# Patient Record
Sex: MALE | Race: WHITE | NOT HISPANIC OR LATINO | Employment: OTHER | URBAN - METROPOLITAN AREA
[De-identification: names, ages, dates, MRNs, and addresses within clinical notes are randomized per-mention and may not be internally consistent; named-entity substitution may affect disease eponyms.]

---

## 2022-12-17 ENCOUNTER — APPOINTMENT (OUTPATIENT)
Dept: CARDIOLOGY | Facility: HOSPITAL | Age: 77
DRG: 235 | End: 2022-12-17
Payer: MEDICARE

## 2022-12-17 ENCOUNTER — HOSPITAL ENCOUNTER (INPATIENT)
Facility: HOSPITAL | Age: 77
LOS: 17 days | Discharge: NURSING, REHAB OR LONG-TERM CARE | DRG: 235 | End: 2023-01-03
Attending: THORACIC SURGERY (CARDIOTHORACIC VASCULAR SURGERY) | Admitting: THORACIC SURGERY (CARDIOTHORACIC VASCULAR SURGERY)
Payer: MEDICARE

## 2022-12-17 ENCOUNTER — APPOINTMENT (OUTPATIENT)
Dept: GENERAL RADIOLOGY | Facility: HOSPITAL | Age: 77
DRG: 235 | End: 2022-12-17
Payer: MEDICARE

## 2022-12-17 DIAGNOSIS — I25.110 CORONARY ARTERY DISEASE INVOLVING NATIVE CORONARY ARTERY OF NATIVE HEART WITH UNSTABLE ANGINA PECTORIS: Primary | ICD-10-CM

## 2022-12-17 DIAGNOSIS — Z95.1 S/P CABG X 4: ICD-10-CM

## 2022-12-17 PROBLEM — I50.43 SYSTOLIC AND DIASTOLIC CHF, ACUTE ON CHRONIC: Status: ACTIVE | Noted: 2022-12-17

## 2022-12-17 PROBLEM — E11.65 TYPE 2 DIABETES MELLITUS WITH HYPERGLYCEMIA, WITHOUT LONG-TERM CURRENT USE OF INSULIN: Status: ACTIVE | Noted: 2022-12-17

## 2022-12-17 PROBLEM — E78.2 MIXED HYPERLIPIDEMIA: Status: ACTIVE | Noted: 2022-12-17

## 2022-12-17 PROBLEM — I25.10 CAD (CORONARY ARTERY DISEASE), NATIVE CORONARY ARTERY: Status: ACTIVE | Noted: 2022-12-17

## 2022-12-17 PROBLEM — I10 PRIMARY HYPERTENSION: Status: ACTIVE | Noted: 2022-12-17

## 2022-12-17 LAB
ALBUMIN SERPL-MCNC: 3.8 G/DL (ref 3.5–5.2)
ALBUMIN/GLOB SERPL: 1.1 G/DL
ALP SERPL-CCNC: 90 U/L (ref 39–117)
ALT SERPL W P-5'-P-CCNC: 9 U/L (ref 1–41)
ANION GAP SERPL CALCULATED.3IONS-SCNC: 12 MMOL/L (ref 5–15)
ANION GAP SERPL CALCULATED.3IONS-SCNC: 14 MMOL/L (ref 5–15)
APTT PPP: 22.8 SECONDS (ref 61–76.5)
APTT PPP: 41.2 SECONDS (ref 61–76.5)
APTT PPP: 49.4 SECONDS (ref 61–76.5)
AST SERPL-CCNC: 16 U/L (ref 1–40)
BACTERIA UR QL AUTO: ABNORMAL /HPF
BASOPHILS # BLD AUTO: 0.1 10*3/MM3 (ref 0–0.2)
BASOPHILS NFR BLD AUTO: 0.9 % (ref 0–1.5)
BH CV XLRA MEAS - DIST GSV CALF DIST LEFT: 0.32 CM
BH CV XLRA MEAS - DIST GSV CALF DIST RIGHT: 0.37 CM
BH CV XLRA MEAS - DIST GSV THIGH DIST LEFT: 0.47 CM
BH CV XLRA MEAS - DIST GSV THIGH DIST RIGHT: 0.35 CM
BH CV XLRA MEAS - MID GSV CALF LEFT: 0.29 CM
BH CV XLRA MEAS - MID GSV CALF RIGHT: 0.34 CM
BH CV XLRA MEAS - MID GSV THIGH  LEFT: 0.42 CM
BH CV XLRA MEAS - MID GSV THIGH  RIGHT: 0.36 CM
BH CV XLRA MEAS - PROX GSV CALF DIST LEFT: 0.33 CM
BH CV XLRA MEAS - PROX GSV CALF DIST RIGHT: 0.24 CM
BH CV XLRA MEAS - PROX GSV THIGH  LEFT: 0.55 CM
BH CV XLRA MEAS - PROX GSV THIGH  RIGHT: 0.46 CM
BH CV XLRA MEAS LEFT DIST CCA EDV: -14.3 CM/SEC
BH CV XLRA MEAS LEFT DIST CCA PSV: -101 CM/SEC
BH CV XLRA MEAS LEFT DIST ICA EDV: -30.8 CM/SEC
BH CV XLRA MEAS LEFT DIST ICA PSV: -104 CM/SEC
BH CV XLRA MEAS LEFT ICA/CCA RATIO: 1.16
BH CV XLRA MEAS LEFT PROX CCA EDV: 12.4 CM/SEC
BH CV XLRA MEAS LEFT PROX CCA PSV: 88.2 CM/SEC
BH CV XLRA MEAS LEFT PROX ECA PSV: -161 CM/SEC
BH CV XLRA MEAS LEFT PROX ICA EDV: -30.1 CM/SEC
BH CV XLRA MEAS LEFT PROX ICA PSV: -117 CM/SEC
BH CV XLRA MEAS LEFT PROX SCLA PSV: 104 CM/SEC
BH CV XLRA MEAS LEFT VERTEBRAL A EDV: 16.1 CM/SEC
BH CV XLRA MEAS LEFT VERTEBRAL A PSV: 56 CM/SEC
BH CV XLRA MEAS RIGHT DIST CCA EDV: -13.7 CM/SEC
BH CV XLRA MEAS RIGHT DIST CCA PSV: -83.2 CM/SEC
BH CV XLRA MEAS RIGHT DIST ICA EDV: -19.9 CM/SEC
BH CV XLRA MEAS RIGHT DIST ICA PSV: -88.8 CM/SEC
BH CV XLRA MEAS RIGHT ICA/CCA RATIO: 1.47
BH CV XLRA MEAS RIGHT PROX CCA EDV: 13 CM/SEC
BH CV XLRA MEAS RIGHT PROX CCA PSV: 73.9 CM/SEC
BH CV XLRA MEAS RIGHT PROX ECA PSV: -285 CM/SEC
BH CV XLRA MEAS RIGHT PROX ICA EDV: -27.7 CM/SEC
BH CV XLRA MEAS RIGHT PROX ICA PSV: -122 CM/SEC
BH CV XLRA MEAS RIGHT PROX SCLA PSV: 130 CM/SEC
BH CV XLRA MEAS RIGHT VERTEBRAL A EDV: -8.1 CM/SEC
BH CV XLRA MEAS RIGHT VERTEBRAL A PSV: -25.2 CM/SEC
BILIRUB SERPL-MCNC: 0.9 MG/DL (ref 0–1.2)
BILIRUB UR QL STRIP: NEGATIVE
BUN SERPL-MCNC: 32 MG/DL (ref 8–23)
BUN SERPL-MCNC: 37 MG/DL (ref 8–23)
BUN/CREAT SERPL: 27.6 (ref 7–25)
BUN/CREAT SERPL: 28.2 (ref 7–25)
CALCIUM SPEC-SCNC: 9 MG/DL (ref 8.6–10.5)
CALCIUM SPEC-SCNC: 9.2 MG/DL (ref 8.6–10.5)
CHLORIDE SERPL-SCNC: 94 MMOL/L (ref 98–107)
CHLORIDE SERPL-SCNC: 94 MMOL/L (ref 98–107)
CLARITY UR: CLEAR
CLOSE TME COLL+ADP + EPINEP PNL BLD: 93 % (ref 86–100)
CO2 SERPL-SCNC: 26 MMOL/L (ref 22–29)
CO2 SERPL-SCNC: 29 MMOL/L (ref 22–29)
COLOR UR: YELLOW
CREAT SERPL-MCNC: 1.16 MG/DL (ref 0.76–1.27)
CREAT SERPL-MCNC: 1.31 MG/DL (ref 0.76–1.27)
DEPRECATED RDW RBC AUTO: 46.4 FL (ref 37–54)
EGFRCR SERPLBLD CKD-EPI 2021: 56.8 ML/MIN/1.73
EGFRCR SERPLBLD CKD-EPI 2021: 65.7 ML/MIN/1.73
EOSINOPHIL # BLD AUTO: 0.3 10*3/MM3 (ref 0–0.4)
EOSINOPHIL NFR BLD AUTO: 2.4 % (ref 0.3–6.2)
ERYTHROCYTE [DISTWIDTH] IN BLOOD BY AUTOMATED COUNT: 14.4 % (ref 12.3–15.4)
GLOBULIN UR ELPH-MCNC: 3.4 GM/DL
GLUCOSE BLDC GLUCOMTR-MCNC: 214 MG/DL (ref 70–105)
GLUCOSE BLDC GLUCOMTR-MCNC: 220 MG/DL (ref 70–105)
GLUCOSE BLDC GLUCOMTR-MCNC: 264 MG/DL (ref 70–105)
GLUCOSE BLDC GLUCOMTR-MCNC: 416 MG/DL (ref 70–105)
GLUCOSE SERPL-MCNC: 215 MG/DL (ref 65–99)
GLUCOSE SERPL-MCNC: 368 MG/DL (ref 65–99)
GLUCOSE UR STRIP-MCNC: ABNORMAL MG/DL
HCT VFR BLD AUTO: 45 % (ref 37.5–51)
HGB BLD-MCNC: 14.5 G/DL (ref 13–17.7)
HGB UR QL STRIP.AUTO: NEGATIVE
HOLD SPECIMEN: NORMAL
HYALINE CASTS UR QL AUTO: ABNORMAL /LPF
INR PPP: 1.06 (ref 0.93–1.1)
KETONES UR QL STRIP: ABNORMAL
LEFT ARM BP: NORMAL MMHG
LEUKOCYTE ESTERASE UR QL STRIP.AUTO: NEGATIVE
LYMPHOCYTES # BLD AUTO: 1.9 10*3/MM3 (ref 0.7–3.1)
LYMPHOCYTES NFR BLD AUTO: 18.1 % (ref 19.6–45.3)
MAXIMAL PREDICTED HEART RATE: 145 BPM
MAXIMAL PREDICTED HEART RATE: 145 BPM
MCH RBC QN AUTO: 29.5 PG (ref 26.6–33)
MCHC RBC AUTO-ENTMCNC: 32.2 G/DL (ref 31.5–35.7)
MCV RBC AUTO: 91.8 FL (ref 79–97)
MONOCYTES # BLD AUTO: 0.7 10*3/MM3 (ref 0.1–0.9)
MONOCYTES NFR BLD AUTO: 6.8 % (ref 5–12)
NEUTROPHILS NFR BLD AUTO: 7.7 10*3/MM3 (ref 1.7–7)
NEUTROPHILS NFR BLD AUTO: 71.8 % (ref 42.7–76)
NITRITE UR QL STRIP: NEGATIVE
NRBC BLD AUTO-RTO: 0.1 /100 WBC (ref 0–0.2)
PH UR STRIP.AUTO: 5.5 [PH] (ref 5–8)
PLATELET # BLD AUTO: 337 10*3/MM3 (ref 140–450)
PMV BLD AUTO: 9.1 FL (ref 6–12)
POTASSIUM SERPL-SCNC: 4.2 MMOL/L (ref 3.5–5.2)
POTASSIUM SERPL-SCNC: 4.3 MMOL/L (ref 3.5–5.2)
PROT SERPL-MCNC: 7.2 G/DL (ref 6–8.5)
PROT UR QL STRIP: ABNORMAL
PROTHROMBIN TIME: 10.9 SECONDS (ref 9.6–11.7)
RBC # BLD AUTO: 4.9 10*6/MM3 (ref 4.14–5.8)
RBC # UR STRIP: ABNORMAL /HPF
REF LAB TEST METHOD: ABNORMAL
SARS-COV-2 RNA PNL SPEC NAA+PROBE: NOT DETECTED
SODIUM SERPL-SCNC: 134 MMOL/L (ref 136–145)
SODIUM SERPL-SCNC: 135 MMOL/L (ref 136–145)
SP GR UR STRIP: 1.02 (ref 1–1.03)
SQUAMOUS #/AREA URNS HPF: ABNORMAL /HPF
STRESS TARGET HR: 123 BPM
STRESS TARGET HR: 123 BPM
UROBILINOGEN UR QL STRIP: ABNORMAL
WBC # UR STRIP: ABNORMAL /HPF
WBC NRBC COR # BLD: 10.7 10*3/MM3 (ref 3.4–10.8)

## 2022-12-17 PROCEDURE — 93010 ELECTROCARDIOGRAM REPORT: CPT | Performed by: INTERNAL MEDICINE

## 2022-12-17 PROCEDURE — 93005 ELECTROCARDIOGRAM TRACING: CPT | Performed by: THORACIC SURGERY (CARDIOTHORACIC VASCULAR SURGERY)

## 2022-12-17 PROCEDURE — 87635 SARS-COV-2 COVID-19 AMP PRB: CPT | Performed by: INTERNAL MEDICINE

## 2022-12-17 PROCEDURE — 80053 COMPREHEN METABOLIC PANEL: CPT | Performed by: THORACIC SURGERY (CARDIOTHORACIC VASCULAR SURGERY)

## 2022-12-17 PROCEDURE — 85576 BLOOD PLATELET AGGREGATION: CPT | Performed by: THORACIC SURGERY (CARDIOTHORACIC VASCULAR SURGERY)

## 2022-12-17 PROCEDURE — 25010000002 HEPARIN (PORCINE) 25000-0.45 UT/250ML-% SOLUTION: Performed by: THORACIC SURGERY (CARDIOTHORACIC VASCULAR SURGERY)

## 2022-12-17 PROCEDURE — 99221 1ST HOSP IP/OBS SF/LOW 40: CPT | Performed by: NURSE PRACTITIONER

## 2022-12-17 PROCEDURE — 82962 GLUCOSE BLOOD TEST: CPT

## 2022-12-17 PROCEDURE — 85025 COMPLETE CBC W/AUTO DIFF WBC: CPT | Performed by: THORACIC SURGERY (CARDIOTHORACIC VASCULAR SURGERY)

## 2022-12-17 PROCEDURE — 83036 HEMOGLOBIN GLYCOSYLATED A1C: CPT | Performed by: THORACIC SURGERY (CARDIOTHORACIC VASCULAR SURGERY)

## 2022-12-17 PROCEDURE — 93970 EXTREMITY STUDY: CPT

## 2022-12-17 PROCEDURE — 93880 EXTRACRANIAL BILAT STUDY: CPT

## 2022-12-17 PROCEDURE — 81001 URINALYSIS AUTO W/SCOPE: CPT | Performed by: THORACIC SURGERY (CARDIOTHORACIC VASCULAR SURGERY)

## 2022-12-17 PROCEDURE — 85730 THROMBOPLASTIN TIME PARTIAL: CPT | Performed by: THORACIC SURGERY (CARDIOTHORACIC VASCULAR SURGERY)

## 2022-12-17 PROCEDURE — 85610 PROTHROMBIN TIME: CPT | Performed by: THORACIC SURGERY (CARDIOTHORACIC VASCULAR SURGERY)

## 2022-12-17 PROCEDURE — 63710000001 INSULIN ISOPHANE & REGULAR PER 5 UNITS: Performed by: THORACIC SURGERY (CARDIOTHORACIC VASCULAR SURGERY)

## 2022-12-17 PROCEDURE — 99223 1ST HOSP IP/OBS HIGH 75: CPT | Performed by: INTERNAL MEDICINE

## 2022-12-17 PROCEDURE — 71046 X-RAY EXAM CHEST 2 VIEWS: CPT

## 2022-12-17 PROCEDURE — 85730 THROMBOPLASTIN TIME PARTIAL: CPT | Performed by: INTERNAL MEDICINE

## 2022-12-17 PROCEDURE — 63710000001 INSULIN LISPRO (HUMAN) PER 5 UNITS: Performed by: INTERNAL MEDICINE

## 2022-12-17 PROCEDURE — 99024 POSTOP FOLLOW-UP VISIT: CPT | Performed by: THORACIC SURGERY (CARDIOTHORACIC VASCULAR SURGERY)

## 2022-12-17 RX ORDER — ATORVASTATIN CALCIUM 40 MG/1
80 TABLET, FILM COATED ORAL NIGHTLY
Status: DISCONTINUED | OUTPATIENT
Start: 2022-12-17 | End: 2022-12-17

## 2022-12-17 RX ORDER — OSELTAMIVIR PHOSPHATE 75 MG/1
75 CAPSULE ORAL 2 TIMES DAILY
COMMUNITY
End: 2023-01-03 | Stop reason: HOSPADM

## 2022-12-17 RX ORDER — POTASSIUM CHLORIDE 7.45 MG/ML
10 INJECTION INTRAVENOUS
Status: DISCONTINUED | OUTPATIENT
Start: 2022-12-17 | End: 2022-12-17 | Stop reason: SDUPTHER

## 2022-12-17 RX ORDER — POTASSIUM CHLORIDE 7.45 MG/ML
10 INJECTION INTRAVENOUS
Status: DISCONTINUED | OUTPATIENT
Start: 2022-12-17 | End: 2022-12-29

## 2022-12-17 RX ORDER — SODIUM CHLORIDE 0.9 % (FLUSH) 0.9 %
10 SYRINGE (ML) INJECTION AS NEEDED
Status: DISCONTINUED | OUTPATIENT
Start: 2022-12-17 | End: 2022-12-29

## 2022-12-17 RX ORDER — POTASSIUM CHLORIDE 20 MEQ/1
20 TABLET, EXTENDED RELEASE ORAL 2 TIMES DAILY WITH MEALS
Status: DISCONTINUED | OUTPATIENT
Start: 2022-12-17 | End: 2022-12-23

## 2022-12-17 RX ORDER — CHLORAL HYDRATE 500 MG
1000 CAPSULE ORAL
COMMUNITY
End: 2023-01-03 | Stop reason: HOSPADM

## 2022-12-17 RX ORDER — GLIPIZIDE 10 MG/1
10 TABLET ORAL
COMMUNITY
End: 2023-01-03 | Stop reason: HOSPADM

## 2022-12-17 RX ORDER — POTASSIUM CHLORIDE 1.5 G/1.77G
40 POWDER, FOR SOLUTION ORAL AS NEEDED
Status: DISCONTINUED | OUTPATIENT
Start: 2022-12-17 | End: 2022-12-17 | Stop reason: SDUPTHER

## 2022-12-17 RX ORDER — GLIPIZIDE 5 MG/1
10 TABLET ORAL
Status: DISCONTINUED | OUTPATIENT
Start: 2022-12-17 | End: 2022-12-18

## 2022-12-17 RX ORDER — ROSUVASTATIN CALCIUM 5 MG/1
5 TABLET, COATED ORAL NIGHTLY
Status: DISCONTINUED | OUTPATIENT
Start: 2022-12-17 | End: 2022-12-29

## 2022-12-17 RX ORDER — ATORVASTATIN CALCIUM 80 MG/1
80 TABLET, FILM COATED ORAL NIGHTLY
Status: ON HOLD | COMMUNITY
End: 2022-12-17

## 2022-12-17 RX ORDER — CARVEDILOL 3.12 MG/1
3.12 TABLET ORAL 2 TIMES DAILY WITH MEALS
Status: DISCONTINUED | OUTPATIENT
Start: 2022-12-17 | End: 2022-12-22

## 2022-12-17 RX ORDER — DOCUSATE SODIUM 100 MG/1
100 CAPSULE, LIQUID FILLED ORAL 2 TIMES DAILY
Status: DISCONTINUED | OUTPATIENT
Start: 2022-12-17 | End: 2022-12-29

## 2022-12-17 RX ORDER — FUROSEMIDE 40 MG/1
40 TABLET ORAL 2 TIMES DAILY
COMMUNITY
End: 2023-01-03 | Stop reason: HOSPADM

## 2022-12-17 RX ORDER — OSELTAMIVIR PHOSPHATE 75 MG/1
75 CAPSULE ORAL EVERY 12 HOURS SCHEDULED
Status: COMPLETED | OUTPATIENT
Start: 2022-12-17 | End: 2022-12-22

## 2022-12-17 RX ORDER — HEPARIN SODIUM 10000 [USP'U]/100ML
9.71 INJECTION, SOLUTION INTRAVENOUS
Status: DISCONTINUED | OUTPATIENT
Start: 2022-12-17 | End: 2022-12-28

## 2022-12-17 RX ORDER — INSULIN LISPRO 100 [IU]/ML
4-24 INJECTION, SOLUTION INTRAVENOUS; SUBCUTANEOUS
Status: DISCONTINUED | OUTPATIENT
Start: 2022-12-17 | End: 2022-12-20

## 2022-12-17 RX ORDER — OSELTAMIVIR PHOSPHATE 75 MG/1
75 CAPSULE ORAL EVERY 12 HOURS SCHEDULED
Status: DISCONTINUED | OUTPATIENT
Start: 2022-12-17 | End: 2022-12-17

## 2022-12-17 RX ORDER — ACETAMINOPHEN 325 MG/1
650 TABLET ORAL EVERY 4 HOURS PRN
Status: DISCONTINUED | OUTPATIENT
Start: 2022-12-17 | End: 2022-12-25

## 2022-12-17 RX ORDER — DEXTROSE MONOHYDRATE 25 G/50ML
25 INJECTION, SOLUTION INTRAVENOUS
Status: DISCONTINUED | OUTPATIENT
Start: 2022-12-17 | End: 2022-12-29

## 2022-12-17 RX ORDER — NITROGLYCERIN 0.4 MG/1
0.4 TABLET SUBLINGUAL
Status: DISCONTINUED | OUTPATIENT
Start: 2022-12-17 | End: 2022-12-29

## 2022-12-17 RX ORDER — CHOLECALCIFEROL (VITAMIN D3) 125 MCG
5 CAPSULE ORAL NIGHTLY PRN
Status: DISCONTINUED | OUTPATIENT
Start: 2022-12-17 | End: 2022-12-29

## 2022-12-17 RX ORDER — ACETAMINOPHEN, ASPIRIN AND CAFFEINE 250; 250; 65 MG/1; MG/1; MG/1
1 TABLET, FILM COATED ORAL EVERY 6 HOURS PRN
COMMUNITY
End: 2023-01-03 | Stop reason: HOSPADM

## 2022-12-17 RX ORDER — ASPIRIN 81 MG/1
81 TABLET, CHEWABLE ORAL DAILY
COMMUNITY

## 2022-12-17 RX ORDER — POTASSIUM CHLORIDE 20 MEQ/1
40 TABLET, EXTENDED RELEASE ORAL AS NEEDED
Status: DISCONTINUED | OUTPATIENT
Start: 2022-12-17 | End: 2022-12-29

## 2022-12-17 RX ORDER — FUROSEMIDE 40 MG/1
40 TABLET ORAL
Status: DISCONTINUED | OUTPATIENT
Start: 2022-12-17 | End: 2022-12-23

## 2022-12-17 RX ORDER — ASPIRIN 81 MG/1
81 TABLET, CHEWABLE ORAL DAILY
Status: DISCONTINUED | OUTPATIENT
Start: 2022-12-17 | End: 2022-12-29

## 2022-12-17 RX ORDER — CARVEDILOL 3.12 MG/1
3.12 TABLET ORAL 2 TIMES DAILY WITH MEALS
COMMUNITY
End: 2023-01-03 | Stop reason: HOSPADM

## 2022-12-17 RX ORDER — POLYETHYLENE GLYCOL 3350 17 G/17G
17 POWDER, FOR SOLUTION ORAL DAILY
Status: DISCONTINUED | OUTPATIENT
Start: 2022-12-17 | End: 2022-12-29

## 2022-12-17 RX ORDER — ONDANSETRON 2 MG/ML
4 INJECTION INTRAMUSCULAR; INTRAVENOUS EVERY 6 HOURS PRN
Status: DISCONTINUED | OUTPATIENT
Start: 2022-12-17 | End: 2022-12-29

## 2022-12-17 RX ORDER — SODIUM CHLORIDE 0.9 % (FLUSH) 0.9 %
10 SYRINGE (ML) INJECTION EVERY 12 HOURS SCHEDULED
Status: DISCONTINUED | OUTPATIENT
Start: 2022-12-17 | End: 2022-12-29

## 2022-12-17 RX ORDER — POTASSIUM CHLORIDE 1.5 G/1.77G
40 POWDER, FOR SOLUTION ORAL AS NEEDED
Status: DISCONTINUED | OUTPATIENT
Start: 2022-12-17 | End: 2022-12-29

## 2022-12-17 RX ORDER — NICOTINE POLACRILEX 4 MG
15 LOZENGE BUCCAL
Status: DISCONTINUED | OUTPATIENT
Start: 2022-12-17 | End: 2022-12-29

## 2022-12-17 RX ORDER — SODIUM CHLORIDE 9 MG/ML
40 INJECTION, SOLUTION INTRAVENOUS AS NEEDED
Status: DISCONTINUED | OUTPATIENT
Start: 2022-12-17 | End: 2022-12-29

## 2022-12-17 RX ORDER — ROSUVASTATIN CALCIUM 5 MG/1
5 TABLET, COATED ORAL NIGHTLY
COMMUNITY

## 2022-12-17 RX ORDER — POTASSIUM CHLORIDE 20 MEQ/1
40 TABLET, EXTENDED RELEASE ORAL AS NEEDED
Status: DISCONTINUED | OUTPATIENT
Start: 2022-12-17 | End: 2022-12-17 | Stop reason: SDUPTHER

## 2022-12-17 RX ORDER — ALPRAZOLAM 0.25 MG/1
0.25 TABLET ORAL 3 TIMES DAILY PRN
Status: ACTIVE | OUTPATIENT
Start: 2022-12-17 | End: 2022-12-24

## 2022-12-17 RX ORDER — OLANZAPINE 10 MG/2ML
1 INJECTION, POWDER, LYOPHILIZED, FOR SOLUTION INTRAMUSCULAR
Status: DISCONTINUED | OUTPATIENT
Start: 2022-12-17 | End: 2022-12-29

## 2022-12-17 RX ADMIN — Medication 10 ML: at 20:50

## 2022-12-17 RX ADMIN — CARVEDILOL 3.12 MG: 3.12 TABLET, FILM COATED ORAL at 17:47

## 2022-12-17 RX ADMIN — FUROSEMIDE 40 MG: 40 TABLET ORAL at 11:12

## 2022-12-17 RX ADMIN — ROSUVASTATIN CALCIUM 5 MG: 5 TABLET, COATED ORAL at 20:50

## 2022-12-17 RX ADMIN — CARVEDILOL 3.12 MG: 3.12 TABLET, FILM COATED ORAL at 11:12

## 2022-12-17 RX ADMIN — GLIPIZIDE 10 MG: 5 TABLET ORAL at 17:47

## 2022-12-17 RX ADMIN — Medication 10 ML: at 11:18

## 2022-12-17 RX ADMIN — DOCUSATE SODIUM 100 MG: 100 CAPSULE, LIQUID FILLED ORAL at 20:50

## 2022-12-17 RX ADMIN — INSULIN LISPRO 24 UNITS: 100 INJECTION, SOLUTION INTRAVENOUS; SUBCUTANEOUS at 12:19

## 2022-12-17 RX ADMIN — HEPARIN SODIUM 17 UNITS/KG/HR: 10000 INJECTION, SOLUTION INTRAVENOUS at 15:51

## 2022-12-17 RX ADMIN — ASPIRIN 81 MG CHEWABLE TABLET 81 MG: 81 TABLET CHEWABLE at 11:12

## 2022-12-17 RX ADMIN — HEPARIN SODIUM 17 UNITS/KG/HR: 10000 INJECTION, SOLUTION INTRAVENOUS at 17:48

## 2022-12-17 RX ADMIN — INSULIN HUMAN 27 UNITS: 100 INJECTION, SUSPENSION SUBCUTANEOUS at 11:12

## 2022-12-17 RX ADMIN — POLYETHYLENE GLYCOL 3350 17 G: 17 POWDER, FOR SOLUTION ORAL at 11:18

## 2022-12-17 RX ADMIN — DOCUSATE SODIUM 100 MG: 100 CAPSULE, LIQUID FILLED ORAL at 11:17

## 2022-12-17 RX ADMIN — INSULIN LISPRO 12 UNITS: 100 INJECTION, SOLUTION INTRAVENOUS; SUBCUTANEOUS at 17:47

## 2022-12-17 RX ADMIN — GLIPIZIDE 10 MG: 5 TABLET ORAL at 11:12

## 2022-12-17 RX ADMIN — POTASSIUM CHLORIDE 20 MEQ: 1500 TABLET, EXTENDED RELEASE ORAL at 11:17

## 2022-12-17 RX ADMIN — POTASSIUM CHLORIDE 20 MEQ: 1500 TABLET, EXTENDED RELEASE ORAL at 17:47

## 2022-12-17 RX ADMIN — FUROSEMIDE 40 MG: 40 TABLET ORAL at 17:47

## 2022-12-17 RX ADMIN — INSULIN HUMAN 22 UNITS: 100 INJECTION, SUSPENSION SUBCUTANEOUS at 17:47

## 2022-12-17 RX ADMIN — OSELTAMIVIR PHOSPHATE 75 MG: 75 CAPSULE ORAL at 20:50

## 2022-12-17 NOTE — PROGRESS NOTES
Transfer from [place] at request of Dr. Harris. L main and three vessel coronary artery disease, with LVEF 10-15%.    Evaluation for possible Impella assisted PCI. This case was briefly discussed with Dr. Morales prior to patient's admission.

## 2022-12-17 NOTE — PLAN OF CARE
Goal Outcome Evaluation:               Pt is resting at this time, no complaints of pain, vss, no new orders, plan of care continues

## 2022-12-17 NOTE — LETTER
[facility] CASE MANAGEMENT  [address] [facility] [place] [id number] [phone]        January 2, 2023      Patient: Sherman Baumann  YOB: 1945  Date of Visit: 12/15/2022      Admission Referral for Skilled Nursing. Please contact me at [phone] to confirm acceptance or denial.    Thank you,        Rebecca Dempsey RN     Office Phone: [phone]   Office Cell:     [phone]

## 2022-12-17 NOTE — LETTER
[facility] CASE MANAGEMENT  [address] [facility] [place] [address]  [phone]  [phone]        January 3, 2023      Patient: Sherman Baumann  YOB: 1945  Date of Visit: 12/15/2022      Updated discharge Summary with Endocrinology requested medications. Please let us know if there is difficulty getting the narcotic from your pharmacy and how to fix it.     Thanks        Rebecca Dempsey RN

## 2022-12-17 NOTE — LETTER
[facility] CASE MANAGEMENT  [address] [facility] [place] [address]  [phone]        December 20, 2022      Patient: Sherman Baumann  YOB: 1945  Date of Visit: 12/15/2022      Chelo Porras RN     Office Phone: [phone]   Office Cell: [phone]   Fax: [phone]

## 2022-12-17 NOTE — CONSULTS
[facility] Medicine Services      Patient Name: Sherman Baumann  : 1945  MRN: [id number]  Primary Care Physician:  Melvina Hodge  Date of admission: 2022   Reason for consult: Medical management.  Physician requesting consult: Dr. Huber.      Subjective      Chief Complaint: Shortness of breath.    History of Present Illness: Sherman Baumann is a 76 y.o. male who presented to [facility] on 2022 complaining of shortness of breath.  Patient is a 76-year-old male with past medical history of CHF, diabetes mellitus type 2, hypertension and hyperlipidemia who was a transfer to [facility] because of her multivessel coronary artery disease with a severe left ventricular dysfunction with ejection fraction of 20 to 25%.  Cardiology consult was completed.  Cardiothoracic surgery consult was completed.  Hospitalist consult for medical management was requested.    Patient reported no fever or chills, no headache or visual obscurations, no chest pain or abdominal pain, no hot or cold intolerance, no leg swelling or leg pain.      Review of Systems   Constitutional: Negative.   HENT: Negative.    Eyes: Negative.    Cardiovascular: Negative.    Respiratory: Positive for shortness of breath.    Endocrine: Negative.    Hematologic/Lymphatic: Negative.    Skin: Negative.    Musculoskeletal: Negative.    Gastrointestinal: Negative.    Genitourinary: Negative.    Neurological: Negative.    Psychiatric/Behavioral: Negative.    Allergic/Immunologic: Negative.           Personal History     Past Medical History:   Diagnosis Date   • CHF (congestive heart failure) (HCC)    • Diabetes mellitus (HCC)    • Elevated cholesterol    • Hyperlipidemia    • Hypertension        Past Surgical History:   Procedure Laterality Date   • HERNIA REPAIR     • TONSILLECTOMY         Family History:   Father: Hypertension.  Mother: Coronary artery disease.      Social History:    1.  Positive  tobacco.  1 carton per week from 1960 and quit in 1990.  2.  Positive alcohol, occasional.  3.  No IV drug use.  4.  Patient lives with his wife.    Home Medications:  Prior to Admission Medications     Prescriptions Last Dose Informant Patient Reported? Taking?    aspirin 81 MG chewable tablet   Yes No    Chew 81 mg Daily.    aspirin-acetaminophen-caffeine (EXCEDRIN MIGRAINE) 250-250-65 MG per tablet   Yes No    Take 1 tablet by mouth Every 6 (Six) Hours As Needed for Headache.    carvedilol (COREG) 3.125 MG tablet   Yes No    Take 3.125 mg by mouth 2 (Two) Times a Day With Meals.    furosemide (LASIX) 40 MG tablet   Yes No    Take 40 mg by mouth 2 (Two) Times a Day.    glipizide (GLUCOTROL) 10 MG tablet   Yes No    Take 10 mg by mouth 2 (Two) Times a Day Before Meals.    insulin NPH-insulin regular (humuLIN 70/30,novoLIN 70/30) (70-30) 100 UNIT/ML injection   Yes No    Inject 27 Units under the skin into the appropriate area as directed Every Morning.    insulin NPH-insulin regular (humuLIN 70/30,novoLIN 70/30) (70-30) 100 UNIT/ML injection   Yes No    Inject 22 Units under the skin into the appropriate area as directed Every Night.    metFORMIN (GLUCOPHAGE) 500 MG tablet   Yes No    Take 500 mg by mouth Daily With Breakfast.    Omega-3 Fatty Acids (fish oil) 1000 MG capsule capsule   Yes No    Take 1,000 mg by mouth Daily With Breakfast.    oseltamivir (TAMIFLU) 75 MG capsule   Yes No    Take 75 mg by mouth 2 (Two) Times a Day.    rosuvastatin (CRESTOR) 5 MG tablet   Yes No    Take 5 mg by mouth Every Night.            Allergies:  No Known Allergies    Objective      Vitals:   Temp:  [97.8 °F (36.6 °C)-98.3 °F (36.8 °C)] 98.3 °F (36.8 °C)  Heart Rate:  [87-93] 87  Resp:  [18-20] 18  BP: (126-137)/(69-73) 137/71  Flow (L/min):  [3] 3    Physical Exam  Vitals reviewed.   Constitutional:       General: He is not in acute distress.     Comments: Patient is lying comfortably in bed and in no acute distress.   HENT:       Head: Normocephalic.      Nose: Nose normal.      Mouth/Throat:      Mouth: Mucous membranes are dry.      Pharynx: Oropharynx is clear.   Eyes:      Extraocular Movements: Extraocular movements intact.      Conjunctiva/sclera: Conjunctivae normal.      Pupils: Pupils are equal, round, and reactive to light.   Cardiovascular:      Pulses: Normal pulses.      Heart sounds: No murmur heard.    No friction rub. No gallop.      Comments: S1 and S2 present.  No tachycardia.  Pulmonary:      Effort: No respiratory distress.      Breath sounds: No stridor. No wheezing or rales.   Chest:      Chest wall: No tenderness.   Abdominal:      General: Bowel sounds are normal. There is no distension.      Palpations: Abdomen is soft. There is no mass.      Tenderness: There is no abdominal tenderness. There is no right CVA tenderness, left CVA tenderness, guarding or rebound.      Hernia: No hernia is present.   Musculoskeletal:         General: No swelling, tenderness, deformity or signs of injury.      Cervical back: Normal range of motion. No rigidity.      Right lower leg: No edema.      Left lower leg: No edema.   Skin:     General: Skin is warm and dry.      Capillary Refill: Capillary refill takes less than 2 seconds.      Coloration: Skin is not jaundiced.      Findings: No bruising, erythema, lesion or rash.   Neurological:      Comments: No facial asymmetry noted.  Gait and station not tested.   Psychiatric:      Comments: No agitation.            Result Review    Result Review:  I have personally reviewed the results from the time of this admission to 12/17/2022 14:34 EST and agree with these findings:  []  Laboratory  []  Microbiology  []  Radiology  []  EKG/Telemetry   []  Cardiology/Vascular   []  Pathology  []  Old records  []  Other:  Most notable findings include:       Assessment & Plan        Active Hospital Problems:  Active Hospital Problems    Diagnosis    • **CAD (coronary artery disease), native coronary  artery    • Systolic and diastolic CHF, acute on chronic (HCC)    • Type 2 diabetes mellitus with hyperglycemia, without long-term current use of insulin (HCC)    • Mixed hyperlipidemia    • Primary hypertension        Plan:      -Continue appropriate patient's home medications for other chronic medical conditions.  -Continue the present level of care.  -Patient and family agreed with the plan of care.  -Treated diabetes mellitus with insulin therapy.  -Treat hyperlipidemia with Lipitor.  -Treat hypertension with metoprolol.  -Follow cardiology recommendations for CHF and coronary artery disease.  -Follow cardiothoracic surgery recommendations for coronary artery disease.  -Follow ID recommendations for any infectious process including influenza B.      DVT prophylaxis:  Medical DVT prophylaxis orders are present.    CODE STATUS:    Level Of Support Discussed With: Patient  Code Status (Patient has no pulse and is not breathing): CPR (Attempt to Resuscitate)  Medical Interventions (Patient has pulse or is breathing): Full Support      I am very grateful to Dr. Tucker for giving me this great opportunity to participate in the care of this wonderful patient.  I will continue to follow with you.      I discussed the patient's findings and my recommendations with patient, family, nursing staff, primary care team and consulting provider.    This patient has been examined wearing appropriate Personal Protective Equipment and discussed with hospital infection control department, [facility], infectious disease specialist and pulmonologist. 12/17/22      Signature: Electronically signed by Danny Suggs MD, FACP, 12/17/22, 2:34 PM EST.

## 2022-12-17 NOTE — CONSULTS
Infectious Diseases Consult Note    Referring Provider: Jaleel Huber MD    Reason for Consultation: Influenza B infection    Patient Care Team:  Melvina Hodge as PCP - General (Nurse Practitioner)    Chief complaint shortness of breath    Subjective     History of present illness:      This is 76-year-old male who was transferred from [facility] after he was diagnosed with multivessel coronary artery disease.  The patient presented to that local hospital  on December 15, 2022 and apparently was tested positive for influenza B.  The patient was supposedly started on Tamiflu but according to records was only for 1 day.  The patient was transferred here for further work-up.  He was seen and evaluated by cardiothoracic surgery service and felt the patient might benefit from high risk PCI and Impella.  The patient apparently has severe cardiomyopathy with multivessel coronary artery disease.  The patient is currently on 4 L oxygen via nasal cannula      Review of Systems   Review of Systems   Constitutional: Negative.    HENT: Negative.    Eyes: Negative.    Respiratory: Positive for shortness of breath.    Cardiovascular: Negative.    Gastrointestinal: Negative.    Genitourinary: Negative.    Musculoskeletal: Negative.    Skin: Negative.    Neurological: Negative.    Hematological: Negative.    Psychiatric/Behavioral: Negative.        Medications  Medications Prior to Admission   Medication Sig Dispense Refill Last Dose   • aspirin 81 MG chewable tablet Chew 81 mg Daily.      • aspirin-acetaminophen-caffeine (EXCEDRIN MIGRAINE) 250-250-65 MG per tablet Take 1 tablet by mouth Every 6 (Six) Hours As Needed for Headache.      • carvedilol (COREG) 3.125 MG tablet Take 3.125 mg by mouth 2 (Two) Times a Day With Meals.      • furosemide (LASIX) 40 MG tablet Take 40 mg by mouth 2 (Two) Times a Day.      • glipizide (GLUCOTROL) 10 MG tablet Take 10 mg by mouth 2 (Two) Times a Day Before Meals.      • insulin  NPH-insulin regular (humuLIN 70/30,novoLIN 70/30) (70-30) 100 UNIT/ML injection Inject 27 Units under the skin into the appropriate area as directed Every Morning.      • insulin NPH-insulin regular (humuLIN 70/30,novoLIN 70/30) (70-30) 100 UNIT/ML injection Inject 22 Units under the skin into the appropriate area as directed Every Night.      • metFORMIN (GLUCOPHAGE) 500 MG tablet Take 500 mg by mouth Daily With Breakfast.      • Omega-3 Fatty Acids (fish oil) 1000 MG capsule capsule Take 1,000 mg by mouth Daily With Breakfast.      • oseltamivir (TAMIFLU) 75 MG capsule Take 75 mg by mouth 2 (Two) Times a Day.      • rosuvastatin (CRESTOR) 5 MG tablet Take 5 mg by mouth Every Night.          History  Past Medical History:   Diagnosis Date   • CHF (congestive heart failure) (HCC)    • Diabetes mellitus (HCC)    • Elevated cholesterol    • Hyperlipidemia    • Hypertension      Past Surgical History:   Procedure Laterality Date   • HERNIA REPAIR     • TONSILLECTOMY         Family History  No family history on file.    Social History   reports that he has quit smoking. His smoking use included cigarettes. He does not have any smokeless tobacco history on file. He reports that he does not currently use alcohol. He reports that he does not use drugs.    Allergies  Patient has no known allergies.    Objective     Vital Signs   Vital Signs (last 24 hours)       12/16 0700  12/17 0659 12/17 0700  12/17 1538   Most Recent      Temp (°F)   97.8    98.1 -  98.3     98.3 (36.8) 12/17 1424    Heart Rate   93    87 -  90     87 12/17 1424    Resp   20    18 -  20     18 12/17 1424    BP   128/73    126/69 -  137/71     137/71 12/17 1424    SpO2 (%)   96    95 -  98     98 12/17 1424          Physical Exam:  Physical Exam  Vitals and nursing note reviewed.   Constitutional:       Appearance: He is well-developed.   HENT:      Head: Normocephalic and atraumatic.   Eyes:      Pupils: Pupils are equal, round, and reactive to light.    Cardiovascular:      Rate and Rhythm: Normal rate and regular rhythm.      Heart sounds: Normal heart sounds.   Pulmonary:      Effort: Pulmonary effort is normal. No respiratory distress.      Breath sounds: Normal breath sounds. No wheezing or rales.      Comments: Diminished breathing sounds bilaterally  Abdominal:      General: Bowel sounds are normal. There is no distension.      Palpations: Abdomen is soft. There is no mass.      Tenderness: There is no abdominal tenderness. There is no guarding or rebound.   Musculoskeletal:         General: No deformity. Normal range of motion.      Cervical back: Normal range of motion and neck supple.   Skin:     General: Skin is warm.      Findings: No erythema or rash.   Neurological:      Mental Status: He is alert and oriented to person, place, and time.      Cranial Nerves: No cranial nerve deficit.         Microbiology  Microbiology Results (last 10 days)     Procedure Component Value - Date/Time    COVID PRE-OP / PRE-PROCEDURE SCREENING ORDER (NO ISOLATION) - Swab, Nasopharynx [[id number]]  (Normal) Collected: 12/17/22 1237    Lab Status: Final result Specimen: Swab from Nasopharynx Updated: 12/17/22 1304    Narrative:      The following orders were created for panel order COVID PRE-OP / PRE-PROCEDURE SCREENING ORDER (NO ISOLATION) - Swab, Nasopharynx.  Procedure                               Abnormality         Status                     ---------                               -----------         ------                     COVID-19,CEPHEID/CAMERON,CO...[[id number]]  Normal              Final result                 Please view results for these tests on the individual orders.    COVID-19,CEPHEID/CAMERON,COR/BERNY/PAD/ASAF/MAD IN-HOUSE(OR EMERGENT/ADD-ON),NP SWAB IN TRANSPORT MEDIA 3-4 HR TAT, RT-PCR - Swab, Nasopharynx [[id number]]  (Normal) Collected: 12/17/22 1237    Lab Status: Final result Specimen: Swab from Nasopharynx Updated: 12/17/22 1304     COVID19 Not Detected     Narrative:      Fact sheet for providers: https://www.fda.gov/media/018888/download     Fact sheet for patients: https://www.fda.gov/media/105323/download  Fact sheet for providers: https://www.fda.gov/media/453578/download    Fact sheet for patients: https://www.fda.gov/media/136620/download    Test performed by PCR.          Laboratory  Results from last 7 days   Lab Units 12/17/22  0945   WBC 10*3/mm3 10.70   HEMOGLOBIN g/dL 14.5   HEMATOCRIT % 45.0   PLATELETS 10*3/mm3 337     Results from last 7 days   Lab Units 12/17/22  0945   SODIUM mmol/L 134*   POTASSIUM mmol/L 4.2   CHLORIDE mmol/L 94*   CO2 mmol/L 26.0   BUN mg/dL 32*   CREATININE mg/dL 1.16   GLUCOSE mg/dL 368*   CALCIUM mg/dL 9.0     Results from last 7 days   Lab Units 12/17/22  0945   SODIUM mmol/L 134*   POTASSIUM mmol/L 4.2   CHLORIDE mmol/L 94*   CO2 mmol/L 26.0   BUN mg/dL 32*   CREATININE mg/dL 1.16   GLUCOSE mg/dL 368*   CALCIUM mg/dL 9.0                   Radiology  Imaging Results (Last 72 Hours)     Procedure Component Value Units Date/Time    XR Chest PA & Lateral [[id number]] Collected: 12/17/22 1129     Updated: 12/17/22 1132    Narrative:      DATE OF EXAM:  12/17/2022 10:59 AM     PROCEDURE:  XR CHEST PA AND LATERAL-     INDICATIONS:  Coronary artery disease, shortness of breath, confusion.       COMPARISON:  No Comparisons Available     TECHNIQUE:   Two radiologic views of the chest.     FINDINGS:  The heart is enlarged. The pulmonary vascular markings are normal. The  lungs and pleural spaces are clear.  There is degenerative spondylosis  within the thoracic spine. There is an incidental 3 mm metallic density  in the anterior left chest wall consistent with a small foreign body.       Impression:         1. Cardiomegaly.  2. No active pulmonary disease.     Electronically Signed By-Donato Hull MD On:12/17/2022 11:30 AM  This report was finalized on [id number] by  Donato Hull MD.          Cardiology      Results Review:  I have  reviewed all clinical data, test, lab, and imaging results.       Schedule Meds  aspirin, 81 mg, Oral, Daily  carvedilol, 3.125 mg, Oral, BID With Meals  docusate sodium, 100 mg, Oral, BID  furosemide, 40 mg, Oral, BID  glipizide, 10 mg, Oral, BID AC  insulin lispro, 4-24 Units, Subcutaneous, TID With Meals  insulin NPH-insulin regular, 22 Units, Subcutaneous, Daily With Dinner  insulin NPH-insulin regular, 27 Units, Subcutaneous, QAM  oseltamivir, 75 mg, Oral, Q12H  polyethylene glycol, 17 g, Oral, Daily  potassium chloride, 20 mEq, Oral, BID With Meals  rosuvastatin, 5 mg, Oral, Nightly  sodium chloride, 10 mL, Intravenous, Q12H        Infusion Meds  heparin, 9.71 Units/kg/hr, Last Rate: 16 Units/kg/hr (12/17/22 0746)        PRN Meds  •  acetaminophen  •  ALPRAZolam  •  dextrose  •  dextrose  •  glucagon (human recombinant)  •  melatonin  •  nitroglycerin  •  ondansetron  •  potassium chloride **OR** potassium chloride **OR** potassium chloride  •  sodium chloride  •  sodium chloride      Assessment & Plan       Assessment    Influenza B infection.  Patient is currently on 4 L of oxygen.  I believe his hypoxia related to cardiac rather than infection.  The patient was screened positive at outlying facility on December 15, 2022    Multivessel coronary artery disease with ischemic cardiomyopathy.  Patient's been seen by cardiovascular surgery service and cardiology being consulted for possible high risk stent placement    Remote history of tobacco abuse    Plan    Start Tamiflu 75 mg p.o. twice daily for 5 days  Supportive care  Case was discussed with family at bedside  If patient remains afebrile by tomorrow isolation can be discontinued    Navdeep Purdy MD  12/17/22  15:38 EST    Note is dictated utilizing voice recognition software/Dragon

## 2022-12-17 NOTE — H&P
Patient Care Team:  Melvina Hodge as PCP - General (Nurse Practitioner)  Referring Provider:  Dr. Harris ([place], [place])  Reason for transfer: Multivessel CAD, severe LV dysfunction    Chief complaint: Shortness of breath    Subjective     History of Present Illness:  76-year-old gentleman presented to [facility] in [place] with complaints of shortness of breath for approximately 1 week.  He reports it is constant.  Associated symptoms included cough and generalized weakness.  His O2 sats in the ED were in the 80s on 5 L O2 (per ED notes).  He was found to have influenza B.  BNP 5580 and troponin 2.77. Cardiology was consulted he was found to have three-vessel heavily calcified CAD with EF 20 to 25%.  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  Echo at OSH showed EF 15 to 25%, right 1 diastolic dysfunction, mild MR, trace AI, mild TR and mildly dilated ascending aorta.  PMHx includes DM type II, HTN, past tobacco abuse.  Pt is poor historian.  Dr. Huber was asked to evaluate pt for surgical revascularization/PCI.    Review of Systems   Constitutional: Positive for fatigue. Negative for chills and fever.   Respiratory: Positive for cough and shortness of breath.    Cardiovascular: Negative for chest pain, palpitations and leg swelling.   Neurological: Negative for dizziness, weakness and light-headedness.        Past Medical History:   Diagnosis Date   • CHF (congestive heart failure) (HCC)    • Diabetes mellitus (HCC)    • Elevated cholesterol    • Hyperlipidemia    • Hypertension      Past Surgical History:   Procedure Laterality Date   • HERNIA REPAIR     • TONSILLECTOMY       No family history on file.  Social History     Tobacco Use   • Smoking status: Former     Types: Cigarettes   Vaping Use   • Vaping Use: Never used   Substance Use Topics   • Alcohol use: Not Currently   • Drug use: Never     Medications Prior to Admission   Medication Sig  Dispense Refill Last Dose   • aspirin 81 MG chewable tablet Chew 81 mg Daily.      • aspirin-acetaminophen-caffeine (EXCEDRIN MIGRAINE) 250-250-65 MG per tablet Take 1 tablet by mouth Every 6 (Six) Hours As Needed for Headache.      • carvedilol (COREG) 3.125 MG tablet Take 3.125 mg by mouth 2 (Two) Times a Day With Meals.      • furosemide (LASIX) 40 MG tablet Take 40 mg by mouth 2 (Two) Times a Day.      • glipizide (GLUCOTROL) 10 MG tablet Take 10 mg by mouth 2 (Two) Times a Day Before Meals.      • insulin NPH-insulin regular (humuLIN 70/30,novoLIN 70/30) (70-30) 100 UNIT/ML injection Inject 27 Units under the skin into the appropriate area as directed Every Morning.      • insulin NPH-insulin regular (humuLIN 70/30,novoLIN 70/30) (70-30) 100 UNIT/ML injection Inject 22 Units under the skin into the appropriate area as directed Every Night.      • metFORMIN (GLUCOPHAGE) 500 MG tablet Take 500 mg by mouth Daily With Breakfast.      • Omega-3 Fatty Acids (fish oil) 1000 MG capsule capsule Take 1,000 mg by mouth Daily With Breakfast.      • oseltamivir (TAMIFLU) 75 MG capsule Take 75 mg by mouth 2 (Two) Times a Day.      • rosuvastatin (CRESTOR) 5 MG tablet Take 5 mg by mouth Every Night.        aspirin, 81 mg, Oral, Daily  carvedilol, 3.125 mg, Oral, BID With Meals  docusate sodium, 100 mg, Oral, BID  furosemide, 40 mg, Oral, BID  glipizide, 10 mg, Oral, BID AC  insulin lispro, 4-24 Units, Subcutaneous, TID With Meals  insulin NPH-insulin regular, 22 Units, Subcutaneous, Daily With Dinner  insulin NPH-insulin regular, 27 Units, Subcutaneous, QAM  polyethylene glycol, 17 g, Oral, Daily  potassium chloride, 20 mEq, Oral, BID With Meals  rosuvastatin, 5 mg, Oral, Nightly  sodium chloride, 10 mL, Intravenous, Q12H      Allergies:  Patient has no known allergies.    Objective      Vital Signs  Temp:  [97.8 °F (36.6 °C)-98.1 °F (36.7 °C)] 98.1 °F (36.7 °C)  Heart Rate:  [90-93] 90  Resp:  [20] 20  BP: (126-128)/(69-73)  126/69    Flowsheet Rows    Flowsheet Row First Filed Value   Admission Height --   Admission Weight 103 kg (227 lb 1.2 oz) Documented at 12/17/2022 0500             Physical Exam  Vitals and nursing note reviewed.   Constitutional:       General: He is awake.      Appearance: Normal appearance. He is well-developed, well-groomed and overweight.   HENT:      Head: Normocephalic and atraumatic.      Ears:      Comments: + Hearing aides     Nose: Nose normal.      Mouth/Throat:      Lips: Pink.      Mouth: Mucous membranes are moist.      Pharynx: Uvula midline.   Eyes:      General: Lids are normal. No scleral icterus.     Extraocular Movements: Extraocular movements intact.      Conjunctiva/sclera: Conjunctivae normal.      Pupils: Pupils are equal, round, and reactive to light.   Neck:      Thyroid: No thyroid mass or thyromegaly.      Vascular: Normal carotid pulses. No carotid bruit, hepatojugular reflux or JVD.      Trachea: Trachea normal.   Cardiovascular:      Rate and Rhythm: Normal rate and regular rhythm.      Pulses:           Carotid pulses are 2+ on the right side and 2+ on the left side.       Radial pulses are 2+ on the right side and 2+ on the left side.        Femoral pulses are 2+ on the right side and 2+ on the left side.       Popliteal pulses are 2+ on the right side and 2+ on the left side.        Dorsalis pedis pulses are 2+ on the right side and 2+ on the left side.        Posterior tibial pulses are 2+ on the right side and 2+ on the left side.      Heart sounds: Normal heart sounds. No murmur heard.     Comments: Tele:  SR 80s  Drips:  heparin  Pulmonary:      Effort: Pulmonary effort is normal.      Breath sounds: Decreased breath sounds present.      Comments: 95% 3L  Abdominal:      General: Abdomen is protuberant. Bowel sounds are normal. There is no distension.      Palpations: Abdomen is soft.      Tenderness: There is no abdominal tenderness.   Musculoskeletal:      Cervical back:  Neck supple.      Comments: Gait steady and strong without use of assistive devices   Lymphadenopathy:      Cervical: No cervical adenopathy.      Upper Body:      Right upper body: No supraclavicular adenopathy.      Left upper body: No supraclavicular adenopathy.   Skin:     General: Skin is warm and dry.      Capillary Refill: Capillary refill takes less than 2 seconds.      Findings: No erythema or rash.      Nails: There is no clubbing.   Neurological:      Mental Status: He is alert and oriented to person, place, and time.      GCS: GCS eye subscore is 4. GCS verbal subscore is 5. GCS motor subscore is 6.   Psychiatric:         Attention and Perception: Attention and perception normal.         Mood and Affect: Mood and affect normal.         Speech: Speech normal.         Behavior: Behavior normal. Behavior is cooperative.         Thought Content: Thought content normal.         Cognition and Memory: Cognition and memory normal.         Judgment: Judgment normal.         Results Review:   Lab Results (last 24 hours)     Procedure Component Value Units Date/Time    COVID PRE-OP / PRE-PROCEDURE SCREENING ORDER (NO ISOLATION) - Swab, Nasopharynx [[id number]] Collected: 12/17/22 1237    Specimen: Swab from Nasopharynx Updated: 12/17/22 1241    Narrative:      The following orders were created for panel order COVID PRE-OP / PRE-PROCEDURE SCREENING ORDER (NO ISOLATION) - Swab, Nasopharynx.  Procedure                               Abnormality         Status                     ---------                               -----------         ------                     COVID-19,CEPHEID/CAMERON,CO...[[id number]]                      In process                   Please view results for these tests on the individual orders.    COVID-19,CEPHEID/CAMERON,COR/BERNY/PAD/ASAF/MAD IN-HOUSE(OR EMERGENT/ADD-ON),NP SWAB IN TRANSPORT MEDIA 3-4 HR TAT, RT-PCR - Swab, Nasopharynx [[id number]] Collected: 12/17/22 1238    Specimen: Swab from Nasopharynx  Updated: 12/17/22 1241    POC Glucose Once [[id number]]  (Abnormal) Collected: 12/17/22 1159    Specimen: Blood Updated: 12/17/22 1200     Glucose 416 mg/dL      Comment: Serial Number: [id number]:  [id number]       Extra Tubes [[id number]] Collected: 12/17/22 1136    Specimen: Blood, Venous Line Updated: 12/17/22 1136    Narrative:      The following orders were created for panel order Extra Tubes.  Procedure                               Abnormality         Status                     ---------                               -----------         ------                     Gold Top - SST[[id number]]                                   In process                   Please view results for these tests on the individual orders.    Gold Top - SST [[id number]] Collected: 12/17/22 1136    Specimen: Blood Updated: 12/17/22 1136    Urinalysis, Microscopic Only - Urine, Clean Catch [[id number]]  (Abnormal) Collected: 12/17/22 1048    Specimen: Urine, Clean Catch Updated: 12/17/22 1102     RBC, UA 0-2 /HPF      WBC, UA 0-2 /HPF      Bacteria, UA None Seen /HPF      Squamous Epithelial Cells, UA 0-2 /HPF      Hyaline Casts, UA 0-2 /LPF      Methodology Automated Microscopy    Urinalysis With Microscopic If Indicated (No Culture) - Urine, Clean Catch [[id number]]  (Abnormal) Collected: 12/17/22 1048    Specimen: Urine, Clean Catch Updated: 12/17/22 1102     Color, UA Yellow     Appearance, UA Clear     pH, UA 5.5     Specific Gravity, UA 1.024     Glucose,  mg/dL (Trace)     Ketones, UA 15 mg/dL (1+)     Bilirubin, UA Negative     Blood, UA Negative     Protein,  mg/dL (2+)     Leuk Esterase, UA Negative     Nitrite, UA Negative     Urobilinogen, UA 1.0 E.U./dL    CBC & Differential [[id number]]  (Abnormal) Collected: 12/17/22 0945    Specimen: Blood Updated: 12/17/22 1042    Narrative:      The following orders were created for panel order CBC & Differential.  Procedure                               Abnormality          Status                     ---------                               -----------         ------                     CBC Auto Differential[[id number]]        Abnormal            Final result               Scan Slide[[id number]]                                                                    Please view results for these tests on the individual orders.    CBC Auto Differential [[id number]]  (Abnormal) Collected: 12/17/22 0945    Specimen: Blood Updated: 12/17/22 1042     WBC 10.70 10*3/mm3      RBC 4.90 10*6/mm3      Hemoglobin 14.5 g/dL      Hematocrit 45.0 %      MCV 91.8 fL      MCH 29.5 pg      MCHC 32.2 g/dL      RDW 14.4 %      RDW-SD 46.4 fl      MPV 9.1 fL      Platelets 337 10*3/mm3      Neutrophil % 71.8 %      Lymphocyte % 18.1 %      Monocyte % 6.8 %      Eosinophil % 2.4 %      Basophil % 0.9 %      Neutrophils, Absolute 7.70 10*3/mm3      Lymphocytes, Absolute 1.90 10*3/mm3      Monocytes, Absolute 0.70 10*3/mm3      Eosinophils, Absolute 0.30 10*3/mm3      Basophils, Absolute 0.10 10*3/mm3      nRBC 0.1 /100 WBC     Comprehensive Metabolic Panel [[id number]]  (Abnormal) Collected: 12/17/22 0945    Specimen: Blood Updated: 12/17/22 1038     Glucose 368 mg/dL      BUN 32 mg/dL      Creatinine 1.16 mg/dL      Sodium 134 mmol/L      Potassium 4.2 mmol/L      Comment: Slight hemolysis detected by analyzer. Results may be affected.        Chloride 94 mmol/L      CO2 26.0 mmol/L      Calcium 9.0 mg/dL      Total Protein 7.2 g/dL      Albumin 3.80 g/dL      ALT (SGPT) 9 U/L      AST (SGOT) 16 U/L      Alkaline Phosphatase 90 U/L      Total Bilirubin 0.9 mg/dL      Globulin 3.4 gm/dL      A/G Ratio 1.1 g/dL      BUN/Creatinine Ratio 27.6     Anion Gap 14.0 mmol/L      eGFR 65.7 mL/min/1.73      Comment: National Kidney Foundation and American Society of Nephrology (ASN) Task Force recommended calculation based on the Chronic Kidney Disease Epidemiology Collaboration (CKD-EPI) equation refit without adjustment for  race.       Narrative:      GFR Normal >60  Chronic Kidney Disease <60  Kidney Failure <15    The GFR formula is only valid for adults with stable renal function between ages 18 and 70.    aPTT [[id number]]  (Abnormal) Collected: 12/17/22 0708    Specimen: Blood Updated: 12/17/22 0742     PTT 22.8 seconds     Protime-INR [[id number]]  (Normal) Collected: 12/17/22 0708    Specimen: Blood Updated: 12/17/22 0742     Protime 10.9 Seconds      INR 1.06    POC Glucose Once [[id number]]  (Abnormal) Collected: 12/17/22 0737    Specimen: Blood Updated: 12/17/22 0739     Glucose 214 mg/dL      Comment: Serial Number: [id number]:  [id number]       Platelet Function ADP [[id number]]  (Normal) Collected: 12/17/22 0708    Specimen: Blood Updated: 12/17/22 0733     ADP Aggregation, % Platelet 93 %     Hemoglobin A1c [[id number]] Collected: 12/17/22 0708    Specimen: Blood Updated: 12/17/22 0716              Assessment & Plan       CAD (coronary artery disease), native coronary artery      Assessment & Plan     - MV CAD with NSTEMI presentation, EF 20-25% (echo)--transferred for further care and evaluation  - Acute HFrEF, NYHA class II-III--BNP 5580 at OSH  - Influenzae B--ID consulted  - ICM  - DM type 2--a1c 8.4 at OSH  - HTN--stable  - Past smoker  - [place], hearing aids in place  - Obesity    Plans to have Dr. Morales evaluate patient for high risk PCI possible Impella assisted given his severe LV dysfunction.  Continue heparin drip.  ID consulted.  Patient reports he lives at home with his wife who also has failing health.  He drives short distances and goes to the store as needed.  Full recommendations to follow.    Thank you for allowing us to participate in the care of this patient.      ROWAN Muñoz  12/17/22  12:46 EST    **all problems new to this examiner  **EKG and CXR independently reviewed and interpreted

## 2022-12-17 NOTE — CONSULTS
Referring Provider: Jaleel Huber MD    Reason for Consultation: Non-ST elevation MI, multivessel coronary artery disease, HFrEF      Patient Care Team:  Melvina Hodge as PCP - General (Nurse Practitioner)      SUBJECTIVE     Chief Complaint: Chest pain, shortness of breath    History of present illness:  Sherman Baumann is a 76 y.o. male with hypertension, hyperlipidemia, diabetes who presented to [place] with complaints of chest pain and shortness of breath.  He was noted to have elevated troponin more than 2 which later peaked to 4.5.  His echocardiogram shows severely reduced LV function with EF of 15 to 20% and his cardiac catheterization showed  of RCA, anomalous left circumflex with proximal 90% lesion, proximal LAD and diagonal bifurcation 95% lesion.  He has been referred for heart team evaluation for revascularization high risk PCI versus CABG.  During this hospitalization he was also diagnosed with influenza B.  During my evaluation patient reports that he is a  and goes to the [facility] once or twice a year.  He does not see a cardiologist and was taking metformin and glipizide for diabetes.  He currently denies chest pain but reports significant worsening shortness of breath.      Review of systems:    Constitutional: No weakness, fatigue, fever, rigors, chills   Eyes: No vision changes, eye pain   ENT/oropharynx: No difficulty swallowing, sore throat, epistaxis, changes in hearing   Cardiovascular: No chest pain,+ chest tightness, palpitations, paroxysmal nocturnal dyspnea, orthopnea, diaphoresis, dizziness / syncopal episode   Respiratory: + shortness of breath, dyspnea on exertion, cough, wheezing, hemoptysis   Gastrointestinal: No abdominal pain, nausea, vomiting, diarrhea, bloody stools   Genitourinary: No hematuria, dysuria   Neurological: No headache, tremors, numbness, one-sided weakness    Musculoskeletal: No cramps, myalgias, joint pain, joint swelling   Integument: No  rash, edema        Personal History:      Past Medical History:   Diagnosis Date   • CHF (congestive heart failure) (HCC)    • Diabetes mellitus (HCC)    • Elevated cholesterol    • Hyperlipidemia    • Hypertension        Past Surgical History:   Procedure Laterality Date   • HERNIA REPAIR     • TONSILLECTOMY         No family history on file.    Social History     Tobacco Use   • Smoking status: Former     Types: Cigarettes   Vaping Use   • Vaping Use: Never used   Substance Use Topics   • Alcohol use: Not Currently   • Drug use: Never        Medications Prior to Admission   Medication Sig Dispense Refill Last Dose   • aspirin 81 MG chewable tablet Chew 81 mg Daily.      • aspirin-acetaminophen-caffeine (EXCEDRIN MIGRAINE) 250-250-65 MG per tablet Take 1 tablet by mouth Every 6 (Six) Hours As Needed for Headache.      • atorvastatin (LIPITOR) 80 MG tablet Take 80 mg by mouth Every Night.      • carvedilol (COREG) 3.125 MG tablet Take 3.125 mg by mouth 2 (Two) Times a Day With Meals.      • furosemide (LASIX) 40 MG tablet Take 40 mg by mouth 2 (Two) Times a Day.      • glipizide (GLUCOTROL) 10 MG tablet Take 10 mg by mouth 2 (Two) Times a Day Before Meals.      • insulin NPH-insulin regular (humuLIN 70/30,novoLIN 70/30) (70-30) 100 UNIT/ML injection Inject 27 Units under the skin into the appropriate area as directed Every Morning.      • insulin NPH-insulin regular (humuLIN 70/30,novoLIN 70/30) (70-30) 100 UNIT/ML injection Inject 22 Units under the skin into the appropriate area as directed Every Night.      • metFORMIN (GLUCOPHAGE) 500 MG tablet Take 500 mg by mouth Daily With Breakfast.      • Omega-3 Fatty Acids (fish oil) 1000 MG capsule capsule Take 1,000 mg by mouth Daily With Breakfast.      • oseltamivir (TAMIFLU) 75 MG capsule Take 75 mg by mouth 2 (Two) Times a Day.      • rosuvastatin (CRESTOR) 5 MG tablet Take 5 mg by mouth Every Night.           Allergies:     Patient has no known  allergies.    Scheduled Meds:aspirin, 81 mg, Oral, Daily  atorvastatin, 80 mg, Oral, Nightly  carvedilol, 3.125 mg, Oral, BID With Meals  docusate sodium, 100 mg, Oral, BID  furosemide, 40 mg, Oral, BID  glipizide, 10 mg, Oral, BID AC  insulin NPH-insulin regular, 22 Units, Subcutaneous, Nightly  insulin NPH-insulin regular, 27 Units, Subcutaneous, QAM  polyethylene glycol, 17 g, Oral, Daily  potassium chloride, 20 mEq, Oral, BID With Meals  rosuvastatin, 5 mg, Oral, Nightly  sodium chloride, 10 mL, Intravenous, Q12H      Continuous Infusions:heparin, 9.71 Units/kg/hr, Last Rate: 16 Units/kg/hr (12/17/22 0746)      PRN Meds:•  acetaminophen  •  ALPRAZolam  •  melatonin  •  nitroglycerin  •  ondansetron  •  potassium chloride **OR** potassium chloride **OR** potassium chloride  •  sodium chloride  •  sodium chloride      OBJECTIVE    Vital Signs  Vitals:    12/17/22 0500 12/17/22 0606   BP:  128/73   BP Location:  Left arm   Patient Position:  Lying   Pulse:  93   Resp:  20   Temp:  97.8 °F (36.6 °C)   TempSrc:  Oral   SpO2:  96%   Weight: 103 kg (227 lb 1.2 oz) 103 kg (227 lb 1.2 oz)       Flowsheet Rows    Flowsheet Row First Filed Value   Admission Height --   Admission Weight 103 kg (227 lb 1.2 oz) Documented at 12/17/2022 0500          No intake or output data in the 24 hours ending 12/17/22 0952     Telemetry: Sinus rhythm    Physical Exam:  The patient is alert, oriented and in no distress.  Vital signs as noted above.  Head and neck revealed no carotid bruits or jugular venous distention.  No thyromegaly or lymph adenopathy is present  Lungs clear.  No wheezing.  Breath sounds are normal bilaterally.  Heart normal first and second heart sounds. No murmur.  No precordial rub is present.  No gallop is present.  Abdomen soft and nontender.  No organomegaly is present.  Extremities with good peripheral pulses without any pedal edema.  Skin warm and dry.  Musculoskeletal system is grossly normal.  CNS grossly  normal.       Results Review:  I have personally reviewed the results from the time of this admission to 12/17/2022 09:52 EST and agree with these findings:  []  Laboratory  []  Microbiology  []  Radiology  []  EKG/Telemetry   []  Cardiology/Vascular   []  Pathology  []  Old records  []  Other:    Most notable findings include:     Lab Results (last 24 hours)     Procedure Component Value Units Date/Time    CBC & Differential [[id number]] Collected: 12/17/22 0708    Specimen: Blood Updated: 12/17/22 0945    Narrative:      The following orders were created for panel order CBC & Differential.  Procedure                               Abnormality         Status                     ---------                               -----------         ------                     CBC Auto Differential[[id number]]                                                       Scan Slide[[id number]]                                                                    Please view results for these tests on the individual orders.    CBC Auto Differential [[id number]] Collected: 12/17/22 0945    Specimen: Blood Updated: 12/17/22 0945    Comprehensive Metabolic Panel [[id number]] Collected: 12/17/22 0945    Specimen: Blood Updated: 12/17/22 0945    aPTT [[id number]]  (Abnormal) Collected: 12/17/22 0708    Specimen: Blood Updated: 12/17/22 0742     PTT 22.8 seconds     Protime-INR [[id number]]  (Normal) Collected: 12/17/22 0708    Specimen: Blood Updated: 12/17/22 0742     Protime 10.9 Seconds      INR 1.06    POC Glucose Once [[id number]]  (Abnormal) Collected: 12/17/22 0737    Specimen: Blood Updated: 12/17/22 0739     Glucose 214 mg/dL      Comment: Serial Number: [id number]:  [id number]       Platelet Function ADP [[id number]]  (Normal) Collected: 12/17/22 0708    Specimen: Blood Updated: 12/17/22 0733     ADP Aggregation, % Platelet 93 %     Hemoglobin A1c [[id number]] Collected: 12/17/22 0708    Specimen: Blood Updated: 12/17/22 0716           Imaging Results (Last 24 Hours)     ** No results found for the last 24 hours. **          LAB RESULTS (LAST 7 DAYS)    CBC        BMP        CMP         BNP        TROPONIN        CoAg  Results from last 7 days   Lab Units 12/17/22  0708   INR  1.06   APTT seconds 22.8*       Creatinine Clearance  CrCl cannot be calculated (No successful lab value found.).    ABG          Radiology  No radiology results for the last day      EKG  I personally viewed and interpreted the patient's EKG/Telemetry data:  ECG 12 Lead Chest Pain   Preliminary Result   HEART RATE= 89  bpm   RR Interval= 672  ms   NE Interval= 176  ms   P Horizontal Axis= -5  deg   P Front Axis= 29  deg   QRSD Interval= 92  ms   QT Interval= 362  ms   QRS Axis= 16  deg   T Wave Axis= 163  deg   - ABNORMAL ECG -   Sinus rhythm   Probable left atrial enlargement   Probable anterior infarct, age indeterminate   Abnormal T, consider ischemia, lateral leads   Electronically Signed By:    Date and Time of Study: 2022-12-17 07:21:47            Echocardiogram:          Stress Test:        Cardiac Catheterization:  No results found for this or any previous visit.        Other:      ASSESSMENT & PLAN:    Principal Problem:    CAD (coronary artery disease), native coronary artery  HFrEF  Non-ST elevation MI  Anomalous left circumflex artery  Influenza B  Hypertension/hyperlipidemia  Diabetes  Ex-smoker    76-year-old man with multiple complex cardiovascular comorbidities presents with non-ST elevation MI.  He has multivessel coronary artery disease with  of RCA, 90% proximal left circumflex which is an anomalous vessel arising from right coronary cusp, 95% proximal LAD which also involves a diagonal artery.  His echocardiogram has shown severely reduced LV function with EF of 20%.  He does not have any significant valvular abnormalities.  He has been started on aspirin, high intensity statin and beta-blocker.  He is currently chest pain-free.  He will benefit  from revascularization with CABG preferred over PCI.  However if he is turned down for CABG I will offer him high risk PCI with atherectomy and Impella support.  Continue risk factors modification including better diabetes control.  HbA1c 8.4.    Johann Morales MD  12/17/22  09:52 EST

## 2022-12-18 ENCOUNTER — APPOINTMENT (OUTPATIENT)
Dept: CARDIOLOGY | Facility: HOSPITAL | Age: 77
DRG: 235 | End: 2022-12-18
Payer: MEDICARE

## 2022-12-18 PROBLEM — J10.1: Status: ACTIVE | Noted: 2022-12-18

## 2022-12-18 LAB
APTT PPP: 33.9 SECONDS (ref 61–76.5)
APTT PPP: 46.5 SECONDS (ref 61–76.5)
APTT PPP: 59.3 SECONDS (ref 61–76.5)
BASOPHILS # BLD AUTO: 0 10*3/MM3 (ref 0–0.2)
BASOPHILS NFR BLD AUTO: 0.3 % (ref 0–1.5)
BH CV ECHO MEAS - ACS: 1.63 CM
BH CV ECHO MEAS - AO MAX PG: 8.7 MMHG
BH CV ECHO MEAS - AO MEAN PG: 5.8 MMHG
BH CV ECHO MEAS - AO ROOT DIAM: 3.6 CM
BH CV ECHO MEAS - AO V2 MAX: 147.5 CM/SEC
BH CV ECHO MEAS - AO V2 VTI: 31.3 CM
BH CV ECHO MEAS - AVA(I,D): 2.3 CM2
BH CV ECHO MEAS - EDV(CUBED): 190.7 ML
BH CV ECHO MEAS - EDV(MOD-SP4): 144.5 ML
BH CV ECHO MEAS - EF(MOD-BP): 49 %
BH CV ECHO MEAS - EF(MOD-SP4): 48.8 %
BH CV ECHO MEAS - ESV(CUBED): 102.5 ML
BH CV ECHO MEAS - ESV(MOD-SP4): 73.9 ML
BH CV ECHO MEAS - FS: 18.7 %
BH CV ECHO MEAS - IVS/LVPW: 0.93 CM
BH CV ECHO MEAS - IVSD: 0.94 CM
BH CV ECHO MEAS - LA A2CS (ATRIAL LENGTH): 3.3 CM
BH CV ECHO MEAS - LV MASS(C)D: 223.2 GRAMS
BH CV ECHO MEAS - LV MAX PG: 3.1 MMHG
BH CV ECHO MEAS - LV MEAN PG: 1.58 MMHG
BH CV ECHO MEAS - LV V1 MAX: 87.5 CM/SEC
BH CV ECHO MEAS - LV V1 VTI: 19.2 CM
BH CV ECHO MEAS - LVIDD: 5.8 CM
BH CV ECHO MEAS - LVIDS: 4.7 CM
BH CV ECHO MEAS - LVOT AREA: 3.8 CM2
BH CV ECHO MEAS - LVOT DIAM: 2.19 CM
BH CV ECHO MEAS - LVPWD: 1.01 CM
BH CV ECHO MEAS - MV A MAX VEL: 128.7 CM/SEC
BH CV ECHO MEAS - MV DEC SLOPE: 561.5 CM/SEC2
BH CV ECHO MEAS - MV DEC TIME: 0.18 MSEC
BH CV ECHO MEAS - MV E MAX VEL: 103.5 CM/SEC
BH CV ECHO MEAS - MV E/A: 0.8
BH CV ECHO MEAS - MV MAX PG: 8.5 MMHG
BH CV ECHO MEAS - MV MEAN PG: 3.6 MMHG
BH CV ECHO MEAS - MV V2 VTI: 30.6 CM
BH CV ECHO MEAS - MVA(VTI): 2.36 CM2
BH CV ECHO MEAS - PA V2 MAX: 78.6 CM/SEC
BH CV ECHO MEAS - QP/QS: 1.09
BH CV ECHO MEAS - RV MAX PG: 2.4 MMHG
BH CV ECHO MEAS - RV V1 MAX: 77.5 CM/SEC
BH CV ECHO MEAS - RV V1 VTI: 16.5 CM
BH CV ECHO MEAS - RVDD: 2.8 CM
BH CV ECHO MEAS - RVOT DIAM: 2.46 CM
BH CV ECHO MEAS - SV(LVOT): 72.1 ML
BH CV ECHO MEAS - SV(MOD-SP4): 70.5 ML
BH CV ECHO MEAS - SV(RVOT): 78.4 ML
DEPRECATED RDW RBC AUTO: 45.1 FL (ref 37–54)
EOSINOPHIL # BLD AUTO: 0.4 10*3/MM3 (ref 0–0.4)
EOSINOPHIL NFR BLD AUTO: 4 % (ref 0.3–6.2)
ERYTHROCYTE [DISTWIDTH] IN BLOOD BY AUTOMATED COUNT: 14.6 % (ref 12.3–15.4)
GLUCOSE BLDC GLUCOMTR-MCNC: 193 MG/DL (ref 70–105)
GLUCOSE BLDC GLUCOMTR-MCNC: 236 MG/DL (ref 70–105)
GLUCOSE BLDC GLUCOMTR-MCNC: 238 MG/DL (ref 70–105)
GLUCOSE BLDC GLUCOMTR-MCNC: 407 MG/DL (ref 70–105)
HCT VFR BLD AUTO: 45.6 % (ref 37.5–51)
HGB BLD-MCNC: 14.8 G/DL (ref 13–17.7)
LYMPHOCYTES # BLD AUTO: 1.4 10*3/MM3 (ref 0.7–3.1)
LYMPHOCYTES NFR BLD AUTO: 15.3 % (ref 19.6–45.3)
MAXIMAL PREDICTED HEART RATE: 145 BPM
MCH RBC QN AUTO: 29.3 PG (ref 26.6–33)
MCHC RBC AUTO-ENTMCNC: 32.4 G/DL (ref 31.5–35.7)
MCV RBC AUTO: 90.4 FL (ref 79–97)
MONOCYTES # BLD AUTO: 0.7 10*3/MM3 (ref 0.1–0.9)
MONOCYTES NFR BLD AUTO: 7 % (ref 5–12)
MRSA DNA SPEC QL NAA+PROBE: NORMAL
NEUTROPHILS NFR BLD AUTO: 6.9 10*3/MM3 (ref 1.7–7)
NEUTROPHILS NFR BLD AUTO: 73.4 % (ref 42.7–76)
NRBC BLD AUTO-RTO: 0 /100 WBC (ref 0–0.2)
PLATELET # BLD AUTO: 267 10*3/MM3 (ref 140–450)
PMV BLD AUTO: 8.4 FL (ref 6–12)
RBC # BLD AUTO: 5.05 10*6/MM3 (ref 4.14–5.8)
STRESS TARGET HR: 123 BPM
TSH SERPL DL<=0.05 MIU/L-ACNC: 2.04 UIU/ML (ref 0.27–4.2)
WBC NRBC COR # BLD: 9.4 10*3/MM3 (ref 3.4–10.8)

## 2022-12-18 PROCEDURE — 82962 GLUCOSE BLOOD TEST: CPT

## 2022-12-18 PROCEDURE — 25010000002 HEPARIN (PORCINE) 25000-0.45 UT/250ML-% SOLUTION: Performed by: THORACIC SURGERY (CARDIOTHORACIC VASCULAR SURGERY)

## 2022-12-18 PROCEDURE — 85730 THROMBOPLASTIN TIME PARTIAL: CPT | Performed by: INTERNAL MEDICINE

## 2022-12-18 PROCEDURE — 99221 1ST HOSP IP/OBS SF/LOW 40: CPT | Performed by: INTERNAL MEDICINE

## 2022-12-18 PROCEDURE — 85730 THROMBOPLASTIN TIME PARTIAL: CPT | Performed by: THORACIC SURGERY (CARDIOTHORACIC VASCULAR SURGERY)

## 2022-12-18 PROCEDURE — 93306 TTE W/DOPPLER COMPLETE: CPT | Performed by: INTERNAL MEDICINE

## 2022-12-18 PROCEDURE — 93306 TTE W/DOPPLER COMPLETE: CPT

## 2022-12-18 PROCEDURE — 63710000001 INSULIN ISOPHANE & REGULAR PER 5 UNITS: Performed by: THORACIC SURGERY (CARDIOTHORACIC VASCULAR SURGERY)

## 2022-12-18 PROCEDURE — 84443 ASSAY THYROID STIM HORMONE: CPT | Performed by: INTERNAL MEDICINE

## 2022-12-18 PROCEDURE — 85025 COMPLETE CBC W/AUTO DIFF WBC: CPT | Performed by: INTERNAL MEDICINE

## 2022-12-18 PROCEDURE — 25010000002 SULFUR HEXAFLUORIDE MICROSPH 60.7-25 MG RECONSTITUTED SUSPENSION: Performed by: THORACIC SURGERY (CARDIOTHORACIC VASCULAR SURGERY)

## 2022-12-18 PROCEDURE — 99233 SBSQ HOSP IP/OBS HIGH 50: CPT | Performed by: INTERNAL MEDICINE

## 2022-12-18 PROCEDURE — 87641 MR-STAPH DNA AMP PROBE: CPT | Performed by: NURSE PRACTITIONER

## 2022-12-18 PROCEDURE — 63710000001 INSULIN LISPRO (HUMAN) PER 5 UNITS: Performed by: INTERNAL MEDICINE

## 2022-12-18 RX ADMIN — INSULIN LISPRO 24 UNITS: 100 INJECTION, SOLUTION INTRAVENOUS; SUBCUTANEOUS at 11:36

## 2022-12-18 RX ADMIN — FUROSEMIDE 40 MG: 40 TABLET ORAL at 17:10

## 2022-12-18 RX ADMIN — GLIPIZIDE 10 MG: 5 TABLET ORAL at 17:10

## 2022-12-18 RX ADMIN — FUROSEMIDE 40 MG: 40 TABLET ORAL at 08:07

## 2022-12-18 RX ADMIN — INSULIN HUMAN 27 UNITS: 100 INJECTION, SUSPENSION SUBCUTANEOUS at 06:21

## 2022-12-18 RX ADMIN — POLYETHYLENE GLYCOL 3350 17 G: 17 POWDER, FOR SOLUTION ORAL at 08:08

## 2022-12-18 RX ADMIN — GLIPIZIDE 10 MG: 5 TABLET ORAL at 08:07

## 2022-12-18 RX ADMIN — ROSUVASTATIN CALCIUM 5 MG: 5 TABLET, COATED ORAL at 20:01

## 2022-12-18 RX ADMIN — POTASSIUM CHLORIDE 20 MEQ: 1500 TABLET, EXTENDED RELEASE ORAL at 17:10

## 2022-12-18 RX ADMIN — DOCUSATE SODIUM 100 MG: 100 CAPSULE, LIQUID FILLED ORAL at 08:07

## 2022-12-18 RX ADMIN — INSULIN HUMAN 22 UNITS: 100 INJECTION, SUSPENSION SUBCUTANEOUS at 17:11

## 2022-12-18 RX ADMIN — Medication 10 ML: at 20:01

## 2022-12-18 RX ADMIN — CARVEDILOL 3.12 MG: 3.12 TABLET, FILM COATED ORAL at 08:07

## 2022-12-18 RX ADMIN — OSELTAMIVIR PHOSPHATE 75 MG: 75 CAPSULE ORAL at 08:07

## 2022-12-18 RX ADMIN — INSULIN LISPRO 4 UNITS: 100 INJECTION, SOLUTION INTRAVENOUS; SUBCUTANEOUS at 08:08

## 2022-12-18 RX ADMIN — INSULIN LISPRO 8 UNITS: 100 INJECTION, SOLUTION INTRAVENOUS; SUBCUTANEOUS at 17:11

## 2022-12-18 RX ADMIN — SULFUR HEXAFLUORIDE 2 ML: KIT at 14:30

## 2022-12-18 RX ADMIN — POTASSIUM CHLORIDE 20 MEQ: 1500 TABLET, EXTENDED RELEASE ORAL at 08:07

## 2022-12-18 RX ADMIN — Medication 10 ML: at 08:08

## 2022-12-18 RX ADMIN — OSELTAMIVIR PHOSPHATE 75 MG: 75 CAPSULE ORAL at 20:01

## 2022-12-18 RX ADMIN — HEPARIN SODIUM 21 UNITS/KG/HR: 10000 INJECTION, SOLUTION INTRAVENOUS at 11:49

## 2022-12-18 RX ADMIN — ASPIRIN 81 MG CHEWABLE TABLET 81 MG: 81 TABLET CHEWABLE at 08:07

## 2022-12-18 RX ADMIN — CARVEDILOL 3.12 MG: 3.12 TABLET, FILM COATED ORAL at 17:10

## 2022-12-18 RX ADMIN — DOCUSATE SODIUM 100 MG: 100 CAPSULE, LIQUID FILLED ORAL at 20:01

## 2022-12-18 NOTE — PROGRESS NOTES
Referring Provider: Jaleel Huber MD    Reason for follow-up: Non-ST elevation MI, multivessel coronary artery disease, HFrEF     Patient Care Team:  Melvina Hodge as PCP - General (Nurse Practitioner)      SUBJECTIVE  Laying comfortably in bed, denies any chest pain or shortness of breath.     ROS  Review of all systems negative except as indicated.    Since I have last seen, the patient has been without any chest discomfort, shortness of breath, palpitations, dizziness or syncope.  Denies having any headache, abdominal pain, nausea, vomiting, diarrhea, constipation, loss of weight or loss of appetite.  Denies having any excessive bruising, hematuria or blood in the stool.  ROS      Personal History:    Past Medical History:   Diagnosis Date   • CHF (congestive heart failure) (HCC)    • Diabetes mellitus (HCC)    • Elevated cholesterol    • Hyperlipidemia    • Hypertension        Past Surgical History:   Procedure Laterality Date   • HERNIA REPAIR     • TONSILLECTOMY         No family history on file.    Social History     Tobacco Use   • Smoking status: Former     Types: Cigarettes   Vaping Use   • Vaping Use: Never used   Substance Use Topics   • Alcohol use: Not Currently   • Drug use: Never        Medications Prior to Admission   Medication Sig Dispense Refill Last Dose   • aspirin 81 MG chewable tablet Chew 81 mg Daily.      • aspirin-acetaminophen-caffeine (EXCEDRIN MIGRAINE) 250-250-65 MG per tablet Take 1 tablet by mouth Every 6 (Six) Hours As Needed for Headache.      • carvedilol (COREG) 3.125 MG tablet Take 3.125 mg by mouth 2 (Two) Times a Day With Meals.      • furosemide (LASIX) 40 MG tablet Take 40 mg by mouth 2 (Two) Times a Day.      • glipizide (GLUCOTROL) 10 MG tablet Take 10 mg by mouth 2 (Two) Times a Day Before Meals.      • insulin NPH-insulin regular (humuLIN 70/30,novoLIN 70/30) (70-30) 100 UNIT/ML injection Inject 27 Units under the skin into the appropriate area as directed Every  "Morning.      • insulin NPH-insulin regular (humuLIN 70/30,novoLIN 70/30) (70-30) 100 UNIT/ML injection Inject 22 Units under the skin into the appropriate area as directed Every Night.      • metFORMIN (GLUCOPHAGE) 500 MG tablet Take 500 mg by mouth Daily With Breakfast.      • Omega-3 Fatty Acids (fish oil) 1000 MG capsule capsule Take 1,000 mg by mouth Daily With Breakfast.      • oseltamivir (TAMIFLU) 75 MG capsule Take 75 mg by mouth 2 (Two) Times a Day.      • rosuvastatin (CRESTOR) 5 MG tablet Take 5 mg by mouth Every Night.          Allergies:  Patient has no known allergies.    Scheduled Meds:aspirin, 81 mg, Oral, Daily  carvedilol, 3.125 mg, Oral, BID With Meals  docusate sodium, 100 mg, Oral, BID  furosemide, 40 mg, Oral, BID  glipizide, 10 mg, Oral, BID AC  insulin lispro, 4-24 Units, Subcutaneous, TID With Meals  insulin NPH-insulin regular, 22 Units, Subcutaneous, Daily With Dinner  insulin NPH-insulin regular, 27 Units, Subcutaneous, QAM  oseltamivir, 75 mg, Oral, Q12H  polyethylene glycol, 17 g, Oral, Daily  potassium chloride, 20 mEq, Oral, BID With Meals  rosuvastatin, 5 mg, Oral, Nightly  sodium chloride, 10 mL, Intravenous, Q12H      Continuous Infusions:heparin, 9.71 Units/kg/hr, Last Rate: 22 Units/kg/hr (12/18/22 1312)      PRN Meds:.•  acetaminophen  •  ALPRAZolam  •  dextrose  •  dextrose  •  glucagon (human recombinant)  •  melatonin  •  nitroglycerin  •  ondansetron  •  potassium chloride **OR** potassium chloride **OR** potassium chloride  •  sodium chloride  •  sodium chloride      OBJECTIVE    Vital Signs  Vitals:    12/18/22 1305 12/18/22 1355 12/18/22 1424 12/18/22 1700   BP:  129/63 132/71 115/61   BP Location:       Patient Position:       Pulse:   86 87   Resp:   17 17   Temp:   97.8 °F (36.6 °C) 98.7 °F (37.1 °C)   TempSrc:   Oral Oral   SpO2:   96% 98%   Weight: 104 kg (230 lb) 104 kg (230 lb)     Height: 177.8 cm (70\") 177.8 cm (70\")         Flowsheet Rows    Flowsheet Row First " "Filed Value   Admission Height 177.8 cm (70\") Documented at 12/18/2022 1305   Admission Weight 103 kg (227 lb 1.2 oz) Documented at 12/17/2022 0500            Intake/Output Summary (Last 24 hours) at 12/18/2022 1749  Last data filed at 12/18/2022 1424  Gross per 24 hour   Intake 1080 ml   Output 900 ml   Net 180 ml          Telemetry: Sinus rhythm    Physical Exam:  The patient is alert, oriented and in no distress.  Vital signs as noted above.  Head and neck revealed no carotid bruits or jugular venous distention.  No thyromegaly or lymphadenopathy is present  Lungs clear.  No wheezing.  Breath sounds are normal bilaterally.  Heart normal first and second heart sounds.  No murmur. No precordial rub is present.  No gallop is present.  Abdomen soft and nontender.  No organomegaly is present.  Extremities with good peripheral pulses without any pedal edema.  Skin warm and dry.  Musculoskeletal system is grossly normal.  CNS grossly normal.       Results Review:  I have personally reviewed the results from the time of this admission to 12/18/2022 17:49 EST and agree with these findings:  []  Laboratory  []  Microbiology  []  Radiology  []  EKG/Telemetry   []  Cardiology/Vascular   []  Pathology  []  Old records  []  Other:    Most notable findings include:    Lab Results (last 24 hours)     Procedure Component Value Units Date/Time    POC Glucose Once [[id number]]  (Abnormal) Collected: 12/18/22 1608    Specimen: Blood Updated: 12/18/22 1609     Glucose 236 mg/dL      Comment: Serial Number: [id number]:  [id number]       MRSA Screen, PCR (Inpatient) - Swab, Nares [[id number]]  (Normal) Collected: 12/18/22 1153    Specimen: Swab from Nares Updated: 12/18/22 1317     MRSA PCR No MRSA Detected    Narrative:      The negative predictive value of this diagnostic test is high and should only be used to consider de-escalating anti-MRSA therapy. A positive result may indicate colonization with MRSA and must be correlated " clinically.    aPTT [[id number]]  (Abnormal) Collected: 12/18/22 1231    Specimen: Blood from Arm, Left Updated: 12/18/22 1307     PTT 46.5 seconds     CBC & Differential [[id number]]  (Abnormal) Collected: 12/18/22 1235    Specimen: Blood Updated: 12/18/22 1245    Narrative:      The following orders were created for panel order CBC & Differential.  Procedure                               Abnormality         Status                     ---------                               -----------         ------                     CBC Auto Differential[[id number]]        Abnormal            Final result                 Please view results for these tests on the individual orders.    CBC Auto Differential [[id number]]  (Abnormal) Collected: 12/18/22 1235    Specimen: Blood Updated: 12/18/22 1245     WBC 9.40 10*3/mm3      RBC 5.05 10*6/mm3      Hemoglobin 14.8 g/dL      Hematocrit 45.6 %      MCV 90.4 fL      MCH 29.3 pg      MCHC 32.4 g/dL      RDW 14.6 %      RDW-SD 45.1 fl      MPV 8.4 fL      Platelets 267 10*3/mm3      Neutrophil % 73.4 %      Lymphocyte % 15.3 %      Monocyte % 7.0 %      Eosinophil % 4.0 %      Basophil % 0.3 %      Neutrophils, Absolute 6.90 10*3/mm3      Lymphocytes, Absolute 1.40 10*3/mm3      Monocytes, Absolute 0.70 10*3/mm3      Eosinophils, Absolute 0.40 10*3/mm3      Basophils, Absolute 0.00 10*3/mm3      nRBC 0.0 /100 WBC     POC Glucose Once [[id number]]  (Abnormal) Collected: 12/18/22 1123    Specimen: Blood Updated: 12/18/22 1124     Glucose 407 mg/dL      Comment: Serial Number: [id number]:  [id number]       POC Glucose Once [[id number]]  (Abnormal) Collected: 12/18/22 0708    Specimen: Blood Updated: 12/18/22 0710     Glucose 193 mg/dL      Comment: Serial Number: [id number]:  [id number]       aPTT [[id number]]  (Abnormal) Collected: 12/18/22 0333    Specimen: Blood from Arm, Left Updated: 12/18/22 0431     PTT 33.9 seconds     Basic Metabolic Panel [[id number]]  (Abnormal) Collected:  12/17/22 2256    Specimen: Blood Updated: 12/17/22 2348     Glucose 215 mg/dL      BUN 37 mg/dL      Creatinine 1.31 mg/dL      Sodium 135 mmol/L      Potassium 4.3 mmol/L      Chloride 94 mmol/L      CO2 29.0 mmol/L      Calcium 9.2 mg/dL      BUN/Creatinine Ratio 28.2     Anion Gap 12.0 mmol/L      eGFR 56.8 mL/min/1.73      Comment: National Kidney Foundation and American Society of Nephrology (ASN) Task Force recommended calculation based on the Chronic Kidney Disease Epidemiology Collaboration (CKD-EPI) equation refit without adjustment for race.       Narrative:      GFR Normal >60  Chronic Kidney Disease <60  Kidney Failure <15    The GFR formula is only valid for adults with stable renal function between ages 18 and 70.    aPTT [[id number]]  (Abnormal) Collected: 12/17/22 2256    Specimen: Blood Updated: 12/17/22 2335     PTT 41.2 seconds     POC Glucose Once [[id number]]  (Abnormal) Collected: 12/17/22 2036    Specimen: Blood Updated: 12/17/22 2040     Glucose 220 mg/dL      Comment: Serial Number: [id number]:  [id number]             Imaging Results (Last 24 Hours)     ** No results found for the last 24 hours. **          LAB RESULTS (LAST 7 DAYS)    CBC  Results from last 7 days   Lab Units 12/18/22  1235 12/17/22  0945   WBC 10*3/mm3 9.40 10.70   RBC 10*6/mm3 5.05 4.90   HEMOGLOBIN g/dL 14.8 14.5   HEMATOCRIT % 45.6 45.0   MCV fL 90.4 91.8   PLATELETS 10*3/mm3 267 337       BMP  Results from last 7 days   Lab Units 12/17/22 2256 12/17/22  0945   SODIUM mmol/L 135* 134*   POTASSIUM mmol/L 4.3 4.2   CHLORIDE mmol/L 94* 94*   CO2 mmol/L 29.0 26.0   BUN mg/dL 37* 32*   CREATININE mg/dL 1.31* 1.16   GLUCOSE mg/dL 215* 368*       CMP   Results from last 7 days   Lab Units 12/17/22 2256 12/17/22  0945   SODIUM mmol/L 135* 134*   POTASSIUM mmol/L 4.3 4.2   CHLORIDE mmol/L 94* 94*   CO2 mmol/L 29.0 26.0   BUN mg/dL 37* 32*   CREATININE mg/dL 1.31* 1.16   GLUCOSE mg/dL 215* 368*   ALBUMIN g/dL  --  3.80    BILIRUBIN mg/dL  --  0.9   ALK PHOS U/L  --  90   AST (SGOT) U/L  --  16   ALT (SGPT) U/L  --  9       BNP        TROPONIN        CoAg  Results from last 7 days   Lab Units 12/18/22  1231 12/18/22  0333 12/17/22  2256 12/17/22  1451 12/17/22  0708   INR   --   --   --   --  1.06   APTT seconds 46.5* 33.9* 41.2* 49.4* 22.8*       Creatinine Clearance  Estimated Creatinine Clearance: 58.9 mL/min (A) (by C-G formula based on SCr of 1.31 mg/dL (H)).    ABG        Radiology  XR Chest PA & Lateral    Result Date: 12/17/2022   1. Cardiomegaly. 2. No active pulmonary disease.  Electronically Signed By-Donato Hull MD On:12/17/2022 11:30 AM This report was finalized on [id number] by  Donato Hull MD.        EKG  I personally viewed and interpreted the patient's EKG/Telemetry data:  ECG 12 Lead Chest Pain   Preliminary Result   HEART RATE= 89  bpm   RR Interval= 672  ms   NH Interval= 176  ms   P Horizontal Axis= -5  deg   P Front Axis= 29  deg   QRSD Interval= 92  ms   QT Interval= 362  ms   QRS Axis= 16  deg   T Wave Axis= 163  deg   - ABNORMAL ECG -   Sinus rhythm   Probable left atrial enlargement   Probable anterior infarct, age indeterminate   Abnormal T, consider ischemia, lateral leads   Electronically Signed By:    Date and Time of Study: 2022-12-17 07:21:47            Echocardiogram:          Stress Test:         Cardiac Catheterization:  No results found for this or any previous visit.         Other:         ASSESSMENT & PLAN:    Principal Problem:    CAD (coronary artery disease), native coronary artery  Active Problems:    Type 2 diabetes mellitus with hyperglycemia, without long-term current use of insulin (HCC)    Mixed hyperlipidemia    Primary hypertension    Systolic and diastolic CHF, acute on chronic (HCC)    Influenza due to seasonal influenza virus    76-year-old man with multiple and complex cardiovascular risk factors including hypertension, hyperlipidemia, uncontrolled diabetes presented with  non-ST elevation MI and was diagnosed with influenza.  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  Apparently his echocardiogram at the outside hospital showed EF of 20%.  Repeat echocardiogram here shows EF of 40 to 45%.  He does not have any significant valvular abnormalities.  He has been started on aspirin, high intensity statin and beta-blocker.  Ongoing work-up for cardiac surgery.  I believe he has reasonable targets for LAD, diagonal, circumflex and perhaps RCA.  If he is turned down for CABG I will offer high risk multivessel PCI with atherectomy and Impella support.  Currently he appears euvolemic.  Creatinine is 1.3 and HbA1c is 8.4        Johann Morales MD  12/18/22  17:49 EST

## 2022-12-18 NOTE — PLAN OF CARE
Goal Outcome Evaluation:               Pt is resting at this time, no complaints of pain, no new orders, plan of care continues at this time, pt is receiving heparin therapy in anticipation of CABG procedure.

## 2022-12-18 NOTE — PROGRESS NOTES
[facility] Medicine Services Daily Progress Note    Patient Name: Sherman Baumann  : 1945  MRN: [id number]  Primary Care Physician:  Melvina Hodge  Date of admission: 2022      Subjective      Chief Complaint: Shortness of breath.      Patient Reports:        2022.  Patient was seen and examined.  Patient reported no new symptoms.        Review of Systems   Constitutional: Positive for malaise/fatigue.   HENT: Negative.    Eyes: Negative.    Cardiovascular: Negative.    Respiratory: Negative.    Endocrine: Negative.    Hematologic/Lymphatic: Negative.    Skin: Negative.    Musculoskeletal: Negative.    Gastrointestinal: Negative.    Genitourinary: Negative.    Neurological: Negative.    Psychiatric/Behavioral: Negative.    Allergic/Immunologic: Negative.             Objective      Vitals:   Temp:  [97.4 °F (36.3 °C)-98.2 °F (36.8 °C)] 97.8 °F (36.6 °C)  Heart Rate:  [80-93] 86  Resp:  [15-20] 17  BP: (119-142)/(63-73) 132/71  Flow (L/min):  [2-3] 2    Physical Exam  Vitals reviewed.   Constitutional:       General: He is not in acute distress.  HENT:      Head: Normocephalic.      Nose: Nose normal.      Mouth/Throat:      Mouth: Mucous membranes are dry.      Pharynx: Oropharynx is clear.   Eyes:      Extraocular Movements: Extraocular movements intact.      Conjunctiva/sclera: Conjunctivae normal.      Pupils: Pupils are equal, round, and reactive to light.   Cardiovascular:      Pulses: Normal pulses.      Heart sounds: No murmur heard.    No friction rub. No gallop.      Comments: S1 and S2 present.  No tachycardia.  Pulmonary:      Breath sounds: No stridor. No wheezing or rales.   Chest:      Chest wall: No tenderness.   Abdominal:      General: Bowel sounds are normal. There is no distension.      Palpations: Abdomen is soft.      Tenderness: There is no abdominal tenderness. There is no right CVA tenderness or guarding.   Musculoskeletal:         General: No  swelling, tenderness, deformity or signs of injury.      Cervical back: Normal range of motion. No rigidity.      Right lower leg: No edema.      Left lower leg: No edema.   Skin:     General: Skin is warm and dry.      Capillary Refill: Capillary refill takes less than 2 seconds.      Coloration: Skin is not jaundiced.      Findings: No bruising, erythema, lesion or rash.   Neurological:      Mental Status: He is alert.      Comments: No facial asymmetry noted.  Gait and station not tested.   Psychiatric:      Comments: No agitation.                 Result Review    Result Review:  I have personally reviewed the results from the time of this admission to 12/18/2022 16:29 EST and agree with these findings:  []  Laboratory  []  Microbiology  []  Radiology  []  EKG/Telemetry   []  Cardiology/Vascular   []  Pathology  []  Old records  []  Other:  Most notable findings include:           Assessment & Plan    From previous notes and with minor updates.    Brief Patient Summary:      Patient is a 76-year-old male with past medical history of CHF, diabetes mellitus type 2, hypertension and hyperlipidemia who was a transfer to [facility] because of her multivessel coronary artery disease with a severe left ventricular dysfunction with ejection fraction of 20 to 25%.  Cardiology consult was completed.  Cardiothoracic surgery consult was completed.  Hospitalist consult for medical management was requested.  ID consult was completed.         aspirin, 81 mg, Oral, Daily  carvedilol, 3.125 mg, Oral, BID With Meals  docusate sodium, 100 mg, Oral, BID  furosemide, 40 mg, Oral, BID  glipizide, 10 mg, Oral, BID AC  insulin lispro, 4-24 Units, Subcutaneous, TID With Meals  insulin NPH-insulin regular, 22 Units, Subcutaneous, Daily With Dinner  insulin NPH-insulin regular, 27 Units, Subcutaneous, QAM  oseltamivir, 75 mg, Oral, Q12H  polyethylene glycol, 17 g, Oral, Daily  potassium chloride, 20 mEq, Oral, BID With  Meals  rosuvastatin, 5 mg, Oral, Nightly  sodium chloride, 10 mL, Intravenous, Q12H       heparin, 9.71 Units/kg/hr, Last Rate: 22 Units/kg/hr (12/18/22 1312)         Active Hospital Problems:  Active Hospital Problems    Diagnosis    • **CAD (coronary artery disease), native coronary artery    • Systolic and diastolic CHF, acute on chronic (HCC)    • Influenza due to seasonal influenza virus    • Type 2 diabetes mellitus with hyperglycemia, without long-term current use of insulin (HCC)    • Mixed hyperlipidemia    • Primary hypertension          Plan:      -Continue appropriate patient's home medications for other chronic medical conditions.  -Continue the present level of care.  -Patient and family agreed with the plan of care.  -Treat diabetes mellitus with insulin therapy.  -Treat hyperlipidemia with Crestor.  -Follow cardiology and Cardiothoracic surgery recommendations.  -Treat coronary artery disease with aspirin.  -Treat acute on chronic systolic and diastolic CHF with Lasix.  -Treat influenza with Tamiflu.  -Follow ID recommendations.      DVT prophylaxis:  Medical DVT prophylaxis orders are present.    CODE STATUS:    Level Of Support Discussed With: Patient  Code Status (Patient has no pulse and is not breathing): CPR (Attempt to Resuscitate)  Medical Interventions (Patient has pulse or is breathing): Full Support      Disposition: Pending clinical improvement.    This patient has been examined wearing appropriate Personal Protective Equipment and discussed with hospital infection control department, [facility] department, infectious disease specialist and pulmonologist. 12/18/22      Electronically signed by Danny Suggs MD, FACP, 12/18/22, 16:29 ENZO.      [facility] Hospitalist Team

## 2022-12-18 NOTE — PROGRESS NOTES
CC:  Shortness of breath    F/U:  MV CAD/ NSTEMI/ severe LV dysfunction--[place]  EF 20-25% (echo)    Subjective:  No c/o's, feels better    He looks better today  Drips:  heparin      PREOP studies:  Carotid duplex:  16-49% bilat  Vein zoraida:  adequate  A1c:  8.4 at [place]   Plt agg:  pending  MRSA screen:  pending  COVID-19 screen:  Neg 12/17  UA:  Neg for UTI 12/17  Echo:  Repeat ordered 12/18      Intake/Output Summary (Last 24 hours) at 12/18/2022 1132  Last data filed at 12/18/2022 0954  Gross per 24 hour   Intake 1200 ml   Output 900 ml   Net 300 ml     Temp:  [97.4 °F (36.3 °C)-98.3 °F (36.8 °C)] 97.4 °F (36.3 °C)  Heart Rate:  [80-93] 89  Resp:  [15-20] 17  BP: (119-142)/(70-73) 123/70      Results from last 7 days   Lab Units 12/17/22  0945 12/17/22  0708   WBC 10*3/mm3 10.70  --    HEMOGLOBIN g/dL 14.5  --    HEMATOCRIT % 45.0  --    PLATELETS 10*3/mm3 337  --    INR   --  1.06     Results from last 7 days   Lab Units 12/17/22  2256   CREATININE mg/dL 1.31*   POTASSIUM mmol/L 4.3   SODIUM mmol/L 135*       Physical Exam:  Neuro intact, nad, resting in bed, [facility]  Tele:  SR 80-90s  Diminished bases, 92% 2L  Benign abd, + BM  No edema    Assessment/Plan:  Principal Problem:    CAD (coronary artery disease), native coronary artery  Active Problems:    Type 2 diabetes mellitus with hyperglycemia, without long-term current use of insulin (HCC)    Mixed hyperlipidemia    Primary hypertension    Systolic and diastolic CHF, acute on chronic (HCC)    - MV CAD with NSTEMI presentation, EF 20-25% (echo)--transferred for further care and evaluation  - Acute HFrEF, NYHA class II-III--BNP 5580 at OSH  - Influenzae B--ID consulted, Tamiflu  - ICM  - DM type 2 with hyperglycemia--a1c 8.4 at OSH  - HTN--stable  - Past smoker  - [facility], hearing aids in place  - Obesity      Dr. Medellin reviewed cath films.  Plans to repeat echo today.  Vein zoraida/carotid duplex results reviewed.  D/w Dr. Morales--he feels pt has reasonable targets for  "bypass.  Dr. Huber to eval films/ studies and provide full recommendations.  Hospitalist consulted to manage DM.    Of note, while sitting up on edge of bed today noted right lower neck enlargement.  Pt reports it is his \"bone.\"  It is firm.  Will have Dr. Huber evaluate on Monday.    Narad Duffy, APRN  12/18/2022  11:32 EST  "

## 2022-12-18 NOTE — CONSULTS
[facility]    Referring Provider: Narda Barbour NP  Reason for Consultation: Diabetes evaluation & management.    Patient Care Team:  Melvina Hodge PCP - General (Nurse Practitioner)    Chief complaint: shortness of breath      Subjective .     History of present illness:    This is a  76 y.o. male with type 2 Diabetes since 1997, on metformin, glipizide & 70/30 insulin 27 units in am, 22 units ac supper.  Transferred from [facility] in [place] for possible CABG due to severe multivessel CAD.    Review of Systems  Review of Systems   Constitutional: Positive for fatigue.   HENT: Negative for trouble swallowing.    Eyes: Negative for blurred vision.   Respiratory: Positive for shortness of breath.    Cardiovascular: Negative for leg swelling.   Gastrointestinal: Negative for nausea.   Endocrine: Negative for polyuria.   Neurological: Negative for headache.       History  Past Medical History:   Diagnosis Date   • CHF (congestive heart failure) (HCC)    • Diabetes mellitus (HCC)    • Elevated cholesterol    • Hyperlipidemia    • Hypertension      Past Surgical History:   Procedure Laterality Date   • HERNIA REPAIR     • TONSILLECTOMY       No family history on file.  Social History     Tobacco Use   • Smoking status: Former     Types: Cigarettes   Vaping Use   • Vaping Use: Never used   Substance Use Topics   • Alcohol use: Not Currently   • Drug use: Never     Medications Prior to Admission   Medication Sig Dispense Refill Last Dose   • aspirin 81 MG chewable tablet Chew 81 mg Daily.      • aspirin-acetaminophen-caffeine (EXCEDRIN MIGRAINE) 250-250-65 MG per tablet Take 1 tablet by mouth Every 6 (Six) Hours As Needed for Headache.      • carvedilol (COREG) 3.125 MG tablet Take 3.125 mg by mouth 2 (Two) Times a Day With Meals.      • furosemide (LASIX) 40 MG tablet Take 40 mg by mouth 2 (Two) Times a Day.      • glipizide (GLUCOTROL) 10 MG tablet Take 10 mg by mouth 2 (Two) Times  a Day Before Meals.      • insulin NPH-insulin regular (humuLIN 70/30,novoLIN 70/30) (70-30) 100 UNIT/ML injection Inject 27 Units under the skin into the appropriate area as directed Every Morning.      • insulin NPH-insulin regular (humuLIN 70/30,novoLIN 70/30) (70-30) 100 UNIT/ML injection Inject 22 Units under the skin into the appropriate area as directed Every Night.      • metFORMIN (GLUCOPHAGE) 500 MG tablet Take 500 mg by mouth Daily With Breakfast.      • Omega-3 Fatty Acids (fish oil) 1000 MG capsule capsule Take 1,000 mg by mouth Daily With Breakfast.      • oseltamivir (TAMIFLU) 75 MG capsule Take 75 mg by mouth 2 (Two) Times a Day.      • rosuvastatin (CRESTOR) 5 MG tablet Take 5 mg by mouth Every Night.        Scheduled Meds:  aspirin, 81 mg, Oral, Daily  carvedilol, 3.125 mg, Oral, BID With Meals  docusate sodium, 100 mg, Oral, BID  furosemide, 40 mg, Oral, BID  glipizide, 10 mg, Oral, BID AC  insulin lispro, 4-24 Units, Subcutaneous, TID With Meals  insulin NPH-insulin regular, 22 Units, Subcutaneous, Daily With Dinner  insulin NPH-insulin regular, 27 Units, Subcutaneous, QAM  oseltamivir, 75 mg, Oral, Q12H  polyethylene glycol, 17 g, Oral, Daily  potassium chloride, 20 mEq, Oral, BID With Meals  rosuvastatin, 5 mg, Oral, Nightly  sodium chloride, 10 mL, Intravenous, Q12H      Continuous Infusions:  heparin, 9.71 Units/kg/hr, Last Rate: 22 Units/kg/hr (12/18/22 1312)      PRN Meds:  •  acetaminophen  •  ALPRAZolam  •  dextrose  •  dextrose  •  glucagon (human recombinant)  •  melatonin  •  nitroglycerin  •  ondansetron  •  potassium chloride **OR** potassium chloride **OR** potassium chloride  •  sodium chloride  •  sodium chloride  Allergies:  Patient has no known allergies.    Objective     Vital Signs   Temp:  [97.4 °F (36.3 °C)-98.7 °F (37.1 °C)] 98.7 °F (37.1 °C)  Heart Rate:  [80-89] 87  Resp:  [15-17] 17  BP: (115-132)/(61-71) 115/61    Physical Exam:     General Appearance:    Alert,  cooperative, in no acute distress. Obese   Head:    Normocephalic, without obvious abnormality, atraumatic   Eyes:            Lids and lashes normal, conjunctivae and sclerae normal, no   icterus, no pallor, corneas clear, PERRLA   Throat:   No oral lesions,  oral mucosa moist. Dentures   Neck:   No adenopathy, supple,  no thyromegaly, no   carotid bruit   Lungs:     Decreased breath sounds    Heart:    Regular rhythm and normal rate   Chest Wall:    No abnormalities observed   Abdomen:     Normal bowel sounds, soft                 Extremities:   Moves all extremities well, no edema               Pulses:   Pulses palpable and equal bilaterally   Skin:   Dry   Neurologic:  DTR absent, able to feel the 10g monofilament       Results Review  I have reviewed the patient's new clinical results, labs & imaging.    Lab Results (last 24 hours)     Procedure Component Value Units Date/Time    POC Glucose Once [[id number]]  (Abnormal) Collected: 12/18/22 1608    Specimen: Blood Updated: 12/18/22 1609     Glucose 236 mg/dL      Comment: Serial Number: [id number]:  [id number]       MRSA Screen, PCR (Inpatient) - Swab, Nares [[id number]]  (Normal) Collected: 12/18/22 1153    Specimen: Swab from Nares Updated: 12/18/22 1317     MRSA PCR No MRSA Detected    Narrative:      The negative predictive value of this diagnostic test is high and should only be used to consider de-escalating anti-MRSA therapy. A positive result may indicate colonization with MRSA and must be correlated clinically.    aPTT [[id number]]  (Abnormal) Collected: 12/18/22 1231    Specimen: Blood from Arm, Left Updated: 12/18/22 1307     PTT 46.5 seconds     CBC & Differential [[id number]]  (Abnormal) Collected: 12/18/22 1235    Specimen: Blood Updated: 12/18/22 1245    Narrative:      The following orders were created for panel order CBC & Differential.  Procedure                               Abnormality         Status                     ---------                                -----------         ------                     CBC Auto Differential[[id number]]        Abnormal            Final result                 Please view results for these tests on the individual orders.    CBC Auto Differential [[id number]]  (Abnormal) Collected: 12/18/22 1235    Specimen: Blood Updated: 12/18/22 1245     WBC 9.40 10*3/mm3      RBC 5.05 10*6/mm3      Hemoglobin 14.8 g/dL      Hematocrit 45.6 %      MCV 90.4 fL      MCH 29.3 pg      MCHC 32.4 g/dL      RDW 14.6 %      RDW-SD 45.1 fl      MPV 8.4 fL      Platelets 267 10*3/mm3      Neutrophil % 73.4 %      Lymphocyte % 15.3 %      Monocyte % 7.0 %      Eosinophil % 4.0 %      Basophil % 0.3 %      Neutrophils, Absolute 6.90 10*3/mm3      Lymphocytes, Absolute 1.40 10*3/mm3      Monocytes, Absolute 0.70 10*3/mm3      Eosinophils, Absolute 0.40 10*3/mm3      Basophils, Absolute 0.00 10*3/mm3      nRBC 0.0 /100 WBC     POC Glucose Once [[id number]]  (Abnormal) Collected: 12/18/22 1123    Specimen: Blood Updated: 12/18/22 1124     Glucose 407 mg/dL      Comment: Serial Number: [id number]:  [id number]       POC Glucose Once [[id number]]  (Abnormal) Collected: 12/18/22 0708    Specimen: Blood Updated: 12/18/22 0710     Glucose 193 mg/dL      Comment: Serial Number: [id number]:  [id number]       aPTT [[id number]]  (Abnormal) Collected: 12/18/22 0333    Specimen: Blood from Arm, Left Updated: 12/18/22 0431     PTT 33.9 seconds     Basic Metabolic Panel [[id number]]  (Abnormal) Collected: 12/17/22 2256    Specimen: Blood Updated: 12/17/22 2348     Glucose 215 mg/dL      BUN 37 mg/dL      Creatinine 1.31 mg/dL      Sodium 135 mmol/L      Potassium 4.3 mmol/L      Chloride 94 mmol/L      CO2 29.0 mmol/L      Calcium 9.2 mg/dL      BUN/Creatinine Ratio 28.2     Anion Gap 12.0 mmol/L      eGFR 56.8 mL/min/1.73      Comment: National Kidney Foundation and American Society of Nephrology (ASN) Task Force recommended calculation based on the  Chronic Kidney Disease Epidemiology Collaboration (CKD-EPI) equation refit without adjustment for race.       Narrative:      GFR Normal >60  Chronic Kidney Disease <60  Kidney Failure <15    The GFR formula is only valid for adults with stable renal function between ages 18 and 70.    aPTT [[id number]]  (Abnormal) Collected: 12/17/22 2256    Specimen: Blood Updated: 12/17/22 2335     PTT 41.2 seconds     POC Glucose Once [[id number]]  (Abnormal) Collected: 12/17/22 2036    Specimen: Blood Updated: 12/17/22 2040     Glucose 220 mg/dL      Comment: Serial Number: [id number]:  [id number]           A1C pending    Assessment & Plan     1. Diabetes type 2, with hyperglycemia  2. CAD w multivessel disease    Will discontinue glipizide while inpt.  Continue 70/30 insulin bid.  Continue sliding scale lispro.  Good candidate for SGLT2 Rx post surgery.  Will follow with you.  Thank you for the consult.      I discussed the patients findings and my recommendations with patient    Poncho Menard MD  12/18/22  18:23 EST

## 2022-12-19 ENCOUNTER — APPOINTMENT (OUTPATIENT)
Dept: CT IMAGING | Facility: HOSPITAL | Age: 77
DRG: 235 | End: 2022-12-19
Payer: MEDICARE

## 2022-12-19 LAB
ANION GAP SERPL CALCULATED.3IONS-SCNC: 13 MMOL/L (ref 5–15)
APTT PPP: 70 SECONDS (ref 61–76.5)
APTT PPP: 74.4 SECONDS (ref 61–76.5)
APTT PPP: 74.7 SECONDS (ref 61–76.5)
BASOPHILS # BLD AUTO: 0 10*3/MM3 (ref 0–0.2)
BASOPHILS NFR BLD AUTO: 0.2 % (ref 0–1.5)
BUN SERPL-MCNC: 29 MG/DL (ref 8–23)
BUN/CREAT SERPL: 27.4 (ref 7–25)
CALCIUM SPEC-SCNC: 9.2 MG/DL (ref 8.6–10.5)
CHLORIDE SERPL-SCNC: 94 MMOL/L (ref 98–107)
CO2 SERPL-SCNC: 29 MMOL/L (ref 22–29)
CREAT SERPL-MCNC: 1.06 MG/DL (ref 0.76–1.27)
DEPRECATED RDW RBC AUTO: 47.7 FL (ref 37–54)
EGFRCR SERPLBLD CKD-EPI 2021: 73.2 ML/MIN/1.73
EOSINOPHIL # BLD AUTO: 0.4 10*3/MM3 (ref 0–0.4)
EOSINOPHIL NFR BLD AUTO: 5.4 % (ref 0.3–6.2)
ERYTHROCYTE [DISTWIDTH] IN BLOOD BY AUTOMATED COUNT: 14.4 % (ref 12.3–15.4)
GLUCOSE BLDC GLUCOMTR-MCNC: 193 MG/DL (ref 70–105)
GLUCOSE BLDC GLUCOMTR-MCNC: 240 MG/DL (ref 70–105)
GLUCOSE BLDC GLUCOMTR-MCNC: 285 MG/DL (ref 70–105)
GLUCOSE BLDC GLUCOMTR-MCNC: 325 MG/DL (ref 70–105)
GLUCOSE BLDC GLUCOMTR-MCNC: 395 MG/DL (ref 70–105)
GLUCOSE SERPL-MCNC: 225 MG/DL (ref 65–99)
HBA1C MFR BLD: 8.3 % (ref 3.5–5.6)
HCT VFR BLD AUTO: 40.7 % (ref 37.5–51)
HGB BLD-MCNC: 13.5 G/DL (ref 13–17.7)
LYMPHOCYTES # BLD AUTO: 1.9 10*3/MM3 (ref 0.7–3.1)
LYMPHOCYTES NFR BLD AUTO: 22.9 % (ref 19.6–45.3)
MCH RBC QN AUTO: 29.4 PG (ref 26.6–33)
MCHC RBC AUTO-ENTMCNC: 33.2 G/DL (ref 31.5–35.7)
MCV RBC AUTO: 88.7 FL (ref 79–97)
MONOCYTES # BLD AUTO: 0.7 10*3/MM3 (ref 0.1–0.9)
MONOCYTES NFR BLD AUTO: 8.1 % (ref 5–12)
NEUTROPHILS NFR BLD AUTO: 5.3 10*3/MM3 (ref 1.7–7)
NEUTROPHILS NFR BLD AUTO: 63.4 % (ref 42.7–76)
NRBC BLD AUTO-RTO: 0.1 /100 WBC (ref 0–0.2)
PLATELET # BLD AUTO: 267 10*3/MM3 (ref 140–450)
PMV BLD AUTO: 8.9 FL (ref 6–12)
POTASSIUM SERPL-SCNC: 4.4 MMOL/L (ref 3.5–5.2)
QT INTERVAL: 362 MS
RBC # BLD AUTO: 4.58 10*6/MM3 (ref 4.14–5.8)
SODIUM SERPL-SCNC: 136 MMOL/L (ref 136–145)
WBC NRBC COR # BLD: 8.4 10*3/MM3 (ref 3.4–10.8)

## 2022-12-19 PROCEDURE — 85025 COMPLETE CBC W/AUTO DIFF WBC: CPT | Performed by: THORACIC SURGERY (CARDIOTHORACIC VASCULAR SURGERY)

## 2022-12-19 PROCEDURE — 71250 CT THORAX DX C-: CPT

## 2022-12-19 PROCEDURE — 82962 GLUCOSE BLOOD TEST: CPT

## 2022-12-19 PROCEDURE — 80048 BASIC METABOLIC PNL TOTAL CA: CPT | Performed by: THORACIC SURGERY (CARDIOTHORACIC VASCULAR SURGERY)

## 2022-12-19 PROCEDURE — 94640 AIRWAY INHALATION TREATMENT: CPT

## 2022-12-19 PROCEDURE — 94799 UNLISTED PULMONARY SVC/PX: CPT

## 2022-12-19 PROCEDURE — 63710000001 INSULIN ISOPHANE & REGULAR PER 5 UNITS: Performed by: THORACIC SURGERY (CARDIOTHORACIC VASCULAR SURGERY)

## 2022-12-19 PROCEDURE — 25010000002 HEPARIN (PORCINE) 25000-0.45 UT/250ML-% SOLUTION: Performed by: THORACIC SURGERY (CARDIOTHORACIC VASCULAR SURGERY)

## 2022-12-19 PROCEDURE — 63710000001 INSULIN LISPRO (HUMAN) PER 5 UNITS: Performed by: INTERNAL MEDICINE

## 2022-12-19 PROCEDURE — 85730 THROMBOPLASTIN TIME PARTIAL: CPT | Performed by: THORACIC SURGERY (CARDIOTHORACIC VASCULAR SURGERY)

## 2022-12-19 PROCEDURE — 99233 SBSQ HOSP IP/OBS HIGH 50: CPT | Performed by: INTERNAL MEDICINE

## 2022-12-19 PROCEDURE — 85730 THROMBOPLASTIN TIME PARTIAL: CPT | Performed by: INTERNAL MEDICINE

## 2022-12-19 PROCEDURE — 94761 N-INVAS EAR/PLS OXIMETRY MLT: CPT

## 2022-12-19 PROCEDURE — 94664 DEMO&/EVAL PT USE INHALER: CPT

## 2022-12-19 RX ORDER — BUDESONIDE 0.5 MG/2ML
0.5 INHALANT ORAL
Status: DISCONTINUED | OUTPATIENT
Start: 2022-12-19 | End: 2022-12-24

## 2022-12-19 RX ORDER — IPRATROPIUM BROMIDE AND ALBUTEROL SULFATE 2.5; .5 MG/3ML; MG/3ML
3 SOLUTION RESPIRATORY (INHALATION)
Status: DISCONTINUED | OUTPATIENT
Start: 2022-12-19 | End: 2022-12-20

## 2022-12-19 RX ADMIN — CARVEDILOL 3.12 MG: 3.12 TABLET, FILM COATED ORAL at 17:00

## 2022-12-19 RX ADMIN — HEPARIN SODIUM 23 UNITS/KG/HR: 10000 INJECTION, SOLUTION INTRAVENOUS at 00:10

## 2022-12-19 RX ADMIN — INSULIN LISPRO 4 UNITS: 100 INJECTION, SOLUTION INTRAVENOUS; SUBCUTANEOUS at 08:20

## 2022-12-19 RX ADMIN — CARVEDILOL 3.12 MG: 3.12 TABLET, FILM COATED ORAL at 08:20

## 2022-12-19 RX ADMIN — IPRATROPIUM BROMIDE AND ALBUTEROL SULFATE 3 ML: .5; 3 SOLUTION RESPIRATORY (INHALATION) at 19:48

## 2022-12-19 RX ADMIN — FUROSEMIDE 40 MG: 40 TABLET ORAL at 17:00

## 2022-12-19 RX ADMIN — INSULIN HUMAN 22 UNITS: 100 INJECTION, SUSPENSION SUBCUTANEOUS at 17:00

## 2022-12-19 RX ADMIN — Medication 10 ML: at 08:21

## 2022-12-19 RX ADMIN — HEPARIN SODIUM 22.91 UNITS/KG/HR: 10000 INJECTION, SOLUTION INTRAVENOUS at 10:32

## 2022-12-19 RX ADMIN — OSELTAMIVIR PHOSPHATE 75 MG: 75 CAPSULE ORAL at 08:20

## 2022-12-19 RX ADMIN — ASPIRIN 81 MG CHEWABLE TABLET 81 MG: 81 TABLET CHEWABLE at 08:20

## 2022-12-19 RX ADMIN — POTASSIUM CHLORIDE 20 MEQ: 1500 TABLET, EXTENDED RELEASE ORAL at 17:00

## 2022-12-19 RX ADMIN — BUDESONIDE 0.5 MG: 0.5 INHALANT RESPIRATORY (INHALATION) at 19:53

## 2022-12-19 RX ADMIN — INSULIN LISPRO 8 UNITS: 100 INJECTION, SOLUTION INTRAVENOUS; SUBCUTANEOUS at 17:00

## 2022-12-19 RX ADMIN — OSELTAMIVIR PHOSPHATE 75 MG: 75 CAPSULE ORAL at 20:45

## 2022-12-19 RX ADMIN — ROSUVASTATIN CALCIUM 5 MG: 5 TABLET, COATED ORAL at 20:44

## 2022-12-19 RX ADMIN — INSULIN LISPRO 16 UNITS: 100 INJECTION, SOLUTION INTRAVENOUS; SUBCUTANEOUS at 12:22

## 2022-12-19 RX ADMIN — FUROSEMIDE 40 MG: 40 TABLET ORAL at 08:21

## 2022-12-19 RX ADMIN — INSULIN HUMAN 27 UNITS: 100 INJECTION, SUSPENSION SUBCUTANEOUS at 08:20

## 2022-12-19 RX ADMIN — POTASSIUM CHLORIDE 20 MEQ: 1500 TABLET, EXTENDED RELEASE ORAL at 08:20

## 2022-12-19 RX ADMIN — DOCUSATE SODIUM 100 MG: 100 CAPSULE, LIQUID FILLED ORAL at 08:20

## 2022-12-19 RX ADMIN — Medication 10 ML: at 20:45

## 2022-12-19 RX ADMIN — DOCUSATE SODIUM 100 MG: 100 CAPSULE, LIQUID FILLED ORAL at 20:45

## 2022-12-19 NOTE — CASE MANAGEMENT/SOCIAL WORK
Discharge Planning Assessment  [facility]     Patient Name: Sherman Baumann  MRN: [id number]  Today's Date: 12/19/2022    Admit Date: 12/17/2022    Plan: DC Plan: Anticipate routine home pending clinical course.   Discharge Needs Assessment     Row Name 12/19/22 1521       Living Environment    People in Home spouse    Name(s) of People in Home Spouse- Marta    Current Living Arrangements home    Primary Care Provided by self    Provides Primary Care For no one    Family Caregiver if Needed spouse    Quality of Family Relationships unable to assess    Able to Return to Prior Arrangements yes       Resource/Environmental Concerns    Resource/Environmental Concerns none    Transportation Concerns none       Transition Planning    Patient/Family Anticipates Transition to home with family    Patient/Family Anticipated Services at Transition none    Transportation Anticipated family or friend will provide       Discharge Needs Assessment    Readmission Within the Last 30 Days no previous admission in last 30 days    Equipment Currently Used at Home none    Concerns to be Addressed denies needs/concerns at this time    Anticipated Changes Related to Illness none    Equipment Needed After Discharge none    Provided Post Acute Provider List? N/A               Discharge Plan     Row Name 12/19/22 1526       Plan    Plan DC Plan: Anticipate routine home pending clinical course.    Patient/Family in Agreement with Plan yes    Plan Comments CM met with patient at bedside to discuss dc planning. Patient reported that he was at [facility] in [place] prior to [facility]. PCP and pharmacy confirmed, reported no trouble affording medications, and declined needs at this time for any DME/HH/PT services. DC Barriers: Heparin gtt, CABG evaluation pending.               Demographic Summary     Row Name 12/19/22 1521       General Information    Admission Type inpatient    Required Notices Provided Important Message from Medicare     Referral Source admission list    Reason for Consult discharge planning    Preferred Language English       Contact Information    Permission Granted to Share Info With                Functional Status     Row Name 12/19/22 1521       Functional Status    Usual Activity Tolerance good    Current Activity Tolerance moderate       Functional Status, IADL    Medications independent    Meal Preparation independent    Housekeeping independent    Laundry independent    Shopping independent              Met with patient in room wearing PPE: mask.      Maintained distance greater than six feet and spent less than 15 minutes in the room.      Chelo Porras RN     Office Phone: [phone]  Office Cell: [phone]

## 2022-12-19 NOTE — CONSULTS
PULMONARY CRITICAL CARE CONSULT NOTE      PATIENT IDENTIFICATION:  Name: Sherman Baumann  MRN: [id number]  :  1945     Age: 76 y.o.  Sex: male        DATE OF CONSULTATION:  2022  PRIMARY CARE PHYSICIAN    Melvina Hodge                  CHIEF COMPLAINT: SOB    History of Present Illness:   Sherman Baumann is a 76 y.o. male  with recent diagnosis multivessel coronary artery disease cardiomyopathy. And presented to [facility] with SOB cough found to have Influanza B     Currently pt with Sob cough and fatigue no sputum no hemoptysis no chest pain     Review of Systems:   Constitutional: As above   Eyes: negative   ENT/oropharynx: negative   Cardiovascular: negative   Respiratory: As above    Gastrointestinal: negative   Genitourinary: negative   Neurological: negative   Musculoskeletal: negative   Integument/breast: negative   Endocrine: negative   Allergic/Immunologic: negative     Past Medical History:  Past Medical History:   Diagnosis Date   • CHF (congestive heart failure) (HCC)    • Diabetes mellitus (HCC)    • Elevated cholesterol    • Hyperlipidemia    • Hypertension        Past Surgical History:  Past Surgical History:   Procedure Laterality Date   • HERNIA REPAIR     • TONSILLECTOMY          Family History:  No family history on file.     Social History:   Social History     Tobacco Use   • Smoking status: Former     Types: Cigarettes   • Smokeless tobacco: Not on file   Substance Use Topics   • Alcohol use: Not Currently        Allergies:  No Known Allergies    Home Meds:  Medications Prior to Admission   Medication Sig Dispense Refill Last Dose   • aspirin 81 MG chewable tablet Chew 81 mg Daily.      • aspirin-acetaminophen-caffeine (EXCEDRIN MIGRAINE) 250-250-65 MG per tablet Take 1 tablet by mouth Every 6 (Six) Hours As Needed for Headache.      • carvedilol (COREG) 3.125 MG tablet Take 3.125 mg by mouth 2 (Two) Times a Day With Meals.      • furosemide (LASIX) 40 MG tablet Take 40 mg by  "mouth 2 (Two) Times a Day.      • glipizide (GLUCOTROL) 10 MG tablet Take 10 mg by mouth 2 (Two) Times a Day Before Meals.      • insulin NPH-insulin regular (humuLIN 70/30,novoLIN 70/30) (70-30) 100 UNIT/ML injection Inject 27 Units under the skin into the appropriate area as directed Every Morning.      • insulin NPH-insulin regular (humuLIN 70/30,novoLIN 70/30) (70-30) 100 UNIT/ML injection Inject 22 Units under the skin into the appropriate area as directed Every Night.      • metFORMIN (GLUCOPHAGE) 500 MG tablet Take 500 mg by mouth Daily With Breakfast.      • Omega-3 Fatty Acids (fish oil) 1000 MG capsule capsule Take 1,000 mg by mouth Daily With Breakfast.      • oseltamivir (TAMIFLU) 75 MG capsule Take 75 mg by mouth 2 (Two) Times a Day.      • rosuvastatin (CRESTOR) 5 MG tablet Take 5 mg by mouth Every Night.          Objective:  tMax 24 hrs: Temp (24hrs), Av.2 °F (36.8 °C), Min:97.6 °F (36.4 °C), Max:98.7 °F (37.1 °C)      Vitals Ranges:   Temp:  [97.6 °F (36.4 °C)-98.7 °F (37.1 °C)] 98.3 °F (36.8 °C)  Heart Rate:  [78-93] 92  Resp:  [14-17] 17  BP: (115-134)/(61-75) 122/61    Intake and Output Last 3 Shifts:   I/O last 3 completed shifts:  In: 1560 [P.O.:1560]  Out: 2650 [Urine:2650]    Exam:  /61 (BP Location: Left arm, Patient Position: Sitting)   Pulse 92   Temp 98.3 °F (36.8 °C) (Oral)   Resp 17   Ht 177.8 cm (70\")   Wt 104 kg (229 lb 4.5 oz)   SpO2 95%   BMI 32.90 kg/m²       22  1355 22  0517   Weight: 104 kg (230 lb) 104 kg (229 lb 4.5 oz)     General Appearance: Alert    HEENT:  Normocephalic, without obvious abnormality, atraumatic. Conjunctivae/corneas clear.  Normal external ear canals. Nares normal, no drainage.  Neck:  Supple, symmetrical, trachea midline. No JVD.  Lungs /Chest wall:   Bilateral basal rhonchi, respirations unlabored, symmetrical wall movement.     Heart:  Regular rate and rhythm, systolic murmur PMI left sternal border  Abdomen: Soft, nontender, " no masses, no organomegaly.    Extremities: Trace edema, no clubbing or cyanosis        Data Review:  All labs (24hrs):   Recent Results (from the past 24 hour(s))   POC Glucose Once    Collection Time: 12/18/22  7:37 PM    Specimen: Blood   Result Value Ref Range    Glucose 238 (H) 70 - 105 mg/dL   aPTT    Collection Time: 12/18/22  8:03 PM    Specimen: Blood   Result Value Ref Range    PTT 59.3 (L) 61.0 - 76.5 seconds   TSH    Collection Time: 12/18/22  8:03 PM    Specimen: Blood   Result Value Ref Range    TSH 2.040 0.270 - 4.200 uIU/mL   CBC Auto Differential    Collection Time: 12/19/22  3:00 AM    Specimen: Blood   Result Value Ref Range    WBC 8.40 3.40 - 10.80 10*3/mm3    RBC 4.58 4.14 - 5.80 10*6/mm3    Hemoglobin 13.5 13.0 - 17.7 g/dL    Hematocrit 40.7 37.5 - 51.0 %    MCV 88.7 79.0 - 97.0 fL    MCH 29.4 26.6 - 33.0 pg    MCHC 33.2 31.5 - 35.7 g/dL    RDW 14.4 12.3 - 15.4 %    RDW-SD 47.7 37.0 - 54.0 fl    MPV 8.9 6.0 - 12.0 fL    Platelets 267 140 - 450 10*3/mm3    Neutrophil % 63.4 42.7 - 76.0 %    Lymphocyte % 22.9 19.6 - 45.3 %    Monocyte % 8.1 5.0 - 12.0 %    Eosinophil % 5.4 0.3 - 6.2 %    Basophil % 0.2 0.0 - 1.5 %    Neutrophils, Absolute 5.30 1.70 - 7.00 10*3/mm3    Lymphocytes, Absolute 1.90 0.70 - 3.10 10*3/mm3    Monocytes, Absolute 0.70 0.10 - 0.90 10*3/mm3    Eosinophils, Absolute 0.40 0.00 - 0.40 10*3/mm3    Basophils, Absolute 0.00 0.00 - 0.20 10*3/mm3    nRBC 0.1 0.0 - 0.2 /100 WBC   aPTT    Collection Time: 12/19/22  3:00 AM    Specimen: Blood   Result Value Ref Range    PTT 70.0 61.0 - 76.5 seconds   Basic Metabolic Panel    Collection Time: 12/19/22  3:00 AM    Specimen: Blood   Result Value Ref Range    Glucose 225 (H) 65 - 99 mg/dL    BUN 29 (H) 8 - 23 mg/dL    Creatinine 1.06 0.76 - 1.27 mg/dL    Sodium 136 136 - 145 mmol/L    Potassium 4.4 3.5 - 5.2 mmol/L    Chloride 94 (L) 98 - 107 mmol/L    CO2 29.0 22.0 - 29.0 mmol/L    Calcium 9.2 8.6 - 10.5 mg/dL    BUN/Creatinine Ratio 27.4  (H) 7.0 - 25.0    Anion Gap 13.0 5.0 - 15.0 mmol/L    eGFR 73.2 >60.0 mL/min/1.73   POC Glucose Once    Collection Time: 22  7:11 AM    Specimen: Blood   Result Value Ref Range    Glucose 193 (H) 70 - 105 mg/dL   aPTT    Collection Time: 22 10:43 AM    Specimen: Blood   Result Value Ref Range    PTT 74.4 61.0 - 76.5 seconds   POC Glucose Once    Collection Time: 22 10:53 AM    Specimen: Blood   Result Value Ref Range    Glucose 395 (H) 70 - 105 mg/dL   POC Glucose Once    Collection Time: 22 12:16 PM    Specimen: Blood   Result Value Ref Range    Glucose 325 (H) 70 - 105 mg/dL   POC Glucose Once    Collection Time: 22  4:04 PM    Specimen: Blood   Result Value Ref Range    Glucose 285 (H) 70 - 105 mg/dL        Imaging:  Adult Transthoracic Echo Complete W/ Cont if Necessary Per Protocol    Result Date: 2022  •  Left ventricular ejection fraction appears to be 41 - 45%. •  The left ventricular cavity is borderline dilated. •  Left ventricular diastolic function is consistent with (grade I) impaired relaxation. •  No significant valvular abnormalities     Adult Transthoracic Echo Complete W/ Cont if Necessary Per Protocol    Result Date: 12/15/2022                                         Transthoracic Echocardiogram Report  Name: SAM ANDRADE                  Exam Date:  12/15/2022 06:17      Ordered By:   NAYAN FUNG  Age: 76      Gender: M                  Room #:     2202                  Copies To:  CC1:                                    CC2:                              CC3:  :   1945                       Prev. Study:None                  Technologist: AP^^^^  Visit Number: [id number]                 Ht (cm): 183   Wt (kg): 108  MRN:   [id number]                       BSA(m2): 2     BMI:     32  Procedure CPT:  Indication:   NON-ST-SEGMENT ELEVATION MYOCARDIAL  INFARCTION, pulmonary edema  BP:  158   / 96      HR: 102                      Rhythm:           Sinus  MEASUREMENTS  (Male / Female) Normal Values       Technical Quality:Fair  2D ECHO  LV Diastolic Diameter CRYSTAL  5.9 cm      4.2 - 5.9 / 3  LVOT Diameter              2.0 cm        LV Systolic Diameter PLAX  5.0 cm      2.3-4.0 cm     Aortic Root Diameter       2.9 cm        LV Fractional Shortening   15.3 %                     LA Systolic Diameter LX    4.4 cm      3.0 - 4.0 / 2.7 - 3.8 cm  IVS Diastolic Thickness    1.1 cm      0.6 - 1.0 / 0  Ascending Aorta Diameter   3.9 cm        LVPW Diastolic Thickness   1.1 cm      0.6 - 1.0 / 0.6 - 0.9 cm  DOPPLER  AV Peak Velocity           169.0 cm/s                 Mitral E to A Ratio        0.8            AV Peak Gradient           11.4 mmHg                  LV E' Septal Velocity      4.1 cm/s      LVOT Peak Velocity         74.6 cm/s                  Mitral E to LV E' Septal   28.8          LVOT Peak Gradient         2.2 mmHg                   LV A' Septal Velocity      9.6 cm/s      LVOT Velocity Time Integr  12.9 cm                    TR Peak Velocity           278.0 cm/s    LVOT Cardiac Index         1742.5 cm3                 TR Peak Gradient           30.9 mmHg      AV Area Cont Eq pk         1.4 cm2                    PV Peak Velocity           73.7 cm/s      Mitral E Point Velocity    119.0 cm/s                 PV Peak Gradient           2.2 mmHg      Mitral A Point Velocity    144.0 cm/s    FINDINGS  Left Ventricle     Normal left ventricular cavity size. Mild left ventricular hypertrophy.  Decreased left ventricular systolic                       function.  Anterior/anterseptal hypokinesis.  EF is estimated at 15-25%.  LV Diastolic       Stage I diastolic dysfunction.  Function  Right Ventricle    The right ventricle is normal in size and function.  Right Atrium       The right atrium is normal in size.  Left Atrium        Mildly increased left atrial diameter.   Mitral Valve       Structurally normal mitral valve without significant stenosis or prolapse.  There is mild mitral regurgitation.  Aortic Valve       Uncertain if aortic valve is tricuspid. Mild aortic valve calcification. No aortic valve stenosis. Trace aortic                       valve regurgitation.  Tricuspid Valve    Structurally normal tricuspid valve without significant stenosis.  There is mild regurgitation.  Pulmonary                       artery systolic pressure is elevated.  Pulmonic Valve     Structurally normal pulmonic valve without significant stenosis.  There is trace pulmonic regurgitation.  Pericardium        Normal pericardium without effusion.  Aorta              Mildly dilated ascending aorta.  Miscelleaneous     Normal IVC dimension with >50% respiratory change of the inferior vena cava.  TECH NOTES  Definity was administered by Cyndy Sapp RN.  Dr. Harris was notified of a new finding of a decreased EF.  CONCLUSIONS  Normal left ventricular cavity size. Mild left ventricular hypertrophy.  Decreased left ventricular systolic function.    Anterior/anterseptal hypokinesis.  EF is estimated at 15-25%.  Stage I diastolic dysfunction.  Mildly increased left atrial diameter.  Mild aortic valve calcification. No aortic valve stenosis. Trace aortic valve regurgitation.  Mild tricuspid regurgitation.  Pulmonary artery systolic pressure is elevated with the RVSP estimated at 31-36mmHg  Mildly dilated ascending aorta.   Dr Cornelio Harris MD   (Electronically Signed)   Final Date:15 December 2022 09:16     Cardiac Catheterization/Vascular Study    Result Date: 12/15/2022  This result has an attachment that is not available. [address] Cardiac Catheterization Date of Procedure:  12/15/2022 Patient Name:  Sherman Baumann Patient ID:   [id number]   YOB: 1945          Procedure:  Left heart cath, Coronary angiography, Left  ventriculography Indication:  Acute systolic heart failure; non ST-elevation MI; diabetes mellitus type 2; influenza B Method: Informed consent was obtained.  The patient was placed in supine position.  The right wrist was prepped and draped in sterile fashion.  A sheath was placed into the radial artery using a retrograde stick. The tip of the sheath was placed in the radial artery.  There was adequate flow through the arch of the hand.  The coronary catheters were then used for coronary angiography and left ventriculography and pressure measurements were preformed.  At the end of the procedure, the sheath was removed and a Hemoband was placed over the arteriotomy to maintain hemostasis.  There were no immediate complication. The patient received conscious sedation and local anesthesia. There was no specimen removed.  The patient tolerated the procedure well. Estimated blood loss:  10mL Contrast:  150 ml of Omnipaque 350   Hemodynamics: LV pressure:  133/4 with mean of 21 mm Hg       AR pressure:  132/61 with a mean of 95 mm Hg       Left Ventriculography: Estimated ejection fraction:  20-25 % Wall Motion:  Global hypokinesis Valvular pathology:  None Coronary Arteries: Left main coronary artery:  The left main coronary artery is a long vessel that feeds the LAD only.  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  There is a distal heavily calcified 95% stenosis prior to the start of the true LAD.     LAD coronary artery:  The left anterior descending coronary artery  has an ostial 95% calcified stenosis.  The 1st diagonal is large and covers a large portion of the anterior lateral wall.  It has an ostial 90% calcified stenosis. 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  It has a 95% ostial stenosis.  There is a mid segment 80% stenosis. Right coronary artery:  The right coronary artery  is 100% occluded at its ostium.  It appears heavily  calcified throughout.  It is collateralized via the septal perforators into the right PDA. Conclusion:  Severe 3 vessel coronary artery disease with heavy calcification.  Anomalous left circumflex.  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  Severe left ventricular dysfunction.  Diabetes mellitus type 2. Recommendations:  Typically would recommend urgent coronary bypass grafting however given active influenza B and the fact that his symptoms have been present for quite some time would probably benefit from initiation of medical therapy followed by coronary bypass grafting within a week and less decompensates. We will start heparin drip, continue nitroglycerin, beta-blocker therapy, afterload reduction as tolerated.  Gentle IV diuresis.          Cornelio Harris MD DISCLAIMER: This note was transcribed using a [employer] voice recognition system. It may contain mistakes that may have not been recognized during proofreading. This note was also amended on the date signed due to recognized errors.     Bilateral Carotid Duplex    Result Date: 12/17/2022  •  Proximal right internal carotid artery mild stenosis. •  Proximal left internal carotid artery mild stenosis.     XR Chest PA & Lateral    Result Date: 12/17/2022  DATE OF EXAM: 12/17/2022 10:59 AM  PROCEDURE: XR CHEST PA AND LATERAL-  INDICATIONS: Coronary artery disease, shortness of breath, confusion.   COMPARISON: No Comparisons Available  TECHNIQUE: Two radiologic views of the chest.  FINDINGS: The heart is enlarged. The pulmonary vascular markings are normal. The lungs and pleural spaces are clear.  There is degenerative spondylosis within the thoracic spine. There is an incidental 3 mm metallic density in the anterior left chest wall consistent with a small foreign body.       1. Cardiomegaly. 2. No active pulmonary disease.  Electronically Signed By-Donato Hull MD On:12/17/2022 11:30 AM This report was finalized on  [id number] by  Donato Hull MD.    XR chest 1 view    Result Date: 12/15/2022  PROCEDURE:    CHEST,FRONTAL COMPARISON:    None. INDICATIONS:    DYSPNEA FINDINGS:     CARDIAC:    Cardiomegaly.  The central vessels are indistinct. MEDIASTINUM:    No gross mediastinal widening, within the limits of assessment. LUNGS:    Interstitial prominence with areas of atelectasis and airspace disease involving especially the lower lung zones. PLEURA:    No pleural effusion or pneumothorax.   UPPER ABD:    Unremarkable.   BONES:    Osteophytosis at the spine. OTHER:    None.     CONCLUSION:     1. Mixed interstitial and airspace disease.  Suspect a component of pulmonary edema.  A concurrent infectious process should be excluded clinically as well.  Further assessment should be directed clinically.   Electronically signed by: Reji Gooden M.D. on 12/15/2022 at 7:48     Approved by: Reji Gooden M.D. on 12/15/2022 at 7:51   This report was dictated using voice recognition software and was transcribed electronically.  The transcription may contain errors not detected in proofreading.       CT head stroke protocol    Result Date: 12/15/2022  This result has an attachment that is not available. PROCEDURE:    CT HEAD STROKE PROTOCOL COMPARISON:    None. INDICATIONS:    HEADACHE TECHNIQUE:    CT images were obtained without contrast material.  All CT scans are performed using dose optimization technique as appropriate and may include automated exposure control or mA/kV adjustment according to patient size.   FINDINGS:     SKULL:    No calvarial fracture.  Trace mastoid fluid bilaterally.   SINUSES:    Visualized portions are clear.   BRAIN:    No acute intracranial hemorrhage.  No mass effect or midline shift.  No hydrocephalus.  Mild chronic small vessel ischemic changes..  No large vascular territory infarcts. CONCLUSION:    No acute intracranial abnormality. Electronically signed by: Ken Harris M.D. on 12/15/2022  at 6:54     Approved by: Ken Harris M.D. on 12/15/2022 at 6:55   This report was dictated using voice recognition software and was transcribed electronically.  The transcription may contain errors not detected in proofreading.       Duplex Vein Mapping Lower Extremity - Bilateral CAR    Result Date: 12/17/2022  •  Right GSV is adequate in caliber and quality throughout its course •  Left GSV is adequate in caliber and quality throughout its course        Current:  aspirin, 81 mg, Oral, Daily  carvedilol, 3.125 mg, Oral, BID With Meals  docusate sodium, 100 mg, Oral, BID  furosemide, 40 mg, Oral, BID  insulin lispro, 4-24 Units, Subcutaneous, TID With Meals  insulin NPH-insulin regular, 22 Units, Subcutaneous, Daily With Dinner  [START ON 12/20/2022] insulin NPH-insulin regular, 35 Units, Subcutaneous, QAM  oseltamivir, 75 mg, Oral, Q12H  polyethylene glycol, 17 g, Oral, Daily  potassium chloride, 20 mEq, Oral, BID With Meals  rosuvastatin, 5 mg, Oral, Nightly  sodium chloride, 10 mL, Intravenous, Q12H        Infusion:  heparin, 9.71 Units/kg/hr, Last Rate: 22.913 Units/kg/hr (12/19/22 1139)         PRN:  •  acetaminophen  •  ALPRAZolam  •  dextrose  •  dextrose  •  glucagon (human recombinant)  •  melatonin  •  nitroglycerin  •  ondansetron  •  potassium chloride **OR** potassium chloride **OR** potassium chloride  •  sodium chloride  •  sodium chloride    ASSESSMENT:  Acute hypoxic respiratory failure   Flu B  COPD  LOGAN  Cor Pulmonale   CHF  DM II  Hyperlipidemia  HTN  CAD with multivessel disease      PLAN:  Patient is at moderate risk for pulmonary issues with surgery   Optimize Incentive spirometer and BD, ICS prior to surgery and after   Tamiflu -will need to complete tx prior to surgery   BIPAP while sleeping   Bronchodilators  Inhaled corticosteroids  IS/Flutter valve  Diuresis and monitor JANES's  Electrolytes/ glycemic control  DVT and GI prophylaxis-Heparin drip      Total Critical care time in direct  medical management (   ) minutes, This time specifically excludes time spent performing procedures.    Kyle Parkinson MD   12/19/2022  19:00 EST

## 2022-12-19 NOTE — PROGRESS NOTES
Referring Provider: Jaleel Huber MD    Reason for follow-up: Non-ST elevation MI, multivessel coronary disease, HFrEF     Patient Care Team:  Melvina Hodge as PCP - General (Nurse Practitioner)      SUBJECTIVE  He is laying comfortably in bed, denies any chest pain or shortness of breath.  Patient is hard of hearing and is difficult to understand him.     ROS  Review of all systems negative except as indicated.    Since I have last seen, the patient has been without any chest discomfort, shortness of breath, palpitations, dizziness or syncope.  Denies having any headache, abdominal pain, nausea, vomiting, diarrhea, constipation, loss of weight or loss of appetite.  Denies having any excessive bruising, hematuria or blood in the stool.  ROS      Personal History:    Past Medical History:   Diagnosis Date   • CHF (congestive heart failure) (HCC)    • Diabetes mellitus (HCC)    • Elevated cholesterol    • Hyperlipidemia    • Hypertension        Past Surgical History:   Procedure Laterality Date   • HERNIA REPAIR     • TONSILLECTOMY         No family history on file.    Social History     Tobacco Use   • Smoking status: Former     Types: Cigarettes   Vaping Use   • Vaping Use: Never used   Substance Use Topics   • Alcohol use: Not Currently   • Drug use: Never        Medications Prior to Admission   Medication Sig Dispense Refill Last Dose   • aspirin 81 MG chewable tablet Chew 81 mg Daily.      • aspirin-acetaminophen-caffeine (EXCEDRIN MIGRAINE) 250-250-65 MG per tablet Take 1 tablet by mouth Every 6 (Six) Hours As Needed for Headache.      • carvedilol (COREG) 3.125 MG tablet Take 3.125 mg by mouth 2 (Two) Times a Day With Meals.      • furosemide (LASIX) 40 MG tablet Take 40 mg by mouth 2 (Two) Times a Day.      • glipizide (GLUCOTROL) 10 MG tablet Take 10 mg by mouth 2 (Two) Times a Day Before Meals.      • insulin NPH-insulin regular (humuLIN 70/30,novoLIN 70/30) (70-30) 100 UNIT/ML injection Inject 27  Units under the skin into the appropriate area as directed Every Morning.      • insulin NPH-insulin regular (humuLIN 70/30,novoLIN 70/30) (70-30) 100 UNIT/ML injection Inject 22 Units under the skin into the appropriate area as directed Every Night.      • metFORMIN (GLUCOPHAGE) 500 MG tablet Take 500 mg by mouth Daily With Breakfast.      • Omega-3 Fatty Acids (fish oil) 1000 MG capsule capsule Take 1,000 mg by mouth Daily With Breakfast.      • oseltamivir (TAMIFLU) 75 MG capsule Take 75 mg by mouth 2 (Two) Times a Day.      • rosuvastatin (CRESTOR) 5 MG tablet Take 5 mg by mouth Every Night.          Allergies:  Patient has no known allergies.    Scheduled Meds:aspirin, 81 mg, Oral, Daily  carvedilol, 3.125 mg, Oral, BID With Meals  docusate sodium, 100 mg, Oral, BID  furosemide, 40 mg, Oral, BID  insulin lispro, 4-24 Units, Subcutaneous, TID With Meals  insulin NPH-insulin regular, 22 Units, Subcutaneous, Daily With Dinner  [START ON 12/20/2022] insulin NPH-insulin regular, 35 Units, Subcutaneous, QAM  oseltamivir, 75 mg, Oral, Q12H  polyethylene glycol, 17 g, Oral, Daily  potassium chloride, 20 mEq, Oral, BID With Meals  rosuvastatin, 5 mg, Oral, Nightly  sodium chloride, 10 mL, Intravenous, Q12H      Continuous Infusions:heparin, 9.71 Units/kg/hr, Last Rate: 22.913 Units/kg/hr (12/19/22 1139)      PRN Meds:.•  acetaminophen  •  ALPRAZolam  •  dextrose  •  dextrose  •  glucagon (human recombinant)  •  melatonin  •  nitroglycerin  •  ondansetron  •  potassium chloride **OR** potassium chloride **OR** potassium chloride  •  sodium chloride  •  sodium chloride      OBJECTIVE    Vital Signs  Vitals:    12/19/22 0108 12/19/22 0517 12/19/22 0943 12/19/22 1412   BP: 125/75 134/67 132/62 122/61   BP Location: Left arm Left arm Left arm Left arm   Patient Position: Lying Lying Lying Sitting   Pulse: 78 81 93 92   Resp: 14 15 15 17   Temp: 97.7 °F (36.5 °C) 98.2 °F (36.8 °C) 97.6 °F (36.4 °C) 98.3 °F (36.8 °C)  "  TempSrc: Axillary Axillary Oral Oral   SpO2: 98% 94% 93% 95%   Weight:  104 kg (229 lb 4.5 oz)     Height:           Flowsheet Rows    Flowsheet Row First Filed Value   Admission Height 177.8 cm (70\") Documented at 12/18/2022 1305   Admission Weight 103 kg (227 lb 1.2 oz) Documented at 12/17/2022 0500            Intake/Output Summary (Last 24 hours) at 12/19/2022 1510  Last data filed at 12/19/2022 0908  Gross per 24 hour   Intake 960 ml   Output 1750 ml   Net -790 ml          Telemetry:  Sinus rhythm    Physical Exam:  The patient is alert, oriented and in no distress.  Vital signs as noted above.  Head and neck revealed no carotid bruits or jugular venous distention.  No thyromegaly or lymphadenopathy is present  Lungs clear.  No wheezing.  Breath sounds are normal bilaterally.  Heart normal first and second heart sounds.  No murmur. No precordial rub is present.  No gallop is present.  Abdomen soft and nontender.  No organomegaly is present.  Extremities with good peripheral pulses without any pedal edema.  Skin warm and dry.  Musculoskeletal system is grossly normal.  CNS grossly normal.       Results Review:  I have personally reviewed the results from the time of this admission to 12/19/2022 15:10 EST and agree with these findings:  []  Laboratory  []  Microbiology  []  Radiology  []  EKG/Telemetry   []  Cardiology/Vascular   []  Pathology  []  Old records  []  Other:    Most notable findings include:    Lab Results (last 24 hours)     Procedure Component Value Units Date/Time    POC Glucose Once [[id number]]  (Abnormal) Collected: 12/19/22 1216    Specimen: Blood Updated: 12/19/22 1220     Glucose 325 mg/dL      Comment: Serial Number: [id number]:  [id number]       aPTT [[id number]]  (Normal) Collected: 12/19/22 1043    Specimen: Blood Updated: 12/19/22 1133     PTT 74.4 seconds     POC Glucose Once [[id number]]  (Abnormal) Collected: 12/19/22 1053    Specimen: Blood Updated: 12/19/22 1057     Glucose " 395 mg/dL      Comment: Serial Number: [id number]:  [id number]       Hemoglobin A1c [[id number]]  (Abnormal) Collected: 12/17/22 0708    Specimen: Blood Updated: 12/19/22 1009     Hemoglobin A1C 8.3 %     Narrative:      Hemoglobin A1C Reference Range:    <5.7 %        Normal  5.7-6.4 %     Increased risk for diabetes  > 6.4 %        Diabetes       These guidelines have been recommended by the American Diabetic Association for Hgb A1c.      The following 2010 guidelines have been recommended by the American Diabetes Association for Hemoglobin A1c.    HBA1c 5.7-6.4% Increased risk for future diabetes (pre-diabetes)  HBA1c     >6.4% Diabetes      POC Glucose Once [[id number]]  (Abnormal) Collected: 12/19/22 0711    Specimen: Blood Updated: 12/19/22 0712     Glucose 193 mg/dL      Comment: Serial Number: [id number]:  [id number]       Basic Metabolic Panel [[id number]]  (Abnormal) Collected: 12/19/22 0300    Specimen: Blood Updated: 12/19/22 0415     Glucose 225 mg/dL      BUN 29 mg/dL      Creatinine 1.06 mg/dL      Sodium 136 mmol/L      Potassium 4.4 mmol/L      Chloride 94 mmol/L      CO2 29.0 mmol/L      Calcium 9.2 mg/dL      BUN/Creatinine Ratio 27.4     Anion Gap 13.0 mmol/L      eGFR 73.2 mL/min/1.73      Comment: National Kidney Foundation and American Society of Nephrology (ASN) Task Force recommended calculation based on the Chronic Kidney Disease Epidemiology Collaboration (CKD-EPI) equation refit without adjustment for race.       Narrative:      GFR Normal >60  Chronic Kidney Disease <60  Kidney Failure <15    The GFR formula is only valid for adults with stable renal function between ages 18 and 70.    aPTT [[id number]]  (Normal) Collected: 12/19/22 0300    Specimen: Blood Updated: 12/19/22 0403     PTT 70.0 seconds     CBC & Differential [[id number]]  (Normal) Collected: 12/19/22 0300    Specimen: Blood Updated: 12/19/22 0343    Narrative:      The following orders were created for panel order  CBC & Differential.  Procedure                               Abnormality         Status                     ---------                               -----------         ------                     CBC Auto Differential[[id number]]        Normal              Final result                 Please view results for these tests on the individual orders.    CBC Auto Differential [[id number]]  (Normal) Collected: 12/19/22 0300    Specimen: Blood Updated: 12/19/22 0343     WBC 8.40 10*3/mm3      RBC 4.58 10*6/mm3      Hemoglobin 13.5 g/dL      Hematocrit 40.7 %      MCV 88.7 fL      MCH 29.4 pg      MCHC 33.2 g/dL      RDW 14.4 %      RDW-SD 47.7 fl      MPV 8.9 fL      Platelets 267 10*3/mm3      Neutrophil % 63.4 %      Lymphocyte % 22.9 %      Monocyte % 8.1 %      Eosinophil % 5.4 %      Basophil % 0.2 %      Neutrophils, Absolute 5.30 10*3/mm3      Lymphocytes, Absolute 1.90 10*3/mm3      Monocytes, Absolute 0.70 10*3/mm3      Eosinophils, Absolute 0.40 10*3/mm3      Basophils, Absolute 0.00 10*3/mm3      nRBC 0.1 /100 WBC     TSH [[id number]]  (Normal) Collected: 12/18/22 2003    Specimen: Blood Updated: 12/18/22 2043     TSH 2.040 uIU/mL     aPTT [[id number]]  (Abnormal) Collected: 12/18/22 2003    Specimen: Blood Updated: 12/18/22 2028     PTT 59.3 seconds     POC Glucose Once [[id number]]  (Abnormal) Collected: 12/18/22 1937    Specimen: Blood Updated: 12/18/22 1938     Glucose 238 mg/dL      Comment: Serial Number: [id number]:  [id number]       POC Glucose Once [[id number]]  (Abnormal) Collected: 12/18/22 1608    Specimen: Blood Updated: 12/18/22 1609     Glucose 236 mg/dL      Comment: Serial Number: [id number]:  [id number]             Imaging Results (Last 24 Hours)     ** No results found for the last 24 hours. **          LAB RESULTS (LAST 7 DAYS)    CBC  Results from last 7 days   Lab Units 12/19/22  0300 12/18/22  1235 12/17/22  0945   WBC 10*3/mm3 8.40 9.40 10.70   RBC 10*6/mm3 4.58 5.05 4.90   HEMOGLOBIN  g/dL 13.5 14.8 14.5   HEMATOCRIT % 40.7 45.6 45.0   MCV fL 88.7 90.4 91.8   PLATELETS 10*3/mm3 267 267 337       BMP  Results from last 7 days   Lab Units 12/19/22 0300 12/17/22 2256 12/17/22  0945   SODIUM mmol/L 136 135* 134*   POTASSIUM mmol/L 4.4 4.3 4.2   CHLORIDE mmol/L 94* 94* 94*   CO2 mmol/L 29.0 29.0 26.0   BUN mg/dL 29* 37* 32*   CREATININE mg/dL 1.06 1.31* 1.16   GLUCOSE mg/dL 225* 215* 368*       CMP   Results from last 7 days   Lab Units 12/19/22 0300 12/17/22 2256 12/17/22  0945   SODIUM mmol/L 136 135* 134*   POTASSIUM mmol/L 4.4 4.3 4.2   CHLORIDE mmol/L 94* 94* 94*   CO2 mmol/L 29.0 29.0 26.0   BUN mg/dL 29* 37* 32*   CREATININE mg/dL 1.06 1.31* 1.16   GLUCOSE mg/dL 225* 215* 368*   ALBUMIN g/dL  --   --  3.80   BILIRUBIN mg/dL  --   --  0.9   ALK PHOS U/L  --   --  90   AST (SGOT) U/L  --   --  16   ALT (SGPT) U/L  --   --  9       BNP        TROPONIN        CoAg  Results from last 7 days   Lab Units 12/19/22  1043 12/19/22  0300 12/18/22 2003 12/18/22  1231 12/18/22  0333 12/17/22  2256 12/17/22  1451 12/17/22  0708   INR   --   --   --   --   --   --   --  1.06   APTT seconds 74.4 70.0 59.3* 46.5* 33.9* 41.2* 49.4* 22.8*       Creatinine Clearance  Estimated Creatinine Clearance: 72.7 mL/min (by C-G formula based on SCr of 1.06 mg/dL).    ABG        Radiology  No radiology results for the last day      EKG  I personally viewed and interpreted the patient's EKG/Telemetry data:  ECG 12 Lead Chest Pain   Final Result   HEART RATE= 89  bpm   RR Interval= 672  ms   CA Interval= 176  ms   P Horizontal Axis= -5  deg   P Front Axis= 29  deg   QRSD Interval= 92  ms   QT Interval= 362  ms   QRS Axis= 16  deg   T Wave Axis= 163  deg   - ABNORMAL ECG -   Sinus rhythm   Probable left atrial enlargement   Probable anterior infarct, age indeterminate   Abnormal T, consider ischemia, lateral leads   No previous ECG available for comparison   Electronically Signed By: Alex Byrd (BERNY) 19-Dec-2022  08:52:18   Date and Time of Study: 2022-12-17 07:21:47      SCANNED - TELEMETRY     Final Result      SCANNED - TELEMETRY     Final Result      SCANNED - TELEMETRY     Final Result      SCANNED - TELEMETRY     Final Result      SCANNED - TELEMETRY     Final Result      SCANNED - TELEMETRY     Final Result      SCANNED - TELEMETRY     Final Result      SCANNED - TELEMETRY     Final Result      SCANNED - TELEMETRY     Final Result            Echocardiogram:    Results for orders placed during the hospital encounter of 12/17/22    Adult Transthoracic Echo Complete W/ Cont if Necessary Per Protocol    Interpretation Summary  •  Left ventricular ejection fraction appears to be 41 - 45%.  •  The left ventricular cavity is borderline dilated.  •  Left ventricular diastolic function is consistent with (grade I) impaired relaxation.  •  No significant valvular abnormalities        Stress Test:         Cardiac Catheterization:  No results found for this or any previous visit.         Other:         ASSESSMENT & PLAN:    Principal Problem:    CAD (coronary artery disease), native coronary artery  Active Problems:    Type 2 diabetes mellitus with hyperglycemia, without long-term current use of insulin (HCC)    Mixed hyperlipidemia    Primary hypertension    Systolic and diastolic CHF, acute on chronic (HCC)    Influenza due to seasonal influenza virus    76-year-old man with multiple and complex cardiovascular risk factors including hypertension, hyperlipidemia, uncontrolled diabetes presented with non-ST elevation MI and was diagnosed with influenza.  He also had MENDEZ while renal function is resolved.  Troponin peaked at 4.5.  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  His initial EF was 20% however repeat echocardiogram shows EF of 40 to 45% with no significant valvular abnormalities.  He has been started on aspirin, high intensity statin and  beta-blocker.  Due to reduction in EF and multivessel coronary disease in the presence of diabetes CABG is a better option than percutaneous coronary intervention.  However if turned down for CABG Impella assisted PCI with atherectomy can be offered.  He will require multiple stents in LAD, diagonal and left circumflex artery.  He is currently on diuretics with furosemide 40 mg p.o. twice daily and renal function is normal with creatinine of 1.06.  He is also completing treatment for influenza.      Johann Morales MD  12/19/22  15:10 EST

## 2022-12-19 NOTE — PLAN OF CARE
Problem: Adult Inpatient Plan of Care  Goal: Plan of Care Review  Outcome: Ongoing, Progressing  Flowsheets (Taken 12/19/2022 1323)  Outcome Evaluation: Patient with no complaints of chest pain or soa this shift. Patient has been calm an cooperative awaiting cardiothoracic decision on possible CABG. VSS  Goal: Patient-Specific Goal (Individualized)  Outcome: Ongoing, Progressing  Goal: Absence of Hospital-Acquired Illness or Injury  Outcome: Ongoing, Progressing  Intervention: Identify and Manage Fall Risk  Recent Flowsheet Documentation  Taken 12/19/2022 1000 by Manda Cruz, RN  Safety Promotion/Fall Prevention:   nonskid shoes/slippers when out of bed   safety round/check completed   room organization consistent  Goal: Optimal Comfort and Wellbeing  Outcome: Ongoing, Progressing  Intervention: Provide Person-Centered Care  Recent Flowsheet Documentation  Taken 12/19/2022 0901 by Manda Cruz, RN  Trust Relationship/Rapport:   care explained   choices provided   emotional support provided  Goal: Readiness for Transition of Care  Outcome: Ongoing, Progressing   Goal Outcome Evaluation:              Outcome Evaluation: Patient with no complaints of chest pain or soa this shift. Patient has been calm an cooperative awaiting cardiothoracic decision on possible CABG. VSS

## 2022-12-19 NOTE — PLAN OF CARE
Goal Outcome Evaluation:  Plan of Care Reviewed With: patient        Progress: no change  Outcome Evaluation: Patient alert and oriented, [place], patient wears hearing aids continues O2@2liters via nc, patient continues on heparin gtt@23 units, patient has rested well this shift, VSS.

## 2022-12-19 NOTE — PROGRESS NOTES
[facility] Medicine Services Daily Progress Note    Patient Name: Sherman Baumann  : 1945  MRN: [id number]  Primary Care Physician:  Melvina Hodge  Date of admission: 2022      Subjective      Chief Complaint: Shortness of breath.      Patient Reports:        Seen and examined vitals and labs and notes reviewed  White count normal  Blood glucose uncontrolled  ThisNotes reviewed  Work-up for CABG on going  Increased daytime insulin 70/30      Review of Systems   Constitutional: Positive for malaise/fatigue.   HENT: Negative.    Eyes: Negative.    Cardiovascular: Negative.    Respiratory: Negative.    Endocrine: Negative.    Hematologic/Lymphatic: Negative.    Skin: Negative.    Musculoskeletal: Negative.    Gastrointestinal: Negative.    Genitourinary: Negative.    Neurological: Negative.    Psychiatric/Behavioral: Negative.    Allergic/Immunologic: Negative.             Objective      Vitals:   Temp:  [97.6 °F (36.4 °C)-98.7 °F (37.1 °C)] 97.6 °F (36.4 °C)  Heart Rate:  [78-93] 93  Resp:  [14-17] 15  BP: (115-134)/(61-75) 132/62  Flow (L/min):  [2] 2    Physical Exam  Vitals reviewed.   Constitutional:       General: He is not in acute distress.  HENT:      Head: Normocephalic.      Nose: Nose normal.      Mouth/Throat:      Mouth: Mucous membranes are dry.      Pharynx: Oropharynx is clear.   Eyes:      Extraocular Movements: Extraocular movements intact.      Conjunctiva/sclera: Conjunctivae normal.      Pupils: Pupils are equal, round, and reactive to light.   Cardiovascular:      Pulses: Normal pulses.      Heart sounds: No murmur heard.    No friction rub. No gallop.      Comments: S1 and S2 present.  No tachycardia.  Pulmonary:      Breath sounds: No stridor. No wheezing or rales.   Chest:      Chest wall: No tenderness.   Abdominal:      General: Bowel sounds are normal. There is no distension.      Palpations: Abdomen is soft.      Tenderness: There is no  abdominal tenderness. There is no right CVA tenderness or guarding.   Musculoskeletal:         General: No swelling, tenderness, deformity or signs of injury.      Cervical back: Normal range of motion. No rigidity.      Right lower leg: No edema.      Left lower leg: No edema.   Skin:     General: Skin is warm and dry.      Capillary Refill: Capillary refill takes less than 2 seconds.      Coloration: Skin is not jaundiced.      Findings: No bruising, erythema, lesion or rash.   Neurological:      Mental Status: He is alert.      Comments: No facial asymmetry noted.  Gait and station not tested.   Psychiatric:      Comments: No agitation.                 Result Review    Result Review:  I have personally reviewed the results from the time of this admission to 12/19/2022 11:03 EST and agree with these findings:  []  Laboratory  []  Microbiology  []  Radiology  []  EKG/Telemetry   []  Cardiology/Vascular   []  Pathology  []  Old records  []  Other:  Most notable findings include:           Assessment & Plan    From previous notes and with minor updates.    Brief Patient Summary:      Patient is a 76-year-old male with past medical history of CHF, diabetes mellitus type 2, hypertension and hyperlipidemia who was a transfer to [facility] because of her multivessel coronary artery disease with a severe left ventricular dysfunction with ejection fraction of 20 to 25%.  Cardiology consult was completed.  Cardiothoracic surgery consult was completed.  Hospitalist consult for medical management was requested.  ID consult was completed.         aspirin, 81 mg, Oral, Daily  carvedilol, 3.125 mg, Oral, BID With Meals  docusate sodium, 100 mg, Oral, BID  furosemide, 40 mg, Oral, BID  insulin lispro, 4-24 Units, Subcutaneous, TID With Meals  insulin NPH-insulin regular, 22 Units, Subcutaneous, Daily With Dinner  insulin NPH-insulin regular, 27 Units, Subcutaneous, QAM  oseltamivir, 75 mg, Oral, Q12H  polyethylene  glycol, 17 g, Oral, Daily  potassium chloride, 20 mEq, Oral, BID With Meals  rosuvastatin, 5 mg, Oral, Nightly  sodium chloride, 10 mL, Intravenous, Q12H       heparin, 9.71 Units/kg/hr, Last Rate: 22.913 Units/kg/hr (12/19/22 1032)         Active Hospital Problems:  Active Hospital Problems    Diagnosis    • **CAD (coronary artery disease), native coronary artery    • Influenza due to seasonal influenza virus    • Type 2 diabetes mellitus with hyperglycemia, without long-term current use of insulin (HCC)    • Mixed hyperlipidemia    • Primary hypertension    • Systolic and diastolic CHF, acute on chronic (HCC)          Plan:      -Continue appropriate patient's home medications for other chronic medical conditions.  -Insulin regimen adjusted  -Patient and family agreed with the plan of care.  - Target blood glucose 1 40-1 80 until surgery  -Treat hyperlipidemia with Crestor.  -Follow cardiology and Cardiothoracic surgery recommendations.  -Treat coronary artery disease with aspirin.  -Treat acute on chronic systolic and diastolic CHF with Lasix.  -Treat influenza with Tamiflu.  -Follow ID recommendations.  -Currently on heparin drip aspirin beta-blocker Lasix Crestor    DVT prophylaxis:  Medical DVT prophylaxis orders are present.    CODE STATUS:    Level Of Support Discussed With: Patient  Code Status (Patient has no pulse and is not breathing): CPR (Attempt to Resuscitate)  Medical Interventions (Patient has pulse or is breathing): Full Support      Disposition: Pending clinical improvement.    This patient has been examined wearing appropriate Personal Protective Equipment and discussed with hospital infection control department, [facility], infectious disease specialist and pulmonologist. 12/19/22      Electronically signed by Nader Bustos MD, FACP, 12/19/22, 11:03 EST.      [facility] Hospitalist Team

## 2022-12-19 NOTE — PROGRESS NOTES
Infectious Diseases Progress Note      LOS: 1 day   Patient Care Team:  Melvina Hodge as PCP - General (Nurse Practitioner)    Chief Complaint: Shortness of breath at admission    Subjective       The patient has been afebrile for the last 24 hours.  The patient is on room air, hemodynamically stable, and is tolerating antimicrobial therapy.      Review of Systems:   Review of Systems   Constitutional: Negative.    HENT: Negative.    Eyes: Negative.    Respiratory: Negative.    Cardiovascular: Negative.    Gastrointestinal: Negative.    Endocrine: Negative.    Genitourinary: Negative.    Musculoskeletal: Negative.    Skin: Negative.    Neurological: Negative.    Psychiatric/Behavioral: Negative.    All other systems reviewed and are negative.       Objective     Vital Signs  Temp:  [97.4 °F (36.3 °C)-98.7 °F (37.1 °C)] 98.7 °F (37.1 °C)  Heart Rate:  [80-89] 87  Resp:  [15-17] 17  BP: (115-132)/(61-71) 115/61    Physical Exam:  Physical Exam  Vitals and nursing note reviewed.   Constitutional:       General: He is not in acute distress.     Appearance: Normal appearance. He is well-developed and normal weight. He is not diaphoretic.   HENT:      Head: Normocephalic and atraumatic.   Eyes:      Conjunctiva/sclera: Conjunctivae normal.      Pupils: Pupils are equal, round, and reactive to light.   Cardiovascular:      Rate and Rhythm: Normal rate and regular rhythm.      Heart sounds: Normal heart sounds, S1 normal and S2 normal.   Pulmonary:      Effort: Pulmonary effort is normal. No respiratory distress.      Breath sounds: Normal breath sounds. No stridor. No wheezing or rales.   Abdominal:      General: Bowel sounds are normal. There is no distension.      Palpations: Abdomen is soft. There is no mass.      Tenderness: There is no abdominal tenderness. There is no guarding.   Musculoskeletal:         General: No deformity. Normal range of motion.      Cervical back: Neck supple.   Skin:     General: Skin is  warm and dry.      Coloration: Skin is not pale.      Findings: No erythema or rash.   Neurological:      Mental Status: He is alert and oriented to person, place, and time.      Cranial Nerves: No cranial nerve deficit.   Psychiatric:         Mood and Affect: Mood normal.          Results Review:    I have reviewed all clinical data, test, lab, and imaging results.     Radiology  Adult Transthoracic Echo Complete W/ Cont if Necessary Per Protocol    Result Date: 12/18/2022  •  Left ventricular ejection fraction appears to be 41 - 45%. •  The left ventricular cavity is borderline dilated. •  Left ventricular diastolic function is consistent with (grade I) impaired relaxation. •  No significant valvular abnormalities       Cardiology    Laboratory    Results from last 7 days   Lab Units 12/18/22  1235 12/17/22  0945   WBC 10*3/mm3 9.40 10.70   HEMOGLOBIN g/dL 14.8 14.5   HEMATOCRIT % 45.6 45.0   PLATELETS 10*3/mm3 267 337     Results from last 7 days   Lab Units 12/17/22  2256 12/17/22  0945   SODIUM mmol/L 135* 134*   POTASSIUM mmol/L 4.3 4.2   CHLORIDE mmol/L 94* 94*   CO2 mmol/L 29.0 26.0   BUN mg/dL 37* 32*   CREATININE mg/dL 1.31* 1.16   GLUCOSE mg/dL 215* 368*   ALBUMIN g/dL  --  3.80   BILIRUBIN mg/dL  --  0.9   ALK PHOS U/L  --  90   AST (SGOT) U/L  --  16   ALT (SGPT) U/L  --  9   CALCIUM mg/dL 9.2 9.0                 Microbiology   Microbiology Results (last 10 days)     Procedure Component Value - Date/Time    MRSA Screen, PCR (Inpatient) - Swab, Nares [[id number]]  (Normal) Collected: 12/18/22 1153    Lab Status: Final result Specimen: Swab from Nares Updated: 12/18/22 1317     MRSA PCR No MRSA Detected    Narrative:      The negative predictive value of this diagnostic test is high and should only be used to consider de-escalating anti-MRSA therapy. A positive result may indicate colonization with MRSA and must be correlated clinically.    COVID PRE-OP / PRE-PROCEDURE SCREENING ORDER (NO ISOLATION) -  Swab, Nasopharynx [[id number]]  (Normal) Collected: 12/17/22 1237    Lab Status: Final result Specimen: Swab from Nasopharynx Updated: 12/17/22 1304    Narrative:      The following orders were created for panel order COVID PRE-OP / PRE-PROCEDURE SCREENING ORDER (NO ISOLATION) - Swab, Nasopharynx.  Procedure                               Abnormality         Status                     ---------                               -----------         ------                     COVID-19,CEPHEID/CAMERON,CO...[[id number]]  Normal              Final result                 Please view results for these tests on the individual orders.    COVID-19,CEPHEID/CAMERON,COR/BERNY/PAD/ASAF/MAD IN-HOUSE(OR EMERGENT/ADD-ON),NP SWAB IN TRANSPORT MEDIA 3-4 HR TAT, RT-PCR - Swab, Nasopharynx [[id number]]  (Normal) Collected: 12/17/22 1237    Lab Status: Final result Specimen: Swab from Nasopharynx Updated: 12/17/22 1304     COVID19 Not Detected    Narrative:      Fact sheet for providers: https://www.fda.gov/media/655373/download     Fact sheet for patients: https://www.fda.gov/media/097626/download  Fact sheet for providers: https://www.fda.gov/media/656127/download    Fact sheet for patients: https://www.fda.gov/media/862603/download    Test performed by PCR.          Medication Review:       Schedule Meds  aspirin, 81 mg, Oral, Daily  carvedilol, 3.125 mg, Oral, BID With Meals  docusate sodium, 100 mg, Oral, BID  furosemide, 40 mg, Oral, BID  insulin lispro, 4-24 Units, Subcutaneous, TID With Meals  insulin NPH-insulin regular, 22 Units, Subcutaneous, Daily With Dinner  insulin NPH-insulin regular, 27 Units, Subcutaneous, QAM  oseltamivir, 75 mg, Oral, Q12H  polyethylene glycol, 17 g, Oral, Daily  potassium chloride, 20 mEq, Oral, BID With Meals  rosuvastatin, 5 mg, Oral, Nightly  sodium chloride, 10 mL, Intravenous, Q12H        Infusion Meds  heparin, 9.71 Units/kg/hr, Last Rate: 22 Units/kg/hr (12/18/22 1312)        PRN Meds  •  acetaminophen  •   ALPRAZolam  •  dextrose  •  dextrose  •  glucagon (human recombinant)  •  melatonin  •  nitroglycerin  •  ondansetron  •  potassium chloride **OR** potassium chloride **OR** potassium chloride  •  sodium chloride  •  sodium chloride        Assessment & Plan       Antimicrobial Therapy   1.  P.o. Tamiflu        2.        3.        4.        5.            Assessment     Influenza B infection.  Patient is currently on 4 L of oxygen.  I believe his hypoxia related to cardiac rather than infection.  The patient was screened positive at outlying facility on December 15, 2022     Multivessel coronary artery disease with ischemic cardiomyopathy.  Patient's been seen by cardiovascular surgery service and cardiology being consulted for possible high risk stent placement     Remote history of tobacco abuse     Plan     Continue Tamiflu 75 mg p.o. twice daily for 5 days  Supportive care  Can discontinue isolation  Not much more to add from infectious disease standpoint-we will sign off at this time-please call with any questions.    Ashley Moore, APRN  12/18/22  20:30 EST    Note is dictated utilizing voice recognition software/Dragon

## 2022-12-19 NOTE — PROGRESS NOTES
CC:  Shortness of breath    F/U:  MV CAD/ NSTEMI/ severe LV dysfunction--[facility]  EF 20-25% (echo) at [place]  Repeat echo  -- EF 41-45%    Subjective:  No complaints. Denies chest pain. States he feels fine and doesn't feel like he has the flu.     Drips:  heparin      PREOP studies:  Carotid duplex:  16-49% bilat  Vein zoraida:  adequate  A1c:  8.4 at [place]   Plt ag%  MRSA screen: negative   COVID-19 screen:  Neg   UA:  Neg for UTI   Echo:  Repeat ordered       Intake/Output Summary (Last 24 hours) at 2022 0857  Last data filed at 2022 0450  Gross per 24 hour   Intake 1080 ml   Output 1750 ml   Net -670 ml     Temp:  [97.4 °F (36.3 °C)-98.7 °F (37.1 °C)] 98.2 °F (36.8 °C)  Heart Rate:  [78-89] 81  Resp:  [14-17] 15  BP: (115-134)/(61-75) 134/67      Results from last 7 days   Lab Units 22  0300 22  1235 22  0945 22  0708   WBC 10*3/mm3 8.40 9.40   < >  --    HEMOGLOBIN g/dL 13.5 14.8   < >  --    HEMATOCRIT % 40.7 45.6   < >  --    PLATELETS 10*3/mm3 267 267   < >  --    INR   --   --   --  1.06    < > = values in this interval not displayed.     Results from last 7 days   Lab Units 22  0300   CREATININE mg/dL 1.06   POTASSIUM mmol/L 4.4   SODIUM mmol/L 136       Physical Exam:  Neuro intact, nad, sitting up on side of bed eating breakfast, [facility]  Tele:  SR 80-90s  Diminished bases, 92% RA  Benign abd, + BM  No edema    Assessment/Plan:  Principal Problem:    CAD (coronary artery disease), native coronary artery  Active Problems:    Type 2 diabetes mellitus with hyperglycemia, without long-term current use of insulin (HCC)    Mixed hyperlipidemia    Primary hypertension    Systolic and diastolic CHF, acute on chronic (HCC)    Influenza due to seasonal influenza virus    - MV CAD with NSTEMI presentation, EF 20-25% (echo)--transferred for further care and evaluation  - Acute HFrEF, NYHA class II-III--BNP 5580 at OSH, repeat echo  EF 41-45%  - Influenza  B--ID consulted, Tamiflu x 5 days   - ICM  - DM type 2 with hyperglycemia--a1c 8.4 at OSH, endocrine following   - HTN--stable  - Past smoker  - [place], hearing aids in place  - Obesity    Work-up in progress for possible CABG. Echocardiogram repeated yesterday shows improved EF 41-45%. Patient currently receiving Tamiflu for Influenza B (last dose 12/22). Continue Heparin drip.     ANALI Babcock  12/19/2022  08:57 EST     Addendum:  Will repeat echo on Weds to ascertain stability of LV function and Mitral valve, obtain CT chest, consult Dr. Judd for preop clearance, will tentatively plan for 10-14 days recovery post flu for surgery if continued improvement in EF.  PT/OT eval and treat, right neck without abnormality to palpation today by Dr. Huber.     Chyna Christian, ROWAN  Cardiothoracic Surgery

## 2022-12-20 LAB
ANION GAP SERPL CALCULATED.3IONS-SCNC: 11 MMOL/L (ref 5–15)
APTT PPP: 61.8 SECONDS (ref 61–76.5)
APTT PPP: 63.1 SECONDS (ref 61–76.5)
APTT PPP: 78.2 SECONDS (ref 61–76.5)
APTT PPP: 82.6 SECONDS (ref 61–76.5)
BUN SERPL-MCNC: 28 MG/DL (ref 8–23)
BUN/CREAT SERPL: 25.5 (ref 7–25)
CALCIUM SPEC-SCNC: 9.3 MG/DL (ref 8.6–10.5)
CHLORIDE SERPL-SCNC: 91 MMOL/L (ref 98–107)
CO2 SERPL-SCNC: 28 MMOL/L (ref 22–29)
CREAT SERPL-MCNC: 1.1 MG/DL (ref 0.76–1.27)
EGFRCR SERPLBLD CKD-EPI 2021: 70 ML/MIN/1.73
GLUCOSE BLDC GLUCOMTR-MCNC: 208 MG/DL (ref 70–105)
GLUCOSE BLDC GLUCOMTR-MCNC: 331 MG/DL (ref 70–105)
GLUCOSE BLDC GLUCOMTR-MCNC: 365 MG/DL (ref 70–105)
GLUCOSE SERPL-MCNC: 374 MG/DL (ref 65–99)
POTASSIUM SERPL-SCNC: 4.7 MMOL/L (ref 3.5–5.2)
SODIUM SERPL-SCNC: 130 MMOL/L (ref 136–145)

## 2022-12-20 PROCEDURE — 94799 UNLISTED PULMONARY SVC/PX: CPT

## 2022-12-20 PROCEDURE — 97162 PT EVAL MOD COMPLEX 30 MIN: CPT

## 2022-12-20 PROCEDURE — 99233 SBSQ HOSP IP/OBS HIGH 50: CPT | Performed by: INTERNAL MEDICINE

## 2022-12-20 PROCEDURE — 97166 OT EVAL MOD COMPLEX 45 MIN: CPT

## 2022-12-20 PROCEDURE — 85730 THROMBOPLASTIN TIME PARTIAL: CPT | Performed by: THORACIC SURGERY (CARDIOTHORACIC VASCULAR SURGERY)

## 2022-12-20 PROCEDURE — 63710000001 INSULIN ISOPHANE & REGULAR PER 5 UNITS: Performed by: INTERNAL MEDICINE

## 2022-12-20 PROCEDURE — 99232 SBSQ HOSP IP/OBS MODERATE 35: CPT | Performed by: INTERNAL MEDICINE

## 2022-12-20 PROCEDURE — 63710000001 INSULIN LISPRO (HUMAN) PER 5 UNITS: Performed by: INTERNAL MEDICINE

## 2022-12-20 PROCEDURE — 84100 ASSAY OF PHOSPHORUS: CPT | Performed by: HOSPITALIST

## 2022-12-20 PROCEDURE — 85025 COMPLETE CBC W/AUTO DIFF WBC: CPT | Performed by: THORACIC SURGERY (CARDIOTHORACIC VASCULAR SURGERY)

## 2022-12-20 PROCEDURE — 97530 THERAPEUTIC ACTIVITIES: CPT

## 2022-12-20 PROCEDURE — 25010000002 HEPARIN (PORCINE) 25000-0.45 UT/250ML-% SOLUTION: Performed by: THORACIC SURGERY (CARDIOTHORACIC VASCULAR SURGERY)

## 2022-12-20 PROCEDURE — 80048 BASIC METABOLIC PNL TOTAL CA: CPT | Performed by: NURSE PRACTITIONER

## 2022-12-20 PROCEDURE — 99231 SBSQ HOSP IP/OBS SF/LOW 25: CPT | Performed by: NURSE PRACTITIONER

## 2022-12-20 PROCEDURE — 82962 GLUCOSE BLOOD TEST: CPT

## 2022-12-20 PROCEDURE — 83735 ASSAY OF MAGNESIUM: CPT | Performed by: HOSPITALIST

## 2022-12-20 RX ORDER — IPRATROPIUM BROMIDE AND ALBUTEROL SULFATE 2.5; .5 MG/3ML; MG/3ML
3 SOLUTION RESPIRATORY (INHALATION) EVERY 4 HOURS PRN
Status: DISCONTINUED | OUTPATIENT
Start: 2022-12-20 | End: 2022-12-29

## 2022-12-20 RX ORDER — INSULIN LISPRO 100 [IU]/ML
2-12 INJECTION, SOLUTION INTRAVENOUS; SUBCUTANEOUS
Status: DISCONTINUED | OUTPATIENT
Start: 2022-12-20 | End: 2022-12-29

## 2022-12-20 RX ADMIN — IPRATROPIUM BROMIDE AND ALBUTEROL SULFATE 3 ML: .5; 3 SOLUTION RESPIRATORY (INHALATION) at 11:54

## 2022-12-20 RX ADMIN — HEPARIN SODIUM 18.89 UNITS/KG/HR: 10000 INJECTION, SOLUTION INTRAVENOUS at 13:19

## 2022-12-20 RX ADMIN — Medication 10 ML: at 21:21

## 2022-12-20 RX ADMIN — OSELTAMIVIR PHOSPHATE 75 MG: 75 CAPSULE ORAL at 21:21

## 2022-12-20 RX ADMIN — CARVEDILOL 3.12 MG: 3.12 TABLET, FILM COATED ORAL at 08:03

## 2022-12-20 RX ADMIN — CARVEDILOL 3.12 MG: 3.12 TABLET, FILM COATED ORAL at 17:39

## 2022-12-20 RX ADMIN — POTASSIUM CHLORIDE 20 MEQ: 1500 TABLET, EXTENDED RELEASE ORAL at 17:39

## 2022-12-20 RX ADMIN — DOCUSATE SODIUM 100 MG: 100 CAPSULE, LIQUID FILLED ORAL at 08:02

## 2022-12-20 RX ADMIN — BUDESONIDE 0.5 MG: 0.5 INHALANT RESPIRATORY (INHALATION) at 07:22

## 2022-12-20 RX ADMIN — ASPIRIN 81 MG CHEWABLE TABLET 81 MG: 81 TABLET CHEWABLE at 08:02

## 2022-12-20 RX ADMIN — INSULIN LISPRO 10 UNITS: 100 INJECTION, SOLUTION INTRAVENOUS; SUBCUTANEOUS at 11:59

## 2022-12-20 RX ADMIN — ROSUVASTATIN CALCIUM 5 MG: 5 TABLET, COATED ORAL at 21:21

## 2022-12-20 RX ADMIN — INSULIN HUMAN 24 UNITS: 100 INJECTION, SUSPENSION SUBCUTANEOUS at 08:03

## 2022-12-20 RX ADMIN — EMPAGLIFLOZIN 10 MG: 10 TABLET, FILM COATED ORAL at 17:39

## 2022-12-20 RX ADMIN — HEPARIN SODIUM 22.91 UNITS/KG/HR: 10000 INJECTION, SOLUTION INTRAVENOUS at 00:17

## 2022-12-20 RX ADMIN — IPRATROPIUM BROMIDE AND ALBUTEROL SULFATE 3 ML: .5; 3 SOLUTION RESPIRATORY (INHALATION) at 19:18

## 2022-12-20 RX ADMIN — IPRATROPIUM BROMIDE AND ALBUTEROL SULFATE 3 ML: .5; 3 SOLUTION RESPIRATORY (INHALATION) at 15:42

## 2022-12-20 RX ADMIN — DOCUSATE SODIUM 100 MG: 100 CAPSULE, LIQUID FILLED ORAL at 21:21

## 2022-12-20 RX ADMIN — BUDESONIDE 0.5 MG: 0.5 INHALANT RESPIRATORY (INHALATION) at 19:24

## 2022-12-20 RX ADMIN — FUROSEMIDE 40 MG: 40 TABLET ORAL at 08:03

## 2022-12-20 RX ADMIN — INSULIN LISPRO 4 UNITS: 100 INJECTION, SOLUTION INTRAVENOUS; SUBCUTANEOUS at 08:03

## 2022-12-20 RX ADMIN — Medication 10 ML: at 08:03

## 2022-12-20 RX ADMIN — INSULIN LISPRO 8 UNITS: 100 INJECTION, SOLUTION INTRAVENOUS; SUBCUTANEOUS at 17:38

## 2022-12-20 RX ADMIN — POTASSIUM CHLORIDE 20 MEQ: 1500 TABLET, EXTENDED RELEASE ORAL at 08:02

## 2022-12-20 RX ADMIN — IPRATROPIUM BROMIDE AND ALBUTEROL SULFATE 3 ML: .5; 3 SOLUTION RESPIRATORY (INHALATION) at 07:16

## 2022-12-20 RX ADMIN — POLYETHYLENE GLYCOL 3350 17 G: 17 POWDER, FOR SOLUTION ORAL at 08:02

## 2022-12-20 RX ADMIN — INSULIN HUMAN 30 UNITS: 100 INJECTION, SUSPENSION SUBCUTANEOUS at 17:39

## 2022-12-20 RX ADMIN — OSELTAMIVIR PHOSPHATE 75 MG: 75 CAPSULE ORAL at 08:03

## 2022-12-20 RX ADMIN — FUROSEMIDE 40 MG: 40 TABLET ORAL at 17:39

## 2022-12-20 RX ADMIN — INSULIN HUMAN 24 UNITS: 100 INJECTION, SUSPENSION SUBCUTANEOUS at 11:59

## 2022-12-20 NOTE — PROGRESS NOTES
[facility] Medicine Services   Daily Progress Note    Patient Name: Sherman Baumann  : 1945  MRN: [id number]  Primary Care Physician:  Melvina Hodge  Date of admission: 2022  Date and Time of Service:       Subjective      Patient denies any chest pain shortness of breath  Had some lightheadedness when he first got up which has currently resolved.  Denies any worsening lower extremity edema or orthopnea  Has mild cough  No chills or sweats  No nausea vomiting diarrhea or constipation.      Objective      Vitals:   Temp:  [97.6 °F (36.4 °C)-99.1 °F (37.3 °C)] 97.6 °F (36.4 °C)  Heart Rate:  [74-89] 87  Resp:  [12-21] 14  BP: (114-145)/(54-77) 145/76  Flow (L/min):  [2] 2    Physical Exam   General: No acute distress  HEENT: neck supple, normal oral mucosa, no masses, no lymphadenopathy  Lungs: Clear bilaterally, no wheezing, No crackles, No Rhonchi. Equal excursions.   CV - Normal S1/S2, no murmur, Regular rate and rhythm   Abdomin - Soft, non-tender, non-distended, normal bowel sounds  Extremities - no edema, no erythema  Neuro - No focal weakness, normal sensation  Psych - Alert and oriented x3  Skin - no wounds or lesions.        Result Review    Result Review:  I have personally reviewed the results from the time of this admission to 2022 18:06 EST and agree with these findings:  [x]  Laboratory  [x]  Microbiology  [x]  Radiology  [x]  EKG/Telemetry   [x]  Cardiology/Vascular   []  Pathology  [x]  Old records  []  Other:  Most notable findings include:           Assessment & Plan      Brief Patient Summary:  Sherman Bauamnn is a 76 y.o. male who       aspirin, 81 mg, Oral, Daily  budesonide, 0.5 mg, Nebulization, BID - RT  carvedilol, 3.125 mg, Oral, BID With Meals  docusate sodium, 100 mg, Oral, BID  empagliflozin, 10 mg, Oral, Daily  furosemide, 40 mg, Oral, BID  insulin lispro, 2-12 Units, Subcutaneous, TID With Meals  insulin NPH-insulin regular, 30 Units, Subcutaneous,  TID AC  ipratropium-albuterol, 3 mL, Nebulization, 4x Daily - RT  oseltamivir, 75 mg, Oral, Q12H  polyethylene glycol, 17 g, Oral, Daily  potassium chloride, 20 mEq, Oral, BID With Meals  rosuvastatin, 5 mg, Oral, Nightly  sodium chloride, 10 mL, Intravenous, Q12H       heparin, 9.71 Units/kg/hr, Last Rate: 18.893 Units/kg/hr (12/20/22 1724)         Active Hospital Problems:  Active Hospital Problems    Diagnosis    • **CAD (coronary artery disease), native coronary artery    • Influenza due to seasonal influenza virus    • Type 2 diabetes mellitus with hyperglycemia, without long-term current use of insulin (HCC)    • Mixed hyperlipidemia    • Primary hypertension    • Systolic and diastolic CHF, acute on chronic (HCC)      Plan:     Multivessel CAD/NSTEMI/ICMP  - Acute Systolic heart failure EF 20-25%  - Continue Hepatin gtt  - ASA, BB, Statin. On Oral lasix.   - Plan for CABG next week  - Plan for repeat Echo tomorrow     Acute respiratory failure with hypoxia  - Influenza A  - Continue Tamiflu  - Pulm on consult. Wean off oxygen as tolerated  - Continue Nebs.     DM-2 with hyperglycemia  - managed by Endocrinology     DL - Statin          DVT prophylaxis:  Medical DVT prophylaxis orders are present.    CODE STATUS:    Level Of Support Discussed With: Patient  Code Status (Patient has no pulse and is not breathing): CPR (Attempt to Resuscitate)  Medical Interventions (Patient has pulse or is breathing): Full Support      Disposition:  I expect patient to be discharged .    This patient has been  and discussed with . 12/20/22      Electronically signed by Artemio Sheets MD, 12/20/22, 18:06 EST.  [facility]ist Team

## 2022-12-20 NOTE — THERAPY EVALUATION
Patient Name: Sherman Baumann  : 1945    MRN: [id number]                              Today's Date: 2022       Admit Date: 2022    Visit Dx: No diagnosis found.  Patient Active Problem List   Diagnosis   • CAD (coronary artery disease), native coronary artery   • Type 2 diabetes mellitus with hyperglycemia, without long-term current use of insulin (HCC)   • Mixed hyperlipidemia   • Primary hypertension   • Systolic and diastolic CHF, acute on chronic (HCC)   • Influenza due to seasonal influenza virus     Past Medical History:   Diagnosis Date   • CHF (congestive heart failure) (HCC)    • Diabetes mellitus (HCC)    • Elevated cholesterol    • Hyperlipidemia    • Hypertension      Past Surgical History:   Procedure Laterality Date   • HERNIA REPAIR     • TONSILLECTOMY        General Information     Row Name 22 [facility]          Physical Therapy Time and Intention    Document Type evaluation  -     Mode of Treatment physical therapy  -     Row Name 22 [facility]          General Information    Prior Level of Function independent:;all household mobility;gait;transfer;bed mobility  Did not utilize an AD  -     Existing Precautions/Restrictions fall;oxygen therapy device and L/min  Currently on 2L O2  -     Barriers to Rehab medically complex  -     Row Name 22 1401          Living Environment    People in Home spouse  -     Row Name 22 1401          Home Main Entrance    Number of Stairs, Main Entrance none;other (see comments)  ramp  -     Row Name 22 1401          Stairs Within Home, Primary    Stairs, Within Home, Primary Basement, but pt does not need to utilize  -     Row Name 22 1401          Safety Issues, Functional Mobility    Impairments Affecting Function (Mobility) endurance/activity tolerance;shortness of breath  -           User Key  (r) = Recorded By, (t) = Taken By, (c) = Cosigned By    Initials Name Provider Type    Jessica Capone,  PT Physical Therapist               Mobility     Row Name 12/20/22 1402          Bed Mobility    Comment, (Bed Mobility) Pt was seated at EOB, finishing up breakfast, upon entry  -     Row Name 12/20/22 1402          Sit-Stand Transfer    Sit-Stand [place] (Transfers) contact guard  -     Assistive Device (Sit-Stand Transfers) walker, front-wheeled  -     Comment, (Sit-Stand Transfer) STS from EOB  -     Row Name 12/20/22 1402          Gait/Stairs (Locomotion)    [place] Level (Gait) contact guard  -     Assistive Device (Gait) walker, front-wheeled  -     Distance in Feet (Gait) 15ft x2 RW  -     Deviations/Abnormal Patterns (Gait) gait speed decreased  -     Comment, (Gait/Stairs) No LOB throughout, though recommend RW at this time  -           User Key  (r) = Recorded By, (t) = Taken By, (c) = Cosigned By    Initials Name Provider Type    JULIA Jessica Salas PT Physical Therapist               Obj/Interventions     Row Name 12/20/22 1403          Range of Motion Comprehensive    General Range of Motion no range of motion deficits identified  -     Row Name 12/20/22 1403          Strength Comprehensive (MMT)    General Manual Muscle Testing (MMT) Assessment lower extremity strength deficits identified  -     Comment, General Manual Muscle Testing (MMT) Assessment BLE grossly 4-/5  -     Row Name 12/20/22 1403          Balance    Balance Assessment sitting static balance;sitting dynamic balance;standing static balance;standing dynamic balance  -     Static Sitting Balance independent  -     Dynamic Sitting Balance supervision  -     Position, Sitting Balance sitting edge of bed  -     Static Standing Balance standby assist  -     Dynamic Standing Balance contact guard  -     Position/Device Used, Standing Balance walker, front-wheeled  -     Row Name 12/20/22 1403          Sensory Assessment (Somatosensory)    Sensory Assessment (Somatosensory) sensation intact  -            User Key  (r) = Recorded By, (t) = Taken By, (c) = Cosigned By    Initials Name Provider Type    Jessica Capone PT Physical Therapist               Goals/Plan     Row Name 12/20/22 1405          Bed Mobility Goal 1 (PT)    Activity/Assistive Device (Bed Mobility Goal 1, PT) bed mobility activities, all  -JULIA     [place] Level/Cues Needed (Bed Mobility Goal 1, PT) independent  -JULIA     Time Frame (Bed Mobility Goal 1, PT) long term goal (LTG);2 weeks  -     Row Name 12/20/22 1405          Transfer Goal 1 (PT)    Activity/Assistive Device (Transfer Goal 1, PT) sit-to-stand/stand-to-sit;bed-to-chair/chair-to-bed  -JULIA     [place] Level/Cues Needed (Transfer Goal 1, PT) modified independence  -JULIA     Time Frame (Transfer Goal 1, PT) long term goal (LTG);2 weeks  -     Row Name 12/20/22 1405          Gait Training Goal 1 (PT)    Activity/Assistive Device (Gait Training Goal 1, PT) gait (walking locomotion)  -JULIA     [place] Level (Gait Training Goal 1, PT) modified independence  -JULIA     Distance (Gait Training Goal 1, PT) 100ft  -JULIA     Time Frame (Gait Training Goal 1, PT) long term goal (LTG);2 weeks  -     Row Name 12/20/22 1401          Therapy Assessment/Plan (PT)    Planned Therapy Interventions (PT) balance training;bed mobility training;gait training;home exercise program;patient/family education;strengthening;neuromuscular re-education;transfer training  -JULIA           User Key  (r) = Recorded By, (t) = Taken By, (c) = Cosigned By    Initials Name Provider Type    Jessica Capone PT Physical Therapist               Clinical Impression     Row Name 12/20/22 2915          Pain    Pretreatment Pain Rating 0/10 - no pain  -JULIA     Posttreatment Pain Rating 0/10 - no pain  -JULIA     Row Name 12/20/22 1409          Plan of Care Review    Plan of Care Reviewed With patient  -     Outcome Evaluation Pt is a 77 y/o male who presents to [facility] with elevated troponin and nonSTEMI. Pt was found  to have influenza B upon admission. Pt is currently being worked up for a CABG. At [facility] pt lives with his spouse in a SSH with basement and a ramped entry. He was independent w/ HH ambulation w/out an AD. Pt is not aware if he has a RW for use at home. At this time pt ambulates 15ft x2 w/ RW, CGA. BP following ambulation is 111/66, with minimal complaints of dizziness. Pt denies chest pain. Discussed post-op precautions if pt does undergo CABG and pt was receptive of education. At this time it is recommended pt to d/c home with family assist and HHPT, but will re-evaluate post-op. Pt to check with family to ensure he has a RW for use at home.  -     Row Name 12/20/22 1407          Therapy Assessment/Plan (PT)    Criteria for Skilled Interventions Met (PT) yes;skilled treatment is necessary  -JULIA     Therapy Frequency (PT) 5 times/wk  -JULIA     Predicted Duration of Therapy Intervention (PT) Until d/c  -JULIA     Row Name 12/20/22 1405          Vital Signs    Pre Systolic BP Rehab 135  standing  -JULIA     Pre Treatment Diastolic BP 70  -JULIA     Intra Systolic BP Rehab 119  -JULIA     Intra Treatment Diastolic BP 66  -JULIA     Post Systolic BP Rehab 111  post ambulation  -JULIA     Post Treatment Diastolic BP 66  -JULIA     Pre SpO2 (%) 97  -JULIA     O2 Delivery Pre Treatment supplemental O2  -JULIA     Intra SpO2 (%) 94  -JULIA     O2 Delivery Intra Treatment supplemental O2  -JULIA     Post SpO2 (%) 95  -JULIA     O2 Delivery Post Treatment supplemental O2  2L  -JULIA     Row Name 12/20/22 140          Positioning and Restraints    Pre-Treatment Position in bed  -JULIA     Post Treatment Position chair  -JULIA     In Chair notified nsg;call light within reach;encouraged to call for assist;exit alarm on;reclined  -           User Key  (r) = Recorded By, (t) = Taken By, (c) = Cosigned By    Initials Name Provider Type    Jessica Capone, PT Physical Therapist               Outcome Measures     Row Name 12/20/22 3517          How much help from another  person do you currently need...    Turning from your back to your side while in flat bed without using bedrails? 4  -JULIA     Moving from lying on back to sitting on the side of a flat bed without bedrails? 3  -JULIA     Moving to and from a bed to a chair (including a wheelchair)? 3  -JULIA     Standing up from a chair using your arms (e.g., wheelchair, bedside chair)? 3  -JULIA     Climbing 3-5 steps with a railing? 3  -JULIA     To walk in hospital room? 3  -JULIA     AM-PAC 6 Clicks Score (PT) 19  -JULIA     Highest level of mobility 6 --> Walked 10 steps or more  -     Row Name 12/20/22 1406          Functional Assessment    Outcome Measure Options AM-PAC 6 Clicks Basic Mobility (PT)  -           User Key  (r) = Recorded By, (t) = Taken By, (c) = Cosigned By    Initials Name Provider Type    Jessica Capone, PT Physical Therapist                             Physical Therapy Education     Title: PT OT SLP Therapies (In Progress)     Topic: Physical Therapy (In Progress)     Point: Mobility training (Done)     Learning Progress Summary           Patient Acceptance, E, VU by  at 12/20/2022 1406                   Point: Home exercise program (Not Started)     Learner Progress:  Not documented in this visit.          Point: Body mechanics (Done)     Learning Progress Summary           Patient Acceptance, E, VU by  at 12/20/2022 1406                   Point: Precautions (Done)     Learning Progress Summary           Patient Acceptance, E, VU by  at 12/20/2022 1406                               User Key     Initials Effective Dates Name Provider Type Discipline     08/23/21 -  Jessica Salas, KELSEA Physical Therapist PT              PT Recommendation and Plan  Planned Therapy Interventions (PT): balance training, bed mobility training, gait training, home exercise program, patient/family education, strengthening, neuromuscular re-education, transfer training  Plan of Care Reviewed With: patient  Outcome Evaluation: Pt is a  75 y/o male who presents to [facility] with elevated troponin and nonSTEMI. Pt was found to have influenza B upon admission. Pt is currently being worked up for a CABG. At [facility] pt lives with his spouse in a SSH with basement and a ramped entry. He was independent w/ HH ambulation w/out an AD. Pt is not aware if he has a RW for use at home. At this time pt ambulates 15ft x2 w/ RW, CGA. BP following ambulation is 111/66, with minimal complaints of dizziness. Pt denies chest pain. Discussed post-op precautions if pt does undergo CABG and pt was receptive of education. At this time it is recommended pt to d/c home with family assist and HHPT, but will re-evaluate post-op. Pt to check with family to ensure he has a RW for use at home.     Time Calculation:    PT Charges     Row Name 12/20/22 1406             Time Calculation    Start Time 0906  -      Stop Time 0939  -JULIA      Time Calculation (min) 33 min  -JULIA      PT Received On 12/20/22  -JULIA      PT - Next Appointment 12/21/22  -JULIA      PT Goal Re-Cert Due Date 01/03/23  -JULIA            User Key  (r) = Recorded By, (t) = Taken By, (c) = Cosigned By    Initials Name Provider Type    Jessica Capone, KELSEA Physical Therapist              Therapy Charges for Today     Code Description Service Date Service Provider Modifiers Qty    [id number]  PT EVAL MOD COMPLEXITY 4 12/20/2022 Jessica Salas, PT GP 1          PT G-Codes  Outcome Measure Options: AM-PAC 6 Clicks Basic Mobility (PT)  AM-PAC 6 Clicks Score (PT): 19  PT Discharge Summary  Anticipated Discharge Disposition (PT): home with home health, home with assist    Jessica Salas PT  12/20/2022

## 2022-12-20 NOTE — DISCHARGE PLACEMENT REQUEST
"Sam Andrade (76 y.o. Male)     Date of Birth   1945    Social Security Number       Address   [address] [place] [address] [place] [facility]    Home Phone   [phone]    MRN   [id number]       Mandaen   None    Marital Status                               Admission Date   12/17/22    Admission Type   Urgent    Admitting Provider   Jaleel Huber MD    Attending Provider   Jaleel Huber MD    Department, Room/Bed   [facility], 2113/1       Discharge Date       Discharge Disposition       Discharge Destination                               Attending Provider: Jaleel Huber MD    Allergies: No Known Allergies    Isolation: Droplet   Infection: None   Code Status: CPR    Ht: 177.8 cm (70\")   Wt: 101 kg (223 lb 12.3 oz)    Admission Cmt: None   Principal Problem: CAD (coronary artery disease), native coronary artery [I25.10]                 Active Insurance as of 12/17/2022     Primary Coverage     Payor Plan Insurance Group Employer/Plan Group    MEDICARE MEDICARE A & B      Payor Plan Address Payor Plan Phone Number Payor Plan Fax Number Effective Dates    [address] [phone]  12/1/2011 - None Entered    [facility] [id number]       Subscriber Name Subscriber Birth Date Member ID       SAM ANDRADE 1945 [id number]           Secondary Coverage     Payor Plan Insurance Group Employer/Plan Group    [facility]      Payor Plan Address Payor Plan Phone Number Payor Plan Fax Number Effective Dates    [address] [phone]  1/1/2022 - None Entered    [facility] [id number]       Subscriber Name Subscriber Birth Date Member ID       SAM ANDRADE 1945 [id number]                 Emergency Contacts      (Rel.) Home Phone Work Phone Mobile Phone    RAYMONDCUBA BAUTISTA (Spouse) [phone] -- --    DANISHWEN APONTE (Son) [phone] -- --               History & Physical      Satterly-Narda Walker " ROWAN ZIEGLER at 12/17/22 1243     Attestation signed by Jr Sunil Medellin MD at 12/18/22 1082    I have reviewed this documentation and agree.                    Patient Care Team:  Melvina Hodge as PCP - General (Nurse Practitioner)  Referring Provider:  Dr. Harris ([place], [place])  Reason for transfer: Multivessel CAD, severe LV dysfunction    Chief complaint: Shortness of breath    Subjective      History of Present Illness:  76-year-old gentleman presented to [facility] in [place] with complaints of shortness of breath for approximately 1 week.  He reports it is constant.  Associated symptoms included cough and generalized weakness.  His O2 sats in the ED were in the 80s on 5 L O2 (per ED notes).  He was found to have influenza B.  BNP 5580 and troponin 2.77. Cardiology was consulted he was found to have three-vessel heavily calcified CAD with EF 20 to 25%.  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  Echo at OSH showed EF 15 to 25%, right 1 diastolic dysfunction, mild MR, trace AI, mild TR and mildly dilated ascending aorta.  PMHx includes DM type II, HTN, past tobacco abuse.  Pt is poor historian.  Dr. Huber was asked to evaluate pt for surgical revascularization/PCI.    Review of Systems   Constitutional: Positive for fatigue. Negative for chills and fever.   Respiratory: Positive for cough and shortness of breath.    Cardiovascular: Negative for chest pain, palpitations and leg swelling.   Neurological: Negative for dizziness, weakness and light-headedness.        Past Medical History:   Diagnosis Date   • CHF (congestive heart failure) (HCC)    • Diabetes mellitus (HCC)    • Elevated cholesterol    • Hyperlipidemia    • Hypertension      Past Surgical History:   Procedure Laterality Date   • HERNIA REPAIR     • TONSILLECTOMY       No family history on file.  Social History     Tobacco Use   • Smoking status: Former     Types: Cigarettes   Vaping  Use   • Vaping Use: Never used   Substance Use Topics   • Alcohol use: Not Currently   • Drug use: Never     Medications Prior to Admission   Medication Sig Dispense Refill Last Dose   • aspirin 81 MG chewable tablet Chew 81 mg Daily.      • aspirin-acetaminophen-caffeine (EXCEDRIN MIGRAINE) 250-250-65 MG per tablet Take 1 tablet by mouth Every 6 (Six) Hours As Needed for Headache.      • carvedilol (COREG) 3.125 MG tablet Take 3.125 mg by mouth 2 (Two) Times a Day With Meals.      • furosemide (LASIX) 40 MG tablet Take 40 mg by mouth 2 (Two) Times a Day.      • glipizide (GLUCOTROL) 10 MG tablet Take 10 mg by mouth 2 (Two) Times a Day Before Meals.      • insulin NPH-insulin regular (humuLIN 70/30,novoLIN 70/30) (70-30) 100 UNIT/ML injection Inject 27 Units under the skin into the appropriate area as directed Every Morning.      • insulin NPH-insulin regular (humuLIN 70/30,novoLIN 70/30) (70-30) 100 UNIT/ML injection Inject 22 Units under the skin into the appropriate area as directed Every Night.      • metFORMIN (GLUCOPHAGE) 500 MG tablet Take 500 mg by mouth Daily With Breakfast.      • Omega-3 Fatty Acids (fish oil) 1000 MG capsule capsule Take 1,000 mg by mouth Daily With Breakfast.      • oseltamivir (TAMIFLU) 75 MG capsule Take 75 mg by mouth 2 (Two) Times a Day.      • rosuvastatin (CRESTOR) 5 MG tablet Take 5 mg by mouth Every Night.        aspirin, 81 mg, Oral, Daily  carvedilol, 3.125 mg, Oral, BID With Meals  docusate sodium, 100 mg, Oral, BID  furosemide, 40 mg, Oral, BID  glipizide, 10 mg, Oral, BID AC  insulin lispro, 4-24 Units, Subcutaneous, TID With Meals  insulin NPH-insulin regular, 22 Units, Subcutaneous, Daily With Dinner  insulin NPH-insulin regular, 27 Units, Subcutaneous, QAM  polyethylene glycol, 17 g, Oral, Daily  potassium chloride, 20 mEq, Oral, BID With Meals  rosuvastatin, 5 mg, Oral, Nightly  sodium chloride, 10 mL, Intravenous, Q12H      Allergies:  Patient has no known  allergies.    Objective       Vital Signs  Temp:  [97.8 °F (36.6 °C)-98.1 °F (36.7 °C)] 98.1 °F (36.7 °C)  Heart Rate:  [90-93] 90  Resp:  [20] 20  BP: (126-128)/(69-73) 126/69    Flowsheet Rows    Flowsheet Row First Filed Value   Admission Height --   Admission Weight 103 kg (227 lb 1.2 oz) Documented at 12/17/2022 0500             Physical Exam  Vitals and nursing note reviewed.   Constitutional:       General: He is awake.      Appearance: Normal appearance. He is well-developed, well-groomed and overweight.   HENT:      Head: Normocephalic and atraumatic.      Ears:      Comments: + Hearing aides     Nose: Nose normal.      Mouth/Throat:      Lips: Pink.      Mouth: Mucous membranes are moist.      Pharynx: Uvula midline.   Eyes:      General: Lids are normal. No scleral icterus.     Extraocular Movements: Extraocular movements intact.      Conjunctiva/sclera: Conjunctivae normal.      Pupils: Pupils are equal, round, and reactive to light.   Neck:      Thyroid: No thyroid mass or thyromegaly.      Vascular: Normal carotid pulses. No carotid bruit, hepatojugular reflux or JVD.      Trachea: Trachea normal.   Cardiovascular:      Rate and Rhythm: Normal rate and regular rhythm.      Pulses:           Carotid pulses are 2+ on the right side and 2+ on the left side.       Radial pulses are 2+ on the right side and 2+ on the left side.        Femoral pulses are 2+ on the right side and 2+ on the left side.       Popliteal pulses are 2+ on the right side and 2+ on the left side.        Dorsalis pedis pulses are 2+ on the right side and 2+ on the left side.        Posterior tibial pulses are 2+ on the right side and 2+ on the left side.      Heart sounds: Normal heart sounds. No murmur heard.     Comments: Tele:  SR 80s  Drips:  heparin  Pulmonary:      Effort: Pulmonary effort is normal.      Breath sounds: Decreased breath sounds present.      Comments: 95% 3L  Abdominal:      General: Abdomen is protuberant. Bowel  sounds are normal. There is no distension.      Palpations: Abdomen is soft.      Tenderness: There is no abdominal tenderness.   Musculoskeletal:      Cervical back: Neck supple.      Comments: Gait steady and strong without use of assistive devices   Lymphadenopathy:      Cervical: No cervical adenopathy.      Upper Body:      Right upper body: No supraclavicular adenopathy.      Left upper body: No supraclavicular adenopathy.   Skin:     General: Skin is warm and dry.      Capillary Refill: Capillary refill takes less than 2 seconds.      Findings: No erythema or rash.      Nails: There is no clubbing.   Neurological:      Mental Status: He is alert and oriented to person, place, and time.      GCS: GCS eye subscore is 4. GCS verbal subscore is 5. GCS motor subscore is 6.   Psychiatric:         Attention and Perception: Attention and perception normal.         Mood and Affect: Mood and affect normal.         Speech: Speech normal.         Behavior: Behavior normal. Behavior is cooperative.         Thought Content: Thought content normal.         Cognition and Memory: Cognition and memory normal.         Judgment: Judgment normal.         Results Review:   Lab Results (last 24 hours)     Procedure Component Value Units Date/Time    COVID PRE-OP / PRE-PROCEDURE SCREENING ORDER (NO ISOLATION) - Swab, Nasopharynx [[id number]] Collected: 12/17/22 1237    Specimen: Swab from Nasopharynx Updated: 12/17/22 1241    Narrative:      The following orders were created for panel order COVID PRE-OP / PRE-PROCEDURE SCREENING ORDER (NO ISOLATION) - Swab, Nasopharynx.  Procedure                               Abnormality         Status                     ---------                               -----------         ------                     COVID-19,CEPHEID/CAMERON,CO...[[id number]]                      In process                   Please view results for these tests on the individual orders.    COVID-19,CEPHEID/CAMERON,COR/BERNY/PAD/ASAF/MAD  IN-HOUSE(OR EMERGENT/ADD-ON),NP SWAB IN TRANSPORT MEDIA 3-4 HR TAT, RT-PCR - Swab, Nasopharynx [[id number]] Collected: 12/17/22 1237    Specimen: Swab from Nasopharynx Updated: 12/17/22 1241    POC Glucose Once [[id number]]  (Abnormal) Collected: 12/17/22 1159    Specimen: Blood Updated: 12/17/22 1200     Glucose 416 mg/dL      Comment: Serial Number: [id number]:  [id number]       Extra Tubes [[id number]] Collected: 12/17/22 1136    Specimen: Blood, Venous Line Updated: 12/17/22 1136    Narrative:      The following orders were created for panel order Extra Tubes.  Procedure                               Abnormality         Status                     ---------                               -----------         ------                     Gold Top - SST[[id number]]                                   In process                   Please view results for these tests on the individual orders.    Gold Top - SST [[id number]] Collected: 12/17/22 1136    Specimen: Blood Updated: 12/17/22 1136    Urinalysis, Microscopic Only - Urine, Clean Catch [[id number]]  (Abnormal) Collected: 12/17/22 1048    Specimen: Urine, Clean Catch Updated: 12/17/22 1102     RBC, UA 0-2 /HPF      WBC, UA 0-2 /HPF      Bacteria, UA None Seen /HPF      Squamous Epithelial Cells, UA 0-2 /HPF      Hyaline Casts, UA 0-2 /LPF      Methodology Automated Microscopy    Urinalysis With Microscopic If Indicated (No Culture) - Urine, Clean Catch [[id number]]  (Abnormal) Collected: 12/17/22 1048    Specimen: Urine, Clean Catch Updated: 12/17/22 1102     Color, UA Yellow     Appearance, UA Clear     pH, UA 5.5     Specific Gravity, UA 1.024     Glucose,  mg/dL (Trace)     Ketones, UA 15 mg/dL (1+)     Bilirubin, UA Negative     Blood, UA Negative     Protein,  mg/dL (2+)     Leuk Esterase, UA Negative     Nitrite, UA Negative     Urobilinogen, UA 1.0 E.U./dL    CBC & Differential [[id number]]  (Abnormal) Collected: 12/17/22 0945    Specimen: Blood  Updated: 12/17/22 1042    Narrative:      The following orders were created for panel order CBC & Differential.  Procedure                               Abnormality         Status                     ---------                               -----------         ------                     CBC Auto Differential[[id number]]        Abnormal            Final result               Scan Slide[[id number]]                                                                    Please view results for these tests on the individual orders.    CBC Auto Differential [[id number]]  (Abnormal) Collected: 12/17/22 0945    Specimen: Blood Updated: 12/17/22 1042     WBC 10.70 10*3/mm3      RBC 4.90 10*6/mm3      Hemoglobin 14.5 g/dL      Hematocrit 45.0 %      MCV 91.8 fL      MCH 29.5 pg      MCHC 32.2 g/dL      RDW 14.4 %      RDW-SD 46.4 fl      MPV 9.1 fL      Platelets 337 10*3/mm3      Neutrophil % 71.8 %      Lymphocyte % 18.1 %      Monocyte % 6.8 %      Eosinophil % 2.4 %      Basophil % 0.9 %      Neutrophils, Absolute 7.70 10*3/mm3      Lymphocytes, Absolute 1.90 10*3/mm3      Monocytes, Absolute 0.70 10*3/mm3      Eosinophils, Absolute 0.30 10*3/mm3      Basophils, Absolute 0.10 10*3/mm3      nRBC 0.1 /100 WBC     Comprehensive Metabolic Panel [[id number]]  (Abnormal) Collected: 12/17/22 0945    Specimen: Blood Updated: 12/17/22 1038     Glucose 368 mg/dL      BUN 32 mg/dL      Creatinine 1.16 mg/dL      Sodium 134 mmol/L      Potassium 4.2 mmol/L      Comment: Slight hemolysis detected by analyzer. Results may be affected.        Chloride 94 mmol/L      CO2 26.0 mmol/L      Calcium 9.0 mg/dL      Total Protein 7.2 g/dL      Albumin 3.80 g/dL      ALT (SGPT) 9 U/L      AST (SGOT) 16 U/L      Alkaline Phosphatase 90 U/L      Total Bilirubin 0.9 mg/dL      Globulin 3.4 gm/dL      A/G Ratio 1.1 g/dL      BUN/Creatinine Ratio 27.6     Anion Gap 14.0 mmol/L      eGFR 65.7 mL/min/1.73      Comment: National Kidney Foundation and American  Society of Nephrology (ASN) Task Force recommended calculation based on the Chronic Kidney Disease Epidemiology Collaboration (CKD-EPI) equation refit without adjustment for race.       Narrative:      GFR Normal >60  Chronic Kidney Disease <60  Kidney Failure <15    The GFR formula is only valid for adults with stable renal function between ages 18 and 70.    aPTT [[id number]]  (Abnormal) Collected: 12/17/22 0708    Specimen: Blood Updated: 12/17/22 0742     PTT 22.8 seconds     Protime-INR [[id number]]  (Normal) Collected: 12/17/22 0708    Specimen: Blood Updated: 12/17/22 0742     Protime 10.9 Seconds      INR 1.06    POC Glucose Once [[id number]]  (Abnormal) Collected: 12/17/22 0737    Specimen: Blood Updated: 12/17/22 0739     Glucose 214 mg/dL      Comment: Serial Number: [id number]:  [id number]       Platelet Function ADP [[id number]]  (Normal) Collected: 12/17/22 0708    Specimen: Blood Updated: 12/17/22 0733     ADP Aggregation, % Platelet 93 %     Hemoglobin A1c [[id number]] Collected: 12/17/22 0708    Specimen: Blood Updated: 12/17/22 0716              Assessment & Plan       CAD (coronary artery disease), native coronary artery      Assessment & Plan     - MV CAD with NSTEMI presentation, EF 20-25% (echo)--transferred for further care and evaluation  - Acute HFrEF, NYHA class II-III--BNP 5580 at OSH  - Influenzae B--ID consulted  - ICM  - DM type 2--a1c 8.4 at OSH  - HTN--stable  - Past smoker  - [place], hearing aids in place  - Obesity    Plans to have Dr. Morales evaluate patient for high risk PCI possible Impella assisted given his severe LV dysfunction.  Continue heparin drip.  ID consulted.  Patient reports he lives at home with his wife who also has failing health.  He drives short distances and goes to the store as needed.  Full recommendations to follow.    Thank you for allowing us to participate in the care of this patient.      Narda Duffy, ROWAN  12/17/22  12:46 EST    **all  problems new to this examiner  **EKG and CXR independently reviewed and interpreted          Electronically signed by Jr Sunil Medellin MD at 12/18/22 1344          Physician Progress Notes (last 24 hours)      Satterly-Narda Walker APRN at 12/20/22 1259          CC:  Shortness of breath    F/U:  MV CAD/ NSTEMI/ severe LV dysfunction--Huber  EF 20-25% (echo)    Subjective:  No c/o's    He looks better today  Drips:  heparin      PREOP studies:  Carotid duplex:  16-49% bilat  Vein zoraida:  adequate  A1c:  8.4 at [place]   Plt agg:  pending  MRSA screen:  pending  COVID-19 screen:  Neg 12/17  UA:  Neg for UTI 12/17  Echo:  Repeat ordered 12/18      Intake/Output Summary (Last 24 hours) at 12/20/2022 1259  Last data filed at 12/20/2022 0909  Gross per 24 hour   Intake 720 ml   Output 2150 ml   Net -1430 ml     Temp:  [98.1 °F (36.7 °C)-98.7 °F (37.1 °C)] 98.1 °F (36.7 °C)  Heart Rate:  [74-92] 87  Resp:  [12-20] 17  BP: (114-159)/(54-80) 114/54      Results from last 7 days   Lab Units 12/19/22  0300 12/18/22  1235 12/17/22  0945 12/17/22  0708   WBC 10*3/mm3 8.40 9.40   < >  --    HEMOGLOBIN g/dL 13.5 14.8   < >  --    HEMATOCRIT % 40.7 45.6   < >  --    PLATELETS 10*3/mm3 267 267   < >  --    INR   --   --   --  1.06    < > = values in this interval not displayed.     Results from last 7 days   Lab Units 12/20/22  1103   CREATININE mg/dL 1.10   POTASSIUM mmol/L 4.7   SODIUM mmol/L 130*       Physical Exam:  Neuro intact, nad, eating lunch sitting on edge of bed, family at bedside [facility]  Tele:  SR 80-90s  Diminished bases, 95% 2L  Benign abd, + BM  No edema    Assessment/Plan:  Principal Problem:    CAD (coronary artery disease), native coronary artery  Active Problems:    Type 2 diabetes mellitus with hyperglycemia, without long-term current use of insulin (HCC)    Mixed hyperlipidemia    Primary hypertension    Systolic and diastolic CHF, acute on chronic (HCC)    Influenza due to seasonal influenza virus    -  "MV CAD with NSTEMI presentation, EF 20-25% (echo)--transferred for further care and evaluation  - Acute HFrEF, NYHA class II-III--BNP 5580 at OSH  - Influenzae B--ID consulted, Tamiflu  - ICM  - DM type 2 with hyperglycemia--a1c 8.4 at OSH  - HTN--stable  - Past smoker  - [place], hearing aids in place  - Obesity    Dr. Huber d/w pt/wife and son at bedside--plans for upcoming CABG.  He would like to wait 10 days from diagnosis of Flu.  Remains on Tamiflu.  Repeat echo showed EF 20% per Dr. Huber.  Plans for repeat echo tomorrow to re-eval LV dysfunction.  Vein zoraida/carotid duplex results reviewed.   Hospitalist managing DM.  Tentative plans would be after [place].        ROWAN Muñoz  2022  12:59 EST    Electronically signed by Narda Duffy APRN at 22 1307     Kyle Parkinson MD at 22 1009          PULMONARY CRITICAL CARE PROGRESS  NOTE      PATIENT IDENTIFICATION:  Name: Sherman Baumann  MRN: [id number]  :  1945     Age: 76 y.o.  Sex: male    DATE OF Note:  2022   Referring Physician: Jaleel Huber MD                  Subjective:   Feeling a little better, on 2 L O2, no SOB, no chest or abd pain, no bowel or bladder issues reported    Objective:  tMax 24 hrs: Temp (24hrs), Av.3 °F (36.8 °C), Min:98.1 °F (36.7 °C), Max:98.7 °F (37.1 °C)      Vitals Ranges:   Temp:  [98.1 °F (36.7 °C)-98.7 °F (37.1 °C)] 98.2 °F (36.8 °C)  Heart Rate:  [74-92] 74  Resp:  [12-18] 16  BP: (122-159)/(61-80) 143/77    Intake and Output Last 3 Shifts:   I/O last 3 completed shifts:  In: 1440 [P.O.:1440]  Out: 4600 [Urine:4600]    Exam:  /77 (BP Location: Left arm, Patient Position: Lying)   Pulse 74   Temp 98.2 °F (36.8 °C) (Oral)   Resp 16   Ht 177.8 cm (70\")   Wt 101 kg (223 lb 12.3 oz)   SpO2 91%   BMI 32.11 kg/m²     General Appearance: Alert    HEENT:  Normocephalic, without obvious abnormality. Conjunctivae/corneas clear.  Normal external ear " canals. Nares normal, no drainage     Neck:  Supple, symmetrical, trachea midline. No JVD.  Lungs /Chest wall:   Bilateral basal rhonchi, respirations unlabored, symmetrical wall movement.     Heart:  Regular rate and rhythm, systolic murmur PMI left sternal border  Abdomen: Soft, nontender, no masses, no organomegaly.    Extremities: Trace edema, no clubbing or cyanosis        Medications:  aspirin, 81 mg, Oral, Daily  budesonide, 0.5 mg, Nebulization, BID - RT  carvedilol, 3.125 mg, Oral, BID With Meals  docusate sodium, 100 mg, Oral, BID  furosemide, 40 mg, Oral, BID  insulin lispro, 4-24 Units, Subcutaneous, TID With Meals  insulin NPH-insulin regular, 24 Units, Subcutaneous, TID AC  ipratropium-albuterol, 3 mL, Nebulization, 4x Daily - RT  oseltamivir, 75 mg, Oral, Q12H  polyethylene glycol, 17 g, Oral, Daily  potassium chloride, 20 mEq, Oral, BID With Meals  rosuvastatin, 5 mg, Oral, Nightly  sodium chloride, 10 mL, Intravenous, Q12H        Infusion:  heparin, 9.71 Units/kg/hr, Last Rate: 18.9 Units/kg/hr (12/20/22 0945)         PRN:  •  acetaminophen  •  ALPRAZolam  •  dextrose  •  dextrose  •  glucagon (human recombinant)  •  melatonin  •  nitroglycerin  •  ondansetron  •  potassium chloride **OR** potassium chloride **OR** potassium chloride  •  sodium chloride  •  sodium chloride  Data Review:  All labs (24hrs):   Recent Results (from the past 24 hour(s))   aPTT    Collection Time: 12/19/22 10:43 AM    Specimen: Blood   Result Value Ref Range    PTT 74.4 61.0 - 76.5 seconds   POC Glucose Once    Collection Time: 12/19/22 10:53 AM    Specimen: Blood   Result Value Ref Range    Glucose 395 (H) 70 - 105 mg/dL   POC Glucose Once    Collection Time: 12/19/22 12:16 PM    Specimen: Blood   Result Value Ref Range    Glucose 325 (H) 70 - 105 mg/dL   POC Glucose Once    Collection Time: 12/19/22  4:04 PM    Specimen: Blood   Result Value Ref Range    Glucose 285 (H) 70 - 105 mg/dL   POC Glucose Once    Collection  Time: 12/19/22  4:48 PM    Specimen: Blood   Result Value Ref Range    Glucose 240 (H) 70 - 105 mg/dL   aPTT    Collection Time: 12/19/22  5:58 PM    Specimen: Blood   Result Value Ref Range    PTT 74.7 61.0 - 76.5 seconds   aPTT    Collection Time: 12/19/22 11:52 PM    Specimen: Blood   Result Value Ref Range    PTT 82.6 (H) 61.0 - 76.5 seconds   POC Glucose Once    Collection Time: 12/20/22  7:25 AM    Specimen: Blood   Result Value Ref Range    Glucose 208 (H) 70 - 105 mg/dL   aPTT    Collection Time: 12/20/22  8:50 AM    Specimen: Blood   Result Value Ref Range    PTT 78.2 (H) 61.0 - 76.5 seconds        Imaging:  CT Chest Without Contrast Diagnostic  Narrative: CT CHEST WO CONTRAST DIAGNOSTIC-     Date of Exam: 12/19/2022 6:50 PM     Indication: Respiratory illness, nondiagnostic xray.     Comparison: Chest radiograph from 12/17/2022     Technique: Serial and axial CT images of the chest were obtained.  Reconstructions in the coronal and sagittal planes were performed.   Automated exposure control and iterative reconstruction methods were  used.     FINDINGS:     Hilum and Mediastinum: There is no significant pericardial effusion. The  heart is not enlarged. There is severe coronary artery calcific  atherosclerotic disease. There is calcific atherosclerosis of the  thoracic aorta.     Lung Parenchyma and Pleura: There is mild prominence of the pulmonary  interstitium particularly in the periphery of the lungs. There is mild  emphysema. There are no focal consolidations to suggest pneumonia.     Upper Abdomen: Unremarkable.     Soft tissues: Unremarkable.     Osseous structures: No aggressive focal lytic or sclerotic osseous  lesions.     Impression: Chronic interstitial change. Heavy coronary artery calcification. No  active disease.           Electronically Signed By-Jeffrey Cai MD On:12/19/2022 7:39 PM  This report was finalized on [id number] by  Jeffrey Cai MD.       ASSESSMENT:  Acute hypoxic  respiratory failure   Flu B  COPD  LOGAN  Cor Pulmonale   CHF  DM II  Hyperlipidemia  HTN  CAD with multivessel disease        PLAN:  Encourage OOB daily   PT/OT   Patient is at moderate risk for pulmonary issues with surgery   Optimize Incentive spirometer and BD, ICS prior to surgery and after   Tamiflu -will need to complete tx prior to surgery   BIPAP while sleeping   Bronchodilators  Inhaled corticosteroids  IS/Flutter valve  Diuresis and monitor JANES's  Electrolytes/ glycemic control  DVT and GI prophylaxis-Heparin drip     Discussed with Dr Tesha Geller, APRN  12/20/2022  10:09 EST  I personally have examined  and interviewed the patient. I have reviewed the history, data, problems, assessment and plan with our NP.  Critical care time in direct medical management (   ) minutes  Electronically signed by Kyle Parkinson MD. D, ABSM.    Electronically signed by Kyle Parkinson MD at 12/20/22 1300     Serg Quesada MD at 12/19/22 1937        Patient off floor, blood sugar log reviewed, blood sugars are running high.  We will change 70/30 insulin to 24 units subcu 3 times a day half an hour before each meal and follow blood sugars and make adjustments as needed.    Electronically signed by Serg Quesada MD at 12/19/22 1937          Physical Therapy Notes (last 24 hours)      Jessica Salas, PT at 12/20/22 0906  Version 1 of 1       Goal Outcome Evaluation:     Pt is a 75 y/o male who presents to [facility] with elevated troponin and nonSTEMI. Pt was found to have influenza B upon admission. Pt is currently being worked up for a CABG. At [facility] pt lives with his spouse in a [facility] with basement and a ramped entry. He was independent w/ HH ambulation w/out an AD. Pt is not aware if he has a RW for use at home. At this time pt ambulates 15ft x2 w/ RW, CGA. BP following ambulation is 111/66, with minimal complaints of dizziness. Pt denies chest pain. Discussed post-op precautions if pt does undergo CABG and pt was  receptive of education. At this time it is recommended pt to d/c home with family assist and HHPT, but will re-evaluate post-op. Pt to check with family to ensure he has a RW for use at home.    Electronically signed by Jessica Salas, PT at 22 140     Jessica Salas, PT at 22 0906  Version 1 of 1         Patient Name: Sherman Baumann  : 1945    MRN: [id number]                              Today's Date: 2022       Admit Date: 2022    Visit Dx: No diagnosis found.  Patient Active Problem List   Diagnosis   • CAD (coronary artery disease), native coronary artery   • Type 2 diabetes mellitus with hyperglycemia, without long-term current use of insulin (HCC)   • Mixed hyperlipidemia   • Primary hypertension   • Systolic and diastolic CHF, acute on chronic (HCC)   • Influenza due to seasonal influenza virus     Past Medical History:   Diagnosis Date   • CHF (congestive heart failure) (HCC)    • Diabetes mellitus (HCC)    • Elevated cholesterol    • Hyperlipidemia    • Hypertension      Past Surgical History:   Procedure Laterality Date   • HERNIA REPAIR     • TONSILLECTOMY        General Information     Row Name 22 140          Physical Therapy Time and Intention    Document Type evaluation  -     Mode of Treatment physical therapy  -     Row Name 22 140          General Information    Prior Level of Function independent:;all household mobility;gait;transfer;bed mobility  Did not utilize an AD  -JULIA     Existing Precautions/Restrictions fall;oxygen therapy device and L/min  Currently on 2L O2  -JULIA     Barriers to Rehab medically complex  -JULIA     Row Name 22 140          Living Environment    People in Home spouse  -JULIA     Row Name 22 140          Home Main Entrance    Number of Stairs, Main Entrance none;other (see comments)  ramp  -JULIA     Row Name 22 140          Stairs Within Home, Primary    Stairs, Within Home, Primary Basement, but pt does  not need to utilize  -     Row Name 12/20/22 1401          Safety Issues, Functional Mobility    Impairments Affecting Function (Mobility) endurance/activity tolerance;shortness of breath  -           User Key  (r) = Recorded By, (t) = Taken By, (c) = Cosigned By    Initials Name Provider Type    Jessica Capone, PT Physical Therapist               Mobility     Row Name 12/20/22 1402          Bed Mobility    Comment, (Bed Mobility) Pt was seated at EOB, finishing up breakfast, upon entry  -     Row Name 12/20/22 1402          Sit-Stand Transfer    Sit-Stand [place] (Transfers) contact guard  -     Assistive Device (Sit-Stand Transfers) walker, front-wheeled  -JULIA     Comment, (Sit-Stand Transfer) STS from EOB  -     Row Name 12/20/22 1402          Gait/Stairs (Locomotion)    [place] Level (Gait) contact guard  -JULIA     Assistive Device (Gait) walker, front-wheeled  -JULIA     Distance in Feet (Gait) 15ft x2 RW  -JULIA     Deviations/Abnormal Patterns (Gait) gait speed decreased  -     Comment, (Gait/Stairs) No LOB throughout, though recommend RW at this time  -           User Key  (r) = Recorded By, (t) = Taken By, (c) = Cosigned By    Initials Name Provider Type    Jessica Capone PT Physical Therapist               Obj/Interventions     Row Name 12/20/22 1403          Range of Motion Comprehensive    General Range of Motion no range of motion deficits identified  -     Row Name 12/20/22 1403          Strength Comprehensive (MMT)    General Manual Muscle Testing (MMT) Assessment lower extremity strength deficits identified  -     Comment, General Manual Muscle Testing (MMT) Assessment BLE grossly 4-/5  -JULIA     Row Name 12/20/22 1403          Balance    Balance Assessment sitting static balance;sitting dynamic balance;standing static balance;standing dynamic balance  -     Static Sitting Balance independent  -     Dynamic Sitting Balance supervision  -     Position, Sitting Balance  sitting edge of bed  -JULIA     Static Standing Balance standby assist  -JULIA     Dynamic Standing Balance contact guard  -     Position/Device Used, Standing Balance walker, front-wheeled  -     Row Name 12/20/22 1403          Sensory Assessment (Somatosensory)    Sensory Assessment (Somatosensory) sensation intact  -           User Key  (r) = Recorded By, (t) = Taken By, (c) = Cosigned By    Initials Name Provider Type    Jessica Capone, PT Physical Therapist               Goals/Plan     Row Name 12/20/22 1405          Bed Mobility Goal 1 (PT)    Activity/Assistive Device (Bed Mobility Goal 1, PT) bed mobility activities, all  -JULIA     [place] Level/Cues Needed (Bed Mobility Goal 1, PT) independent  -JULIA     Time Frame (Bed Mobility Goal 1, PT) long term goal (LTG);2 weeks  -     Row Name 12/20/22 1401          Transfer Goal 1 (PT)    Activity/Assistive Device (Transfer Goal 1, PT) sit-to-stand/stand-to-sit;bed-to-chair/chair-to-bed  -JULIA     [place] Level/Cues Needed (Transfer Goal 1, PT) modified independence  -JULIA     Time Frame (Transfer Goal 1, PT) long term goal (LTG);2 weeks  -     Row Name 12/20/22 3442          Gait Training Goal 1 (PT)    Activity/Assistive Device (Gait Training Goal 1, PT) gait (walking locomotion)  -JULAI     [place] Level (Gait Training Goal 1, PT) modified independence  -JULIA     Distance (Gait Training Goal 1, PT) 100ft  -JULIA     Time Frame (Gait Training Goal 1, PT) long term goal (LTG);2 weeks  -     Row Name 12/20/22 7156          Therapy Assessment/Plan (PT)    Planned Therapy Interventions (PT) balance training;bed mobility training;gait training;home exercise program;patient/family education;strengthening;neuromuscular re-education;transfer training  -           User Key  (r) = Recorded By, (t) = Taken By, (c) = Cosigned By    Initials Name Provider Type    Jessica Capone, PT Physical Therapist               Clinical Impression     Row Name 12/20/22 8714           Pain    Pretreatment Pain Rating 0/10 - no pain  -JULIA     Posttreatment Pain Rating 0/10 - no pain  -JULIA     Row Name 12/20/22 1404          Plan of Care Review    Plan of Care Reviewed With patient  -     Outcome Evaluation Pt is a 77 y/o male who presents to [facility] with elevated troponin and nonSTEMI. Pt was found to have influenza B upon admission. Pt is currently being worked up for a CABG. At [facility] pt lives with his spouse in a [facility] with basement and a ramped entry. He was independent w/ HH ambulation w/out an AD. Pt is not aware if he has a RW for use at home. At this time pt ambulates 15ft x2 w/ RW, CGA. BP following ambulation is 111/66, with minimal complaints of dizziness. Pt denies chest pain. Discussed post-op precautions if pt does undergo CABG and pt was receptive of education. At this time it is recommended pt to d/c home with family assist and HHPT, but will re-evaluate post-op. Pt to check with family to ensure he has a RW for use at home.  -     Row Name 12/20/22 140          Therapy Assessment/Plan (PT)    Criteria for Skilled Interventions Met (PT) yes;skilled treatment is necessary  -     Therapy Frequency (PT) 5 times/wk  -     Predicted Duration of Therapy Intervention (PT) Until d/c  -     Row Name 12/20/22 1404          Vital Signs    Pre Systolic BP Rehab 135  standing  -JULIA     Pre Treatment Diastolic BP 70  -JULIA     Intra Systolic BP Rehab 119  -JULIA     Intra Treatment Diastolic BP 66  -JULIA     Post Systolic BP Rehab 111  post ambulation  -JULIA     Post Treatment Diastolic BP 66  -JULIA     Pre SpO2 (%) 97  -JULIA     O2 Delivery Pre Treatment supplemental O2  -JULIA     Intra SpO2 (%) 94  -JULIA     O2 Delivery Intra Treatment supplemental O2  -JULIA     Post SpO2 (%) 95  -JULIA     O2 Delivery Post Treatment supplemental O2  2L  -JULIA     Row Name 12/20/22 1404          Positioning and Restraints    Pre-Treatment Position in bed  -JULIA     Post Treatment Position chair  -JULIA     In Chair notified  nsg;call light within reach;encouraged to call for assist;exit alarm on;reclined  -           User Key  (r) = Recorded By, (t) = Taken By, (c) = Cosigned By    Initials Name Provider Type    Jessica Capone PT Physical Therapist               Outcome Measures     Row Name 12/20/22 1406          How much help from another person do you currently need...    Turning from your back to your side while in flat bed without using bedrails? 4  -JULIA     Moving from lying on back to sitting on the side of a flat bed without bedrails? 3  -JULIA     Moving to and from a bed to a chair (including a wheelchair)? 3  -JULIA     Standing up from a chair using your arms (e.g., wheelchair, bedside chair)? 3  -JULIA     Climbing 3-5 steps with a railing? 3  -JULIA     To walk in hospital room? 3  -JULIA     AM-PAC 6 Clicks Score (PT) 19  -JULIA     Highest level of mobility 6 --> Walked 10 steps or more  -     Row Name 12/20/22 1406          Functional Assessment    Outcome Measure Options AM-PAC 6 Clicks Basic Mobility (PT)  -           User Key  (r) = Recorded By, (t) = Taken By, (c) = Cosigned By    Initials Name Provider Type    Jessica Capone PT Physical Therapist                             Physical Therapy Education     Title: PT OT SLP Therapies (In Progress)     Topic: Physical Therapy (In Progress)     Point: Mobility training (Done)     Learning Progress Summary           Patient Acceptance, E, VU by  at 12/20/2022 1406                   Point: Home exercise program (Not Started)     Learner Progress:  Not documented in this visit.          Point: Body mechanics (Done)     Learning Progress Summary           Patient Acceptance, E, VU by JULIA at 12/20/2022 1406                   Point: Precautions (Done)     Learning Progress Summary           Patient Acceptance, E, VU by  at 12/20/2022 1406                               User Key     Initials Effective Dates Name Provider Type [facility] 08/23/21 -  Jessica Salas PT  Physical Therapist PT              PT Recommendation and Plan  Planned Therapy Interventions (PT): balance training, bed mobility training, gait training, home exercise program, patient/family education, strengthening, neuromuscular re-education, transfer training  Plan of Care Reviewed With: patient  Outcome Evaluation: Pt is a 75 y/o male who presents to [facility] with elevated troponin and nonSTEMI. Pt was found to have influenza B upon admission. Pt is currently being worked up for a CABG. At [facility] pt lives with his spouse in a H with basement and a ramped entry. He was independent w/ HH ambulation w/out an AD. Pt is not aware if he has a RW for use at home. At this time pt ambulates 15ft x2 w/ RW, CGA. BP following ambulation is 111/66, with minimal complaints of dizziness. Pt denies chest pain. Discussed post-op precautions if pt does undergo CABG and pt was receptive of education. At this time it is recommended pt to d/c home with family assist and HHPT, but will re-evaluate post-op. Pt to check with family to ensure he has a RW for use at home.     Time Calculation:    PT Charges     Row Name 12/20/22 1406             Time Calculation    Start Time 0906  -      Stop Time 0939  -      Time Calculation (min) 33 min  -      PT Received On 12/20/22  -JULIA      PT - Next Appointment 12/21/22  -      PT Goal Re-Cert Due Date 01/03/23  -            User Key  (r) = Recorded By, (t) = Taken By, (c) = Cosigned By    Initials Name Provider Type    JULIA Jessica Salas, PT Physical Therapist              Therapy Charges for Today     Code Description Service Date Service Provider Modifiers Qty    [id number]  PT EVAL MOD COMPLEXITY 4 12/20/2022 Jessica Salas, PT GP 1          PT G-Codes  Outcome Measure Options: AM-PAC 6 Clicks Basic Mobility (PT)  AM-PAC 6 Clicks Score (PT): 19  PT Discharge Summary  Anticipated Discharge Disposition (PT): home with home health, home with assist    Jessica Salas  PT  2022      Electronically signed by Jessica Salas, PT at 22 1408          Occupational Therapy Notes (last 24 hours)      Giovanna Boo, OT at 22 1416          Patient Name: Sherman Baumann  : 1945    MRN: [id number]                              Today's Date: 2022       Admit Date: 2022    Visit Dx: No diagnosis found.  Patient Active Problem List   Diagnosis   • CAD (coronary artery disease), native coronary artery   • Type 2 diabetes mellitus with hyperglycemia, without long-term current use of insulin (HCC)   • Mixed hyperlipidemia   • Primary hypertension   • Systolic and diastolic CHF, acute on chronic (HCC)   • Influenza due to seasonal influenza virus     Past Medical History:   Diagnosis Date   • CHF (congestive heart failure) (HCC)    • Diabetes mellitus (HCC)    • Elevated cholesterol    • Hyperlipidemia    • Hypertension      Past Surgical History:   Procedure Laterality Date   • HERNIA REPAIR     • TONSILLECTOMY        General Information     Row Name 22 1406          OT Time and Intention    Document Type evaluation  -AM     Mode of Treatment occupational therapy  -AM     Row Name 22 1406          General Information    Prior Level of Function independent:;ADL's;all household mobility  -AM     Existing Precautions/Restrictions oxygen therapy device and L/min;fall  2L O2 with dizziness when changing positions  -AM     Barriers to Rehab medically complex  -AM     Row Name 22 1406          Living Environment    People in Home spouse  -AM     Row Name 22 1406          Home Main Entrance    Number of Stairs, Main Entrance none  -AM     Row Name 22 1406          Cognition    Orientation Status (Cognition) oriented x 4  -AM     Row Name 22 1406          Safety Issues, Functional Mobility    Impairments Affecting Function (Mobility) endurance/activity tolerance;shortness of breath  -AM           User Key  (r) = Recorded By, (t) =  Taken By, (c) = Cosigned By    Initials Name Provider Type    Giovanna Alcaraz OT Occupational Therapist                 Mobility/ADL's     Row Name 12/20/22 1407          Transfers    Transfers sit-stand transfer  -AM     Row Name 12/20/22 1407          Sit-Stand Transfer    Sit-Stand [place] (Transfers) contact guard  -AM     Assistive Device (Sit-Stand Transfers) walker, front-wheeled  -AM     Row Name 12/20/22 1407          Activities of Daily Living    BADL Assessment/Intervention lower body dressing;feeding  -AM     Row Name 12/20/22 1407          Lower Body Dressing Assessment/Training    [place] Level (Lower Body Dressing) minimum assist (75% patient effort)  -AM     Position (Lower Body Dressing) edge of bed sitting  -AM     Row Name 12/20/22 1407          Self-Feeding Assessment/Training    [place] [address] Level (Feeding) prepare tray/open items  -AM     Position (Self-Feeding) edge of bed sitting  -AM           User Key  (r) = Recorded By, (t) = Taken By, (c) = Cosigned By    Initials Name Provider Type    Giovanna Alcaraz OT Occupational Therapist               Obj/Interventions     Row Name 12/20/22 1408          Range of Motion Comprehensive    General Range of Motion bilateral upper extremity ROM WNL  -AM     Row Name 12/20/22 1408          Strength Comprehensive (MMT)    General Manual Muscle Testing (MMT) Assessment no strength deficits identified  -AM           User Key  (r) = Recorded By, (t) = Taken By, (c) = Cosigned By    Initials Name Provider Type    Giovanna Alcaraz OT Occupational Therapist               Goals/Plan     Row Name 12/20/22 1414          Transfer Goal 1 (OT)    Activity/Assistive Device (Transfer Goal 1, OT) toilet  -AM     [place] [address] Level/Cues Needed (Transfer Goal 1, OT) modified independence  -AM     Time Frame (Transfer Goal 1, OT) long term goal (LTG);2 weeks  -AM     Row Name 12/20/22 1414          Dressing Goal 1 (OT)    Activity/Device (Dressing Goal 1, OT)  dressing skills, all  -AM     [place]/Cues Needed (Dressing Goal 1, OT) modified independence  -AM     Time Frame (Dressing Goal 1, OT) long term goal (LTG);2 weeks  -AM     Row Name 12/20/22 1414          Toileting Goal 1 (OT)    Activity/Device (Toileting Goal 1, OT) toileting skills, all  -AM     [place] Level/Cues Needed (Toileting Goal 1, OT) modified independence  -AM     Time Frame (Toileting Goal 1, OT) long term goal (LTG);2 weeks  -AM     Row Name 12/20/22 1414          Therapy Assessment/Plan (OT)    Planned Therapy Interventions (OT) activity tolerance training;BADL retraining;occupation/activity based interventions;passive ROM/stretching;patient/caregiver education/training;transfer/mobility retraining;strengthening exercise;ROM/therapeutic exercise  -AM           User Key  (r) = Recorded By, (t) = Taken By, (c) = Cosigned By    Initials Name Provider Type    AM Giovanna Boo OT Occupational Therapist               Clinical Impression     Row Name 12/20/22 1408          Pain Assessment    Pretreatment Pain Rating 0/10 - no pain  -AM     Posttreatment Pain Rating 0/10 - no pain  -AM     Row Name 12/20/22 140          Plan of Care Review    Plan of Care Reviewed With patient;spouse;family  -AM     Outcome Evaluation Patient is a 77 y/o male admitted to [facility] from [place] for elevated troponin, NSTEMI, and EF of 15-20%. During hospitalization patient tested positive for influenza B requiring supplemental O2 at 2 LPM. Patient on heparin drip and being worked up for possible CABG. Patient reports at baseline living at home with his wife in a one story home with basement and no steps to enter. Patient reports being I with ADLs, IADLs, and mobility with no AD/AE. Patient [place] where he slipped with no injuries. Patient has a tub/shower but is considering renovating it to a walk in shower.  At time of evaluation patients O2 is stable on 2 LPM. Patient requires min A for  donning socks sitting EOB and CGA for ambulation from walker level with reports of dizziness with movement. Patient has supportive family nearby and patient is appropriate to return home with  services however family is aware that therapy will re-evaluate post-op.  -AM     Row Name 12/20/22 1408          Therapy Assessment/Plan (OT)    Criteria for Skilled Therapeutic Interventions Met (OT) yes;meets criteria;skilled treatment is necessary  -AM     Therapy Frequency (OT) 3 times/wk  -AM     Predicted Duration of Therapy Intervention (OT) 2 weeks  -AM     Row Name 12/20/22 1408          Therapy Plan Review/Discharge Plan (OT)    Anticipated Discharge Disposition (OT) home with assist;home with home health  will need re-eval post-op  -AM     Row Name 12/20/22 1408          Positioning and Restraints    Pre-Treatment Position in bed  -AM     Post Treatment Position chair  -AM     In Chair reclined;call light within reach;encouraged to call for assist;exit alarm on;with family/caregiver  -AM           User Key  (r) = Recorded By, (t) = Taken By, (c) = Cosigned By    Initials Name Provider Type    AM Giovanna Boo, OT Occupational Therapist               Outcome Measures     Row Name 12/20/22 1406          How much help from another person do you currently need...    Turning from your back to your side while in flat bed without using bedrails? 4  -JULIA     Moving from lying on back to sitting on the side of a flat bed without bedrails? 3  -JULIA     Moving to and from a bed to a chair (including a wheelchair)? 3  -JULIA     Standing up from a chair using your arms (e.g., wheelchair, bedside chair)? 3  -JULIA     Climbing 3-5 steps with a railing? 3  -JULIA     To walk in hospital room? 3  -JULIA     AM-PAC 6 Clicks Score (PT) 19  -JULIA     Highest level of mobility 6 --> Walked 10 steps or more  -     Row Name 12/20/22 1406          Functional Assessment    Outcome Measure Options AM-PAC 6 Clicks Basic Mobility (PT)  -JULIA           User  Key  (r) = Recorded By, (t) = Taken By, (c) = Cosigned By    Initials Name Provider Type    Jessica Capone PT Physical Therapist                Occupational Therapy Education     Title: PT OT SLP Therapies (In Progress)     Topic: Occupational Therapy (In Progress)     Point: ADL training (Done)     Description:   Instruct learner(s) on proper safety adaptation and remediation techniques during self care or transfers.   Instruct in proper use of assistive devices.              Learning Progress Summary           Patient Acceptance, E, VU by AM at 12/20/2022 1415                   Point: Home exercise program (Not Started)     Description:   Instruct learner(s) on appropriate technique for monitoring, assisting and/or progressing therapeutic exercises/activities.              Learner Progress:  Not documented in this visit.          Point: Precautions (Not Started)     Description:   Instruct learner(s) on prescribed precautions during self-care and functional transfers.              Learner Progress:  Not documented in this visit.          Point: Body mechanics (Not Started)     Description:   Instruct learner(s) on proper positioning and spine alignment during self-care, functional mobility activities and/or exercises.              Learner Progress:  Not documented in this visit.                      User Key     Initials Effective Dates Name Provider Type Discipline    AM 09/27/22 -  Giovanna Boo OT Occupational Therapist OT              OT Recommendation and Plan  Planned Therapy Interventions (OT): activity tolerance training, BADL retraining, occupation/activity based interventions, passive ROM/stretching, patient/caregiver education/training, transfer/mobility retraining, strengthening exercise, ROM/therapeutic exercise  Therapy Frequency (OT): 3 times/wk  Plan of Care Review  Plan of Care Reviewed With: patient, spouse, family  Outcome Evaluation: Patient is a 75 y/o male admitted to [facility] from [place] for  elevated troponin, NSTEMI, and EF of 15-20%. During hospitalization patient tested positive for influenza B requiring supplemental O2 at 2 LPM. Patient on heparin drip and being worked up for possible CABG. Patient reports at baseline living at home with his wife in a one story home with basement and no steps to enter. Patient reports being I with ADLs, IADLs, and mobility with no AD/AE. Patient reports one [place] where he slipped with no injuries. Patient has a tub/shower but is considering renovating it to a walk in shower.  At time of evaluation patients O2 is stable on 2 LPM. Patient requires min A for donning socks sitting EOB and CGA for ambulation from walker level with reports of dizziness with movement. Patient has supportive family nearby and patient is appropriate to return home with  services however family is aware that therapy will re-evaluate post-op.     Time Calculation:    Time Calculation- OT     Row Name 12/20/22 1415             Time Calculation- OT    OT Start Time 1310  -AM      OT Stop Time 1343  -AM      OT Time Calculation (min) 33 min  -AM      Total Timed Code Minutes- OT 15 minute(s)  -AM      OT Received On 12/20/22  -AM      OT - Next Appointment 12/21/22  -AM      OT Goal Re-Cert Due Date 01/03/23  -AM            User Key  (r) = Recorded By, (t) = Taken By, (c) = Cosigned By    Initials Name Provider Type    Giovanna Alcaraz OT Occupational Therapist              Therapy Charges for Today     Code Description Service Date Service Provider Modifiers Qty    79891368246  OT THERAPEUTIC ACT EA 15 MIN 12/20/2022 Giovanna Boo OT GO 1    [id number]  OT EVAL MOD COMPLEXITY 3 12/20/2022 Giovanna Boo OT GO 1               Giovanna Boo OT  12/20/2022    Electronically signed by Giovanna Boo OT at 12/20/22 1416     Giovanna Boo OT at 12/20/22 1415        Goal Outcome Evaluation:  Plan of Care Reviewed With: patient, spouse, family           Outcome Evaluation: Patient  is a 75 y/o male admitted to [facility] from [place] for elevated troponin, NSTEMI, and EF of 15-20%. During hospitalization patient tested positive for influenza B requiring supplemental O2 at 2 LPM. Patient on heparin drip and being worked up for possible CABG. Patient reports at baseline living at home with his wife in a one story home with basement and no steps to enter. Patient reports being I with ADLs, IADLs, and mobility with no AD/AE. Patient reports one fall in his chicken [place] where he slipped with no injuries. Patient has a tub/shower but is considering renovating it to a walk in shower.  At time of evaluation patients O2 is stable on 2 LPM. Patient requires min A for donning socks sitting EOB and CGA for ambulation from walker level with reports of dizziness with movement. Patient has supportive family nearby and patient is appropriate to return home with  services however family is aware that therapy will re-evaluate post-op.    Electronically signed by Giovanna Boo OT at 12/20/22 3528

## 2022-12-20 NOTE — PROGRESS NOTES
"Heart Failure Program  Nurse Navigator  Discharge Planning    Patient Name:Sherman Baumann  :1945  Cardiologist:Andrew  Current Admission Date: 2022   Previous Admission:    Admission frequency: 1 admissions in 6 months    Heart Failure history per record:    Symptoms on admission:c/o SOA x1 week, pt transferred here from [facility]. Pt advises he has been having SOA with FERRARA for several month, \"I have to stop and catch my breath often, and it got worse.\" pt also notes feet swelling on and off prior to admission.       Admission Weight:  Flowsheet Rows    Flowsheet Row First Filed Value   Admission Height 177.8 cm (70\") Documented at 2022 1305   Admission Weight 103 kg (227 lb 1.2 oz) Documented at 2022 0500            Current Home Medications:  Prior to Admission medications    Medication Sig Start Date End Date Taking? Authorizing Provider   aspirin 81 MG chewable tablet Chew 81 mg Daily.    Ruth Crowell MD   aspirin-acetaminophen-caffeine (EXCEDRIN MIGRAINE) 250-250-65 MG per tablet Take 1 tablet by mouth Every 6 (Six) Hours As Needed for Headache.    Ruth Crowell MD   carvedilol (COREG) 3.125 MG tablet Take 3.125 mg by mouth 2 (Two) Times a Day With Meals.    Ruth Crowell MD   furosemide (LASIX) 40 MG tablet Take 40 mg by mouth 2 (Two) Times a Day.    Ruth Crowell MD   glipizide (GLUCOTROL) 10 MG tablet Take 10 mg by mouth 2 (Two) Times a Day Before Meals.    Ruth Crowell MD   insulin NPH-insulin regular (humuLIN 70/30,novoLIN 70/30) (70-30) 100 UNIT/ML injection Inject 27 Units under the skin into the appropriate area as directed Every Morning.    Ruth Crowell MD   insulin NPH-insulin regular (humuLIN 70/30,novoLIN 70/30) (70-30) 100 UNIT/ML injection Inject 22 Units under the skin into the appropriate area as directed Every Night.    Ruth Crowell MD   metFORMIN (GLUCOPHAGE) 500 MG tablet Take 500 mg by mouth Daily " With Breakfast.    ProviderRuth MD   Omega-3 Fatty Acids (fish oil) 1000 MG capsule capsule Take 1,000 mg by mouth Daily With Breakfast.    Ruth Crowell MD   oseltamivir (TAMIFLU) 75 MG capsule Take 75 mg by mouth 2 (Two) Times a Day.    Ruth Crowell MD   rosuvastatin (CRESTOR) 5 MG tablet Take 5 mg by mouth Every Night.    ProviderRuth MD       Social history:   Pt form home lives with spouse, pt very [place]. Denies issues with affording medications.     Smoking status:     Diagnostics Testing:  proBNP level: 5580    Echocardiogram:Results for orders placed during the hospital encounter of 12/17/22    Adult Transthoracic Echo Complete W/ Cont if Necessary Per Protocol    Interpretation Summary  •  Left ventricular ejection fraction appears to be 41 - 45%.  •  The left ventricular cavity is borderline dilated.  •  Left ventricular diastolic function is consistent with (grade I) impaired relaxation.  •  No significant valvular abnormalities        Patient Assessment:   Pt sitting up in bed, resp even and unlabored, no SOA with conversation, no edema    Current O2: 2L NC  Home O2:      Education provided to patient:  yes- Heart Failure disease education  yes -Symptom identification/management  yes -Daily Weights  pending- Diet education  na- Fluid restriction (if ordered)  pending- Activity education  pending- Medication education  na- Smoking cessation  yes- Follow-up Appointments  yes-Provided information on how to access [facility] My HF Guide/Heart Failure Interactive workbook    Acceptance of learning: acceptance, cooperative    Heart Failure education interactive teaching session time: 30 minutes    GWTG: EF 40-45%     Identified needs/barriers:   Pending cardiac evaluation, HF education provided, I&O, daily weights    Intervention:

## 2022-12-20 NOTE — PROGRESS NOTES
CC:  Shortness of breath    F/U:  MV CAD/ NSTEMI/ severe LV dysfunction--Huber  EF 20-25% (echo)    Subjective:  No c/o's    He looks better today  Drips:  heparin      PREOP studies:  Carotid duplex:  16-49% bilat  Vein zoraida:  adequate  A1c:  8.4 at [place]   Plt agg:  pending  MRSA screen:  pending  COVID-19 screen:  Neg 12/17  UA:  Neg for UTI 12/17  Echo:  Repeat ordered 12/18      Intake/Output Summary (Last 24 hours) at 12/20/2022 1259  Last data filed at 12/20/2022 0909  Gross per 24 hour   Intake 720 ml   Output 2150 ml   Net -1430 ml     Temp:  [98.1 °F (36.7 °C)-98.7 °F (37.1 °C)] 98.1 °F (36.7 °C)  Heart Rate:  [74-92] 87  Resp:  [12-20] 17  BP: (114-159)/(54-80) 114/54      Results from last 7 days   Lab Units 12/19/22  0300 12/18/22  1235 12/17/22  0945 12/17/22  0708   WBC 10*3/mm3 8.40 9.40   < >  --    HEMOGLOBIN g/dL 13.5 14.8   < >  --    HEMATOCRIT % 40.7 45.6   < >  --    PLATELETS 10*3/mm3 267 267   < >  --    INR   --   --   --  1.06    < > = values in this interval not displayed.     Results from last 7 days   Lab Units 12/20/22  1103   CREATININE mg/dL 1.10   POTASSIUM mmol/L 4.7   SODIUM mmol/L 130*       Physical Exam:  Neuro intact, nad, eating lunch sitting on edge of bed, family at bedside [facility]  Tele:  SR 80-90s  Diminished bases, 95% 2L  Benign abd, + BM  No edema    Assessment/Plan:  Principal Problem:    CAD (coronary artery disease), native coronary artery  Active Problems:    Type 2 diabetes mellitus with hyperglycemia, without long-term current use of insulin (HCC)    Mixed hyperlipidemia    Primary hypertension    Systolic and diastolic CHF, acute on chronic (HCC)    Influenza due to seasonal influenza virus    - MV CAD with NSTEMI presentation, EF 20-25% (echo)--transferred for further care and evaluation  - Acute HFrEF, NYHA class II-III--BNP 5580 at OSH  - Influenzae B--ID consulted, Tamiflu  - ICM  - DM type 2 with hyperglycemia--a1c 8.4 at OSH  - HTN--stable  - Past smoker  -  [place], hearing aids in place  - Obesity    Dr. Huber d/w pt/wife and son at bedside--plans for upcoming CABG.  He would like to wait 10 days from diagnosis of Flu.  Remains on Tamiflu.  Repeat echo showed EF 20% per Dr. Huber.  Plans for repeat echo tomorrow to re-eval LV dysfunction.  Vein zoraida/carotid duplex results reviewed.   Hospitalist managing DM.  Tentative plans would be after [place].        Narda Barbour-Denise, APRN  12/20/2022  12:59 EST

## 2022-12-20 NOTE — PROGRESS NOTES
"PULMONARY CRITICAL CARE PROGRESS  NOTE      PATIENT IDENTIFICATION:  Name: Sherman Baumann  MRN: [id number]  :  1945     Age: 76 y.o.  Sex: male    DATE OF Note:  2022   Referring Physician: Jaleel Huber MD                  Subjective:   Feeling a little better, on 2 L O2, no SOB, no chest or abd pain, no bowel or bladder issues reported    Objective:  tMax 24 hrs: Temp (24hrs), Av.3 °F (36.8 °C), Min:98.1 °F (36.7 °C), Max:98.7 °F (37.1 °C)      Vitals Ranges:   Temp:  [98.1 °F (36.7 °C)-98.7 °F (37.1 °C)] 98.2 °F (36.8 °C)  Heart Rate:  [74-92] 74  Resp:  [12-18] 16  BP: (122-159)/(61-80) 143/77    Intake and Output Last 3 Shifts:   I/O last 3 completed shifts:  In: 1440 [P.O.:1440]  Out: 4600 [Urine:4600]    Exam:  /77 (BP Location: Left arm, Patient Position: Lying)   Pulse 74   Temp 98.2 °F (36.8 °C) (Oral)   Resp 16   Ht 177.8 cm (70\")   Wt 101 kg (223 lb 12.3 oz)   SpO2 91%   BMI 32.11 kg/m²     General Appearance: Alert    HEENT:  Normocephalic, without obvious abnormality. Conjunctivae/corneas clear.  Normal external ear canals. Nares normal, no drainage     Neck:  Supple, symmetrical, trachea midline. No JVD.  Lungs /Chest wall:   Bilateral basal rhonchi, respirations unlabored, symmetrical wall movement.     Heart:  Regular rate and rhythm, systolic murmur PMI left sternal border  Abdomen: Soft, nontender, no masses, no organomegaly.    Extremities: Trace edema, no clubbing or cyanosis        Medications:  aspirin, 81 mg, Oral, Daily  budesonide, 0.5 mg, Nebulization, BID - RT  carvedilol, 3.125 mg, Oral, BID With Meals  docusate sodium, 100 mg, Oral, BID  furosemide, 40 mg, Oral, BID  insulin lispro, 4-24 Units, Subcutaneous, TID With Meals  insulin NPH-insulin regular, 24 Units, Subcutaneous, TID AC  ipratropium-albuterol, 3 mL, Nebulization, 4x Daily - RT  oseltamivir, 75 mg, Oral, Q12H  polyethylene glycol, 17 g, Oral, Daily  potassium chloride, 20 mEq, Oral, " BID With Meals  rosuvastatin, 5 mg, Oral, Nightly  sodium chloride, 10 mL, Intravenous, Q12H        Infusion:  heparin, 9.71 Units/kg/hr, Last Rate: 18.9 Units/kg/hr (12/20/22 0945)         PRN:    acetaminophen    ALPRAZolam    dextrose    dextrose    glucagon (human recombinant)    melatonin    nitroglycerin    ondansetron    potassium chloride **OR** potassium chloride **OR** potassium chloride    sodium chloride    sodium chloride  Data Review:  All labs (24hrs):   Recent Results (from the past 24 hour(s))   aPTT    Collection Time: 12/19/22 10:43 AM    Specimen: Blood   Result Value Ref Range    PTT 74.4 61.0 - 76.5 seconds   POC Glucose Once    Collection Time: 12/19/22 10:53 AM    Specimen: Blood   Result Value Ref Range    Glucose 395 (H) 70 - 105 mg/dL   POC Glucose Once    Collection Time: 12/19/22 12:16 PM    Specimen: Blood   Result Value Ref Range    Glucose 325 (H) 70 - 105 mg/dL   POC Glucose Once    Collection Time: 12/19/22  4:04 PM    Specimen: Blood   Result Value Ref Range    Glucose 285 (H) 70 - 105 mg/dL   POC Glucose Once    Collection Time: 12/19/22  4:48 PM    Specimen: Blood   Result Value Ref Range    Glucose 240 (H) 70 - 105 mg/dL   aPTT    Collection Time: 12/19/22  5:58 PM    Specimen: Blood   Result Value Ref Range    PTT 74.7 61.0 - 76.5 seconds   aPTT    Collection Time: 12/19/22 11:52 PM    Specimen: Blood   Result Value Ref Range    PTT 82.6 (H) 61.0 - 76.5 seconds   POC Glucose Once    Collection Time: 12/20/22  7:25 AM    Specimen: Blood   Result Value Ref Range    Glucose 208 (H) 70 - 105 mg/dL   aPTT    Collection Time: 12/20/22  8:50 AM    Specimen: Blood   Result Value Ref Range    PTT 78.2 (H) 61.0 - 76.5 seconds        Imaging:  CT Chest Without Contrast Diagnostic  Narrative: CT CHEST WO CONTRAST DIAGNOSTIC-     Date of Exam: 12/19/2022 6:50 PM     Indication: Respiratory illness, nondiagnostic xray.     Comparison: Chest radiograph from 12/17/2022     Technique: Serial  and axial CT images of the chest were obtained.  Reconstructions in the coronal and sagittal planes were performed.   Automated exposure control and iterative reconstruction methods were  used.     FINDINGS:     Hilum and Mediastinum: There is no significant pericardial effusion. The  heart is not enlarged. There is severe coronary artery calcific  atherosclerotic disease. There is calcific atherosclerosis of the  thoracic aorta.     Lung Parenchyma and Pleura: There is mild prominence of the pulmonary  interstitium particularly in the periphery of the lungs. There is mild  emphysema. There are no focal consolidations to suggest pneumonia.     Upper Abdomen: Unremarkable.     Soft tissues: Unremarkable.     Osseous structures: No aggressive focal lytic or sclerotic osseous  lesions.     Impression: Chronic interstitial change. Heavy coronary artery calcification. No  active disease.           Electronically Signed By-Jeffrey Cai MD On:12/19/2022 7:39 PM  This report was finalized on [id number] by  Jeffrey Cai MD.       ASSESSMENT:  Acute hypoxic respiratory failure   Flu B  COPD  LOGAN  Cor Pulmonale   CHF  DM II  Hyperlipidemia  HTN  CAD with multivessel disease        PLAN:  Encourage OOB daily   PT/OT   Patient is at moderate risk for pulmonary issues with surgery   Optimize Incentive spirometer and BD, ICS prior to surgery and after   Tamiflu -will need to complete tx prior to surgery   BIPAP while sleeping   Bronchodilators  Inhaled corticosteroids  IS/Flutter valve  Diuresis and monitor JANES's  Electrolytes/ glycemic control  DVT and GI prophylaxis-Heparin drip     Discussed with Dr Tesha Geller, ROWAN  12/20/2022  10:09 EST  I personally have examined  and interviewed the patient. I have reviewed the history, data, problems, assessment and plan with our NP.  Critical care time in direct medical management (   ) minutes  Electronically signed by Kyle Parkinson MD. D, ABSM.

## 2022-12-20 NOTE — PLAN OF CARE
Goal Outcome Evaluation:  Plan of Care Reviewed With: patient, spouse, family           Outcome Evaluation: Patient is a 75 y/o male admitted to [facility] from [place] for elevated troponin, NSTEMI, and EF of 15-20%. During hospitalization patient tested positive for influenza B requiring supplemental O2 at 2 LPM. Patient on heparin drip and being worked up for possible CABG. Patient reports at baseline living at home with his wife in a one story home with basement and no steps to enter. Patient reports being I with ADLs, IADLs, and mobility with no AD/AE. Patient reports one fall in his chicken [place] where he slipped with no injuries. Patient has a tub/shower but is considering renovating it to a walk in shower.  At time of evaluation patients O2 is stable on 2 LPM. Patient requires min A for donning socks sitting EOB and CGA for ambulation from walker level with reports of dizziness with movement. Patient has supportive family nearby and patient is appropriate to return home with  services however family is aware that therapy will re-evaluate post-op.

## 2022-12-20 NOTE — PROGRESS NOTES
Daily Progress Note    Patient Care Team:  Melvina Hodge as PCP - General (Nurse Practitioner)    Chief Complaint: Follow-up type 2 diabetes    HPI: Patient seen and examined today.  Blood sugar continues to run very high.  He is eating well.    ROS:   Constitutional:  Denies fatigue, tiredness.    Respiratory: denies cough, shortness of breath.   Cardiovascular:  denies chest pain, edema   GI:  Denies abdominal pain, nausea, vomiting.         Vitals:    12/20/22 1545   BP:    Pulse: 86   Resp: 12   Temp:    SpO2:      Body mass index is 32.11 kg/m².    Physical Exam:  GEN: NAD, conversant  CV: RRR  LUNG: CTA  PSYCH: Awake and coherent.      Results Review:     I reviewed the patient's new clinical results.    Glucose   Date Value Ref Range Status   12/20/2022 374 (H) 65 - 99 mg/dL Final     Sodium   Date Value Ref Range Status   12/20/2022 130 (L) 136 - 145 mmol/L Final     Potassium   Date Value Ref Range Status   12/20/2022 4.7 3.5 - 5.2 mmol/L Final     CO2   Date Value Ref Range Status   12/20/2022 28.0 22.0 - 29.0 mmol/L Final     Chloride   Date Value Ref Range Status   12/20/2022 91 (L) 98 - 107 mmol/L Final     Anion Gap   Date Value Ref Range Status   12/20/2022 11.0 5.0 - 15.0 mmol/L Final     Creatinine   Date Value Ref Range Status   12/20/2022 1.10 0.76 - 1.27 mg/dL Final     BUN   Date Value Ref Range Status   12/20/2022 28 (H) 8 - 23 mg/dL Final     BUN/Creatinine Ratio   Date Value Ref Range Status   12/20/2022 25.5 (H) 7.0 - 25.0 Final     Calcium   Date Value Ref Range Status   12/20/2022 9.3 8.6 - 10.5 mg/dL Final     Lab Results   Component Value Date    HGBA1C 8.3 (H) 12/17/2022     No results found for: GLUF, MICROALBUR  Results from last 7 days   Lab Units 12/20/22  1150 12/20/22  0725 12/19/22  1648 12/19/22  1604 12/19/22  1216 12/19/22  1053   GLUCOSE mg/dL 365* 208* 240* 285* 325* 395*             Medication Review: Reviewed.     aspirin, 81 mg, Oral, Daily  budesonide, 0.5 mg,  Nebulization, BID - RT  carvedilol, 3.125 mg, Oral, BID With Meals  docusate sodium, 100 mg, Oral, BID  furosemide, 40 mg, Oral, BID  insulin lispro, 4-24 Units, Subcutaneous, TID With Meals  insulin NPH-insulin regular, 24 Units, Subcutaneous, TID AC  ipratropium-albuterol, 3 mL, Nebulization, 4x Daily - RT  oseltamivir, 75 mg, Oral, Q12H  polyethylene glycol, 17 g, Oral, Daily  potassium chloride, 20 mEq, Oral, BID With Meals  rosuvastatin, 5 mg, Oral, Nightly  sodium chloride, 10 mL, Intravenous, Q12H        Assessment and plan:  Diabetes mellitus type 2 with hyperglycemia: Uncontrolled with very high blood sugars, will increase 70/30 insulin to 30 units subcu 3 times a day before each meal and continue Humalog sliding scale and follow blood sugars and make further adjustments as needed.  We will also add Jardiance 10 mg once a day.    Hyperlipidemia: Currently on rosuvastatin.    CAD: Being followed by cardiothoracic surgery for CABG.    Serg Quesada MD. FACE

## 2022-12-20 NOTE — PLAN OF CARE
Goal Outcome Evaluation:     Pt is a 77 y/o male who presents to [facility] with elevated troponin and nonSTEMI. Pt was found to have influenza B upon admission. Pt is currently being worked up for a CABG. At [facility] pt lives with his spouse in a SSH with basement and a ramped entry. He was independent w/ HH ambulation w/out an AD. Pt is not aware if he has a RW for use at home. At this time pt ambulates 15ft x2 w/ RW, CGA. BP following ambulation is 111/66, with minimal complaints of dizziness. Pt denies chest pain. Discussed post-op precautions if pt does undergo CABG and pt was receptive of education. At this time it is recommended pt to d/c home with family assist and HHPT, but will re-evaluate post-op. Pt to check with family to ensure he has a RW for use at home.

## 2022-12-20 NOTE — PROGRESS NOTES
Nutrition Services    Patient Name: Sherman Baumann  YOB: 1945  MRN: [id number]  Admission date: 12/17/2022      PPE Documentation        PPE Worn By Provider mask and eye protection   PPE Worn By Patient  N/A     Nutrition Counseling & Education       Reason for Visit: Criteria noted by RDN during nutrition assessment      Session topic: Key food habit change, Consistent Carbohydrate Diet and Type 2 Diabetes     Session comments: Other  RD provided diabetes/consistent CHO education to pt based on HgbA1c of 8.3% on 12/17/22. RD provided multiple handouts and contact information. Pt reports he has been educated on DM before. RD and pt discussed cutting back fruit juice consumption or looking for diet juice, choosing SF snacks (pudding), and adding protein to all meals/snacks + what sources of protein are.   Provided print material via: Academy of [employer] Dietetics-Nutrition Care Manual  and [facility] RDN created education material     Goals: Increase knowledge on diet/nutrition effects on condition/status      Monitoring/Follow Up: RD to follow up PRN    Patient to follow up PRN on outpatient basis       Electronically signed by:  Safia Holloway RD  12/20/22 15:02 EST

## 2022-12-20 NOTE — PLAN OF CARE
Goal Outcome Evaluation:         Pt has been calm and cooperative this shift. Pt has been resting well. Pt has had a large amount of urine output so far this shift. Pt is still on heparin gtt (see MAR). Pt is still taking tamiflu (see MAR). Pt is on 2L NC. Pt has had no complaints of pain so far this shift. Pts VSS.       Problem: Adult Inpatient Plan of Care  Goal: Plan of Care Review  Outcome: Ongoing, Progressing  Goal: Patient-Specific Goal (Individualized)  Outcome: Ongoing, Progressing  Goal: Absence of Hospital-Acquired Illness or Injury  Outcome: Ongoing, Progressing  Intervention: Identify and Manage Fall Risk  Recent Flowsheet Documentation  Taken 12/20/2022 0209 by Portia Thomson RN  Safety Promotion/Fall Prevention:   activity supervised   assistive device/personal items within reach   clutter free environment maintained   nonskid shoes/slippers when out of bed   safety round/check completed   room organization consistent  Taken 12/20/2022 0010 by Portia Thomson RN  Safety Promotion/Fall Prevention:   activity supervised   assistive device/personal items within reach   clutter free environment maintained   nonskid shoes/slippers when out of bed   room organization consistent   safety round/check completed  Taken 12/19/2022 2218 by Portia Thomson RN  Safety Promotion/Fall Prevention:   activity supervised   assistive device/personal items within reach   clutter free environment maintained   nonskid shoes/slippers when out of bed   room organization consistent   safety round/check completed  Taken 12/19/2022 2048 by Portia Thomson RN  Safety Promotion/Fall Prevention:   activity supervised   assistive device/personal items within reach   clutter free environment maintained   nonskid shoes/slippers when out of bed   safety round/check completed   fall prevention program maintained  Intervention: Prevent Skin Injury  Recent Flowsheet Documentation  Taken 12/20/2022 0209 by Portia Thomson RN  Body  Position: position changed independently  Taken 12/20/2022 0010 by Portia Thomson RN  Body Position: position changed independently  Skin Protection:   adhesive use limited   incontinence pads utilized  Taken 12/19/2022 2218 by Portia Thomson RN  Body Position: position changed independently  Taken 12/19/2022 2048 by Portia Thomson RN  Body Position: position changed independently  Skin Protection: adhesive use limited  Intervention: Prevent and Manage VTE (Venous Thromboembolism) Risk  Recent Flowsheet Documentation  Taken 12/20/2022 0010 by Portia Thomson RN  Activity Management:   activity adjusted per tolerance   activity encouraged  VTE Prevention/Management: (heparin) other (see comments)  Range of Motion: active ROM (range of motion) encouraged  Taken 12/19/2022 2048 by Portia Thomson RN  Activity Management:   activity adjusted per tolerance   activity encouraged  VTE Prevention/Management: (heparin) other (see comments)  Range of Motion: active ROM (range of motion) encouraged  Intervention: Prevent Infection  Recent Flowsheet Documentation  Taken 12/20/2022 0209 by Portia Thomson RN  Infection Prevention:   hand hygiene promoted   personal protective equipment utilized   rest/sleep promoted   single patient room provided   environmental surveillance performed  Taken 12/20/2022 0010 by Portia Thomson RN  Infection Prevention:   hand hygiene promoted   personal protective equipment utilized   rest/sleep promoted   single patient room provided   environmental surveillance performed  Taken 12/19/2022 2218 by Portia Thomson RN  Infection Prevention:   hand hygiene promoted   personal protective equipment utilized   rest/sleep promoted   single patient room provided   environmental surveillance performed  Taken 12/19/2022 2048 by Portia Thomson RN  Infection Prevention:   hand hygiene promoted   personal protective equipment utilized   rest/sleep promoted   environmental surveillance  performed  Goal: Optimal Comfort and Wellbeing  Outcome: Ongoing, Progressing  Intervention: Provide Person-Centered Care  Recent Flowsheet Documentation  Taken 12/20/2022 0010 by Portia Thomson RN  Trust Relationship/Rapport:   care explained   questions answered   questions encouraged  Taken 12/19/2022 2048 by Portia Thomson RN  Trust Relationship/Rapport:   care explained   questions answered   questions encouraged  Goal: Readiness for Transition of Care  Outcome: Ongoing, Progressing     Problem: Diabetes Comorbidity  Goal: Blood Glucose Level Within Targeted Range  Outcome: Ongoing, Progressing  Intervention: Monitor and Manage Glycemia  Recent Flowsheet Documentation  Taken 12/20/2022 0010 by Portia Thomson RN  Glycemic Management:   blood glucose monitored   oral hydration promoted  Taken 12/19/2022 2048 by Portia Thomson RN  Glycemic Management:   blood glucose monitored   oral hydration promoted     Problem: Fall Injury Risk  Goal: Absence of Fall and Fall-Related Injury  Outcome: Ongoing, Progressing  Intervention: Identify and Manage Contributors  Recent Flowsheet Documentation  Taken 12/20/2022 0209 by Portia Thomson RN  Medication Review/Management: medications reviewed  Taken 12/20/2022 0010 by Portia Thomson RN  Medication Review/Management: medications reviewed  Self-Care Promotion: independence encouraged  Taken 12/19/2022 2218 by Portia Thomson RN  Medication Review/Management: medications reviewed  Taken 12/19/2022 2048 by Portia Thomson RN  Medication Review/Management: medications reviewed  Self-Care Promotion: independence encouraged  Intervention: Promote Injury-Free Environment  Recent Flowsheet Documentation  Taken 12/20/2022 0209 by Portia Thomson RN  Safety Promotion/Fall Prevention:   activity supervised   assistive device/personal items within reach   clutter free environment maintained   nonskid shoes/slippers when out of bed   safety round/check completed   room  organization consistent  Taken 12/20/2022 0010 by Portia Thomson RN  Safety Promotion/Fall Prevention:   activity supervised   assistive device/personal items within reach   clutter free environment maintained   nonskid shoes/slippers when out of bed   room organization consistent   safety round/check completed  Taken 12/19/2022 2218 by Portia Thomson RN  Safety Promotion/Fall Prevention:   activity supervised   assistive device/personal items within reach   clutter free environment maintained   nonskid shoes/slippers when out of bed   room organization consistent   safety round/check completed  Taken 12/19/2022 2048 by Portia Thomson RN  Safety Promotion/Fall Prevention:   activity supervised   assistive device/personal items within reach   clutter free environment maintained   nonskid shoes/slippers when out of bed   safety round/check completed   fall prevention program maintained     Problem: Skin Injury Risk Increased  Goal: Skin Health and Integrity  Outcome: Ongoing, Progressing  Intervention: Promote and Optimize Oral Intake  Recent Flowsheet Documentation  Taken 12/20/2022 0010 by Portia Thomson RN  Oral Nutrition Promotion: rest periods promoted  Taken 12/19/2022 2048 by Portia Thomson RN  Oral Nutrition Promotion: rest periods promoted  Intervention: Optimize Skin Protection  Recent Flowsheet Documentation  Taken 12/20/2022 0209 by Portia Thomson RN  Head of Bed (HOB) Positioning: HOB at 20-30 degrees  Taken 12/20/2022 0010 by Portia Thomson RN  Pressure Reduction Techniques: frequent weight shift encouraged  Head of Bed (HOB) Positioning: HOB at 20-30 degrees  Pressure Reduction Devices: pressure-redistributing mattress utilized  Skin Protection:   adhesive use limited   incontinence pads utilized  Taken 12/19/2022 2218 by Portia Thomson RN  Head of Bed (HOB) Positioning: HOB at 20-30 degrees  Taken 12/19/2022 2048 by Portia Thomson RN  Pressure Reduction Techniques: frequent weight  shift encouraged  Head of Bed (HOB) Positioning: HOB at 20-30 degrees  Pressure Reduction Devices: pressure-redistributing mattress utilized  Skin Protection: adhesive use limited

## 2022-12-20 NOTE — PLAN OF CARE
Problem: Adult Inpatient Plan of Care  Goal: Plan of Care Review  Outcome: Ongoing, Progressing  Flowsheets (Taken 12/20/2022 1328)  Outcome Evaluation: Patient on 2L nc and tolerating well. On heparin gtt with no complaints of cp or soa. Patient and family had long discussion with CT surgery today and all questions were answered. Patient and family verbalized understanding of plan and options for revascularization. VSS  Goal: Patient-Specific Goal (Individualized)  Outcome: Ongoing, Progressing  Goal: Absence of Hospital-Acquired Illness or Injury  Outcome: Ongoing, Progressing  Intervention: Identify and Manage Fall Risk  Recent Flowsheet Documentation  Taken 12/20/2022 1220 by Manda Cruz, RN  Safety Promotion/Fall Prevention:   nonskid shoes/slippers when out of bed   room organization consistent   safety round/check completed  Taken 12/20/2022 1002 by Manda Cruz, RN  Safety Promotion/Fall Prevention:   nonskid shoes/slippers when out of bed   room organization consistent   safety round/check completed  Taken 12/20/2022 0909 by Manda Cruz, RN  Safety Promotion/Fall Prevention: fall prevention program maintained  Goal: Optimal Comfort and Wellbeing  Outcome: Ongoing, Progressing  Goal: Readiness for Transition of Care  Outcome: Ongoing, Progressing   Goal Outcome Evaluation:              Outcome Evaluation: Patient on 2L nc and tolerating well. On heparin gtt with no complaints of cp or soa. Patient and family had long discussion with CT surgery today and all questions were answered. Patient and family verbalized understanding of plan and options for revascularization. VSS

## 2022-12-20 NOTE — PROGRESS NOTES
Referring Provider: Artemio Sheets MD    Reason for follow-up: Non-ST elevation MI, multivessel coronary artery disease, uncontrolled diabetes     Patient Care Team:  Melvina Hodge as PCP - General (Nurse Practitioner)      SUBJECTIVE  Laying comfortably in bed, denies any chest pain or shortness of breath.       ROS  Review of all systems negative except as indicated.    Since I have last seen, the patient has been without any chest discomfort, shortness of breath, palpitations, dizziness or syncope.  Denies having any headache, abdominal pain, nausea, vomiting, diarrhea, constipation, loss of weight or loss of appetite.  Denies having any excessive bruising, hematuria or blood in the stool.  ROS      Personal History:    Past Medical History:   Diagnosis Date   • CHF (congestive heart failure) (HCC)    • Diabetes mellitus (HCC)    • Elevated cholesterol    • Hyperlipidemia    • Hypertension        Past Surgical History:   Procedure Laterality Date   • HERNIA REPAIR     • TONSILLECTOMY         No family history on file.    Social History     Tobacco Use   • Smoking status: Former     Types: Cigarettes   Vaping Use   • Vaping Use: Never used   Substance Use Topics   • Alcohol use: Not Currently   • Drug use: Never        Medications Prior to Admission   Medication Sig Dispense Refill Last Dose   • aspirin 81 MG chewable tablet Chew 81 mg Daily.      • aspirin-acetaminophen-caffeine (EXCEDRIN MIGRAINE) 250-250-65 MG per tablet Take 1 tablet by mouth Every 6 (Six) Hours As Needed for Headache.      • carvedilol (COREG) 3.125 MG tablet Take 3.125 mg by mouth 2 (Two) Times a Day With Meals.      • furosemide (LASIX) 40 MG tablet Take 40 mg by mouth 2 (Two) Times a Day.      • glipizide (GLUCOTROL) 10 MG tablet Take 10 mg by mouth 2 (Two) Times a Day Before Meals.      • insulin NPH-insulin regular (humuLIN 70/30,novoLIN 70/30) (70-30) 100 UNIT/ML injection Inject 27 Units under the skin into the appropriate area as  "directed Every Morning.      • insulin NPH-insulin regular (humuLIN 70/30,novoLIN 70/30) (70-30) 100 UNIT/ML injection Inject 22 Units under the skin into the appropriate area as directed Every Night.      • metFORMIN (GLUCOPHAGE) 500 MG tablet Take 500 mg by mouth Daily With Breakfast.      • Omega-3 Fatty Acids (fish oil) 1000 MG capsule capsule Take 1,000 mg by mouth Daily With Breakfast.      • oseltamivir (TAMIFLU) 75 MG capsule Take 75 mg by mouth 2 (Two) Times a Day.      • rosuvastatin (CRESTOR) 5 MG tablet Take 5 mg by mouth Every Night.          Allergies:  Patient has no known allergies.    Scheduled Meds:aspirin, 81 mg, Oral, Daily  budesonide, 0.5 mg, Nebulization, BID - RT  carvedilol, 3.125 mg, Oral, BID With Meals  docusate sodium, 100 mg, Oral, BID  furosemide, 40 mg, Oral, BID  insulin lispro, 4-24 Units, Subcutaneous, TID With Meals  insulin NPH-insulin regular, 24 Units, Subcutaneous, TID AC  ipratropium-albuterol, 3 mL, Nebulization, 4x Daily - RT  oseltamivir, 75 mg, Oral, Q12H  polyethylene glycol, 17 g, Oral, Daily  potassium chloride, 20 mEq, Oral, BID With Meals  rosuvastatin, 5 mg, Oral, Nightly  sodium chloride, 10 mL, Intravenous, Q12H      Continuous Infusions:heparin, 9.71 Units/kg/hr, Last Rate: 18.9 Units/kg/hr (12/20/22 0945)      PRN Meds:.•  acetaminophen  •  ALPRAZolam  •  dextrose  •  dextrose  •  glucagon (human recombinant)  •  melatonin  •  nitroglycerin  •  ondansetron  •  potassium chloride **OR** potassium chloride **OR** potassium chloride  •  sodium chloride  •  sodium chloride      OBJECTIVE    Vital Signs  Vitals:    12/20/22 0719 12/20/22 0722 12/20/22 0736 12/20/22 1002   BP:    114/54   BP Location:       Patient Position:    Lying   Pulse: 75 75 74 81   Resp: 15 16 16 20   Temp:    98.1 °F (36.7 °C)   TempSrc:    Oral   SpO2:    97%   Weight:       Height:           Flowsheet Rows      Flowsheet Row First Filed Value   Admission Height 177.8 cm (70\") Documented at " 12/18/2022 1305   Admission Weight 103 kg (227 lb 1.2 oz) Documented at 12/17/2022 0500              Intake/Output Summary (Last 24 hours) at 12/20/2022 1154  Last data filed at 12/20/2022 0909  Gross per 24 hour   Intake 720 ml   Output 2150 ml   Net -1430 ml          Telemetry: Sinus rhythm    Physical Exam:  The patient is alert, oriented and in no distress.  Vital signs as noted above.  Head and neck revealed no carotid bruits or jugular venous distention.  No thyromegaly or lymphadenopathy is present  Lungs clear.  No wheezing.  Breath sounds are normal bilaterally.  Heart normal first and second heart sounds.  No murmur. No precordial rub is present.  No gallop is present.  Abdomen soft and nontender.  No organomegaly is present.  Extremities with good peripheral pulses without any pedal edema.  Skin warm and dry.  Musculoskeletal system is grossly normal.  CNS grossly normal.       Results Review:  I have personally reviewed the results from the time of this admission to 12/20/2022 11:54 EST and agree with these findings:  []  Laboratory  []  Microbiology  []  Radiology  []  EKG/Telemetry   []  Cardiology/Vascular   []  Pathology  []  Old records  []  Other:    Most notable findings include:    Lab Results (last 24 hours)       Procedure Component Value Units Date/Time    POC Glucose Once [[id number]]  (Abnormal) Collected: 12/20/22 1150    Specimen: Blood Updated: 12/20/22 1151     Glucose 365 mg/dL      Comment: Serial Number: [id number]:  [id number]       aPTT [[id number]]  (Normal) Collected: 12/20/22 1103    Specimen: Blood Updated: 12/20/22 1132     PTT 63.1 seconds     Basic Metabolic Panel [[id number]]  (Abnormal) Collected: 12/20/22 1103    Specimen: Blood Updated: 12/20/22 1131     Glucose 374 mg/dL      BUN 28 mg/dL      Creatinine 1.10 mg/dL      Sodium 130 mmol/L      Potassium 4.7 mmol/L      Chloride 91 mmol/L      CO2 28.0 mmol/L      Calcium 9.3 mg/dL      BUN/Creatinine Ratio 25.5      Anion Gap 11.0 mmol/L      eGFR 70.0 mL/min/1.73      Comment: National Kidney Foundation and American Society of Nephrology (ASN) Task Force recommended calculation based on the Chronic Kidney Disease Epidemiology Collaboration (CKD-EPI) equation refit without adjustment for race.       Narrative:      GFR Normal >60  Chronic Kidney Disease <60  Kidney Failure <15    The GFR formula is only valid for adults with stable renal function between ages 18 and 70.    aPTT [[id number]]  (Abnormal) Collected: 12/20/22 0850    Specimen: Blood Updated: 12/20/22 0932     PTT 78.2 seconds     POC Glucose Once [[id number]]  (Abnormal) Collected: 12/20/22 0725    Specimen: Blood Updated: 12/20/22 0726     Glucose 208 mg/dL      Comment: Serial Number: [id number]:  [id number]       aPTT [[id number]]  (Abnormal) Collected: 12/19/22 2352    Specimen: Blood Updated: 12/20/22 0042     PTT 82.6 seconds     aPTT [[id number]]  (Normal) Collected: 12/19/22 1758    Specimen: Blood Updated: 12/19/22 1837     PTT 74.7 seconds     POC Glucose Once [[id number]]  (Abnormal) Collected: 12/19/22 1648    Specimen: Blood Updated: 12/19/22 1649     Glucose 240 mg/dL      Comment: Serial Number: [id number]:  [id number]       POC Glucose Once [[id number]]  (Abnormal) Collected: 12/19/22 1604    Specimen: Blood Updated: 12/19/22 1606     Glucose 285 mg/dL      Comment: Serial Number: [id number]:  [id number]       POC Glucose Once [[id number]]  (Abnormal) Collected: 12/19/22 1216    Specimen: Blood Updated: 12/19/22 1220     Glucose 325 mg/dL      Comment: Serial Number: [id number]:  [id number]               Imaging Results (Last 24 Hours)       Procedure Component Value Units Date/Time    CT Chest Without Contrast Diagnostic [[id number]] Collected: 12/19/22 1929     Updated: 12/19/22 1941    Narrative:      CT CHEST WO CONTRAST DIAGNOSTIC-     Date of Exam: 12/19/2022 6:50 PM     Indication: Respiratory illness, nondiagnostic xray.      Comparison: Chest radiograph from 12/17/2022     Technique: Serial and axial CT images of the chest were obtained.  Reconstructions in the coronal and sagittal planes were performed.   Automated exposure control and iterative reconstruction methods were  used.     FINDINGS:     Hilum and Mediastinum: There is no significant pericardial effusion. The  heart is not enlarged. There is severe coronary artery calcific  atherosclerotic disease. There is calcific atherosclerosis of the  thoracic aorta.     Lung Parenchyma and Pleura: There is mild prominence of the pulmonary  interstitium particularly in the periphery of the lungs. There is mild  emphysema. There are no focal consolidations to suggest pneumonia.     Upper Abdomen: Unremarkable.     Soft tissues: Unremarkable.     Osseous structures: No aggressive focal lytic or sclerotic osseous  lesions.       Impression:      Chronic interstitial change. Heavy coronary artery calcification. No  active disease.           Electronically Signed By-Jeffrey Cai MD On:12/19/2022 7:39 PM  This report was finalized on [id number] by  Jeffrey Cai MD.            LAB RESULTS (LAST 7 DAYS)    CBC  Results from last 7 days   Lab Units 12/19/22  0300 12/18/22  1235 12/17/22  0945   WBC 10*3/mm3 8.40 9.40 10.70   RBC 10*6/mm3 4.58 5.05 4.90   HEMOGLOBIN g/dL 13.5 14.8 14.5   HEMATOCRIT % 40.7 45.6 45.0   MCV fL 88.7 90.4 91.8   PLATELETS 10*3/mm3 267 267 337       BMP  Results from last 7 days   Lab Units 12/20/22  1103 12/19/22  0300 12/17/22 2256 12/17/22  0945   SODIUM mmol/L 130* 136 135* 134*   POTASSIUM mmol/L 4.7 4.4 4.3 4.2   CHLORIDE mmol/L 91* 94* 94* 94*   CO2 mmol/L 28.0 29.0 29.0 26.0   BUN mg/dL 28* 29* 37* 32*   CREATININE mg/dL 1.10 1.06 1.31* 1.16   GLUCOSE mg/dL 374* 225* 215* 368*       CMP   Results from last 7 days   Lab Units 12/20/22  1103 12/19/22  0300 12/17/22  2256 12/17/22  0945   SODIUM mmol/L 130* 136 135* 134*   POTASSIUM mmol/L 4.7 4.4 4.3 4.2    CHLORIDE mmol/L 91* 94* 94* 94*   CO2 mmol/L 28.0 29.0 29.0 26.0   BUN mg/dL 28* 29* 37* 32*   CREATININE mg/dL 1.10 1.06 1.31* 1.16   GLUCOSE mg/dL 374* 225* 215* 368*   ALBUMIN g/dL  --   --   --  3.80   BILIRUBIN mg/dL  --   --   --  0.9   ALK PHOS U/L  --   --   --  90   AST (SGOT) U/L  --   --   --  16   ALT (SGPT) U/L  --   --   --  9       BNP        TROPONIN        CoAg  Results from last 7 days   Lab Units 12/20/22  1103 12/20/22  0850 12/19/22  2352 12/19/22  1758 12/19/22  1043 12/19/22  0300 12/18/22 2003 12/17/22  1451 12/17/22  0708   INR   --   --   --   --   --   --   --   --  1.06   APTT seconds 63.1 78.2* 82.6* 74.7 74.4 70.0 59.3*   < > 22.8*    < > = values in this interval not displayed.       Creatinine Clearance  Estimated Creatinine Clearance: 69.4 mL/min (by C-G formula based on SCr of 1.1 mg/dL).    ABG        Radiology  CT Chest Without Contrast Diagnostic    Result Date: 12/19/2022  Chronic interstitial change. Heavy coronary artery calcification. No active disease.    Electronically Signed By-Jeffrey Cai MD On:12/19/2022 7:39 PM This report was finalized on [id number] by  Jeffrey Cai MD.        EKG  I personally viewed and interpreted the patient's EKG/Telemetry data:  ECG 12 Lead Chest Pain   Final Result   HEART RATE= 89  bpm   RR Interval= 672  ms   AZ Interval= 176  ms   P Horizontal Axis= -5  deg   P Front Axis= 29  deg   QRSD Interval= 92  ms   QT Interval= 362  ms   QRS Axis= 16  deg   T Wave Axis= 163  deg   - ABNORMAL ECG -   Sinus rhythm   Probable left atrial enlargement   Probable anterior infarct, age indeterminate   Abnormal T, consider ischemia, lateral leads   No previous ECG available for comparison   Electronically Signed By: Alex Byrd (BERNY) 19-Dec-2022 08:52:18   Date and Time of Study: 2022-12-17 07:21:47      SCANNED - TELEMETRY     Final Result      SCANNED - TELEMETRY     Final Result      SCANNED - TELEMETRY     Final Result      SCANNED -  TELEMETRY     Final Result      SCANNED - TELEMETRY     Final Result      SCANNED - TELEMETRY     Final Result      SCANNED - TELEMETRY     Final Result      SCANNED - TELEMETRY     Final Result      SCANNED - TELEMETRY     Final Result      SCANNED - TELEMETRY     Final Result      SCANNED - TELEMETRY     Final Result      SCANNED - TELEMETRY     Final Result      SCANNED - TELEMETRY     Final Result      SCANNED - TELEMETRY     Final Result      SCANNED - TELEMETRY     Final Result      SCANNED - TELEMETRY     Final Result      SCANNED - TELEMETRY     Final Result      SCANNED - TELEMETRY     Final Result      SCANNED - TELEMETRY     Final Result      SCANNED - TELEMETRY     Final Result      SCANNED - TELEMETRY     Final Result            Echocardiogram:    Results for orders placed during the hospital encounter of 12/17/22    Adult Transthoracic Echo Complete W/ Cont if Necessary Per Protocol    Interpretation Summary  •  Left ventricular ejection fraction appears to be 41 - 45%.  •  The left ventricular cavity is borderline dilated.  •  Left ventricular diastolic function is consistent with (grade I) impaired relaxation.  •  No significant valvular abnormalities        Stress Test:         Cardiac Catheterization:  No results found for this or any previous visit.         Other:         ASSESSMENT & PLAN:    Principal Problem:    CAD (coronary artery disease), native coronary artery  Active Problems:    Type 2 diabetes mellitus with hyperglycemia, without long-term current use of insulin (HCC)    Mixed hyperlipidemia    Primary hypertension    Systolic and diastolic CHF, acute on chronic (HCC)    Influenza due to seasonal influenza virus    76-year-old man with multiple and complex cardiovascular risk factors including hypertension, hyperlipidemia, uncontrolled diabetes presented with non-ST elevation MI in the setting of influenza.  He also developed acute kidney injury however his creatinine has now  improved.  Troponin peaked at 4.5 and eventually trended down.  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  His EF was reported at 20% at the outside hospital in [place] however a repeat echocardiogram here with contrast shows EF of at least 40%.  He is currently on aspirin, high intensity statin and beta-blocker.  Due to low EF, multivessel coronary artery disease and diabetes CABG is a superior option than multivessel PCI.  If he is turned down for CABG I will offer him Impella assisted atherectomy PCI of LAD/diagonal and circumflex artery.  Current plan is to offer CABG once he recovers from current influenza infection.      Johann Morales MD  12/20/22  11:54 EST

## 2022-12-20 NOTE — CASE MANAGEMENT/SOCIAL WORK
Continued Stay Note   [facility]     Patient Name: Sherman Baumann  MRN: [id number]  Today's Date: 12/20/2022    Admit Date: 12/17/2022    Plan: DC Plan: Anticipate routine home. [facility] referral pending acceptance.   Discharge Plan     Row Name 12/20/22 1555       Plan    Plan DC Plan: Anticipate routine home. [facility] referral pending acceptance.    Patient/Family in Agreement with Plan yes    Provided Post Acute Provider List? Yes    Post Acute Provider List Home Health    Provided Post Acute Provider Quality & Resource List? Yes    Post Acute Provider Quality and Resource List Home Health    Delivered To Patient    Method of Delivery In person    Plan Comments CM met with patient at bedside to discuss dc planning and therapy recommendations for HH. Patient agreeable, list provided, patient chose [facility]. Unable to send referral in basket. CM contacted [facility] ([phone]) who reported that referral can be faxed to [phone]. CM sent via ad-hoc comm.              Met with patient in room wearing PPE: mask.      Maintained distance greater than six feet and spent less than 15 minutes in the room.      Chelo Porras RN     Office Phone: [phone]  Office Cell: [phone]

## 2022-12-20 NOTE — THERAPY EVALUATION
Patient Name: Sherman Baumann  : 1945    MRN: [id number]                              Today's Date: 2022       Admit Date: 2022    Visit Dx: No diagnosis found.  Patient Active Problem List   Diagnosis   • CAD (coronary artery disease), native coronary artery   • Type 2 diabetes mellitus with hyperglycemia, without long-term current use of insulin (HCC)   • Mixed hyperlipidemia   • Primary hypertension   • Systolic and diastolic CHF, acute on chronic (HCC)   • Influenza due to seasonal influenza virus     Past Medical History:   Diagnosis Date   • CHF (congestive heart failure) (HCC)    • Diabetes mellitus (HCC)    • Elevated cholesterol    • Hyperlipidemia    • Hypertension      Past Surgical History:   Procedure Laterality Date   • HERNIA REPAIR     • TONSILLECTOMY        General Information     Row Name 22 1406          OT Time and Intention    Document Type evaluation  -AM     Mode of Treatment occupational therapy  -AM     Row Name 22 1406          General Information    Prior Level of Function independent:;ADL's;all household mobility  -AM     Existing Precautions/Restrictions oxygen therapy device and L/min;fall  2L O2 with dizziness when changing positions  -AM     Barriers to Rehab medically complex  -AM     Row Name 22 1406          Living Environment    People in Home spouse  -AM     Row Name 22 1406          Home Main Entrance    Number of Stairs, Main Entrance none  -AM     Row Name 22 1406          Cognition    Orientation Status (Cognition) oriented x 4  -AM     Row Name 22 1406          Safety Issues, Functional Mobility    Impairments Affecting Function (Mobility) endurance/activity tolerance;shortness of breath  -AM           User Key  (r) = Recorded By, (t) = Taken By, (c) = Cosigned By    Initials Name Provider Type    AM Giovanna Boo OT Occupational Therapist                 Mobility/ADL's     Row Name 22 1407          Transfers     Transfers sit-stand transfer  -AM     Row Name 12/20/22 1407          Sit-Stand Transfer    Sit-Stand [place] (Transfers) contact guard  -AM     Assistive Device (Sit-Stand Transfers) walker, front-wheeled  -AM     Row Name 12/20/22 1407          Activities of Daily Living    BADL Assessment/Intervention lower body dressing;feeding  -AM     Row Name 12/20/22 1407          Lower Body Dressing Assessment/Training    [place] Level (Lower Body Dressing) minimum assist (75% patient effort)  -AM     Position (Lower Body Dressing) edge of bed sitting  -AM     Row Name 12/20/22 1407          Self-Feeding Assessment/Training    [place] Level (Feeding) prepare tray/open items  -AM     Position (Self-Feeding) edge of bed sitting  -AM           User Key  (r) = Recorded By, (t) = Taken By, (c) = Cosigned By    Initials Name Provider Type    Giovanna Alcaraz OT Occupational Therapist               Obj/Interventions     Row Name 12/20/22 1408          Range of Motion Comprehensive    General Range of Motion bilateral upper extremity ROM WNL  -AM     Row Name 12/20/22 1408          Strength Comprehensive (MMT)    General Manual Muscle Testing (MMT) Assessment no strength deficits identified  -AM           User Key  (r) = Recorded By, (t) = Taken By, (c) = Cosigned By    Initials Name Provider Type    Giovanna Alcaraz OT Occupational Therapist               Goals/Plan     [facility] Name 12/20/22 1414          Transfer Goal 1 (OT)    Activity/Assistive Device (Transfer Goal 1, OT) toilet  -AM     [place] Level/Cues Needed (Transfer Goal 1, OT) modified independence  -AM     Time Frame (Transfer Goal 1, OT) long term goal (LTG);2 weeks  -AM     [facility] Name 12/20/22 1414          Dressing Goal 1 (OT)    Activity/Device (Dressing Goal 1, OT) dressing skills, all  -AM     [place]/Cues Needed (Dressing Goal 1, OT) modified independence  -AM     Time Frame (Dressing Goal 1, OT) long term goal (LTG);2 weeks  -AM     [facility]  Name 12/20/22 1414          Toileting Goal 1 (OT)    Activity/Device (Toileting Goal 1, OT) toileting skills, all  -AM     [place] Level/Cues Needed (Toileting Goal 1, OT) modified independence  -AM     Time Frame (Toileting Goal 1, OT) long term goal (LTG);2 weeks  -AM     Row Name 12/20/22 1414          Therapy Assessment/Plan (OT)    Planned Therapy Interventions (OT) activity tolerance training;BADL retraining;occupation/activity based interventions;passive ROM/stretching;patient/caregiver education/training;transfer/mobility retraining;strengthening exercise;ROM/therapeutic exercise  -AM           User Key  (r) = Recorded By, (t) = Taken By, (c) = Cosigned By    Initials Name Provider Type    AM Giovanna Boo OT Occupational Therapist               Clinical Impression     Row Name 12/20/22 1408          Pain Assessment    Pretreatment Pain Rating 0/10 - no pain  -AM     Posttreatment Pain Rating 0/10 - no pain  -AM     Row Name 12/20/22 1408          Plan of Care Review    Plan of Care Reviewed With patient;spouse;family  -AM     Outcome Evaluation Patient is a 75 y/o male admitted to [facility] from [place] for elevated troponin, NSTEMI, and EF of 15-20%. During hospitalization patient tested positive for influenza B requiring supplemental O2 at 2 LPM. Patient on heparin drip and being worked up for possible CABG. Patient reports at baseline living at home with his wife in a one story home with basement and no steps to enter. Patient reports being I with ADLs, IADLs, and mobility with no AD/AE. Patient reports one fall [place] his [place] where he slipped with no injuries. Patient has a tub/shower but is considering renovating it to a walk in shower.  At time of evaluation patients O2 is stable on 2 LPM. Patient requires min A for donning socks sitting EOB and CGA for ambulation from walker level with reports of dizziness with movement. Patient has supportive family nearby and patient is appropriate to return home  with  services however family is aware that therapy will re-evaluate post-op.  -AM     Row Name 12/20/22 1408          Therapy Assessment/Plan (OT)    Criteria for Skilled Therapeutic Interventions Met (OT) yes;meets criteria;skilled treatment is necessary  -AM     Therapy Frequency (OT) 3 times/wk  -AM     Predicted Duration of Therapy Intervention (OT) 2 weeks  -AM     Row Name 12/20/22 1408          Therapy Plan Review/Discharge Plan (OT)    Anticipated Discharge Disposition (OT) home with assist;home with home health  will need re-eval post-op  -AM     Row Name 12/20/22 1408          Positioning and Restraints    Pre-Treatment Position in bed  -AM     Post Treatment Position chair  -AM     In Chair reclined;call light within reach;encouraged to call for assist;exit alarm on;with family/caregiver  -AM           User Key  (r) = Recorded By, (t) = Taken By, (c) = Cosigned By    Initials Name Provider Type    Giovanna Alcaraz, OT Occupational Therapist               Outcome Measures     Row Name 12/20/22 1406          How much help from another person do you currently need...    Turning from your back to your side while in flat bed without using bedrails? 4  -JULIA     Moving from lying on back to sitting on the side of a flat bed without bedrails? 3  -JULIA     Moving to and from a bed to a chair (including a wheelchair)? 3  -JULIA     Standing up from a chair using your arms (e.g., wheelchair, bedside chair)? 3  -JULIA     Climbing 3-5 steps with a railing? 3  -JULIA     To walk in hospital room? 3  -JULIA     AM-PAC 6 Clicks Score (PT) 19  -JULIA     Highest level of mobility 6 --> Walked 10 steps or more  -[facility] Name 12/20/22 1406          Functional Assessment    Outcome Measure Options AM-PAC 6 Clicks Basic Mobility (PT)  -           User Key  (r) = Recorded By, (t) = Taken By, (c) = Cosigned By    Initials Name Provider Type    Jessica Capone, PT Physical Therapist                Occupational Therapy Education      Title: PT OT SLP Therapies (In Progress)     Topic: Occupational Therapy (In Progress)     Point: ADL training (Done)     Description:   Instruct learner(s) on proper safety adaptation and remediation techniques during self care or transfers.   Instruct in proper use of assistive devices.              Learning Progress Summary           Patient Acceptance, E, VU by AM at 12/20/2022 1415                   Point: Home exercise program (Not Started)     Description:   Instruct learner(s) on appropriate technique for monitoring, assisting and/or progressing therapeutic exercises/activities.              Learner Progress:  Not documented in this visit.          Point: Precautions (Not Started)     Description:   Instruct learner(s) on prescribed precautions during self-care and functional transfers.              Learner Progress:  Not documented in this visit.          Point: Body mechanics (Not Started)     Description:   Instruct learner(s) on proper positioning and spine alignment during self-care, functional mobility activities and/or exercises.              Learner Progress:  Not documented in this visit.                      User Key     Initials Effective Dates Name Provider Type Discipline     09/27/22 -  Giovanna Boo OT Occupational Therapist OT              OT Recommendation and Plan  Planned Therapy Interventions (OT): activity tolerance training, BADL retraining, occupation/activity based interventions, passive ROM/stretching, patient/caregiver education/training, transfer/mobility retraining, strengthening exercise, ROM/therapeutic exercise  Therapy Frequency (OT): 3 times/wk  Plan of Care Review  Plan of Care Reviewed With: patient, spouse, family  Outcome Evaluation: Patient is a 75 y/o male admitted to [facility] from [place] for elevated troponin, NSTEMI, and EF of 15-20%. During hospitalization patient tested positive for influenza B requiring supplemental O2 at 2 LPM. Patient on heparin drip and being  worked up for possible CABG. Patient reports at baseline living at home with his wife in a one story home with basement and no steps to enter. Patient reports being I with ADLs, IADLs, and mobility with no AD/AE. Patient reports one fall [place] his [place] where he slipped with no injuries. Patient has a tub/shower but is considering renovating it to a walk in shower.  At time of evaluation patients O2 is stable on 2 LPM. Patient requires min A for donning socks sitting EOB and CGA for ambulation from walker level with reports of dizziness with movement. Patient has supportive family nearby and patient is appropriate to return home with  services however family is aware that therapy will re-evaluate post-op.     Time Calculation:    Time Calculation- OT     Row Name 12/20/22 1415             Time Calculation- OT    OT Start Time 1310  -AM      OT Stop Time 1343  -AM      OT Time Calculation (min) 33 min  -AM      Total Timed Code Minutes- OT 15 minute(s)  -AM      OT Received On 12/20/22  -AM      OT - Next Appointment 12/21/22  -AM      OT Goal Re-Cert Due Date 01/03/23  -AM            User Key  (r) = Recorded By, (t) = Taken By, (c) = Cosigned By    Initials Name Provider Type    AM Giovanna Boo OT Occupational Therapist              Therapy Charges for Today     Code Description Service Date Service Provider Modifiers Qty    [id number]  OT THERAPEUTIC ACT EA 15 MIN 12/20/2022 Giovanna Boo OT GO 1    [id number]  OT EVAL MOD COMPLEXITY 3 12/20/2022 Giovanna Boo OT GO 1               Giovanna Boo OT  12/20/2022

## 2022-12-21 ENCOUNTER — APPOINTMENT (OUTPATIENT)
Dept: CARDIOLOGY | Facility: HOSPITAL | Age: 77
DRG: 235 | End: 2022-12-21
Payer: MEDICARE

## 2022-12-21 LAB
ANION GAP SERPL CALCULATED.3IONS-SCNC: 14 MMOL/L (ref 5–15)
APTT PPP: 49.1 SECONDS (ref 61–76.5)
APTT PPP: 54.6 SECONDS (ref 61–76.5)
APTT PPP: 76.9 SECONDS (ref 61–76.5)
APTT PPP: 77.2 SECONDS (ref 61–76.5)
BASOPHILS # BLD AUTO: 0 10*3/MM3 (ref 0–0.2)
BASOPHILS NFR BLD AUTO: 0.6 % (ref 0–1.5)
BUN SERPL-MCNC: 28 MG/DL (ref 8–23)
BUN/CREAT SERPL: 23.1 (ref 7–25)
CALCIUM SPEC-SCNC: 9.9 MG/DL (ref 8.6–10.5)
CHLORIDE SERPL-SCNC: 92 MMOL/L (ref 98–107)
CO2 SERPL-SCNC: 29 MMOL/L (ref 22–29)
CREAT SERPL-MCNC: 1.21 MG/DL (ref 0.76–1.27)
DEPRECATED RDW RBC AUTO: 45.9 FL (ref 37–54)
EGFRCR SERPLBLD CKD-EPI 2021: 62.4 ML/MIN/1.73
EOSINOPHIL # BLD AUTO: 0.2 10*3/MM3 (ref 0–0.4)
EOSINOPHIL NFR BLD AUTO: 3.2 % (ref 0.3–6.2)
ERYTHROCYTE [DISTWIDTH] IN BLOOD BY AUTOMATED COUNT: 14.5 % (ref 12.3–15.4)
GLUCOSE BLDC GLUCOMTR-MCNC: 164 MG/DL (ref 70–105)
GLUCOSE BLDC GLUCOMTR-MCNC: 224 MG/DL (ref 70–105)
GLUCOSE BLDC GLUCOMTR-MCNC: 239 MG/DL (ref 70–105)
GLUCOSE SERPL-MCNC: 135 MG/DL (ref 65–99)
HCT VFR BLD AUTO: 44.3 % (ref 37.5–51)
HGB BLD-MCNC: 14.3 G/DL (ref 13–17.7)
LYMPHOCYTES # BLD AUTO: 1.9 10*3/MM3 (ref 0.7–3.1)
LYMPHOCYTES NFR BLD AUTO: 24.3 % (ref 19.6–45.3)
MAGNESIUM SERPL-MCNC: 1.8 MG/DL (ref 1.6–2.4)
MCH RBC QN AUTO: 29.1 PG (ref 26.6–33)
MCHC RBC AUTO-ENTMCNC: 32.2 G/DL (ref 31.5–35.7)
MCV RBC AUTO: 90.5 FL (ref 79–97)
MONOCYTES # BLD AUTO: 0.8 10*3/MM3 (ref 0.1–0.9)
MONOCYTES NFR BLD AUTO: 10 % (ref 5–12)
NEUTROPHILS NFR BLD AUTO: 4.8 10*3/MM3 (ref 1.7–7)
NEUTROPHILS NFR BLD AUTO: 61.9 % (ref 42.7–76)
NRBC BLD AUTO-RTO: 0 /100 WBC (ref 0–0.2)
PHOSPHATE SERPL-MCNC: 4.5 MG/DL (ref 2.5–4.5)
PLATELET # BLD AUTO: 336 10*3/MM3 (ref 140–450)
PMV BLD AUTO: 8.8 FL (ref 6–12)
POTASSIUM SERPL-SCNC: 4.4 MMOL/L (ref 3.5–5.2)
RBC # BLD AUTO: 4.9 10*6/MM3 (ref 4.14–5.8)
SODIUM SERPL-SCNC: 135 MMOL/L (ref 136–145)
WBC NRBC COR # BLD: 7.7 10*3/MM3 (ref 3.4–10.8)

## 2022-12-21 PROCEDURE — 97530 THERAPEUTIC ACTIVITIES: CPT

## 2022-12-21 PROCEDURE — 94664 DEMO&/EVAL PT USE INHALER: CPT

## 2022-12-21 PROCEDURE — 63710000001 INSULIN LISPRO (HUMAN) PER 5 UNITS: Performed by: INTERNAL MEDICINE

## 2022-12-21 PROCEDURE — 99232 SBSQ HOSP IP/OBS MODERATE 35: CPT | Performed by: INTERNAL MEDICINE

## 2022-12-21 PROCEDURE — 99233 SBSQ HOSP IP/OBS HIGH 50: CPT | Performed by: INTERNAL MEDICINE

## 2022-12-21 PROCEDURE — 93306 TTE W/DOPPLER COMPLETE: CPT

## 2022-12-21 PROCEDURE — 94799 UNLISTED PULMONARY SVC/PX: CPT

## 2022-12-21 PROCEDURE — 85730 THROMBOPLASTIN TIME PARTIAL: CPT | Performed by: INTERNAL MEDICINE

## 2022-12-21 PROCEDURE — 99231 SBSQ HOSP IP/OBS SF/LOW 25: CPT | Performed by: PHYSICIAN ASSISTANT

## 2022-12-21 PROCEDURE — 82962 GLUCOSE BLOOD TEST: CPT

## 2022-12-21 PROCEDURE — 85730 THROMBOPLASTIN TIME PARTIAL: CPT | Performed by: THORACIC SURGERY (CARDIOTHORACIC VASCULAR SURGERY)

## 2022-12-21 PROCEDURE — 25010000002 HEPARIN (PORCINE) 25000-0.45 UT/250ML-% SOLUTION: Performed by: THORACIC SURGERY (CARDIOTHORACIC VASCULAR SURGERY)

## 2022-12-21 PROCEDURE — 93306 TTE W/DOPPLER COMPLETE: CPT | Performed by: INTERNAL MEDICINE

## 2022-12-21 PROCEDURE — 94761 N-INVAS EAR/PLS OXIMETRY MLT: CPT

## 2022-12-21 PROCEDURE — 25010000002 SULFUR HEXAFLUORIDE MICROSPH 60.7-25 MG RECONSTITUTED SUSPENSION: Performed by: HOSPITALIST

## 2022-12-21 PROCEDURE — 63710000001 INSULIN ISOPHANE & REGULAR PER 5 UNITS: Performed by: INTERNAL MEDICINE

## 2022-12-21 RX ADMIN — CARVEDILOL 3.12 MG: 3.12 TABLET, FILM COATED ORAL at 08:36

## 2022-12-21 RX ADMIN — INSULIN HUMAN 30 UNITS: 100 INJECTION, SUSPENSION SUBCUTANEOUS at 17:22

## 2022-12-21 RX ADMIN — CARVEDILOL 3.12 MG: 3.12 TABLET, FILM COATED ORAL at 17:22

## 2022-12-21 RX ADMIN — ACETAMINOPHEN 650 MG: 325 TABLET, FILM COATED ORAL at 17:25

## 2022-12-21 RX ADMIN — SULFUR HEXAFLUORIDE 2 ML: KIT at 14:40

## 2022-12-21 RX ADMIN — POTASSIUM CHLORIDE 20 MEQ: 1500 TABLET, EXTENDED RELEASE ORAL at 17:22

## 2022-12-21 RX ADMIN — HEPARIN SODIUM 18.89 UNITS/KG/HR: 10000 INJECTION, SOLUTION INTRAVENOUS at 17:00

## 2022-12-21 RX ADMIN — ASPIRIN 81 MG CHEWABLE TABLET 81 MG: 81 TABLET CHEWABLE at 08:36

## 2022-12-21 RX ADMIN — OSELTAMIVIR PHOSPHATE 75 MG: 75 CAPSULE ORAL at 20:25

## 2022-12-21 RX ADMIN — POTASSIUM CHLORIDE 20 MEQ: 1500 TABLET, EXTENDED RELEASE ORAL at 08:36

## 2022-12-21 RX ADMIN — Medication 10 ML: at 20:26

## 2022-12-21 RX ADMIN — Medication 10 ML: at 08:36

## 2022-12-21 RX ADMIN — EMPAGLIFLOZIN 10 MG: 10 TABLET, FILM COATED ORAL at 08:36

## 2022-12-21 RX ADMIN — INSULIN HUMAN 30 UNITS: 100 INJECTION, SUSPENSION SUBCUTANEOUS at 12:17

## 2022-12-21 RX ADMIN — INSULIN HUMAN 30 UNITS: 100 INJECTION, SUSPENSION SUBCUTANEOUS at 08:34

## 2022-12-21 RX ADMIN — INSULIN LISPRO 4 UNITS: 100 INJECTION, SOLUTION INTRAVENOUS; SUBCUTANEOUS at 17:22

## 2022-12-21 RX ADMIN — FUROSEMIDE 40 MG: 40 TABLET ORAL at 08:36

## 2022-12-21 RX ADMIN — DOCUSATE SODIUM 100 MG: 100 CAPSULE, LIQUID FILLED ORAL at 08:36

## 2022-12-21 RX ADMIN — OSELTAMIVIR PHOSPHATE 75 MG: 75 CAPSULE ORAL at 08:36

## 2022-12-21 RX ADMIN — FUROSEMIDE 40 MG: 40 TABLET ORAL at 17:22

## 2022-12-21 RX ADMIN — BUDESONIDE 0.5 MG: 0.5 INHALANT RESPIRATORY (INHALATION) at 08:21

## 2022-12-21 RX ADMIN — DOCUSATE SODIUM 100 MG: 100 CAPSULE, LIQUID FILLED ORAL at 20:25

## 2022-12-21 RX ADMIN — INSULIN LISPRO 2 UNITS: 100 INJECTION, SOLUTION INTRAVENOUS; SUBCUTANEOUS at 08:34

## 2022-12-21 RX ADMIN — INSULIN LISPRO 4 UNITS: 100 INJECTION, SOLUTION INTRAVENOUS; SUBCUTANEOUS at 12:17

## 2022-12-21 RX ADMIN — HEPARIN SODIUM 19.89 UNITS/KG/HR: 10000 INJECTION, SOLUTION INTRAVENOUS at 01:52

## 2022-12-21 RX ADMIN — POLYETHYLENE GLYCOL 3350 17 G: 17 POWDER, FOR SOLUTION ORAL at 08:35

## 2022-12-21 RX ADMIN — ROSUVASTATIN CALCIUM 5 MG: 5 TABLET, COATED ORAL at 20:25

## 2022-12-21 RX ADMIN — BUDESONIDE 0.5 MG: 0.5 INHALANT RESPIRATORY (INHALATION) at 19:04

## 2022-12-21 NOTE — CASE MANAGEMENT/SOCIAL WORK
Continued Stay Note  [facility]     Patient Name: Sherman Baumann  MRN: [id number]  Today's Date: 12/21/2022    Admit Date: 12/17/2022    Plan: DC Plan: Anticipate routine home pending clinical course. [facility] referral pending acceptance, order form needed.   Discharge Plan     Row Name 12/21/22 1511       Plan    Plan DC Plan: Anticipate routine home pending clinical course. [facility] referral pending acceptance, order form needed.    Plan Comments CM contacted Jesenia with [facility] ([phone]) to notify her that patient’s plan is a tentative CABG to be performed next week. She reported that they have an order form that is needed and she would fax to Care Coordination at fx #[phone]. DC Barriers: Heparin gtt, tentative CABG pending.              Phone communication or documentation only - no physical contact with patient or family.    Chelo Porras RN     Office Phone: [phone]  Office Cell: [phone]

## 2022-12-21 NOTE — PROGRESS NOTES
LOS: 4 days   Patient Care Team:  Melvina Hodge PCP - General (Nurse Practitioner)    Chief Complaint:   Pre-op CABG  CAd  Flu    Subjective  No complaints    Vital Signs  Temp:  [97.5 °F (36.4 °C)-98.2 °F (36.8 °C)] 97.7 °F (36.5 °C)  Heart Rate:  [73-93] 93  Resp:  [11-19] 19  BP: (119-145)/(55-76) 131/63      12/20/22  0500 12/21/22  0558 12/21/22  1400   Weight: 101 kg (223 lb 12.3 oz) 101 kg (222 lb 3.6 oz) 101 kg (222 lb)     Body mass index is 31.85 kg/m².    Intake/Output Summary (Last 24 hours) at 12/21/2022 1521  Last data filed at 12/21/2022 1400  Gross per 24 hour   Intake 240 ml   Output 2875 ml   Net -2635 ml     I/O this shift:  In: -   Out: 850 [Urine:850]    Objective    Results Review:        WBC WBC   Date Value Ref Range Status   12/20/2022 7.70 3.40 - 10.80 10*3/mm3 Final   12/19/2022 8.40 3.40 - 10.80 10*3/mm3 Final      HGB Hemoglobin   Date Value Ref Range Status   12/20/2022 14.3 13.0 - 17.7 g/dL Final   12/19/2022 13.5 13.0 - 17.7 g/dL Final      HCT Hematocrit   Date Value Ref Range Status   12/20/2022 44.3 37.5 - 51.0 % Final   12/19/2022 40.7 37.5 - 51.0 % Final      Platelets Platelets   Date Value Ref Range Status   12/20/2022 336 140 - 450 10*3/mm3 Final   12/19/2022 267 140 - 450 10*3/mm3 Final        PT/INR:  No results found for: PROTIME/No results found for: INR    Sodium Sodium   Date Value Ref Range Status   12/20/2022 135 (L) 136 - 145 mmol/L Final   12/20/2022 130 (L) 136 - 145 mmol/L Final   12/19/2022 136 136 - 145 mmol/L Final      Potassium Potassium   Date Value Ref Range Status   12/20/2022 4.4 3.5 - 5.2 mmol/L Final     Comment:     Slight hemolysis detected by analyzer. Results may be affected.   12/20/2022 4.7 3.5 - 5.2 mmol/L Final   12/19/2022 4.4 3.5 - 5.2 mmol/L Final      Chloride Chloride   Date Value Ref Range Status   12/20/2022 92 (L) 98 - 107 mmol/L Final   12/20/2022 91 (L) 98 - 107 mmol/L Final   12/19/2022 94 (L) 98 - 107 mmol/L Final       Bicarbonate CO2   Date Value Ref Range Status   2022 29.0 22.0 - 29.0 mmol/L Final   2022 28.0 22.0 - 29.0 mmol/L Final   2022 29.0 22.0 - 29.0 mmol/L Final      BUN BUN   Date Value Ref Range Status   2022 28 (H) 8 - 23 mg/dL Final   2022 28 (H) 8 - 23 mg/dL Final   2022 29 (H) 8 - 23 mg/dL Final      Creatinine Creatinine   Date Value Ref Range Status   2022 1.21 0.76 - 1.27 mg/dL Final   2022 1.10 0.76 - 1.27 mg/dL Final   2022 1.06 0.76 - 1.27 mg/dL Final      Calcium Calcium   Date Value Ref Range Status   2022 9.9 8.6 - 10.5 mg/dL Final   2022 9.3 8.6 - 10.5 mg/dL Final   2022 9.2 8.6 - 10.5 mg/dL Final      Magnesium Magnesium   Date Value Ref Range Status   2022 1.8 1.6 - 2.4 mg/dL Final          aspirin, 81 mg, Oral, Daily  budesonide, 0.5 mg, Nebulization, BID - RT  carvedilol, 3.125 mg, Oral, BID With Meals  docusate sodium, 100 mg, Oral, BID  empagliflozin, 10 mg, Oral, Daily  furosemide, 40 mg, Oral, BID  insulin lispro, 2-12 Units, Subcutaneous, TID With Meals  insulin NPH-insulin regular, 30 Units, Subcutaneous, TID AC  oseltamivir, 75 mg, Oral, Q12H  polyethylene glycol, 17 g, Oral, Daily  potassium chloride, 20 mEq, Oral, BID With Meals  rosuvastatin, 5 mg, Oral, Nightly  sodium chloride, 10 mL, Intravenous, Q12H      heparin, 9.71 Units/kg/hr, Last Rate: 20.89 Units/kg/hr (22 2477)            Patient Active Problem List   Diagnosis Code   • CAD (coronary artery disease), native coronary artery I25.10   • Type 2 diabetes mellitus with hyperglycemia, without long-term current use of insulin (HCC) E11.65   • Mixed hyperlipidemia E78.2   • Primary hypertension I10   • Systolic and diastolic CHF, acute on chronic (HCC) I50.43   • Influenza due to seasonal influenza virus J10.1       Assessment & Plan      plt ag  UA: negative  CXR: no active disease  Carotid duplex: mild stenosis bilat  Vein mapping: adequate  bilat  MRSA swab: negative  COVID swab: negative 12/17, will need repeat pre-op    - MV CAD with NSTEMI presentation, EF 20-25% (echo)--transferred for further care and evaluation  - Acute HFrEF, NYHA class II-III--BNP 5580 at OSH  - Influenzae B--ID consulted, Tamiflu  - ICM  - DM type 2 with hyperglycemia--a1c 8.4 at OSH, hospitalist following  - HTN--stable  - Past smoker  - [place] [employer] [place], hearing aids in place  - Obesity     Plan to wait 10 days from diagnosis of Flu.  Remains on Tamiflu.  Repeat echo pending to re-eval LV function.  Tentatively scheduled for CABG with [facility] on 12/29.       Jordy Lawrence PA-C  12/21/22  15:21 EST

## 2022-12-21 NOTE — PLAN OF CARE
"Assessment: Sherman Baumann presents with ADL impairments below baseline abilities which indicate the need for continued skilled intervention while inpatient. Pt educated on STS without BUE to prepare for CABG. Pt with fair carryover and required min A to stand secondary to weakness and decreased balance. Pt with improved STS practice following 5 STS from chair with RW.  Tolerating session today without incident. Will continue to follow and progress as tolerated.     Plan/Recommendations:   Low Intensity Therapy recommended post-acute care - This is recommended as therapy feels this patient would require 2-3 visits per week. OP or HH would be the best option depending on patient's home bound status. Consider, if the patient has other  \"skilled\" needs such as wounds, IV antibiotics, etc. Combined with \"low intensity\" could also equate to a SNF. If patient is medically complex, consider LTAC.. Pt requires rolling walker at discharge.   "

## 2022-12-21 NOTE — THERAPY TREATMENT NOTE
"Subjective: Pt agreeable to therapeutic plan of care.  Cognition: oriented to Person, Place, Time and Situation and awareness of deficits: fair awareness of deficits    Objective:     Bed Mobility: N/A or Not attempted. Pt sitting at EOB upon arrival   Functional Transfers: Min-A and with rolling walker Pt educated on STS without BUEs to prepare for potential CABG. Pt required increased momentum and time to stand with this method   Functional Ambulation: CGA and with rolling walker    Toileting: CGA  ADL Position: unsupported sitting  ADL Comments: Pt able to use urinal with CGA       Vitals: WNL    Pain: 0 VAS  Location:   Interventions for pain: N/A  Education: Provided education on the importance of mobility in the acute care setting and Transfer Training    Assessment: Sherman Baumann presents with ADL impairments below baseline abilities which indicate the need for continued skilled intervention while inpatient. Pt educated on STS without BUE to prepare for CABG. Pt with fair carryover and required min A to stand secondary to weakness and decreased balance. Pt with improved STS practice following 5 STS from chair with RW.  Tolerating session today without incident. Will continue to follow and progress as tolerated.     Plan/Recommendations:   Low Intensity Therapy recommended post-acute care - This is recommended as therapy feels this patient would require 2-3 visits per week. OP or HH would be the best option depending on patient's home bound status. Consider, if the patient has other  \"skilled\" needs such as wounds, IV antibiotics, etc. Combined with \"low intensity\" could also equate to a SNF. If patient is medically complex, consider LTAC.. Pt requires rolling walker at discharge.     Pt desires Home with family assist and and Home Health at discharge. Pt cooperative; agreeable to therapeutic recommendations and plan of care.     Modified Priscilla: N/A = No pre-op stroke/TIA    Post-Tx Position: Up in Chair, " Alarms activated and Call light and personal items within reach  PPE: gloves and surgical mask

## 2022-12-21 NOTE — PLAN OF CARE
"Assessment: Sherman Baumann presents with functional mobility impairments which indicate the need for skilled intervention. Pt requires cueing to improve forward weight with STS working toward independence with transfers in preparation for CABG. Pt confirms he has a RW for home use. Tolerating session today without incident. Will continue to follow and progress as tolerated.     Plan/Recommendations:   Low Intensity Therapy recommended post-acute care - This is recommended as therapy feels this patient would require 2-3 visits per week. OP or HH would be the best option depending on patient's home bound status. Consider, if the patient has other  \"skilled\" needs such as wounds, IV antibiotics, etc. Combined with \"low intensity\" could also equate to a SNF. If patient is medically complex, consider LTAC.. Pt requires no DME at discharge.     Pt desires Home with Home Health at discharge. Pt cooperative; agreeable to therapeutic recommendations and plan of care.   "

## 2022-12-21 NOTE — PLAN OF CARE
Goal Outcome Evaluation:      Pt remains on 1-2L nasal cannula and heparin gtt. Plans for repeat echo. VSS  Problem: Adult Inpatient Plan of Care  Goal: Absence of Hospital-Acquired Illness or Injury  Intervention: Identify and Manage Fall Risk  Recent Flowsheet Documentation  Taken 12/21/2022 0400 by Verena Alexander RN  Safety Promotion/Fall Prevention: safety round/check completed  Taken 12/21/2022 0200 by Verena Alexander RN  Safety Promotion/Fall Prevention: safety round/check completed  Taken 12/21/2022 0040 by Verena Alexander RN  Safety Promotion/Fall Prevention: safety round/check completed  Taken 12/20/2022 2205 by Verena Alexander RN  Safety Promotion/Fall Prevention: safety round/check completed  Taken 12/20/2022 2000 by Verena Alexander RN  Safety Promotion/Fall Prevention: safety round/check completed  Intervention: Prevent Skin Injury  Recent Flowsheet Documentation  Taken 12/20/2022 2000 by Verena Alexander RN  Skin Protection: adhesive use limited  Intervention: Prevent and Manage VTE (Venous Thromboembolism) Risk  Recent Flowsheet Documentation  Taken 12/20/2022 2000 by Verena Alexander RN  Activity Management: activity adjusted per tolerance  VTE Prevention/Management: (heparin gtt) other (see comments)  Range of Motion: active ROM (range of motion) encouraged  Intervention: Prevent Infection  Recent Flowsheet Documentation  Taken 12/21/2022 0400 by Verena Alexander RN  Infection Prevention: rest/sleep promoted  Taken 12/21/2022 0200 by Verena Alexander RN  Infection Prevention: rest/sleep promoted  Taken 12/21/2022 0040 by Verena Alexander RN  Infection Prevention: rest/sleep promoted  Taken 12/20/2022 2205 by Verena Alexander RN  Infection Prevention: rest/sleep promoted  Taken 12/20/2022 2000 by Verena Alexander RN  Infection Prevention: rest/sleep promoted  Goal: Optimal Comfort and Wellbeing  Intervention: Provide Person-Centered Care  Recent  Flowsheet Documentation  Taken 12/20/2022 2000 by Verena Alexander RN  Trust Relationship/Rapport:   care explained   questions answered     Problem: Diabetes Comorbidity  Goal: Blood Glucose Level Within Targeted Range  Intervention: Monitor and Manage Glycemia  Recent Flowsheet Documentation  Taken 12/20/2022 2000 by Verena Alexander RN  Glycemic Management:   blood glucose monitored   oral hydration promoted     Problem: Fall Injury Risk  Goal: Absence of Fall and Fall-Related Injury  Intervention: Promote Injury-Free Environment  Recent Flowsheet Documentation  Taken 12/21/2022 0400 by Verena Alexander RN  Safety Promotion/Fall Prevention: safety round/check completed  Taken 12/21/2022 0200 by Verena Alexander RN  Safety Promotion/Fall Prevention: safety round/check completed  Taken 12/21/2022 0040 by Verena Alexander RN  Safety Promotion/Fall Prevention: safety round/check completed  Taken 12/20/2022 2205 by Verena Alexander RN  Safety Promotion/Fall Prevention: safety round/check completed  Taken 12/20/2022 2000 by Verena Alexandre RN  Safety Promotion/Fall Prevention: safety round/check completed     Problem: Skin Injury Risk Increased  Goal: Skin Health and Integrity  Intervention: Promote and Optimize Oral Intake  Recent Flowsheet Documentation  Taken 12/20/2022 2000 by Verena Alexander RN  Oral Nutrition Promotion: rest periods promoted  Intervention: Optimize Skin Protection  Recent Flowsheet Documentation  Taken 12/20/2022 2000 by Verena Alexander RN  Pressure Reduction Techniques: frequent weight shift encouraged  Pressure Reduction Devices: pressure-redistributing mattress utilized  Skin Protection: adhesive use limited

## 2022-12-21 NOTE — PLAN OF CARE
Problem: Adult Inpatient Plan of Care  Goal: Plan of Care Review  Outcome: Ongoing, Progressing  Flowsheets (Taken 12/21/2022 1419)  Outcome Evaluation: Patient to have repeat echo today to assess LV function. Patient with no new complaints. Blood sugars have been much better compared to previous day. VSS  Goal: Patient-Specific Goal (Individualized)  Outcome: Ongoing, Progressing  Goal: Absence of Hospital-Acquired Illness or Injury  Outcome: Ongoing, Progressing  Intervention: Identify and Manage Fall Risk  Recent Flowsheet Documentation  Taken 12/21/2022 1400 by Manda Cruz, RN  Safety Promotion/Fall Prevention:   room organization consistent   safety round/check completed   nonskid shoes/slippers when out of bed   fall prevention program maintained  Taken 12/21/2022 0820 by Manda Cruz, RN  Safety Promotion/Fall Prevention:   nonskid shoes/slippers when out of bed   room organization consistent   safety round/check completed   fall prevention program maintained  Goal: Optimal Comfort and Wellbeing  Outcome: Ongoing, Progressing  Goal: Readiness for Transition of Care  Outcome: Ongoing, Progressing   Goal Outcome Evaluation:              Outcome Evaluation: Patient to have repeat echo today to assess LV function. Patient with no new complaints. Blood sugars have been much better compared to previous day. VSS

## 2022-12-21 NOTE — CONSULTS
"Diabetes Education  Assessment/Teaching    Patient Name:  Sherman Baumann  YOB: 1945  MRN: [id number]  Admit Date:  12/17/2022      Assessment Date:  12/21/2022  Flowsheet Row Most Recent Value   General Information     Referral From: A1c, Blood glucose  [On 12/17/2022 A1c was 8.3% and admission blood sugar was 214.]   Height 177.8 cm (70\")   Height Method Stated   Weight 101 kg (222 lb)   Weight Method Bed scale   Pregnancy Assessment    Diabetes History    What type of diabetes do you have? Type 2   Length of Diabetes Diagnosis 10 + years  [Diagnosed 1997.]   Current DM knowledge fair   Do you test your blood sugar at home? yes   Frequency of checks every morning and at bedtime 2X a day   Meter type unsure of name but 2-3 years old   Who performs the test? patient   Typical readings <140   Have you had low blood sugar? (<70mg/dl) no   Have you had high blood sugar? (>140mg/dl) yes   How often do you have high blood sugar? unknown   When was your last high blood sugar? Admisison blood sugar 214   Education Preferences    What areas of diabetes would you like to learn about? diabetes complications, avoiding high blood sugar   Nutrition Information    Assessment Topics    Problem Solving - Assessment Needs education   Reducing Risk - Assessment Needs education   DM Goals    Problem Solving - Goal Today   Reducing Risk - Goal Today          Flowsheet Row Most Recent Value   DM Education Needs    Meter Has own   Frequency of Testing 2 times a day   Medication Insulin, Oral  [At home patient stated that he takes Metformin  500 mg 2 tabs before breakfast, 1 tab before lunch, and 2 tabs before supper, Glipizide 10 mg BID, and Humulin 70/30 27 units before breakfast and 22 units before supper.]   Problem Solving Hyperglycemia, Signs, Symptoms, Treatment   Reducing Risks A1C testing  [On 12/17/2022 A1c was 8.3%.]   Discharge Plan Home   Motivation Moderate   Teaching Method Discussion, Handouts   Patient " Response Verbalized understanding            Other Comments:  A1c info sheet given with discussion on A1c target and healthy blood sugar range. Patient stated he drinks diet drinks but very seldom drinks water. Patient stated he doesn't get exercise due to his health. Discussed with patient that endocrinology consulting on him and being started on insulin 3X a day before meals. Explained to patient that he may be discharged with a change on how he takes his insulin. Patient stated he understood that possibility. Patient has no further questions or concerns related to diabetes at this time.        Electronically signed by:  Naomi Cintron RN  12/21/22 16:00 EST

## 2022-12-21 NOTE — PROGRESS NOTES
Referring Provider: Artemio Sheets MD    Reason for follow-up: Multivessel coronary artery disease, non-ST elevation MI, diabetes     Patient Care Team:  Melvina Hodge as PCP - General (Nurse Practitioner)      SUBJECTIVE  Resting comfortably in bed, denies chest pain or shortness of breath     ROS  Review of all systems negative except as indicated.    Since I have last seen, the patient has been without any chest discomfort, shortness of breath, palpitations, dizziness or syncope.  Denies having any headache, abdominal pain, nausea, vomiting, diarrhea, constipation, loss of weight or loss of appetite.  Denies having any excessive bruising, hematuria or blood in the stool.  ROS      Personal History:    Past Medical History:   Diagnosis Date   • CHF (congestive heart failure) (HCC)    • Diabetes mellitus (HCC)    • Elevated cholesterol    • Hyperlipidemia    • Hypertension        Past Surgical History:   Procedure Laterality Date   • HERNIA REPAIR     • TONSILLECTOMY         No family history on file.    Social History     Tobacco Use   • Smoking status: Former     Types: Cigarettes   Vaping Use   • Vaping Use: Never used   Substance Use Topics   • Alcohol use: Not Currently   • Drug use: Never        Medications Prior to Admission   Medication Sig Dispense Refill Last Dose   • aspirin 81 MG chewable tablet Chew 81 mg Daily.      • aspirin-acetaminophen-caffeine (EXCEDRIN MIGRAINE) 250-250-65 MG per tablet Take 1 tablet by mouth Every 6 (Six) Hours As Needed for Headache.      • carvedilol (COREG) 3.125 MG tablet Take 3.125 mg by mouth 2 (Two) Times a Day With Meals.      • furosemide (LASIX) 40 MG tablet Take 40 mg by mouth 2 (Two) Times a Day.      • glipizide (GLUCOTROL) 10 MG tablet Take 10 mg by mouth 2 (Two) Times a Day Before Meals.      • insulin NPH-insulin regular (humuLIN 70/30,novoLIN 70/30) (70-30) 100 UNIT/ML injection Inject 27 Units under the skin into the appropriate area as directed Every  Morning.      • insulin NPH-insulin regular (humuLIN 70/30,novoLIN 70/30) (70-30) 100 UNIT/ML injection Inject 22 Units under the skin into the appropriate area as directed Every Night.      • metFORMIN (GLUCOPHAGE) 500 MG tablet Take 500 mg by mouth Daily With Breakfast.      • Omega-3 Fatty Acids (fish oil) 1000 MG capsule capsule Take 1,000 mg by mouth Daily With Breakfast.      • oseltamivir (TAMIFLU) 75 MG capsule Take 75 mg by mouth 2 (Two) Times a Day.      • rosuvastatin (CRESTOR) 5 MG tablet Take 5 mg by mouth Every Night.          Allergies:  Patient has no known allergies.    Scheduled Meds:aspirin, 81 mg, Oral, Daily  budesonide, 0.5 mg, Nebulization, BID - RT  carvedilol, 3.125 mg, Oral, BID With Meals  docusate sodium, 100 mg, Oral, BID  empagliflozin, 10 mg, Oral, Daily  furosemide, 40 mg, Oral, BID  insulin lispro, 2-12 Units, Subcutaneous, TID With Meals  insulin NPH-insulin regular, 30 Units, Subcutaneous, TID AC  oseltamivir, 75 mg, Oral, Q12H  polyethylene glycol, 17 g, Oral, Daily  potassium chloride, 20 mEq, Oral, BID With Meals  rosuvastatin, 5 mg, Oral, Nightly  sodium chloride, 10 mL, Intravenous, Q12H      Continuous Infusions:heparin, 9.71 Units/kg/hr, Last Rate: 20.89 Units/kg/hr (12/21/22 0757)      PRN Meds:.•  acetaminophen  •  ALPRAZolam  •  dextrose  •  dextrose  •  glucagon (human recombinant)  •  ipratropium-albuterol  •  melatonin  •  nitroglycerin  •  ondansetron  •  potassium chloride **OR** potassium chloride **OR** potassium chloride  •  sodium chloride  •  sodium chloride      OBJECTIVE    Vital Signs  Vitals:    12/21/22 0558 12/21/22 0821 12/21/22 0826 12/21/22 0900   BP: 128/68   131/69   BP Location: Left arm   Right arm   Patient Position: Lying   Lying   Pulse: 73 84 85 88   Resp: 17 12 14 13   Temp: 98.2 °F (36.8 °C)   97.8 °F (36.6 °C)   TempSrc: Oral   Oral   SpO2: 95% 94% 93% 95%   Weight: 101 kg (222 lb 3.6 oz)      Height:           Flowsheet Rows      Flowsheet  "Row First Filed Value   Admission Height 177.8 cm (70\") Documented at 12/18/2022 1305   Admission Weight 103 kg (227 lb 1.2 oz) Documented at 12/17/2022 0500              Intake/Output Summary (Last 24 hours) at 12/21/2022 1027  Last data filed at 12/21/2022 0826  Gross per 24 hour   Intake 240 ml   Output 2375 ml   Net -2135 ml          Telemetry: Sinus rhythm    Physical Exam:  The patient is alert, oriented and in no distress.  Vital signs as noted above.  Head and neck revealed no carotid bruits or jugular venous distention.  No thyromegaly or lymphadenopathy is present  Lungs clear.  No wheezing.  Breath sounds are normal bilaterally.  Heart normal first and second heart sounds.  No murmur. No precordial rub is present.  No gallop is present.  Abdomen soft and nontender.  No organomegaly is present.  Extremities with good peripheral pulses without any pedal edema.  Skin warm and dry.  Musculoskeletal system is grossly normal.  CNS grossly normal.       Results Review:  I have personally reviewed the results from the time of this admission to 12/21/2022 10:27 EST and agree with these findings:  []  Laboratory  []  Microbiology  []  Radiology  []  EKG/Telemetry   []  Cardiology/Vascular   []  Pathology  []  Old records  []  Other:    Most notable findings include:    Lab Results (last 24 hours)       Procedure Component Value Units Date/Time    aPTT [[id number]]  (Abnormal) Collected: 12/21/22 0730    Specimen: Blood Updated: 12/21/22 0749     PTT 54.6 seconds     POC Glucose Once [[id number]]  (Abnormal) Collected: 12/21/22 0727    Specimen: Blood Updated: 12/21/22 0728     Glucose 164 mg/dL      Comment: Serial Number: [id number]:  [id number]       aPTT [[id number]]  (Abnormal) Collected: 12/20/22 2313    Specimen: Blood Updated: 12/21/22 0047     PTT 49.1 seconds     Basic Metabolic Panel [[id number]]  (Abnormal) Collected: 12/20/22 2313    Specimen: Blood Updated: 12/21/22 0044     Glucose 135 mg/dL      " BUN 28 mg/dL      Creatinine 1.21 mg/dL      Sodium 135 mmol/L      Potassium 4.4 mmol/L      Comment: Slight hemolysis detected by analyzer. Results may be affected.        Chloride 92 mmol/L      CO2 29.0 mmol/L      Calcium 9.9 mg/dL      BUN/Creatinine Ratio 23.1     Anion Gap 14.0 mmol/L      eGFR 62.4 mL/min/1.73      Comment: National Kidney Foundation and American Society of Nephrology (ASN) Task Force recommended calculation based on the Chronic Kidney Disease Epidemiology Collaboration (CKD-EPI) equation refit without adjustment for race.       Narrative:      GFR Normal >60  Chronic Kidney Disease <60  Kidney Failure <15    The GFR formula is only valid for adults with stable renal function between ages 18 and 70.    Phosphorus [[id number]]  (Normal) Collected: 12/20/22 2313    Specimen: Blood Updated: 12/21/22 0044     Phosphorus 4.5 mg/dL     Magnesium [[id number]]  (Normal) Collected: 12/20/22 2313    Specimen: Blood Updated: 12/21/22 0044     Magnesium 1.8 mg/dL     CBC & Differential [[id number]]  (Normal) Collected: 12/20/22 2313    Specimen: Blood Updated: 12/21/22 0018    Narrative:      The following orders were created for panel order CBC & Differential.  Procedure                               Abnormality         Status                     ---------                               -----------         ------                     CBC Auto Differential[[id number]]        Normal              Final result                 Please view results for these tests on the individual orders.    CBC Auto Differential [[id number]]  (Normal) Collected: 12/20/22 2313    Specimen: Blood Updated: 12/21/22 0018     WBC 7.70 10*3/mm3      RBC 4.90 10*6/mm3      Hemoglobin 14.3 g/dL      Hematocrit 44.3 %      MCV 90.5 fL      MCH 29.1 pg      MCHC 32.2 g/dL      RDW 14.5 %      RDW-SD 45.9 fl      MPV 8.8 fL      Platelets 336 10*3/mm3      Neutrophil % 61.9 %      Lymphocyte % 24.3 %      Monocyte % 10.0 %      Eosinophil %  3.2 %      Basophil % 0.6 %      Neutrophils, Absolute 4.80 10*3/mm3      Lymphocytes, Absolute 1.90 10*3/mm3      Monocytes, Absolute 0.80 10*3/mm3      Eosinophils, Absolute 0.20 10*3/mm3      Basophils, Absolute 0.00 10*3/mm3      nRBC 0.0 /100 WBC     POC Glucose Once [[id number]]  (Abnormal) Collected: 12/20/22 1703    Specimen: Blood Updated: 12/20/22 1704     Glucose 331 mg/dL      Comment: Serial Number: [id number]:  [id number]       aPTT [[id number]]  (Normal) Collected: 12/20/22 1600    Specimen: Blood Updated: 12/20/22 1622     PTT 61.8 seconds     POC Glucose Once [[id number]]  (Abnormal) Collected: 12/20/22 1150    Specimen: Blood Updated: 12/20/22 1151     Glucose 365 mg/dL      Comment: Serial Number: [id number]:  [id number]       aPTT [[id number]]  (Normal) Collected: 12/20/22 1103    Specimen: Blood Updated: 12/20/22 1132     PTT 63.1 seconds     Basic Metabolic Panel [[id number]]  (Abnormal) Collected: 12/20/22 1103    Specimen: Blood Updated: 12/20/22 1131     Glucose 374 mg/dL      BUN 28 mg/dL      Creatinine 1.10 mg/dL      Sodium 130 mmol/L      Potassium 4.7 mmol/L      Chloride 91 mmol/L      CO2 28.0 mmol/L      Calcium 9.3 mg/dL      BUN/Creatinine Ratio 25.5     Anion Gap 11.0 mmol/L      eGFR 70.0 mL/min/1.73      Comment: National Kidney Foundation and American Society of Nephrology (ASN) Task Force recommended calculation based on the Chronic Kidney Disease Epidemiology Collaboration (CKD-EPI) equation refit without adjustment for race.       Narrative:      GFR Normal >60  Chronic Kidney Disease <60  Kidney Failure <15    The GFR formula is only valid for adults with stable renal function between ages 18 and 70.            Imaging Results (Last 24 Hours)       ** No results found for the last 24 hours. **            LAB RESULTS (LAST 7 DAYS)    CBC  Results from last 7 days   Lab Units 12/20/22  2313 12/19/22  0300 12/18/22  1235 12/17/22  0945   WBC 10*3/mm3 7.70 8.40 9.40  10.70   RBC 10*6/mm3 4.90 4.58 5.05 4.90   HEMOGLOBIN g/dL 14.3 13.5 14.8 14.5   HEMATOCRIT % 44.3 40.7 45.6 45.0   MCV fL 90.5 88.7 90.4 91.8   PLATELETS 10*3/mm3 336 267 267 337       BMP  Results from last 7 days   Lab Units 12/20/22 2313 12/20/22 1103 12/19/22 0300 12/17/22 2256 12/17/22  0945   SODIUM mmol/L 135* 130* 136 135* 134*   POTASSIUM mmol/L 4.4 4.7 4.4 4.3 4.2   CHLORIDE mmol/L 92* 91* 94* 94* 94*   CO2 mmol/L 29.0 28.0 29.0 29.0 26.0   BUN mg/dL 28* 28* 29* 37* 32*   CREATININE mg/dL 1.21 1.10 1.06 1.31* 1.16   GLUCOSE mg/dL 135* 374* 225* 215* 368*   MAGNESIUM mg/dL 1.8  --   --   --   --    PHOSPHORUS mg/dL 4.5  --   --   --   --        CMP   Results from last 7 days   Lab Units 12/20/22 2313 12/20/22 1103 12/19/22 0300 12/17/22 2256 12/17/22  0945   SODIUM mmol/L 135* 130* 136 135* 134*   POTASSIUM mmol/L 4.4 4.7 4.4 4.3 4.2   CHLORIDE mmol/L 92* 91* 94* 94* 94*   CO2 mmol/L 29.0 28.0 29.0 29.0 26.0   BUN mg/dL 28* 28* 29* 37* 32*   CREATININE mg/dL 1.21 1.10 1.06 1.31* 1.16   GLUCOSE mg/dL 135* 374* 225* 215* 368*   ALBUMIN g/dL  --   --   --   --  3.80   BILIRUBIN mg/dL  --   --   --   --  0.9   ALK PHOS U/L  --   --   --   --  90   AST (SGOT) U/L  --   --   --   --  16   ALT (SGPT) U/L  --   --   --   --  9       BNP        TROPONIN        CoAg  Results from last 7 days   Lab Units 12/21/22  0730 12/20/22  2313 12/20/22  1600 12/20/22  1103 12/20/22  0850 12/19/22  2352 12/19/22  1758 12/17/22  1451 12/17/22  0708   INR   --   --   --   --   --   --   --   --  1.06   APTT seconds 54.6* 49.1* 61.8 63.1 78.2* 82.6* 74.7   < > 22.8*    < > = values in this interval not displayed.       Creatinine Clearance  Estimated Creatinine Clearance: 62.8 mL/min (by C-G formula based on SCr of 1.21 mg/dL).    ABG        Radiology  CT Chest Without Contrast Diagnostic    Result Date: 12/19/2022  Chronic interstitial change. Heavy coronary artery calcification. No active disease.    Electronically  Signed By-Jeffrey Cai MD On:12/19/2022 7:39 PM This report was finalized on [id number] by  Jeffrey Cai MD.        EKG  I personally viewed and interpreted the patient's EKG/Telemetry data:  ECG 12 Lead Chest Pain   Final Result   HEART RATE= 89  bpm   RR Interval= 672  ms   SD Interval= 176  ms   P Horizontal Axis= -5  deg   P Front Axis= 29  deg   QRSD Interval= 92  ms   QT Interval= 362  ms   QRS Axis= 16  deg   T Wave Axis= 163  deg   - ABNORMAL ECG -   Sinus rhythm   Probable left atrial enlargement   Probable anterior infarct, age indeterminate   Abnormal T, consider ischemia, lateral leads   No previous ECG available for comparison   Electronically Signed By: Alex Byrd ([facility]) 19-Dec-2022 08:52:18   Date and Time of Study: 2022-12-17 07:21:47      SCANNED - TELEMETRY     Final Result      SCANNED - TELEMETRY     Final Result      SCANNED - TELEMETRY     Final Result      SCANNED - TELEMETRY     Final Result      SCANNED - TELEMETRY     Final Result      SCANNED - TELEMETRY     Final Result      SCANNED - TELEMETRY     Final Result      SCANNED - TELEMETRY     Final Result      SCANNED - TELEMETRY     Final Result      SCANNED - TELEMETRY     Final Result      SCANNED - TELEMETRY     Final Result      SCANNED - TELEMETRY     Final Result      SCANNED - TELEMETRY     Final Result      SCANNED - TELEMETRY     Final Result      SCANNED - TELEMETRY     Final Result      SCANNED - TELEMETRY     Final Result      SCANNED - TELEMETRY     Final Result      SCANNED - TELEMETRY     Final Result      SCANNED - TELEMETRY     Final Result      SCANNED - TELEMETRY     Final Result      SCANNED - TELEMETRY     Final Result      SCANNED - TELEMETRY     Final Result      SCANNED - TELEMETRY     Final Result      SCANNED - TELEMETRY     Final Result      SCANNED - TELEMETRY     Final Result      SCANNED - TELEMETRY     Final Result            Echocardiogram:    Results for orders placed during the hospital encounter  of 12/17/22    Adult Transthoracic Echo Complete W/ Cont if Necessary Per Protocol    Interpretation Summary  •  Left ventricular ejection fraction appears to be 41 - 45%.  •  The left ventricular cavity is borderline dilated.  •  Left ventricular diastolic function is consistent with (grade I) impaired relaxation.  •  No significant valvular abnormalities        Stress Test:         Cardiac Catheterization:  No results found for this or any previous visit.         Other:         ASSESSMENT & PLAN:    Principal Problem:    CAD (coronary artery disease), native coronary artery  Active Problems:    Type 2 diabetes mellitus with hyperglycemia, without long-term current use of insulin (HCC)    Mixed hyperlipidemia    Primary hypertension    Systolic and diastolic CHF, acute on chronic (HCC)    Influenza due to seasonal influenza virus    76-year-old man with multiple and complex cardiovascular risk factors including hypertension, hyperlipidemia, uncontrolled diabetes presented with non-ST elevation MI in the setting of new diagnosis of influenza.  He also developed acute kidney injury which is now resolved.  Troponin peaked at 4.5 and eventually trended down.  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  Initial EF from [place] was reported at 20% however repeat echocardiogram shows EF of 40% with contrast.  We have started him on a heparin drip.  He is on aspirin, Coreg, Jardiance, high intensity statin.  Renal function has normalized we will start low-dose lisinopril  Continue oral Lasix.  We have started treatment for diabetes.  Plan is for him to recover from influenza prior to undergoing CABG.  If turned down for CABG multivessel PCI with Impella support and atherectomy can be offered.  Continue medical management of CAD in the meantime  Uptitrate GDMT as  tolerated for HFrEF.    Johann Morales MD  12/21/22  10:27 EST

## 2022-12-21 NOTE — THERAPY TREATMENT NOTE
"Subjective: Pt agreeable to therapeutic plan of care.    Objective:     Bed mobility - SBA supine<>sit, cueing during sit>supine to go without UE use in preparation for CABG.  Transfers - CGA and with rolling walker cues to increase forward movement of trunk, and to scoot to edge of surface; pt requires two attempts for STS from commode with CGA for safety.   Ambulation - 10 feet, 35ft CGA and with rolling walker    Vitals: WNL on 1L; /70 in sitting; 121/71 following stand    Pain: 0 VAS    Intervention for pain: N/A    Education: Provided education on the importance of mobility in the acute care setting, Verbal/Tactile Cues and Transfer Training    Assessment: Sherman Baumann presents with functional mobility impairments which indicate the need for skilled intervention. Pt requires cueing to improve forward weight with STS working toward independence with transfers in preparation for CABG. Pt confirms he has a RW for home use. Tolerating session today without incident. Will continue to follow and progress as tolerated.     Plan/Recommendations:   Low Intensity Therapy recommended post-acute care - This is recommended as therapy feels this patient would require 2-3 visits per week. OP or HH would be the best option depending on patient's home bound status. Consider, if the patient has other  \"skilled\" needs such as wounds, IV antibiotics, etc. Combined with \"low intensity\" could also equate to a SNF. If patient is medically complex, consider LTAC.. Pt requires no DME at discharge.     Pt desires Home with Home Health at discharge. Pt cooperative; agreeable to therapeutic recommendations and plan of care.       Modified [place]: N/A = No pre-op stroke/TIA    Post-Tx Position: Supine with HOB Elevated, Alarms activated and Call light and personal items within reach  PPE: gloves and surgical mask  "

## 2022-12-21 NOTE — PROGRESS NOTES
[facility] Medicine Services   Daily Progress Note    Patient Name: Sherman Baumann  : 1945  MRN: [id number]  Primary Care Physician:  Melvina Hodge  Date of admission: 2022  Date and Time of Service:       Subjective      Patient denies any chest pain shortness of breath  No lightheadedness today.  Denies any worsening lower extremity edema or orthopnea  Has mild cough. Non productive   No chills or sweats  No nausea vomiting diarrhea or constipation.      Objective      Vitals:   Temp:  [97.5 °F (36.4 °C)-99.1 °F (37.3 °C)] 98.2 °F (36.8 °C)  Heart Rate:  [73-89] 73  Resp:  [11-21] 17  BP: (114-145)/(54-76) 128/68  Flow (L/min):  [1-2] 1    Physical Exam   General: No acute distress  HEENT: neck supple, normal oral mucosa, no masses, no lymphadenopathy  Lungs: Clear bilaterally, no wheezing, No crackles, No Rhonchi. Equal excursions.   CV - Normal S1/S2, no murmur, Regular rate and rhythm   Abdomin - Soft, non-tender, non-distended, normal bowel sounds  Extremities - no edema, no erythema  Neuro - No focal weakness, normal sensation  Psych - Alert and oriented x3  Skin - no wounds or lesions.        Result Review    Result Review:  I have personally reviewed the results from the time of this admission to 2022 08:13 EST and agree with these findings:  [x]  Laboratory  [x]  Microbiology  [x]  Radiology  [x]  EKG/Telemetry   [x]  Cardiology/Vascular   []  Pathology  [x]  Old records  []  Other:  Most notable findings include:           Assessment & Plan      Brief Patient Summary:  Sherman Baumann is a 76 y.o. male who       aspirin, 81 mg, Oral, Daily  budesonide, 0.5 mg, Nebulization, BID - RT  carvedilol, 3.125 mg, Oral, BID With Meals  docusate sodium, 100 mg, Oral, BID  empagliflozin, 10 mg, Oral, Daily  furosemide, 40 mg, Oral, BID  insulin lispro, 2-12 Units, Subcutaneous, TID With Meals  insulin NPH-insulin regular, 30 Units, Subcutaneous, TID AC  oseltamivir, 75 mg,  Oral, Q12H  polyethylene glycol, 17 g, Oral, Daily  potassium chloride, 20 mEq, Oral, BID With Meals  rosuvastatin, 5 mg, Oral, Nightly  sodium chloride, 10 mL, Intravenous, Q12H       heparin, 9.71 Units/kg/hr, Last Rate: 20.89 Units/kg/hr (12/21/22 0757)         Active Hospital Problems:  Active Hospital Problems    Diagnosis    • **CAD (coronary artery disease), native coronary artery    • Influenza due to seasonal influenza virus    • Type 2 diabetes mellitus with hyperglycemia, without long-term current use of insulin (HCC)    • Mixed hyperlipidemia    • Primary hypertension    • Systolic and diastolic CHF, acute on chronic (HCC)      Plan:     Multivessel CAD/NSTEMI/ICMP  - Acute Systolic heart failure EF 20-25%  - Continue Hepatin gtt  - ASA, BB, Statin. On Oral lasix. Good urine output   - Plan for CABG next week  - Plan for repeat Echo today    Acute respiratory failure with hypoxia  - Influenza A  - Continue Tamiflu  - Pulm on consult. Wean off oxygen as tolerated  - Continue Nebs.     DM-2 with hyperglycemia  - managed by Endocrinology   - BS better controlled     DL - Statin          DVT prophylaxis:  Medical DVT prophylaxis orders are present.    CODE STATUS:    Level Of Support Discussed With: Patient  Code Status (Patient has no pulse and is not breathing): CPR (Attempt to Resuscitate)  Medical Interventions (Patient has pulse or is breathing): Full Support      Disposition:  I expect patient to be discharged .    This patient has been  and discussed with . 12/21/22      Electronically signed by Artemio Sheets MD, 12/21/22, 08:13 EST.  [facility] Hospitalist Team

## 2022-12-22 LAB
ANION GAP SERPL CALCULATED.3IONS-SCNC: 15 MMOL/L (ref 5–15)
APTT PPP: 57.2 SECONDS (ref 61–76.5)
APTT PPP: 60.1 SECONDS (ref 61–76.5)
APTT PPP: 76 SECONDS (ref 61–76.5)
BH CV ECHO MEAS - ACS: 1.44 CM
BH CV ECHO MEAS - AO MAX PG: 17.9 MMHG
BH CV ECHO MEAS - AO MEAN PG: 10.8 MMHG
BH CV ECHO MEAS - AO ROOT DIAM: 3.2 CM
BH CV ECHO MEAS - AO V2 MAX: 211.3 CM/SEC
BH CV ECHO MEAS - AO V2 VTI: 36.7 CM
BH CV ECHO MEAS - AVA(I,D): 2.26 CM2
BH CV ECHO MEAS - EDV(CUBED): 80.8 ML
BH CV ECHO MEAS - EDV(MOD-SP4): 117.6 ML
BH CV ECHO MEAS - EF(MOD-BP): 40 %
BH CV ECHO MEAS - EF(MOD-SP4): 37.7 %
BH CV ECHO MEAS - ESV(CUBED): 51.4 ML
BH CV ECHO MEAS - ESV(MOD-SP4): 73.3 ML
BH CV ECHO MEAS - FS: 14 %
BH CV ECHO MEAS - IVS/LVPW: 1.07 CM
BH CV ECHO MEAS - IVSD: 1.3 CM
BH CV ECHO MEAS - LA DIMENSION: 4.1 CM
BH CV ECHO MEAS - LV DIASTOLIC VOL/BSA (35-75): 53.9 CM2
BH CV ECHO MEAS - LV MASS(C)D: 198.5 GRAMS
BH CV ECHO MEAS - LV MAX PG: 4.8 MMHG
BH CV ECHO MEAS - LV MEAN PG: 3.1 MMHG
BH CV ECHO MEAS - LV SYSTOLIC VOL/BSA (12-30): 33.6 CM2
BH CV ECHO MEAS - LV V1 MAX: 109.7 CM/SEC
BH CV ECHO MEAS - LV V1 VTI: 21.9 CM
BH CV ECHO MEAS - LVIDD: 4.3 CM
BH CV ECHO MEAS - LVIDS: 3.7 CM
BH CV ECHO MEAS - LVOT AREA: 3.8 CM2
BH CV ECHO MEAS - LVOT DIAM: 2.19 CM
BH CV ECHO MEAS - LVPWD: 1.21 CM
BH CV ECHO MEAS - MV A MAX VEL: 136.7 CM/SEC
BH CV ECHO MEAS - MV DEC SLOPE: 731.4 CM/SEC2
BH CV ECHO MEAS - MV DEC TIME: 0.09 MSEC
BH CV ECHO MEAS - MV E MAX VEL: 67.8 CM/SEC
BH CV ECHO MEAS - MV E/A: 0.5
BH CV ECHO MEAS - MV MAX PG: 7.9 MMHG
BH CV ECHO MEAS - MV MEAN PG: 3.4 MMHG
BH CV ECHO MEAS - MV V2 VTI: 23.2 CM
BH CV ECHO MEAS - MVA(VTI): 3.6 CM2
BH CV ECHO MEAS - PA V2 MAX: 95.6 CM/SEC
BH CV ECHO MEAS - RAP SYSTOLE: 3 MMHG
BH CV ECHO MEAS - RV MAX PG: 1.53 MMHG
BH CV ECHO MEAS - RV V1 MAX: 61.9 CM/SEC
BH CV ECHO MEAS - RV V1 VTI: 10.1 CM
BH CV ECHO MEAS - RVDD: 2.8 CM
BH CV ECHO MEAS - SI(MOD-SP4): 20.3 ML/M2
BH CV ECHO MEAS - SV(LVOT): 82.8 ML
BH CV ECHO MEAS - SV(MOD-SP4): 44.3 ML
BUN SERPL-MCNC: 31 MG/DL (ref 8–23)
BUN/CREAT SERPL: 24.6 (ref 7–25)
CALCIUM SPEC-SCNC: 9.8 MG/DL (ref 8.6–10.5)
CHLORIDE SERPL-SCNC: 92 MMOL/L (ref 98–107)
CO2 SERPL-SCNC: 28 MMOL/L (ref 22–29)
CREAT SERPL-MCNC: 1.26 MG/DL (ref 0.76–1.27)
EGFRCR SERPLBLD CKD-EPI 2021: 59.5 ML/MIN/1.73
GLUCOSE BLDC GLUCOMTR-MCNC: 132 MG/DL (ref 70–105)
GLUCOSE BLDC GLUCOMTR-MCNC: 261 MG/DL (ref 70–105)
GLUCOSE BLDC GLUCOMTR-MCNC: 299 MG/DL (ref 70–105)
GLUCOSE BLDC GLUCOMTR-MCNC: 360 MG/DL (ref 70–105)
GLUCOSE SERPL-MCNC: 107 MG/DL (ref 65–99)
MAXIMAL PREDICTED HEART RATE: 145 BPM
POTASSIUM SERPL-SCNC: 4.3 MMOL/L (ref 3.5–5.2)
SODIUM SERPL-SCNC: 135 MMOL/L (ref 136–145)
STRESS TARGET HR: 123 BPM

## 2022-12-22 PROCEDURE — 82962 GLUCOSE BLOOD TEST: CPT

## 2022-12-22 PROCEDURE — 25010000002 HEPARIN (PORCINE) 25000-0.45 UT/250ML-% SOLUTION: Performed by: THORACIC SURGERY (CARDIOTHORACIC VASCULAR SURGERY)

## 2022-12-22 PROCEDURE — 85730 THROMBOPLASTIN TIME PARTIAL: CPT | Performed by: INTERNAL MEDICINE

## 2022-12-22 PROCEDURE — 94664 DEMO&/EVAL PT USE INHALER: CPT

## 2022-12-22 PROCEDURE — 99232 SBSQ HOSP IP/OBS MODERATE 35: CPT | Performed by: INTERNAL MEDICINE

## 2022-12-22 PROCEDURE — 99231 SBSQ HOSP IP/OBS SF/LOW 25: CPT | Performed by: NURSE PRACTITIONER

## 2022-12-22 PROCEDURE — 99233 SBSQ HOSP IP/OBS HIGH 50: CPT | Performed by: INTERNAL MEDICINE

## 2022-12-22 PROCEDURE — 94799 UNLISTED PULMONARY SVC/PX: CPT

## 2022-12-22 PROCEDURE — 63710000001 INSULIN LISPRO (HUMAN) PER 5 UNITS: Performed by: INTERNAL MEDICINE

## 2022-12-22 PROCEDURE — 80048 BASIC METABOLIC PNL TOTAL CA: CPT | Performed by: NURSE PRACTITIONER

## 2022-12-22 PROCEDURE — 63710000001 INSULIN ISOPHANE & REGULAR PER 5 UNITS: Performed by: INTERNAL MEDICINE

## 2022-12-22 PROCEDURE — 97116 GAIT TRAINING THERAPY: CPT

## 2022-12-22 PROCEDURE — 94761 N-INVAS EAR/PLS OXIMETRY MLT: CPT

## 2022-12-22 PROCEDURE — 85730 THROMBOPLASTIN TIME PARTIAL: CPT | Performed by: HOSPITALIST

## 2022-12-22 PROCEDURE — 97535 SELF CARE MNGMENT TRAINING: CPT

## 2022-12-22 PROCEDURE — 97530 THERAPEUTIC ACTIVITIES: CPT

## 2022-12-22 RX ORDER — CARVEDILOL 3.12 MG/1
3.12 TABLET ORAL ONCE
Status: COMPLETED | OUTPATIENT
Start: 2022-12-22 | End: 2022-12-22

## 2022-12-22 RX ORDER — LISINOPRIL 5 MG/1
2.5 TABLET ORAL
Status: DISCONTINUED | OUTPATIENT
Start: 2022-12-22 | End: 2022-12-23

## 2022-12-22 RX ORDER — CARVEDILOL 6.25 MG/1
6.25 TABLET ORAL 2 TIMES DAILY WITH MEALS
Status: DISCONTINUED | OUTPATIENT
Start: 2022-12-22 | End: 2022-12-29

## 2022-12-22 RX ADMIN — POTASSIUM CHLORIDE 20 MEQ: 1500 TABLET, EXTENDED RELEASE ORAL at 10:33

## 2022-12-22 RX ADMIN — EMPAGLIFLOZIN 10 MG: 10 TABLET, FILM COATED ORAL at 10:32

## 2022-12-22 RX ADMIN — POTASSIUM CHLORIDE 20 MEQ: 1500 TABLET, EXTENDED RELEASE ORAL at 17:52

## 2022-12-22 RX ADMIN — INSULIN HUMAN 34 UNITS: 100 INJECTION, SUSPENSION SUBCUTANEOUS at 17:53

## 2022-12-22 RX ADMIN — POLYETHYLENE GLYCOL 3350 17 G: 17 POWDER, FOR SOLUTION ORAL at 10:35

## 2022-12-22 RX ADMIN — LISINOPRIL 2.5 MG: 5 TABLET ORAL at 17:52

## 2022-12-22 RX ADMIN — HEPARIN SODIUM 19 UNITS/KG/HR: 10000 INJECTION, SOLUTION INTRAVENOUS at 20:53

## 2022-12-22 RX ADMIN — CARVEDILOL 3.12 MG: 3.12 TABLET, FILM COATED ORAL at 12:49

## 2022-12-22 RX ADMIN — BUDESONIDE 0.5 MG: 0.5 INHALANT RESPIRATORY (INHALATION) at 19:21

## 2022-12-22 RX ADMIN — ROSUVASTATIN CALCIUM 5 MG: 5 TABLET, COATED ORAL at 17:52

## 2022-12-22 RX ADMIN — HEPARIN SODIUM 16.8 UNITS/KG/HR: 10000 INJECTION, SOLUTION INTRAVENOUS at 08:30

## 2022-12-22 RX ADMIN — HEPARIN SODIUM 19 UNITS/KG/HR: 10000 INJECTION, SOLUTION INTRAVENOUS at 23:06

## 2022-12-22 RX ADMIN — INSULIN LISPRO 6 UNITS: 100 INJECTION, SOLUTION INTRAVENOUS; SUBCUTANEOUS at 17:53

## 2022-12-22 RX ADMIN — FUROSEMIDE 40 MG: 40 TABLET ORAL at 10:34

## 2022-12-22 RX ADMIN — OSELTAMIVIR PHOSPHATE 75 MG: 75 CAPSULE ORAL at 10:33

## 2022-12-22 RX ADMIN — CARVEDILOL 3.12 MG: 3.12 TABLET, FILM COATED ORAL at 10:33

## 2022-12-22 RX ADMIN — ACETAMINOPHEN 650 MG: 325 TABLET, FILM COATED ORAL at 17:56

## 2022-12-22 RX ADMIN — BUDESONIDE 0.5 MG: 0.5 INHALANT RESPIRATORY (INHALATION) at 08:59

## 2022-12-22 RX ADMIN — INSULIN HUMAN 32 UNITS: 100 INJECTION, SUSPENSION SUBCUTANEOUS at 10:32

## 2022-12-22 RX ADMIN — DOCUSATE SODIUM 100 MG: 100 CAPSULE, LIQUID FILLED ORAL at 10:33

## 2022-12-22 RX ADMIN — INSULIN HUMAN 32 UNITS: 100 INJECTION, SUSPENSION SUBCUTANEOUS at 12:49

## 2022-12-22 RX ADMIN — ASPIRIN 81 MG CHEWABLE TABLET 81 MG: 81 TABLET CHEWABLE at 10:34

## 2022-12-22 RX ADMIN — FUROSEMIDE 40 MG: 40 TABLET ORAL at 17:53

## 2022-12-22 RX ADMIN — INSULIN LISPRO 6 UNITS: 100 INJECTION, SOLUTION INTRAVENOUS; SUBCUTANEOUS at 13:07

## 2022-12-22 RX ADMIN — Medication 10 ML: at 10:35

## 2022-12-22 RX ADMIN — CARVEDILOL 6.25 MG: 6.25 TABLET, FILM COATED ORAL at 17:52

## 2022-12-22 RX ADMIN — DOCUSATE SODIUM 100 MG: 100 CAPSULE, LIQUID FILLED ORAL at 17:52

## 2022-12-22 NOTE — PROGRESS NOTES
Daily Progress Note    Patient Care Team:  Melvina Hodge as PCP - General (Nurse Practitioner)    Chief Complaint: Follow-up type 2 diabetes    HPI: Patient seen and examined today.  Blood sugar log reviewed, blood sugar still high.  He is eating fair.  No complaints at this time.    ROS:   Constitutional:  Denies fatigue, tiredness.    Respiratory: denies cough, shortness of breath.   Cardiovascular:  denies chest pain, edema   GI:  Denies abdominal pain, nausea, vomiting.         Vitals:    12/21/22 1904   BP:    Pulse: 79   Resp: 14   Temp:    SpO2: 100%     Body mass index is 31.85 kg/m².    Physical Exam:  GEN: NAD, conversant  CV: RRR  LUNG: CTA  PSYCH: Awake and coherent.      Results Review:     I reviewed the patient's new clinical results.    Glucose   Date Value Ref Range Status   12/20/2022 135 (H) 65 - 99 mg/dL Final     Sodium   Date Value Ref Range Status   12/20/2022 135 (L) 136 - 145 mmol/L Final     Potassium   Date Value Ref Range Status   12/20/2022 4.4 3.5 - 5.2 mmol/L Final     Comment:     Slight hemolysis detected by analyzer. Results may be affected.     CO2   Date Value Ref Range Status   12/20/2022 29.0 22.0 - 29.0 mmol/L Final     Chloride   Date Value Ref Range Status   12/20/2022 92 (L) 98 - 107 mmol/L Final     Anion Gap   Date Value Ref Range Status   12/20/2022 14.0 5.0 - 15.0 mmol/L Final     Creatinine   Date Value Ref Range Status   12/20/2022 1.21 0.76 - 1.27 mg/dL Final     BUN   Date Value Ref Range Status   12/20/2022 28 (H) 8 - 23 mg/dL Final     BUN/Creatinine Ratio   Date Value Ref Range Status   12/20/2022 23.1 7.0 - 25.0 Final     Calcium   Date Value Ref Range Status   12/20/2022 9.9 8.6 - 10.5 mg/dL Final     Magnesium   Date Value Ref Range Status   12/20/2022 1.8 1.6 - 2.4 mg/dL Final     Phosphorus   Date Value Ref Range Status   12/20/2022 4.5 2.5 - 4.5 mg/dL Final     Lab Results   Component Value Date    HGBA1C 8.3 (H) 12/17/2022     No results found for:  GLUF, MICROALBUR  Results from last 7 days   Lab Units 12/21/22  1608 12/21/22  1116 12/21/22  0727 12/20/22  1703 12/20/22  1150 12/20/22  0725   GLUCOSE mg/dL 224* 239* 164* 331* 365* 208*             Medication Review: Reviewed.     aspirin, 81 mg, Oral, Daily  budesonide, 0.5 mg, Nebulization, BID - RT  carvedilol, 3.125 mg, Oral, BID With Meals  docusate sodium, 100 mg, Oral, BID  empagliflozin, 10 mg, Oral, Daily  furosemide, 40 mg, Oral, BID  insulin lispro, 2-12 Units, Subcutaneous, TID With Meals  insulin NPH-insulin regular, 30 Units, Subcutaneous, TID AC  oseltamivir, 75 mg, Oral, Q12H  polyethylene glycol, 17 g, Oral, Daily  potassium chloride, 20 mEq, Oral, BID With Meals  rosuvastatin, 5 mg, Oral, Nightly  sodium chloride, 10 mL, Intravenous, Q12H              Assessment and plan:  Diabetes mellitus type 2 with hyperglycemia: Uncontrolled with very high blood sugars, will increase 70/30 insulin to 32 units subcu 3 times a day before each meal and continue Humalog sliding scale and follow blood sugars and make further adjustments as needed.   Continue Jardiance     Hyperlipidemia: Currently on rosuvastatin.     CAD: Being followed by cardiothoracic surgery for CABG.    Serg Quesada MD. FACE

## 2022-12-22 NOTE — PROGRESS NOTES
Daily Progress Note    Patient Care Team:  Melvina Hodge as PCP - General (Nurse Practitioner)    Chief Complaint: Follow-up type 2 diabetes    HPI: Patient seen and examined today.  Blood sugar log reviewed, blood sugars still fluctuating.      Vitals:    12/22/22 1428   BP: 126/62   Pulse: 87   Resp: 14   Temp: 98.4 °F (36.9 °C)   SpO2: 95%     Body mass index is 31.98 kg/m².    Physical Exam:  GEN: NAD, conversant  PSYCH: Awake and coherent.      Results Review:     I reviewed the patient's new clinical results.    Glucose   Date Value Ref Range Status   12/22/2022 107 (H) 65 - 99 mg/dL Final     Sodium   Date Value Ref Range Status   12/22/2022 135 (L) 136 - 145 mmol/L Final     Potassium   Date Value Ref Range Status   12/22/2022 4.3 3.5 - 5.2 mmol/L Final     CO2   Date Value Ref Range Status   12/22/2022 28.0 22.0 - 29.0 mmol/L Final     Chloride   Date Value Ref Range Status   12/22/2022 92 (L) 98 - 107 mmol/L Final     Anion Gap   Date Value Ref Range Status   12/22/2022 15.0 5.0 - 15.0 mmol/L Final     Creatinine   Date Value Ref Range Status   12/22/2022 1.26 0.76 - 1.27 mg/dL Final     BUN   Date Value Ref Range Status   12/22/2022 31 (H) 8 - 23 mg/dL Final     BUN/Creatinine Ratio   Date Value Ref Range Status   12/22/2022 24.6 7.0 - 25.0 Final     Calcium   Date Value Ref Range Status   12/22/2022 9.8 8.6 - 10.5 mg/dL Final     Magnesium   Date Value Ref Range Status   12/20/2022 1.8 1.6 - 2.4 mg/dL Final     Phosphorus   Date Value Ref Range Status   12/20/2022 4.5 2.5 - 4.5 mg/dL Final     Lab Results   Component Value Date    HGBA1C 8.3 (H) 12/17/2022     No results found for: GLUF, MICROALBUR  Results from last 7 days   Lab Units 12/22/22  1248 12/22/22  1118 12/22/22  0720 12/21/22  1608 12/21/22  1116 12/21/22  0727   GLUCOSE mg/dL 299* 360* 132* 224* 239* 164*       Medication Review: Reviewed.     aspirin, 81 mg, Oral, Daily  budesonide, 0.5 mg, Nebulization, BID - RT  carvedilol, 6.25  mg, Oral, BID With Meals  docusate sodium, 100 mg, Oral, BID  empagliflozin, 10 mg, Oral, Daily  furosemide, 40 mg, Oral, BID  insulin lispro, 2-12 Units, Subcutaneous, TID With Meals  insulin NPH-insulin regular, 32 Units, Subcutaneous, TID AC  polyethylene glycol, 17 g, Oral, Daily  potassium chloride, 20 mEq, Oral, BID With Meals  rosuvastatin, 5 mg, Oral, Nightly  sodium chloride, 10 mL, Intravenous, Q12H      Assessment and plan:  Diabetes mellitus type 2 with hyperglycemia: Uncontrolled, blood sugar still high, will try increasing to 70/30 insulin to 34 units subcu 3 times a day half an hour before each meal and follow blood sugars and make further adjustments as needed.    Hyperlipidemia: Currently on rosuvastatin    CAD: Being evaluated for CABG by cardiothoracic surgery.    Serg Quesada MD. FACE

## 2022-12-22 NOTE — PROGRESS NOTES
[facility] Medicine Services   Daily Progress Note    Patient Name: Sherman Baumann  : 1945  MRN: [id number]  Primary Care Physician:  Melvina Hodge  Date of admission: 2022  Date and Time of Service:       Subjective      Patient denies any chest pain shortness of breath  No lightheadedness today.  Denies any worsening lower extremity edema or orthopnea  Has mild cough. Non productive   No chills or sweats  No nausea vomiting diarrhea or constipation.      Objective      Vitals:   Temp:  [97.5 °F (36.4 °C)-98.4 °F (36.9 °C)] 98.4 °F (36.9 °C)  Heart Rate:  [66-87] 87  Resp:  [9-20] 14  BP: (122-144)/(56-77) 126/62  Flow (L/min):  [1-2] 2    Physical Exam   General: No acute distress  HEENT: neck supple, normal oral mucosa, no masses, no lymphadenopathy  Lungs: Clear bilaterally, no wheezing, No crackles, No Rhonchi. Equal excursions.   CV - Normal S1/S2, no murmur, Regular rate and rhythm   Abdomin - Soft, non-tender, non-distended, normal bowel sounds  Extremities - no edema, no erythema  Neuro - No focal weakness, normal sensation  Psych - Alert and oriented x3  Skin - no wounds or lesions.        Result Review    Result Review:  I have personally reviewed the results from the time of this admission to 2022 14:46 EST and agree with these findings:  [x]  Laboratory  [x]  Microbiology  [x]  Radiology  [x]  EKG/Telemetry   [x]  Cardiology/Vascular   []  Pathology  [x]  Old records  []  Other:  Most notable findings include:           Assessment & Plan      Brief Patient Summary:  Sherman Baumann is a 76 y.o. male who       aspirin, 81 mg, Oral, Daily  budesonide, 0.5 mg, Nebulization, BID - RT  carvedilol, 6.25 mg, Oral, BID With Meals  docusate sodium, 100 mg, Oral, BID  empagliflozin, 10 mg, Oral, Daily  furosemide, 40 mg, Oral, BID  insulin lispro, 2-12 Units, Subcutaneous, TID With Meals  insulin NPH-insulin regular, 32 Units, Subcutaneous, TID AC  polyethylene glycol, 17  g, Oral, Daily  potassium chloride, 20 mEq, Oral, BID With Meals  rosuvastatin, 5 mg, Oral, Nightly  sodium chloride, 10 mL, Intravenous, Q12H       heparin, 9.71 Units/kg/hr, Last Rate: 18 Units/kg/hr (12/22/22 1421)         Active Hospital Problems:  Active Hospital Problems    Diagnosis    • **CAD (coronary artery disease), native coronary artery    • Influenza due to seasonal influenza virus    • Type 2 diabetes mellitus with hyperglycemia, without long-term current use of insulin (HCC)    • Mixed hyperlipidemia    • Primary hypertension    • Systolic and diastolic CHF, acute on chronic (HCC)      Plan:     Multivessel CAD/NSTEMI/ICMP  - Acute Systolic heart failure EF 20-25%  - Continue Hepatin gtt  - ASA, BB, Statin. On Oral lasix. Good urine output   - Plan for CABG anticipated 12/29/22  - Repeat Echo report pending     Acute respiratory failure with hypoxia  - Influenza A  - Continue Tamiflu  - Pulm on consult. Wean off oxygen as tolerated  - Continue Nebs.     DM-2 with hyperglycemia  - managed by Endocrinology   - BS better controlled     DL - Statin          DVT prophylaxis:  Medical DVT prophylaxis orders are present.    CODE STATUS:    Level Of Support Discussed With: Patient  Code Status (Patient has no pulse and is not breathing): CPR (Attempt to Resuscitate)  Medical Interventions (Patient has pulse or is breathing): Full Support      Disposition:  I expect patient to be discharged .    This patient has been  and discussed with . 12/22/22      Electronically signed by Artemio Sheets MD, 12/22/22, 14:46 EST.  [facility]ist Team

## 2022-12-22 NOTE — PROGRESS NOTES
Referring Provider: Artemio Sheets MD    Reason for follow-up: Multivessel coronary artery disease, non-ST relation MI, diabetes     Patient Care Team:  Melvina Hodge as PCP - General (Nurse Practitioner)      SUBJECTIVE  Denies chest pain or shortness of breath however he is more tachycardic today     ROS  Review of all systems negative except as indicated.    Since I have last seen, the patient has been without any chest discomfort, shortness of breath, palpitations, dizziness or syncope.  Denies having any headache, abdominal pain, nausea, vomiting, diarrhea, constipation, loss of weight or loss of appetite.  Denies having any excessive bruising, hematuria or blood in the stool.  ROS      Personal History:    Past Medical History:   Diagnosis Date   • CHF (congestive heart failure) (HCC)    • Diabetes mellitus (HCC)    • Elevated cholesterol    • Hyperlipidemia    • Hypertension        Past Surgical History:   Procedure Laterality Date   • HERNIA REPAIR     • TONSILLECTOMY         No family history on file.    Social History     Tobacco Use   • Smoking status: Former     Types: Cigarettes   Vaping Use   • Vaping Use: Never used   Substance Use Topics   • Alcohol use: Not Currently   • Drug use: Never        Medications Prior to Admission   Medication Sig Dispense Refill Last Dose   • aspirin 81 MG chewable tablet Chew 81 mg Daily.      • aspirin-acetaminophen-caffeine (EXCEDRIN MIGRAINE) 250-250-65 MG per tablet Take 1 tablet by mouth Every 6 (Six) Hours As Needed for Headache.      • carvedilol (COREG) 3.125 MG tablet Take 3.125 mg by mouth 2 (Two) Times a Day With Meals.      • furosemide (LASIX) 40 MG tablet Take 40 mg by mouth 2 (Two) Times a Day.      • glipizide (GLUCOTROL) 10 MG tablet Take 10 mg by mouth 2 (Two) Times a Day Before Meals.      • insulin NPH-insulin regular (humuLIN 70/30,novoLIN 70/30) (70-30) 100 UNIT/ML injection Inject 27 Units under the skin into the appropriate area as directed  "Every Morning.      • insulin NPH-insulin regular (humuLIN 70/30,novoLIN 70/30) (70-30) 100 UNIT/ML injection Inject 22 Units under the skin into the appropriate area as directed Every Night.      • metFORMIN (GLUCOPHAGE) 500 MG tablet Take 500 mg by mouth Daily With Breakfast.      • Omega-3 Fatty Acids (fish oil) 1000 MG capsule capsule Take 1,000 mg by mouth Daily With Breakfast.      • oseltamivir (TAMIFLU) 75 MG capsule Take 75 mg by mouth 2 (Two) Times a Day.      • rosuvastatin (CRESTOR) 5 MG tablet Take 5 mg by mouth Every Night.          Allergies:  Patient has no known allergies.    Scheduled Meds:aspirin, 81 mg, Oral, Daily  budesonide, 0.5 mg, Nebulization, BID - RT  carvedilol, 6.25 mg, Oral, BID With Meals  docusate sodium, 100 mg, Oral, BID  empagliflozin, 10 mg, Oral, Daily  furosemide, 40 mg, Oral, BID  insulin lispro, 2-12 Units, Subcutaneous, TID With Meals  insulin NPH-insulin regular, 34 Units, Subcutaneous, TID AC  polyethylene glycol, 17 g, Oral, Daily  potassium chloride, 20 mEq, Oral, BID With Meals  rosuvastatin, 5 mg, Oral, Nightly  sodium chloride, 10 mL, Intravenous, Q12H      Continuous Infusions:heparin, 9.71 Units/kg/hr, Last Rate: 18 Units/kg/hr (12/22/22 1421)      PRN Meds:.•  acetaminophen  •  ALPRAZolam  •  dextrose  •  dextrose  •  glucagon (human recombinant)  •  ipratropium-albuterol  •  melatonin  •  nitroglycerin  •  ondansetron  •  potassium chloride **OR** potassium chloride **OR** potassium chloride  •  sodium chloride  •  sodium chloride      OBJECTIVE    Vital Signs  Vitals:    12/22/22 0858 12/22/22 0859 12/22/22 0903 12/22/22 1428   BP: 132/72   126/62   BP Location: Right arm      Patient Position: Sitting      Pulse: 81 81 79 87   Resp: 9 12 14 14   Temp: 97.9 °F (36.6 °C)   98.4 °F (36.9 °C)   TempSrc: Oral   Oral   SpO2: 95% 94% 97% 95%   Weight:       Height:           Flowsheet Rows    Flowsheet Row First Filed Value   Admission Height 177.8 cm (70\") Documented " at 12/18/2022 1305   Admission Weight 103 kg (227 lb 1.2 oz) Documented at 12/17/2022 0500            Intake/Output Summary (Last 24 hours) at 12/22/2022 1520  Last data filed at 12/22/2022 1200  Gross per 24 hour   Intake --   Output 1325 ml   Net -1325 ml          Telemetry: Sinus rhythm with tachycardia    Physical Exam:  The patient is alert, oriented and in no distress.  Vital signs as noted above.  Head and neck revealed no carotid bruits or jugular venous distention.  No thyromegaly or lymphadenopathy is present  Lungs clear.  No wheezing.  Breath sounds are normal bilaterally.  Heart normal first and second heart sounds.  No murmur. No precordial rub is present.  No gallop is present.  Abdomen soft and nontender.  No organomegaly is present.  Extremities with good peripheral pulses without any pedal edema.  Skin warm and dry.  Musculoskeletal system is grossly normal.  CNS grossly normal.       Results Review:  I have personally reviewed the results from the time of this admission to 12/22/2022 15:20 EST and agree with these findings:  []  Laboratory  []  Microbiology  []  Radiology  []  EKG/Telemetry   []  Cardiology/Vascular   []  Pathology  []  Old records  []  Other:    Most notable findings include:    Lab Results (last 24 hours)     Procedure Component Value Units Date/Time    POC Glucose Once [[id number]]  (Abnormal) Collected: 12/22/22 1248    Specimen: Blood Updated: 12/22/22 1419     Glucose 299 mg/dL      Comment: Serial Number: [id number]:  [id number]       POC Glucose Once [[id number]]  (Abnormal) Collected: 12/22/22 1118    Specimen: Blood Updated: 12/22/22 1406     Glucose 360 mg/dL      Comment: Serial Number: [id number]:  [id number]       aPTT [[id number]]  (Abnormal) Collected: 12/22/22 1236    Specimen: Blood Updated: 12/22/22 1306     PTT 57.2 seconds     POC Glucose Once [[id number]]  (Abnormal) Collected: 12/22/22 0720    Specimen: Blood Updated: 12/22/22 0723     Glucose 132  mg/dL      Comment: Serial Number: [id number]:  [id number]       Basic Metabolic Panel [[id number]]  (Abnormal) Collected: 12/22/22 0429    Specimen: Blood Updated: 12/22/22 0513     Glucose 107 mg/dL      BUN 31 mg/dL      Creatinine 1.26 mg/dL      Sodium 135 mmol/L      Potassium 4.3 mmol/L      Chloride 92 mmol/L      CO2 28.0 mmol/L      Calcium 9.8 mg/dL      BUN/Creatinine Ratio 24.6     Anion Gap 15.0 mmol/L      eGFR 59.5 mL/min/1.73      Comment: National Kidney Foundation and American Society of Nephrology (ASN) Task Force recommended calculation based on the Chronic Kidney Disease Epidemiology Collaboration (CKD-EPI) equation refit without adjustment for race.       Narrative:      GFR Normal >60  Chronic Kidney Disease <60  Kidney Failure <15    The GFR formula is only valid for adults with stable renal function between ages 18 and 70.    aPTT [[id number]]  (Normal) Collected: 12/22/22 0429    Specimen: Blood Updated: 12/22/22 0508     PTT 76.0 seconds     aPTT [[id number]]  (Abnormal) Collected: 12/21/22 2133    Specimen: Blood Updated: 12/21/22 2202     PTT 76.9 seconds     POC Glucose Once [[id number]]  (Abnormal) Collected: 12/21/22 1608    Specimen: Blood Updated: 12/21/22 1609     Glucose 224 mg/dL      Comment: Serial Number: [id number]:  [id number]             Imaging Results (Last 24 Hours)     ** No results found for the last 24 hours. **          LAB RESULTS (LAST 7 DAYS)    CBC  Results from last 7 days   Lab Units 12/20/22  2313 12/19/22  0300 12/18/22  1235 12/17/22  0945   WBC 10*3/mm3 7.70 8.40 9.40 10.70   RBC 10*6/mm3 4.90 4.58 5.05 4.90   HEMOGLOBIN g/dL 14.3 13.5 14.8 14.5   HEMATOCRIT % 44.3 40.7 45.6 45.0   MCV fL 90.5 88.7 90.4 91.8   PLATELETS 10*3/mm3 336 267 267 337       BMP  Results from last 7 days   Lab Units 12/22/22  0429 12/20/22  2313 12/20/22  1103 12/19/22  0300 12/17/22  2256 12/17/22  0945   SODIUM mmol/L 135* 135* 130* 136 135* 134*   POTASSIUM mmol/L 4.3  4.4 4.7 4.4 4.3 4.2   CHLORIDE mmol/L 92* 92* 91* 94* 94* 94*   CO2 mmol/L 28.0 29.0 28.0 29.0 29.0 26.0   BUN mg/dL 31* 28* 28* 29* 37* 32*   CREATININE mg/dL 1.26 1.21 1.10 1.06 1.31* 1.16   GLUCOSE mg/dL 107* 135* 374* 225* 215* 368*   MAGNESIUM mg/dL  --  1.8  --   --   --   --    PHOSPHORUS mg/dL  --  4.5  --   --   --   --        CMP   Results from last 7 days   Lab Units 12/22/22  0429 12/20/22  2313 12/20/22  1103 12/19/22  0300 12/17/22  2256 12/17/22  0945   SODIUM mmol/L 135* 135* 130* 136 135* 134*   POTASSIUM mmol/L 4.3 4.4 4.7 4.4 4.3 4.2   CHLORIDE mmol/L 92* 92* 91* 94* 94* 94*   CO2 mmol/L 28.0 29.0 28.0 29.0 29.0 26.0   BUN mg/dL 31* 28* 28* 29* 37* 32*   CREATININE mg/dL 1.26 1.21 1.10 1.06 1.31* 1.16   GLUCOSE mg/dL 107* 135* 374* 225* 215* 368*   ALBUMIN g/dL  --   --   --   --   --  3.80   BILIRUBIN mg/dL  --   --   --   --   --  0.9   ALK PHOS U/L  --   --   --   --   --  90   AST (SGOT) U/L  --   --   --   --   --  16   ALT (SGPT) U/L  --   --   --   --   --  9       BNP        TROPONIN        CoAg  Results from last 7 days   Lab Units 12/22/22  1236 12/22/22  0429 12/21/22  2133 12/21/22  1428 12/21/22  0730 12/20/22  2313 12/20/22  1600 12/17/22  1451 12/17/22  0708   INR   --   --   --   --   --   --   --   --  1.06   APTT seconds 57.2* 76.0 76.9* 77.2* 54.6* 49.1* 61.8   < > 22.8*    < > = values in this interval not displayed.       Creatinine Clearance  Estimated Creatinine Clearance: 60.3 mL/min (by C-G formula based on SCr of 1.26 mg/dL).    ABG        Radiology  No radiology results for the last day      EKG  I personally viewed and interpreted the patient's EKG/Telemetry data:  ECG 12 Lead Chest Pain   Final Result   HEART RATE= 89  bpm   RR Interval= 672  ms   NJ Interval= 176  ms   P Horizontal Axis= -5  deg   P Front Axis= 29  deg   QRSD Interval= 92  ms   QT Interval= 362  ms   QRS Axis= 16  deg   T Wave Axis= 163  deg   - ABNORMAL ECG -   Sinus rhythm   Probable left atrial  enlargement   Probable anterior infarct, age indeterminate   Abnormal T, consider ischemia, lateral leads   No previous ECG available for comparison   Electronically Signed By: Alex Byrd (BERNY) 19-Dec-2022 08:52:18   Date and Time of Study: 2022-12-17 07:21:47      SCANNED - TELEMETRY     Final Result      SCANNED - TELEMETRY     Final Result      SCANNED - TELEMETRY     Final Result      SCANNED - TELEMETRY     Final Result      SCANNED - TELEMETRY     Final Result      SCANNED - TELEMETRY     Final Result      SCANNED - TELEMETRY     Final Result      SCANNED - TELEMETRY     Final Result      SCANNED - TELEMETRY     Final Result      SCANNED - TELEMETRY     Final Result      SCANNED - TELEMETRY     Final Result      SCANNED - TELEMETRY     Final Result      SCANNED - TELEMETRY     Final Result      SCANNED - TELEMETRY     Final Result      SCANNED - TELEMETRY     Final Result      SCANNED - TELEMETRY     Final Result      SCANNED - TELEMETRY     Final Result      SCANNED - TELEMETRY     Final Result      SCANNED - TELEMETRY     Final Result      SCANNED - TELEMETRY     Final Result      SCANNED - TELEMETRY     Final Result      SCANNED - TELEMETRY     Final Result      SCANNED - TELEMETRY     Final Result      SCANNED - TELEMETRY     Final Result      SCANNED - TELEMETRY     Final Result      SCANNED - TELEMETRY     Final Result      SCANNED - TELEMETRY     Final Result      SCANNED - TELEMETRY     Final Result      SCANNED - TELEMETRY     Final Result      SCANNED - TELEMETRY     Final Result      SCANNED - TELEMETRY     Final Result      SCANNED - TELEMETRY     Final Result      SCANNED - TELEMETRY     Final Result      SCANNED - TELEMETRY     Final Result            Echocardiogram:    Results for orders placed during the hospital encounter of 12/17/22    Adult Transthoracic Echo Complete W/ Cont if Necessary Per Protocol    Interpretation Summary  •  Calculated left ventricular EF = 40% Left ventricular  ejection fraction appears to be 40 - 45%.  •  The left ventricular cavity is dilated.  •  Left ventricular diastolic function is consistent with (grade I) impaired relaxation.  •  Mild aortic valve stenosis is present.  •  Estimated right ventricular systolic pressure from tricuspid regurgitation is normal (<35 mmHg).        Stress Test:         Cardiac Catheterization:  No results found for this or any previous visit.         Other:         ASSESSMENT & PLAN:    Principal Problem:    CAD (coronary artery disease), native coronary artery  Active Problems:    Type 2 diabetes mellitus with hyperglycemia, without long-term current use of insulin (HCC)    Mixed hyperlipidemia    Primary hypertension    Systolic and diastolic CHF, acute on chronic (HCC)    Influenza due to seasonal influenza virus    76-year-old man with multiple and complex cardiovascular risk factors including hypertension, hyperlipidemia, uncontrolled diabetes presented with non-ST elevation MI and acute kidney injury in the setting of influenza.  Troponins peaked at 4.5 and trended down.  Renal function has now improved.  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  Initial EF of 20% however repeat echocardiogram shows EF of 40% with contrast.  He remains on heparin drip.  He is also been started on aspirin, high intensity statin, Coreg and Jardiance.  We will increase Coreg to 6.25 p.o. twice daily today.  Also add lisinopril 2.5 mg p.o. daily today.  Endocrinology is on board for management of diabetes.  CABG is a superior option due to underlying diabetes, low EF and multivessel coronary artery disease.  If turndown for CABG high risk multivessel PCI with Impella and atherectomy can be offered.  Current plan is for CABG after treatment of influenza.  Continue to uptitrate GDMT as tolerated for HFrEF.  He has  completed IV diuretics and is now on 40 mg p.o. twice daily furosemide         Johann Morales MD  12/22/22  15:20 EST

## 2022-12-22 NOTE — THERAPY TREATMENT NOTE
"Subjective: Pt agreeable to therapeutic plan of care. Pt reports that he is feeling better than on admission    Objective:     Bed mobility - Min-A  Transfers - Min-A  Ambulation - 25 feet x 2 with RW and Min-A     Toilet transfer with decreased eccentric control.  Pt managed clothing and christina care with CGA for balance.    Returned to EOB and demonstrated controlled lowering to bed.     Vitals: 92% on 2L in supine. Maintained 92-94% on RA with ambulation.    Pain: 0 VAS   Location: n/a  Intervention for pain: N/A    Education: Transfer Training and Gait Training    Assessment: Sherman Baumann presents with functional mobility impairments which indicate the need for skilled intervention. Tolerating session today without incident. Progressing with mobility.  Will continue to follow and progress as tolerated.     Plan/Recommendations:   Low Intensity Therapy recommended post-acute care - This is recommended as therapy feels this patient would require 2-3 visits per week. OP or HH would be the best option depending on patient's home bound status. Consider, if the patient has other  \"skilled\" needs such as wounds, IV antibiotics, etc. Combined with \"low intensity\" could also equate to a SNF. If patient is medically complex, consider LTAC.. Pt requires no DME at discharge.     Pt desires Home with family assist and and Home Health at discharge. Pt cooperative; agreeable to therapeutic recommendations and plan of care.         Basic Mobility 6-click:  Rollin = Total, A lot = 2, A little = 3; 4 = None  Supine>Sit:   1 = Total, A lot = 2, A little = 3; 4 = None   Sit>Stand with arms:  1 = Total, A lot = 2, A little = 3; 4 = None  Bed>Chair:   1 = Total, A lot = 2, A little = 3; 4 = None  Ambulate in room:  1 = Total, A lot = 2, A little = 3; 4 = None  3-5 Steps with railin = Total, A lot = 2, A little = 3; 4 = None  Score: 18    Modified [place]: N/A = No pre-op stroke/TIA    Post-Tx Position: Supine with " HOB Elevated, Alarms activated and Call light and personal items within reach  PPE: gloves and surgical mask

## 2022-12-22 NOTE — PLAN OF CARE
"Goal Outcome Evaluation:         Assessment: Sherman Baumann presents with functional mobility impairments which indicate the need for skilled intervention. Tolerating session today without incident. Progressing with mobility.  Will continue to follow and progress as tolerated.      Plan/Recommendations:   Low Intensity Therapy recommended post-acute care - This is recommended as therapy feels this patient would require 2-3 visits per week. OP or HH would be the best option depending on patient's home bound status. Consider, if the patient has other  \"skilled\" needs such as wounds, IV antibiotics, etc. Combined with \"low intensity\" could also equate to a SNF. If patient is medically complex, consider LTAC.. Pt requires no DME at discharge.      Pt desires Home with family assist and and Home Health at discharge. Pt cooperative; agreeable to therapeutic recommendations and plan of care.         "

## 2022-12-22 NOTE — PROGRESS NOTES
" LOS: 5 days   Patient Care Team:  Melvina Hodge as PCP - General (Nurse Practitioner)    Chief Complaint: preop open heart    Subjective:  Symptoms:  He reports shortness of breath (improved).    Diet:  No nausea.    Activity level: Returning to normal.    Pain:  He reports no pain.          Vital Signs  Temp:  [97.5 °F (36.4 °C)-97.9 °F (36.6 °C)] 97.9 °F (36.6 °C)  Heart Rate:  [66-83] 79  Resp:  [9-20] 14  BP: (122-144)/(56-77) 132/72  Body mass index is 31.98 kg/m².    Intake/Output Summary (Last 24 hours) at 12/22/2022 1349  Last data filed at 12/22/2022 1200  Gross per 24 hour   Intake --   Output 1825 ml   Net -1825 ml     I/O this shift:  In: -   Out: 200 [Urine:200]          12/21/22  0558 12/21/22  1400 12/22/22  0509   Weight: 101 kg (222 lb 3.6 oz) 101 kg (222 lb) 101 kg (222 lb 14.2 oz)         Objective:  General Appearance:  Comfortable.    Vital signs: (most recent): Blood pressure 126/62, pulse 87, temperature 98.4 °F (36.9 °C), temperature source Oral, resp. rate 14, height 177.8 cm (70\"), weight 101 kg (222 lb 14.2 oz), SpO2 95 %.    Lungs:  Normal effort and normal respiratory rate.  (O2 at 2 liters)  Heart: Normal rate.  Regular rhythm.    Extremities: There is no dependent edema.    Neurological: Patient is alert and oriented to person, place and time.    Skin:  Warm and dry.              Results Review:        WBC WBC   Date Value Ref Range Status   12/20/2022 7.70 3.40 - 10.80 10*3/mm3 Final      HGB Hemoglobin   Date Value Ref Range Status   12/20/2022 14.3 13.0 - 17.7 g/dL Final      HCT Hematocrit   Date Value Ref Range Status   12/20/2022 44.3 37.5 - 51.0 % Final      Platelets Platelets   Date Value Ref Range Status   12/20/2022 336 140 - 450 10*3/mm3 Final        PT/INR:  No results found for: PROTIME/No results found for: INR    Sodium Sodium   Date Value Ref Range Status   12/22/2022 135 (L) 136 - 145 mmol/L Final   12/20/2022 135 (L) 136 - 145 mmol/L Final   12/20/2022 130 (L) 136 " - 145 mmol/L Final      Potassium Potassium   Date Value Ref Range Status   12/22/2022 4.3 3.5 - 5.2 mmol/L Final   12/20/2022 4.4 3.5 - 5.2 mmol/L Final     Comment:     Slight hemolysis detected by analyzer. Results may be affected.   12/20/2022 4.7 3.5 - 5.2 mmol/L Final      Chloride Chloride   Date Value Ref Range Status   12/22/2022 92 (L) 98 - 107 mmol/L Final   12/20/2022 92 (L) 98 - 107 mmol/L Final   12/20/2022 91 (L) 98 - 107 mmol/L Final      Bicarbonate CO2   Date Value Ref Range Status   12/22/2022 28.0 22.0 - 29.0 mmol/L Final   12/20/2022 29.0 22.0 - 29.0 mmol/L Final   12/20/2022 28.0 22.0 - 29.0 mmol/L Final      BUN BUN   Date Value Ref Range Status   12/22/2022 31 (H) 8 - 23 mg/dL Final   12/20/2022 28 (H) 8 - 23 mg/dL Final   12/20/2022 28 (H) 8 - 23 mg/dL Final      Creatinine Creatinine   Date Value Ref Range Status   12/22/2022 1.26 0.76 - 1.27 mg/dL Final   12/20/2022 1.21 0.76 - 1.27 mg/dL Final   12/20/2022 1.10 0.76 - 1.27 mg/dL Final      Calcium Calcium   Date Value Ref Range Status   12/22/2022 9.8 8.6 - 10.5 mg/dL Final   12/20/2022 9.9 8.6 - 10.5 mg/dL Final   12/20/2022 9.3 8.6 - 10.5 mg/dL Final      Magnesium Magnesium   Date Value Ref Range Status   12/20/2022 1.8 1.6 - 2.4 mg/dL Final      Troponin No results found for: TROPONIN   BNP No results found for: BNP         aspirin, 81 mg, Oral, Daily  budesonide, 0.5 mg, Nebulization, BID - RT  carvedilol, 6.25 mg, Oral, BID With Meals  docusate sodium, 100 mg, Oral, BID  empagliflozin, 10 mg, Oral, Daily  furosemide, 40 mg, Oral, BID  insulin lispro, 2-12 Units, Subcutaneous, TID With Meals  insulin NPH-insulin regular, 32 Units, Subcutaneous, TID AC  polyethylene glycol, 17 g, Oral, Daily  potassium chloride, 20 mEq, Oral, BID With Meals  rosuvastatin, 5 mg, Oral, Nightly  sodium chloride, 10 mL, Intravenous, Q12H      heparin, 9.71 Units/kg/hr, Last Rate: 16.8 Units/kg/hr (12/22/22 0830)        PRN Meds:.•  acetaminophen  •   ALPRAZolam  •  dextrose  •  dextrose  •  glucagon (human recombinant)  •  ipratropium-albuterol  •  melatonin  •  nitroglycerin  •  ondansetron  •  potassium chloride **OR** potassium chloride **OR** potassium chloride  •  sodium chloride  •  sodium chloride    Patient Active Problem List   Diagnosis Code   • CAD (coronary artery disease), native coronary artery I25.10   • Type 2 diabetes mellitus with hyperglycemia, without long-term current use of insulin (HCC) E11.65   • Mixed hyperlipidemia E78.2   • Primary hypertension I10   • Systolic and diastolic CHF, acute on chronic (HCC) I50.43   • Influenza due to seasonal influenza virus J10.1       Assessment & Plan       - MV CAD with NSTEMI presentation, EF 20-25% (echo)--transferred for further care and evaluation, will start plavix prior to discharge  - ICM, acute HFrEF, NYHA class II-III--BNP 5580 at OSH, repeat echo 12/21 EF 40-45%---GDMT prior to discharge  - Influenza B diagnosed 12/15--ID signed off, Tamiflu x 5 days completed  - DM type 2 with hyperglycemia, a1c 8.3, endocrine managing  - HTN--stable  - HL---statin  - Past smoker  - [place], hearing aids in place  - Obesity  - COPD---pulmonary following    Preop studies:  Carotids: mild stenosis bilaterally  Vein mapping: adequate bilaterally  12/18 MRSA negative  12/17 cxr: no active disease  12/19 CT: emphysema, no significant aortic calcifications  12/17 ua:  ok    EF continued at 40-45%, tentatively scheduled for CABG with IABP on 12/29, hold heparin gtt on call to OR. Will repeat COVID prior to OR, continue PT/OT, will order IS.     Chyna Christian, ROWAN  12/22/22  13:49 EST

## 2022-12-22 NOTE — PLAN OF CARE
"Goal Outcome Evaluation:             Assessment: Sherman Baumann presents with ADL impairments below baseline abilities which indicate the need for continued skilled intervention while inpatient.  On 2 L O2 and remained WFL throughout.  Tolerating session today without incident. Will continue to follow and progress as tolerated.     Plan/Recommendations:   Moderate Intensity Therapy recommended post-acute care. This is recommended as therapy feels the patient would require 3-4 days per week and wouldn't tolerate \"3 hour daily\" rehab intensity. SNF would be the preferred choice. If the patient does not agree to SNF, arrange HH or OP depending on home bound status. If patient is medically complex, consider LTACH.. Pt requires no DME at discharge.      "

## 2022-12-22 NOTE — CASE MANAGEMENT/SOCIAL WORK
Continued Stay Note  [facility]     Patient Name: Sherman Baumann  MRN: [id number]  Today's Date: 12/22/2022    Admit Date: 12/17/2022    Plan: D/C Plan: Anticipate routine home pending clinical course post-CABG (12/29). [facility] referral pending acceptance, order form needed.   Discharge Plan     Row Name 12/22/22 1120       Plan    Plan D/C Plan: Anticipate routine home pending clinical course post-CABG (12/29). [facility] referral pending acceptance, order form needed.    Plan Comments Barrier to D/C: CABG scheduled 12/29, heparin gtt.                    Expected Discharge Date and Time     Expected Discharge Date Expected Discharge Time    Dany 3, 2023         Phone communication or documentation only - no physical contact with patient or family.    DAJA LiN, RN    [address] [address]    Office: [phone]  Fax: [phone]

## 2022-12-22 NOTE — THERAPY TREATMENT NOTE
"Subjective: Pt agreeable to therapeutic plan of care.  Cognition: arousal/alertness: Alert and Attentive    Objective:     Bed Mobility: Independent   Functional Transfers: Supervision  Functional Ambulation: CGA and with rolling walker    Grooming: CGA  ADL Position: supported standing  ADL Comments: cleaning dentures     Vitals: WNL    Pain: 0 VAS  Location: NA  Interventions for pain: N/A  Education: Provided education on the importance of mobility in the acute care setting    Assessment: Sherman Baumann presents with ADL impairments below baseline abilities which indicate the need for continued skilled intervention while inpatient.  On 2 L O2 and remained WFL throughout.  Tolerating session today without incident. Will continue to follow and progress as tolerated.     Plan/Recommendations:   Moderate Intensity Therapy recommended post-acute care. This is recommended as therapy feels the patient would require 3-4 days per week and wouldn't tolerate \"3 hour daily\" rehab intensity. SNF would be the preferred choice. If the patient does not agree to SNF, arrange HH or OP depending on home bound status. If patient is medically complex, consider LTACH.. Pt requires no DME at discharge.     Pt desires Home with Home Health at discharge. Pt cooperative; agreeable to therapeutic recommendations and plan of care.     Modified Priscilla: N/A = No pre-op stroke/TIA    Post-Tx Position: Supine with HOB Elevated, Alarms activated and Call light and personal items within reach  PPE: gloves and surgical mask    "

## 2022-12-22 NOTE — PLAN OF CARE
Goal Outcome Evaluation:              Outcome Evaluation: pt continues heparin gtt-titrating with q6hr pTT. no s/sx of bleeding, pt needs 2L 02 intermittently-when walking with PT he did not need any 02 but when he lays in bed his sats drop to 86%-encouraged coughing and deep breathing and gave pt incentive spirometer. repeat echo shows EF of 45%. multiple visitors today. VSS. blood sugar controlled with SS and 70/30. Pt eating and drinking adequately. adequate urine output, mirilax given to help with constipation-bowel sounds present and pt had BM today. Coreg increased for increased heart rate per cardiology.

## 2022-12-22 NOTE — PLAN OF CARE
Goal Outcome Evaluation:      Pt remains on heparin gtt, 2L nasal cannula at night. Rested overnight. VSS  Problem: Adult Inpatient Plan of Care  Goal: Absence of Hospital-Acquired Illness or Injury  Intervention: Identify and Manage Fall Risk  Recent Flowsheet Documentation  Taken 12/22/2022 0400 by Verena Alexander RN  Safety Promotion/Fall Prevention: safety round/check completed  Taken 12/22/2022 0200 by Verena Alexander RN  Safety Promotion/Fall Prevention: safety round/check completed  Taken 12/22/2022 0000 by Verena Alexander RN  Safety Promotion/Fall Prevention: safety round/check completed  Taken 12/21/2022 2200 by Verena Alexander RN  Safety Promotion/Fall Prevention: safety round/check completed  Taken 12/21/2022 2000 by Verena Alexander RN  Safety Promotion/Fall Prevention: safety round/check completed  Intervention: Prevent Skin Injury  Recent Flowsheet Documentation  Taken 12/21/2022 2000 by Verena Alexander RN  Skin Protection: adhesive use limited  Intervention: Prevent and Manage VTE (Venous Thromboembolism) Risk  Recent Flowsheet Documentation  Taken 12/21/2022 2000 by Verena Alexander RN  Activity Management: activity adjusted per tolerance  Intervention: Prevent Infection  Recent Flowsheet Documentation  Taken 12/22/2022 0400 by Verena Alexander RN  Infection Prevention: rest/sleep promoted  Taken 12/22/2022 0200 by Verena Alexander RN  Infection Prevention: rest/sleep promoted  Taken 12/22/2022 0000 by Verena Alexander RN  Infection Prevention: rest/sleep promoted  Taken 12/21/2022 2200 by Verena Alexander RN  Infection Prevention: rest/sleep promoted  Taken 12/21/2022 2000 by Verena Alexander RN  Infection Prevention: rest/sleep promoted  Goal: Optimal Comfort and Wellbeing  Intervention: Provide Person-Centered Care  Recent Flowsheet Documentation  Taken 12/22/2022 0000 by Verena Alexander RN  Trust Relationship/Rapport: care explained      Problem: Diabetes Comorbidity  Goal: Blood Glucose Level Within Targeted Range  Intervention: Monitor and Manage Glycemia  Recent Flowsheet Documentation  Taken 12/21/2022 2000 by Verena Alexander RN  Glycemic Management: blood glucose monitored     Problem: Fall Injury Risk  Goal: Absence of Fall and Fall-Related Injury  Intervention: Identify and Manage Contributors  Recent Flowsheet Documentation  Taken 12/21/2022 2000 by Verena Alexander RN  Medication Review/Management: medications reviewed  Intervention: Promote Injury-Free Environment  Recent Flowsheet Documentation  Taken 12/22/2022 0400 by Verena Alexander RN  Safety Promotion/Fall Prevention: safety round/check completed  Taken 12/22/2022 0200 by Verena Alexander RN  Safety Promotion/Fall Prevention: safety round/check completed  Taken 12/22/2022 0000 by Verena Alexander RN  Safety Promotion/Fall Prevention: safety round/check completed  Taken 12/21/2022 2200 by Verena Alexander RN  Safety Promotion/Fall Prevention: safety round/check completed  Taken 12/21/2022 2000 by Vreena Alexander RN  Safety Promotion/Fall Prevention: safety round/check completed     Problem: Skin Injury Risk Increased  Goal: Skin Health and Integrity  Intervention: Promote and Optimize Oral Intake  Recent Flowsheet Documentation  Taken 12/21/2022 2000 by Verena Alexander RN  Oral Nutrition Promotion: rest periods promoted  Intervention: Optimize Skin Protection  Recent Flowsheet Documentation  Taken 12/21/2022 2000 by Verena Alexander RN  Pressure Reduction Techniques: frequent weight shift encouraged  Pressure Reduction Devices: pressure-redistributing mattress utilized  Skin Protection: adhesive use limited

## 2022-12-22 NOTE — PROGRESS NOTES
"PULMONARY  PROGRESS  NOTE      PATIENT IDENTIFICATION:  Name: Sherman Baumann  MRN: [id number]  :  1945     Age: 76 y.o.  Sex: male    DATE OF Note:  2022   Referring Physician: Jaleel Huber MD                  Subjective:   Feeling a little better, on 2 L O2, complaints of nasal dryness with the oxygen, no SOB, no chest or abd pain, no bowel or bladder issues reported    Objective:  tMax 24 hrs: Temp (24hrs), Av.8 °F (36.6 °C), Min:97.5 °F (36.4 °C), Max:97.9 °F (36.6 °C)      Vitals Ranges:   Temp:  [97.5 °F (36.4 °C)-97.9 °F (36.6 °C)] 97.9 °F (36.6 °C)  Heart Rate:  [66-93] 79  Resp:  [9-20] 14  BP: (122-144)/(56-77) 132/72    Intake and Output Last 3 Shifts:   I/O last 3 completed shifts:  In: -   Out: 4000 [Urine:4000]    Exam:  /72 (BP Location: Right arm, Patient Position: Sitting)   Pulse 79   Temp 97.9 °F (36.6 °C) (Oral)   Resp 14   Ht 177.8 cm (70\")   Wt 101 kg (222 lb 14.2 oz)   SpO2 97%   BMI 31.98 kg/m²     General Appearance: Alert    HEENT:  Normocephalic, without obvious abnormality. Conjunctivae/corneas clear.  Normal external ear canals. Nares normal, no drainage     Neck:  Supple, symmetrical, trachea midline. No JVD.  Lungs /Chest wall:   Bilateral basal rhonchi, respirations unlabored, symmetrical wall movement.     Heart:  Regular rate and rhythm, systolic murmur PMI left sternal border  Abdomen: Soft, nontender, no masses, no organomegaly.    Extremities: Trace edema, no clubbing or cyanosis        Medications:  aspirin, 81 mg, Oral, Daily  budesonide, 0.5 mg, Nebulization, BID - RT  carvedilol, 3.125 mg, Oral, Once  carvedilol, 6.25 mg, Oral, BID With Meals  docusate sodium, 100 mg, Oral, BID  empagliflozin, 10 mg, Oral, Daily  furosemide, 40 mg, Oral, BID  insulin lispro, 2-12 Units, Subcutaneous, TID With Meals  insulin NPH-insulin regular, 32 Units, Subcutaneous, TID AC  polyethylene glycol, 17 g, Oral, Daily  potassium chloride, 20 mEq, Oral, BID " With Meals  rosuvastatin, 5 mg, Oral, Nightly  sodium chloride, 10 mL, Intravenous, Q12H        Infusion:  heparin, 9.71 Units/kg/hr, Last Rate: 16.89 Units/kg/hr (12/21/22 2222)         PRN:    acetaminophen    ALPRAZolam    dextrose    dextrose    glucagon (human recombinant)    ipratropium-albuterol    melatonin    nitroglycerin    ondansetron    potassium chloride **OR** potassium chloride **OR** potassium chloride    sodium chloride    sodium chloride  Data Review:  All labs (24hrs):   Recent Results (from the past 24 hour(s))   aPTT    Collection Time: 12/21/22  2:28 PM    Specimen: Blood   Result Value Ref Range    PTT 77.2 (H) 61.0 - 76.5 seconds   Adult Transthoracic Echo Complete W/ Cont if Necessary Per Protocol    Collection Time: 12/21/22  2:38 PM   Result Value Ref Range    Target HR (85%) 123 bpm    Max. Pred. HR (100%) 145 bpm    LVIDd 4.3 cm    LVIDs 3.7 cm    IVSd 1.30 cm    LVPWd 1.21 cm    FS 14.0 %    IVS/LVPW 1.07 cm    ESV(cubed) 51.4 ml    LV Sys Vol (BSA corrected) 33.6 cm2    EDV(cubed) 80.8 ml    LV Hanson Vol (BSA corrected) 53.9 cm2    LVOT area 3.8 cm2    LV mass(C)d 198.5 grams    LVOT diam 2.19 cm    EDV(MOD-sp4) 117.6 ml    ESV(MOD-sp4) 73.3 ml    SV(MOD-sp4) 44.3 ml    SI(MOD-sp4) 20.3 ml/m2    EF(MOD-sp4) 37.7 %    MV E max chacorta 67.8 cm/sec    MV A max chacorta 136.7 cm/sec    MV dec time 0.09 msec    MV E/A 0.50     SV(LVOT) 82.8 ml    RVIDd 2.8 cm    LV V1 max 109.7 cm/sec    LV V1 max PG 4.8 mmHg    LV V1 mean PG 3.1 mmHg    LV V1 VTI 21.9 cm    Ao pk chacorta 211.3 cm/sec    Ao max PG 17.9 mmHg    Ao mean PG 10.8 mmHg    Ao V2 VTI 36.7 cm    TEJAS(I,D) 2.26 cm2    MV max PG 7.9 mmHg    MV mean PG 3.4 mmHg    MV V2 VTI 23.2 cm    MVA(VTI) 3.6 cm2    MV dec slope 731.4 cm/sec2    RAP systole 3.0 mmHg    RV V1 max PG 1.53 mmHg    RV V1 max 61.9 cm/sec    RV V1 VTI 10.1 cm    PA V2 max 95.6 cm/sec    Ao root diam 3.2 cm    ACS 1.44 cm    EF(MOD-bp) 40.0 %    LA dimension (2D)  4.1 cm   POC  Glucose Once    Collection Time: 12/21/22  4:08 PM    Specimen: Blood   Result Value Ref Range    Glucose 224 (H) 70 - 105 mg/dL   aPTT    Collection Time: 12/21/22  9:33 PM    Specimen: Blood   Result Value Ref Range    PTT 76.9 (H) 61.0 - 76.5 seconds   Basic Metabolic Panel    Collection Time: 12/22/22  4:29 AM    Specimen: Blood   Result Value Ref Range    Glucose 107 (H) 65 - 99 mg/dL    BUN 31 (H) 8 - 23 mg/dL    Creatinine 1.26 0.76 - 1.27 mg/dL    Sodium 135 (L) 136 - 145 mmol/L    Potassium 4.3 3.5 - 5.2 mmol/L    Chloride 92 (L) 98 - 107 mmol/L    CO2 28.0 22.0 - 29.0 mmol/L    Calcium 9.8 8.6 - 10.5 mg/dL    BUN/Creatinine Ratio 24.6 7.0 - 25.0    Anion Gap 15.0 5.0 - 15.0 mmol/L    eGFR 59.5 (L) >60.0 mL/min/1.73   aPTT    Collection Time: 12/22/22  4:29 AM    Specimen: Blood   Result Value Ref Range    PTT 76.0 61.0 - 76.5 seconds   POC Glucose Once    Collection Time: 12/22/22  7:20 AM    Specimen: Blood   Result Value Ref Range    Glucose 132 (H) 70 - 105 mg/dL        Imaging:  Adult Transthoracic Echo Complete W/ Cont if Necessary Per Protocol    Calculated left ventricular EF = 40% Left ventricular ejection fraction   appears to be 40 - 45%.    The left ventricular cavity is dilated.    Left ventricular diastolic function is consistent with (grade I)   impaired relaxation.    Mild aortic valve stenosis is present.    Estimated right ventricular systolic pressure from tricuspid   regurgitation is normal (<35 mmHg).       ASSESSMENT:  Acute hypoxic respiratory failure   Flu B  COPD  LOGAN  Cor Pulmonale   CHF  DM II  Hyperlipidemia  HTN  CAD with multivessel disease        PLAN:  Will add water to O2   Encourage OOB daily   PT/OT   Patient is at moderate risk for pulmonary complications like atelectasis and pneumonia with surgery but no absolute contraindication  Optimize Incentive spirometer and BD, ICS prior to surgery and after   Tamiflu -will complete tonight   BIPAP while sleeping    Bronchodilators  Inhaled corticosteroids  IS/Flutter valve  Diuresis and monitor JANES's  Electrolytes/ glycemic control  DVT and GI prophylaxis-Heparin drip     Discussed with Dr Kaylee Geller, APRN  12/22/2022  12:16 EST    The NP scribed the note for me, I have examined the patient and reviewed and verified the above findings and and I formulated the assessment and plan as documented

## 2022-12-23 LAB
ANION GAP SERPL CALCULATED.3IONS-SCNC: 13 MMOL/L (ref 5–15)
APTT PPP: 26.9 SECONDS (ref 61–76.5)
APTT PPP: 41.3 SECONDS (ref 61–76.5)
APTT PPP: 53.9 SECONDS (ref 61–76.5)
APTT PPP: 75 SECONDS (ref 61–76.5)
BASOPHILS # BLD AUTO: 0.1 10*3/MM3 (ref 0–0.2)
BASOPHILS NFR BLD AUTO: 1 % (ref 0–1.5)
BUN SERPL-MCNC: 42 MG/DL (ref 8–23)
BUN/CREAT SERPL: 19.8 (ref 7–25)
CALCIUM SPEC-SCNC: 9.7 MG/DL (ref 8.6–10.5)
CHLORIDE SERPL-SCNC: 94 MMOL/L (ref 98–107)
CO2 SERPL-SCNC: 28 MMOL/L (ref 22–29)
CREAT SERPL-MCNC: 2.12 MG/DL (ref 0.76–1.27)
DEPRECATED RDW RBC AUTO: 47.7 FL (ref 37–54)
EGFRCR SERPLBLD CKD-EPI 2021: 31.9 ML/MIN/1.73
EOSINOPHIL # BLD AUTO: 0.3 10*3/MM3 (ref 0–0.4)
EOSINOPHIL NFR BLD AUTO: 3.2 % (ref 0.3–6.2)
ERYTHROCYTE [DISTWIDTH] IN BLOOD BY AUTOMATED COUNT: 14.6 % (ref 12.3–15.4)
GLUCOSE BLDC GLUCOMTR-MCNC: 144 MG/DL (ref 70–105)
GLUCOSE BLDC GLUCOMTR-MCNC: 179 MG/DL (ref 70–105)
GLUCOSE BLDC GLUCOMTR-MCNC: 244 MG/DL (ref 70–105)
GLUCOSE BLDC GLUCOMTR-MCNC: 372 MG/DL (ref 70–105)
GLUCOSE SERPL-MCNC: 95 MG/DL (ref 65–99)
HCT VFR BLD AUTO: 42 % (ref 37.5–51)
HGB BLD-MCNC: 13.8 G/DL (ref 13–17.7)
LYMPHOCYTES # BLD AUTO: 2.6 10*3/MM3 (ref 0.7–3.1)
LYMPHOCYTES NFR BLD AUTO: 26 % (ref 19.6–45.3)
MAGNESIUM SERPL-MCNC: 2.1 MG/DL (ref 1.6–2.4)
MCH RBC QN AUTO: 29.2 PG (ref 26.6–33)
MCHC RBC AUTO-ENTMCNC: 32.7 G/DL (ref 31.5–35.7)
MCV RBC AUTO: 89.3 FL (ref 79–97)
MONOCYTES # BLD AUTO: 1.1 10*3/MM3 (ref 0.1–0.9)
MONOCYTES NFR BLD AUTO: 10.9 % (ref 5–12)
NEUTROPHILS NFR BLD AUTO: 5.8 10*3/MM3 (ref 1.7–7)
NEUTROPHILS NFR BLD AUTO: 58.9 % (ref 42.7–76)
NRBC BLD AUTO-RTO: 0 /100 WBC (ref 0–0.2)
PLATELET # BLD AUTO: 346 10*3/MM3 (ref 140–450)
PMV BLD AUTO: 8.6 FL (ref 6–12)
POTASSIUM SERPL-SCNC: 5 MMOL/L (ref 3.5–5.2)
RBC # BLD AUTO: 4.71 10*6/MM3 (ref 4.14–5.8)
SODIUM SERPL-SCNC: 135 MMOL/L (ref 136–145)
WBC NRBC COR # BLD: 9.9 10*3/MM3 (ref 3.4–10.8)

## 2022-12-23 PROCEDURE — 63710000001 INSULIN ISOPHANE & REGULAR PER 5 UNITS: Performed by: INTERNAL MEDICINE

## 2022-12-23 PROCEDURE — 97116 GAIT TRAINING THERAPY: CPT

## 2022-12-23 PROCEDURE — 94761 N-INVAS EAR/PLS OXIMETRY MLT: CPT

## 2022-12-23 PROCEDURE — 99231 SBSQ HOSP IP/OBS SF/LOW 25: CPT | Performed by: NURSE PRACTITIONER

## 2022-12-23 PROCEDURE — 85730 THROMBOPLASTIN TIME PARTIAL: CPT | Performed by: HOSPITALIST

## 2022-12-23 PROCEDURE — 99232 SBSQ HOSP IP/OBS MODERATE 35: CPT | Performed by: INTERNAL MEDICINE

## 2022-12-23 PROCEDURE — 85730 THROMBOPLASTIN TIME PARTIAL: CPT | Performed by: THORACIC SURGERY (CARDIOTHORACIC VASCULAR SURGERY)

## 2022-12-23 PROCEDURE — 94799 UNLISTED PULMONARY SVC/PX: CPT

## 2022-12-23 PROCEDURE — 97530 THERAPEUTIC ACTIVITIES: CPT

## 2022-12-23 PROCEDURE — 85025 COMPLETE CBC W/AUTO DIFF WBC: CPT | Performed by: THORACIC SURGERY (CARDIOTHORACIC VASCULAR SURGERY)

## 2022-12-23 PROCEDURE — 99233 SBSQ HOSP IP/OBS HIGH 50: CPT | Performed by: INTERNAL MEDICINE

## 2022-12-23 PROCEDURE — 82962 GLUCOSE BLOOD TEST: CPT

## 2022-12-23 PROCEDURE — 80048 BASIC METABOLIC PNL TOTAL CA: CPT | Performed by: NURSE PRACTITIONER

## 2022-12-23 PROCEDURE — 83735 ASSAY OF MAGNESIUM: CPT | Performed by: HOSPITALIST

## 2022-12-23 PROCEDURE — 63710000001 INSULIN GLARGINE PER 5 UNITS: Performed by: INTERNAL MEDICINE

## 2022-12-23 PROCEDURE — 63710000001 INSULIN LISPRO (HUMAN) PER 5 UNITS: Performed by: INTERNAL MEDICINE

## 2022-12-23 PROCEDURE — 94664 DEMO&/EVAL PT USE INHALER: CPT

## 2022-12-23 PROCEDURE — 25010000002 HEPARIN (PORCINE) 25000-0.45 UT/250ML-% SOLUTION: Performed by: THORACIC SURGERY (CARDIOTHORACIC VASCULAR SURGERY)

## 2022-12-23 RX ORDER — INSULIN LISPRO 100 [IU]/ML
14 INJECTION, SOLUTION INTRAVENOUS; SUBCUTANEOUS
Status: DISCONTINUED | OUTPATIENT
Start: 2022-12-23 | End: 2022-12-24

## 2022-12-23 RX ADMIN — EMPAGLIFLOZIN 10 MG: 10 TABLET, FILM COATED ORAL at 08:15

## 2022-12-23 RX ADMIN — Medication 10 ML: at 21:28

## 2022-12-23 RX ADMIN — INSULIN LISPRO 14 UNITS: 100 INJECTION, SOLUTION INTRAVENOUS; SUBCUTANEOUS at 18:42

## 2022-12-23 RX ADMIN — INSULIN HUMAN 34 UNITS: 100 INJECTION, SUSPENSION SUBCUTANEOUS at 11:42

## 2022-12-23 RX ADMIN — ROSUVASTATIN CALCIUM 5 MG: 5 TABLET, COATED ORAL at 21:27

## 2022-12-23 RX ADMIN — ASPIRIN 81 MG CHEWABLE TABLET 81 MG: 81 TABLET CHEWABLE at 08:16

## 2022-12-23 RX ADMIN — BUDESONIDE 0.5 MG: 0.5 INHALANT RESPIRATORY (INHALATION) at 08:25

## 2022-12-23 RX ADMIN — INSULIN GLARGINE 42 UNITS: 100 INJECTION, SOLUTION SUBCUTANEOUS at 21:27

## 2022-12-23 RX ADMIN — INSULIN HUMAN 34 UNITS: 100 INJECTION, SUSPENSION SUBCUTANEOUS at 08:15

## 2022-12-23 RX ADMIN — LISINOPRIL 2.5 MG: 5 TABLET ORAL at 08:16

## 2022-12-23 RX ADMIN — BUDESONIDE 0.5 MG: 0.5 INHALANT RESPIRATORY (INHALATION) at 19:52

## 2022-12-23 RX ADMIN — POTASSIUM CHLORIDE 20 MEQ: 1500 TABLET, EXTENDED RELEASE ORAL at 08:16

## 2022-12-23 RX ADMIN — CARVEDILOL 6.25 MG: 6.25 TABLET, FILM COATED ORAL at 08:15

## 2022-12-23 RX ADMIN — FUROSEMIDE 40 MG: 40 TABLET ORAL at 08:16

## 2022-12-23 RX ADMIN — INSULIN LISPRO 10 UNITS: 100 INJECTION, SOLUTION INTRAVENOUS; SUBCUTANEOUS at 11:42

## 2022-12-23 RX ADMIN — INSULIN LISPRO 2 UNITS: 100 INJECTION, SOLUTION INTRAVENOUS; SUBCUTANEOUS at 18:42

## 2022-12-23 RX ADMIN — Medication 10 ML: at 08:20

## 2022-12-23 RX ADMIN — HEPARIN SODIUM 23 UNITS/KG/HR: 10000 INJECTION, SOLUTION INTRAVENOUS at 20:43

## 2022-12-23 NOTE — PLAN OF CARE
Goal Outcome Evaluation:         Pt rested comfortably through the night with no complaints of pain, on 2L humidified NC, VSS, continue to monitor

## 2022-12-23 NOTE — PROGRESS NOTES
"PULMONARY  PROGRESS  NOTE      PATIENT IDENTIFICATION:  Name: Sherman Baumann  MRN: [id number]  :  1945     Age: 76 y.o.  Sex: male    DATE OF Note:  2022   Referring Physician: Jaleel Huber MD                  Subjective:   Feeling a little better, removed O2, on room air currently,   no SOB, no chest or abd pain, no bowel or bladder issues reported    Objective:  tMax 24 hrs: Temp (24hrs), Av.8 °F (36.6 °C), Min:97.4 °F (36.3 °C), Max:98.3 °F (36.8 °C)      Vitals Ranges:   Temp:  [97.4 °F (36.3 °C)-98.3 °F (36.8 °C)] 97.4 °F (36.3 °C)  Heart Rate:  [70-81] 76  Resp:  [9-20] 9  BP: (102-114)/(49-64) 114/54    Intake and Output Last 3 Shifts:   I/O last 3 completed shifts:  In: 240 [P.O.:240]  Out: 2550 [Urine:2550]    Exam:  /54   Pulse 76   Temp 97.4 °F (36.3 °C) (Oral)   Resp 9   Ht 177.8 cm (70\")   Wt 102 kg (224 lb 10.4 oz)   SpO2 100%   BMI 32.23 kg/m²     General Appearance: Alert    HEENT:  Normocephalic, without obvious abnormality. Conjunctivae/corneas clear.  Normal external ear canals. Nares normal, no drainage     Neck:  Supple, symmetrical, trachea midline. No JVD.  Lungs /Chest wall:   Bilateral basal rhonchi, respirations unlabored, symmetrical wall movement.     Heart:  Regular rate and rhythm, systolic murmur PMI left sternal border  Abdomen: Soft, nontender, no masses, no organomegaly.    Extremities: Trace edema, no clubbing or cyanosis        Medications:  aspirin, 81 mg, Oral, Daily  budesonide, 0.5 mg, Nebulization, BID - RT  carvedilol, 6.25 mg, Oral, BID With Meals  docusate sodium, 100 mg, Oral, BID  empagliflozin, 10 mg, Oral, Daily  insulin glargine, 42 Units, Subcutaneous, Nightly  insulin lispro, 14 Units, Subcutaneous, TID With Meals  insulin lispro, 2-12 Units, Subcutaneous, TID With Meals  polyethylene glycol, 17 g, Oral, Daily  rosuvastatin, 5 mg, Oral, Nightly  sodium chloride, 10 mL, Intravenous, Q12H        Infusion:  heparin, 9.71 " Units/kg/hr, Last Rate: 20 Units/kg/hr (12/23/22 0335)         PRN:    acetaminophen    ALPRAZolam    dextrose    dextrose    glucagon (human recombinant)    ipratropium-albuterol    melatonin    nitroglycerin    ondansetron    potassium chloride **OR** potassium chloride **OR** potassium chloride    sodium chloride    sodium chloride  Data Review:  All labs (24hrs):   Recent Results (from the past 24 hour(s))   POC Glucose Once    Collection Time: 12/22/22  4:58 PM    Specimen: Blood   Result Value Ref Range    Glucose 261 (H) 70 - 105 mg/dL   aPTT    Collection Time: 12/22/22  8:23 PM    Specimen: Arm, Left; Blood   Result Value Ref Range    PTT 60.1 (L) 61.0 - 76.5 seconds   CBC Auto Differential    Collection Time: 12/23/22  2:50 AM    Specimen: Blood   Result Value Ref Range    WBC 9.90 3.40 - 10.80 10*3/mm3    RBC 4.71 4.14 - 5.80 10*6/mm3    Hemoglobin 13.8 13.0 - 17.7 g/dL    Hematocrit 42.0 37.5 - 51.0 %    MCV 89.3 79.0 - 97.0 fL    MCH 29.2 26.6 - 33.0 pg    MCHC 32.7 31.5 - 35.7 g/dL    RDW 14.6 12.3 - 15.4 %    RDW-SD 47.7 37.0 - 54.0 fl    MPV 8.6 6.0 - 12.0 fL    Platelets 346 140 - 450 10*3/mm3    Neutrophil % 58.9 42.7 - 76.0 %    Lymphocyte % 26.0 19.6 - 45.3 %    Monocyte % 10.9 5.0 - 12.0 %    Eosinophil % 3.2 0.3 - 6.2 %    Basophil % 1.0 0.0 - 1.5 %    Neutrophils, Absolute 5.80 1.70 - 7.00 10*3/mm3    Lymphocytes, Absolute 2.60 0.70 - 3.10 10*3/mm3    Monocytes, Absolute 1.10 (H) 0.10 - 0.90 10*3/mm3    Eosinophils, Absolute 0.30 0.00 - 0.40 10*3/mm3    Basophils, Absolute 0.10 0.00 - 0.20 10*3/mm3    nRBC 0.0 0.0 - 0.2 /100 WBC   aPTT    Collection Time: 12/23/22  2:50 AM    Specimen: Arm, Left; Blood   Result Value Ref Range    PTT 41.3 (L) 61.0 - 76.5 seconds   Basic Metabolic Panel    Collection Time: 12/23/22  2:50 AM    Specimen: Blood   Result Value Ref Range    Glucose 95 65 - 99 mg/dL    BUN 42 (H) 8 - 23 mg/dL    Creatinine 2.12 (H) 0.76 - 1.27 mg/dL    Sodium 135 (L) 136 - 145  mmol/L    Potassium 5.0 3.5 - 5.2 mmol/L    Chloride 94 (L) 98 - 107 mmol/L    CO2 28.0 22.0 - 29.0 mmol/L    Calcium 9.7 8.6 - 10.5 mg/dL    BUN/Creatinine Ratio 19.8 7.0 - 25.0    Anion Gap 13.0 5.0 - 15.0 mmol/L    eGFR 31.9 (L) >60.0 mL/min/1.73   Magnesium    Collection Time: 12/23/22  2:50 AM    Specimen: Blood   Result Value Ref Range    Magnesium 2.1 1.6 - 2.4 mg/dL   POC Glucose Once    Collection Time: 12/23/22  7:06 AM    Specimen: Blood   Result Value Ref Range    Glucose 144 (H) 70 - 105 mg/dL   aPTT    Collection Time: 12/23/22  9:32 AM    Specimen: Arm, Left; Blood   Result Value Ref Range    PTT 75.0 61.0 - 76.5 seconds   POC Glucose Once    Collection Time: 12/23/22 11:22 AM    Specimen: Blood   Result Value Ref Range    Glucose 372 (H) 70 - 105 mg/dL        Imaging:  Adult Transthoracic Echo Complete W/ Cont if Necessary Per Protocol    Calculated left ventricular EF = 40% Left ventricular ejection fraction   appears to be 40 - 45%.    The left ventricular cavity is dilated.    Left ventricular diastolic function is consistent with (grade I)   impaired relaxation.    Mild aortic valve stenosis is present.    Estimated right ventricular systolic pressure from tricuspid   regurgitation is normal (<35 mmHg).       ASSESSMENT:  Acute hypoxic respiratory failure   Flu B  COPD  LOGAN  Cor Pulmonale   CHF  DM II  Hyperlipidemia  HTN  CAD with multivessel disease        PLAN:  Monitor oxygen saturation on room air  Encourage OOB daily   PT/OT   Patient is at moderate risk for pulmonary complications like atelectasis and pneumonia with surgery but no absolute contraindication  Optimize Incentive spirometer and BD, ICS prior to surgery and after   Tamiflu -will complete tonight   BIPAP while sleeping   Bronchodilators  Inhaled corticosteroids  IS/Flutter valve  Diuresis and monitor JANES's  Electrolytes/ glycemic control  DVT and GI prophylaxis-Heparin drip     Discussed with Dr Kaylee Geller,  APRN  12/23/2022  16:15 EST    The NP scribed the note for me, I have examined the patient and reviewed and verified the above findings and and I formulated the assessment and plan as documented

## 2022-12-23 NOTE — PROGRESS NOTES
Daily Progress Note    Patient Care Team:  Melvina Hodge as PCP - General (Nurse Practitioner)    Chief Complaint: Follow-up type 2 diabetes    HPI: Patient seen and examined today.  Blood sugar log reviewed, blood sugars are up and down.  Patient is waiting for CABG after the treatment for flu.  He is eating fair.    ROS:   Constitutional:  Denies fatigue, tiredness.    Respiratory: denies cough, shortness of breath.   Cardiovascular:  denies chest pain, edema   GI:  Denies abdominal pain, nausea, vomiting.         Vitals:    12/23/22 0828   BP:    Pulse: 76   Resp: 9   Temp:    SpO2: 100%     Body mass index is 32.23 kg/m².    Physical Exam:  GEN: NAD, conversant  CV: RRR  LUNG: CTA  PSYCH: Awake and coherent.      Results Review:     I reviewed the patient's new clinical results.    Glucose   Date Value Ref Range Status   12/23/2022 95 65 - 99 mg/dL Final     Sodium   Date Value Ref Range Status   12/23/2022 135 (L) 136 - 145 mmol/L Final     Potassium   Date Value Ref Range Status   12/23/2022 5.0 3.5 - 5.2 mmol/L Final     Comment:     Slight hemolysis detected by analyzer. Results may be affected.     CO2   Date Value Ref Range Status   12/23/2022 28.0 22.0 - 29.0 mmol/L Final     Chloride   Date Value Ref Range Status   12/23/2022 94 (L) 98 - 107 mmol/L Final     Anion Gap   Date Value Ref Range Status   12/23/2022 13.0 5.0 - 15.0 mmol/L Final     Creatinine   Date Value Ref Range Status   12/23/2022 2.12 (H) 0.76 - 1.27 mg/dL Final     BUN   Date Value Ref Range Status   12/23/2022 42 (H) 8 - 23 mg/dL Final     BUN/Creatinine Ratio   Date Value Ref Range Status   12/23/2022 19.8 7.0 - 25.0 Final     Calcium   Date Value Ref Range Status   12/23/2022 9.7 8.6 - 10.5 mg/dL Final     Magnesium   Date Value Ref Range Status   12/23/2022 2.1 1.6 - 2.4 mg/dL Final     Phosphorus   Date Value Ref Range Status   12/20/2022 4.5 2.5 - 4.5 mg/dL Final     Lab Results   Component Value Date    HGBA1C 8.3 (H)  12/17/2022     No results found for: GLUF, MICROALBUR  Results from last 7 days   Lab Units 12/23/22  1122 12/23/22  0706 12/22/22  1658 12/22/22  1248 12/22/22  1118 12/22/22  0720   GLUCOSE mg/dL 372* 144* 261* 299* 360* 132*             Medication Review: Reviewed.     aspirin, 81 mg, Oral, Daily  budesonide, 0.5 mg, Nebulization, BID - RT  carvedilol, 6.25 mg, Oral, BID With Meals  docusate sodium, 100 mg, Oral, BID  empagliflozin, 10 mg, Oral, Daily  insulin lispro, 2-12 Units, Subcutaneous, TID With Meals  insulin NPH-insulin regular, 34 Units, Subcutaneous, TID AC  polyethylene glycol, 17 g, Oral, Daily  rosuvastatin, 5 mg, Oral, Nightly  sodium chloride, 10 mL, Intravenous, Q12H              Assessment and plan:  Diabetes mellitus type 2 with hyperglycemia: Uncontrolled with significant fluctuations in the blood sugars.  At this time I will DC 70/30 insulin, add glargine 45 units subcu nightly and Humalog 15 units with each meal along with Humalog sliding scale.  We will continue Jardiance and follow blood sugars and make further adjustments as needed.    CAD: Awaiting for CABG.    Hyperlipidemia: Currently on rosuvastatin.    Serg Quesada MD. FACE

## 2022-12-23 NOTE — PLAN OF CARE
"Goal Outcome Evaluation:                Assessment: Sherman Baumann presents with ADL impairments below baseline abilities which indicate the need for continued skilled intervention while inpatient. Pt on .5 L of O2.  Tolerating session today without incident. Will continue to follow and progress as tolerated.     Plan/Recommendations:   Moderate Intensity Therapy recommended post-acute care. This is recommended as therapy feels the patient would require 3-4 days per week and wouldn't tolerate \"3 hour daily\" rehab intensity. SNF would be the preferred choice. If the patient does not agree to SNF, arrange HH or OP depending on home bound status. If patient is medically complex, consider LTACH.. Pt requires no DME at discharge.   "

## 2022-12-23 NOTE — THERAPY TREATMENT NOTE
"Subjective: Pt agreeable to therapeutic plan of care.    Objective:     Bed mobility - N/A or Not attempted. pt already sitting at edge of the bed  Transfers - CGA and with rolling walker  Ambulation - 10, 22 feet CGA and with rolling walker short step length, poor foot clearance, slow pace    Vitals: WNL    Pain: 0 VAS       Education: Provided education on the importance of mobility in the acute care setting, Transfer Training and Gait Training    Assessment: Sherman Baumann presents with functional mobility impairments which indicate the need for skilled intervention. Tolerating session today without incident. Pt very cooperative to cont to improve his mobility.  Possible CABG .  Will continue to follow and progress as tolerated.     Plan/Recommendations:   Moderate Intensity Therapy recommended post-acute care. This is recommended as therapy feels the patient would require 3-4 days per week and wouldn't tolerate \"3 hour daily\" rehab intensity. SNF would be the preferred choice.  Pt requires no DME at discharge.     Pt desires Skilled Rehab placement at discharge. Pt cooperative; agreeable to therapeutic recommendations and plan of care.     Basic Mobility 6-click:  Rollin = Total, A lot = 2, A little = 3; 4 = None  Supine>Sit:   1 = Total, A lot = 2, A little = 3; 4 = None   Sit>Stand with arms:  1 = Total, A lot = 2, A little = 3; 4 = None  Bed>Chair:   1 = Total, A lot = 2, A little = 3; 4 = None  Ambulate in room:  1 = Total, A lot = 2, A little = 3; 4 = None  3-5 Steps with railin = Total, A lot = 2, A little = 3; 4 = None  Score: 17    Post-Tx Position: Up in Chair, Alarms activated and Call light and personal items within reach   PPE: gloves, surgical mask, gown and eye protection      "

## 2022-12-23 NOTE — PLAN OF CARE
"Goal Outcome Evaluation:     Bed mobility - N/A or Not attempted. pt already sitting at edge of the bed  Transfers - CGA and with rolling walker  Ambulation - 10, 22 feet CGA and with rolling walker short step length, poor foot clearance, slow pace    Moderate Intensity Therapy recommended post-acute care. This is recommended as therapy feels the patient would require 3-4 days per week and wouldn't tolerate \"3 hour daily\" rehab intensity. SNF would be the preferred choice.  Pt requires no DME at discharge.     Pt desires Skilled Rehab placement at discharge. Pt cooperative; agreeable to therapeutic recommendations and plan of care.             "

## 2022-12-23 NOTE — CASE MANAGEMENT/SOCIAL WORK
Continued Stay Note  [facility]     Patient Name: Sherman Baumann  MRN: [id number]  Today's Date: 12/23/2022    Admit Date: 12/17/2022    Plan: D/C Plan: Anticipate routine home pending clinical course post-CABG (12/29). [facility] referral pending acceptance, order form needed.   Discharge Plan     Row Name 12/23/22 1121       Plan    Plan D/C Plan: Anticipate routine home pending clinical course post-CABG (12/29). [facility] referral pending acceptance, order form needed.    Plan Comments Barrier to D/C: heparin gtt, CABG scheduled 12/29.                    Expected Discharge Date and Time     Expected Discharge Date Expected Discharge Time    Jan 4, 2023         Phone communication or documentation only - no physical contact with patient or family.    DAJA LiN, RN    [address] [address]    Office: [phone]  Fax: [phone]

## 2022-12-23 NOTE — PROGRESS NOTES
[facility] Medicine Services   Daily Progress Note    Patient Name: Sherman Baumann  : 1945  MRN: [id number]  Primary Care Physician:  Melvina Hodge  Date of admission: 2022  Date and Time of Service:       Subjective      Patient denies any chest pain shortness of breath  No lightheadedness today.  Denies any worsening lower extremity edema or orthopnea  No cough. Non productive   No chills or sweats  No nausea vomiting diarrhea or constipation.      Objective      Vitals:   Temp:  [97.4 °F (36.3 °C)-98.4 °F (36.9 °C)] 97.4 °F (36.3 °C)  Heart Rate:  [70-87] 76  Resp:  [9-20] 9  BP: (102-126)/(49-64) 114/54  Flow (L/min):  [0.5-2] 0.5    Physical Exam   General: No acute distress  HEENT: neck supple, normal oral mucosa, no masses, no lymphadenopathy  Lungs: Clear bilaterally, no wheezing, No crackles, No Rhonchi. Equal excursions.   CV - Normal S1/S2, no murmur, Regular rate and rhythm   Abdomin - Soft, non-tender, non-distended, normal bowel sounds  Extremities - no edema, no erythema  Neuro - No focal weakness, normal sensation  Psych - Alert and oriented x3  Skin - no wounds or lesions.        Result Review    Result Review:  I have personally reviewed the results from the time of this admission to 2022 12:24 EST and agree with these findings:  [x]  Laboratory  [x]  Microbiology  [x]  Radiology  [x]  EKG/Telemetry   [x]  Cardiology/Vascular   []  Pathology  [x]  Old records  []  Other:  Most notable findings include:           Assessment & Plan      Brief Patient Summary:  Sherman Baumann is a 76 y.o. male who       aspirin, 81 mg, Oral, Daily  budesonide, 0.5 mg, Nebulization, BID - RT  carvedilol, 6.25 mg, Oral, BID With Meals  docusate sodium, 100 mg, Oral, BID  empagliflozin, 10 mg, Oral, Daily  insulin lispro, 2-12 Units, Subcutaneous, TID With Meals  insulin NPH-insulin regular, 34 Units, Subcutaneous, TID AC  polyethylene glycol, 17 g, Oral, Daily  rosuvastatin, 5  mg, Oral, Nightly  sodium chloride, 10 mL, Intravenous, Q12H       heparin, 9.71 Units/kg/hr, Last Rate: 20 Units/kg/hr (12/23/22 3282)         Active Hospital Problems:  Active Hospital Problems    Diagnosis    • **CAD (coronary artery disease), native coronary artery    • Influenza due to seasonal influenza virus    • Type 2 diabetes mellitus with hyperglycemia, without long-term current use of insulin (HCC)    • Mixed hyperlipidemia    • Primary hypertension    • Systolic and diastolic CHF, acute on chronic (HCC)      Plan:     Multivessel CAD/NSTEMI/ICMP  - Acute Systolic heart failure EF 20-25%  - Continue Hepatin gtt  - ASA, BB, Statin.   Good urine output   - Plan for CABG anticipated 12/29/22  - Repeat Echo with improved EF 40-45%    MENDEZ  - will d/c Lasix and Lisinopril and potassium  - monitor kidney function and re-asses tomorrow  - Strict I/O    Acute respiratory failure with hypoxia  - Influenza A  - Continue Tamiflu  - Pulm on consult. Wean off oxygen as tolerated  - Continue Nebs.     DM-2 with hyperglycemia  - managed by Endocrinology   - BS better controlled     DL - Statin    Discussed with Cardiology       DVT prophylaxis:  Medical DVT prophylaxis orders are present.    CODE STATUS:    Level Of Support Discussed With: Patient  Code Status (Patient has no pulse and is not breathing): CPR (Attempt to Resuscitate)  Medical Interventions (Patient has pulse or is breathing): Full Support      Disposition:  I expect patient to be discharged .    This patient has been  and discussed with . 12/23/22      Electronically signed by Artemio Sheets MD, 12/23/22, 12:24 EST.  [facility] Hospitalist Team

## 2022-12-23 NOTE — CONSULTS
visited with patient at bedside wearing PPE.    Patient reports that he is going to have surgery next week and is current doing well. 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    Patient shared about times in his life how he believes God was taking care of him. Patient believes the same way with his hospitalization and surgery next week.     Patient reports experiencing care by the  visiting with him.    Will continue to follow.     Quinn Carrillo

## 2022-12-23 NOTE — THERAPY TREATMENT NOTE
"Subjective: Pt agreeable to therapeutic plan of care.  Cognition: arousal/alertness: Alert and Attentive    Objective:     Bed Mobility: Supervision   Functional Transfers: CGA and with rolling walker  Functional Ambulation: CGA and with rolling walker    Lower Body Dressing: Supervision  ADL Position: edge of bed sitting  ADL Comments: Donning socks     Vitals: WNL    Pain: 0 VAS  Location: NA  Interventions for pain: N/A  Education: Provided education on the importance of mobility in the acute care setting    Assessment: Sherman Baumann presents with ADL impairments below baseline abilities which indicate the need for continued skilled intervention while inpatient. Pt on .5 L of O2.  Tolerating session today without incident. Will continue to follow and progress as tolerated.     Plan/Recommendations:   Moderate Intensity Therapy recommended post-acute care. This is recommended as therapy feels the patient would require 3-4 days per week and wouldn't tolerate \"3 hour daily\" rehab intensity. SNF would be the preferred choice. If the patient does not agree to SNF, arrange HH or OP depending on home bound status. If patient is medically complex, consider LTACH.. Pt requires no DME at discharge.     Pt desires Skilled Rehab placement at discharge. Pt cooperative; agreeable to therapeutic recommendations and plan of care.     Modified [place]: N/A = No pre-op stroke/TIA    Post-Tx Position: Up in Chair, Alarms activated and Call light and personal items within reach  PPE: gloves and surgical mask    "

## 2022-12-23 NOTE — PROGRESS NOTES
" LOS: 6 days   Patient Care Team:  Melvina Hodge PCP - General (Nurse Practitioner)    Chief Complaint: CAD    Subjective      Vital Signs  Temp:  [97.4 °F (36.3 °C)-98.3 °F (36.8 °C)] 97.4 °F (36.3 °C)  Heart Rate:  [70-81] 76  Resp:  [9-20] 9  BP: (102-114)/(49-64) 114/54  Body mass index is 32.23 kg/m².    Intake/Output Summary (Last 24 hours) at 12/23/2022 1531  Last data filed at 12/22/2022 2312  Gross per 24 hour   Intake 240 ml   Output 1225 ml   Net -985 ml     No intake/output data recorded.          12/21/22  1400 12/22/22  0509 12/23/22  0500   Weight: 101 kg (222 lb) 101 kg (222 lb 14.2 oz) 102 kg (224 lb 10.4 oz)         Objective:  General Appearance:  Comfortable.    Vital signs: (most recent): Blood pressure 114/54, pulse 76, temperature 97.4 °F (36.3 °C), temperature source Oral, resp. rate 9, height 177.8 cm (70\"), weight 102 kg (224 lb 10.4 oz), SpO2 100 %.    Lungs:  Normal effort and normal respiratory rate.  (Room air)  Heart: Normal rate.  Regular rhythm.    Skin:  Warm and dry.              Results Review:        WBC WBC   Date Value Ref Range Status   12/23/2022 9.90 3.40 - 10.80 10*3/mm3 Final   12/20/2022 7.70 3.40 - 10.80 10*3/mm3 Final      HGB Hemoglobin   Date Value Ref Range Status   12/23/2022 13.8 13.0 - 17.7 g/dL Final   12/20/2022 14.3 13.0 - 17.7 g/dL Final      HCT Hematocrit   Date Value Ref Range Status   12/23/2022 42.0 37.5 - 51.0 % Final   12/20/2022 44.3 37.5 - 51.0 % Final      Platelets Platelets   Date Value Ref Range Status   12/23/2022 346 140 - 450 10*3/mm3 Final   12/20/2022 336 140 - 450 10*3/mm3 Final        PT/INR:  No results found for: PROTIME/No results found for: INR    Sodium Sodium   Date Value Ref Range Status   12/23/2022 135 (L) 136 - 145 mmol/L Final   12/22/2022 135 (L) 136 - 145 mmol/L Final   12/20/2022 135 (L) 136 - 145 mmol/L Final      Potassium Potassium   Date Value Ref Range Status   12/23/2022 5.0 3.5 - 5.2 mmol/L Final     Comment:     " Slight hemolysis detected by analyzer. Results may be affected.   12/22/2022 4.3 3.5 - 5.2 mmol/L Final   12/20/2022 4.4 3.5 - 5.2 mmol/L Final     Comment:     Slight hemolysis detected by analyzer. Results may be affected.      Chloride Chloride   Date Value Ref Range Status   12/23/2022 94 (L) 98 - 107 mmol/L Final   12/22/2022 92 (L) 98 - 107 mmol/L Final   12/20/2022 92 (L) 98 - 107 mmol/L Final      Bicarbonate CO2   Date Value Ref Range Status   12/23/2022 28.0 22.0 - 29.0 mmol/L Final   12/22/2022 28.0 22.0 - 29.0 mmol/L Final   12/20/2022 29.0 22.0 - 29.0 mmol/L Final      BUN BUN   Date Value Ref Range Status   12/23/2022 42 (H) 8 - 23 mg/dL Final   12/22/2022 31 (H) 8 - 23 mg/dL Final   12/20/2022 28 (H) 8 - 23 mg/dL Final      Creatinine Creatinine   Date Value Ref Range Status   12/23/2022 2.12 (H) 0.76 - 1.27 mg/dL Final   12/22/2022 1.26 0.76 - 1.27 mg/dL Final   12/20/2022 1.21 0.76 - 1.27 mg/dL Final      Calcium Calcium   Date Value Ref Range Status   12/23/2022 9.7 8.6 - 10.5 mg/dL Final   12/22/2022 9.8 8.6 - 10.5 mg/dL Final   12/20/2022 9.9 8.6 - 10.5 mg/dL Final      Magnesium Magnesium   Date Value Ref Range Status   12/23/2022 2.1 1.6 - 2.4 mg/dL Final   12/20/2022 1.8 1.6 - 2.4 mg/dL Final      Troponin No results found for: TROPONIN   BNP No results found for: BNP         aspirin, 81 mg, Oral, Daily  budesonide, 0.5 mg, Nebulization, BID - RT  carvedilol, 6.25 mg, Oral, BID With Meals  docusate sodium, 100 mg, Oral, BID  empagliflozin, 10 mg, Oral, Daily  insulin glargine, 42 Units, Subcutaneous, Nightly  insulin lispro, 14 Units, Subcutaneous, TID With Meals  insulin lispro, 2-12 Units, Subcutaneous, TID With Meals  polyethylene glycol, 17 g, Oral, Daily  rosuvastatin, 5 mg, Oral, Nightly  sodium chloride, 10 mL, Intravenous, Q12H      heparin, 9.71 Units/kg/hr, Last Rate: 20 Units/kg/hr (12/23/22 0335)        PRN Meds:.•  acetaminophen  •  ALPRAZolam  •  dextrose  •  dextrose  •   glucagon (human recombinant)  •  ipratropium-albuterol  •  melatonin  •  nitroglycerin  •  ondansetron  •  potassium chloride **OR** potassium chloride **OR** potassium chloride  •  sodium chloride  •  sodium chloride    Patient Active Problem List   Diagnosis Code   • CAD (coronary artery disease), native coronary artery I25.10   • Type 2 diabetes mellitus with hyperglycemia, without long-term current use of insulin (HCC) E11.65   • Mixed hyperlipidemia E78.2   • Primary hypertension I10   • Systolic and diastolic CHF, acute on chronic (HCC) I50.43   • Influenza due to seasonal influenza virus J10.1       Assessment & Plan    - MV CAD with NSTEMI presentation at [place], EF 20-25% (echo)--transferred by our service for further care and evaluation, will start plavix prior to discharge  - ICM, acute HFrEF, NYHA class II-III--BNP 5580 at OSH, repeat echo 12/21 EF 40-45%---GDMT prior to discharge  - Influenza B diagnosed 12/15--ID signed off, Tamiflu x 5 days completed  - DM type 2 with hyperglycemia, a1c 8.3---- endocrine managing, on Jardiance   - HTN--stable  - HL---statin  - Past smoker  - [employer], hearing aids in place  - Obesity  - COPD---pulmonary following     Preop studies:  Carotids: mild stenosis bilaterally  Vein mapping: adequate bilaterally  12/18 MRSA negative  12/17 cxr: no active disease  12/19 CT: emphysema, no significant aortic calcifications  12/17 ua:  ok    EF continued at 40-45%, repeat echo on Tues.  Tentatively scheduled for CABG with IABP on 12/29, hold heparin gtt on call to OR. Will repeat COVID prior to OR, continue PT/OT.  Move to [facility] when bed available.    Chyna Christian, APRN  12/23/22  15:31 EST

## 2022-12-23 NOTE — PROGRESS NOTES
Referring Provider: Jaleel Huber MD    Reason for follow-up: Multivessel coronary artery disease, non-ST elevation MI, diabetes     Patient Care Team:  Melvina Hodge as PCP - General (Nurse Practitioner)      SUBJECTIVE  Sleeping comfortably in bed, denies any chest pain or shortness of breath.  Heart rate is better     ROS  Review of all systems negative except as indicated.    Since I have last seen, the patient has been without any chest discomfort, shortness of breath, palpitations, dizziness or syncope.  Denies having any headache, abdominal pain, nausea, vomiting, diarrhea, constipation, loss of weight or loss of appetite.  Denies having any excessive bruising, hematuria or blood in the stool.  ROS      Personal History:    Past Medical History:   Diagnosis Date   • CHF (congestive heart failure) (HCC)    • Diabetes mellitus (HCC)    • Elevated cholesterol    • Hyperlipidemia    • Hypertension        Past Surgical History:   Procedure Laterality Date   • HERNIA REPAIR     • TONSILLECTOMY         No family history on file.    Social History     Tobacco Use   • Smoking status: Former     Types: Cigarettes   Vaping Use   • Vaping Use: Never used   Substance Use Topics   • Alcohol use: Not Currently   • Drug use: Never        Medications Prior to Admission   Medication Sig Dispense Refill Last Dose   • aspirin 81 MG chewable tablet Chew 81 mg Daily.      • aspirin-acetaminophen-caffeine (EXCEDRIN MIGRAINE) 250-250-65 MG per tablet Take 1 tablet by mouth Every 6 (Six) Hours As Needed for Headache.      • carvedilol (COREG) 3.125 MG tablet Take 3.125 mg by mouth 2 (Two) Times a Day With Meals.      • furosemide (LASIX) 40 MG tablet Take 40 mg by mouth 2 (Two) Times a Day.      • glipizide (GLUCOTROL) 10 MG tablet Take 10 mg by mouth 2 (Two) Times a Day Before Meals.      • insulin NPH-insulin regular (humuLIN 70/30,novoLIN 70/30) (70-30) 100 UNIT/ML injection Inject 27 Units under the skin into the  "appropriate area as directed Every Morning.      • insulin NPH-insulin regular (humuLIN 70/30,novoLIN 70/30) (70-30) 100 UNIT/ML injection Inject 22 Units under the skin into the appropriate area as directed Every Night.      • metFORMIN (GLUCOPHAGE) 500 MG tablet Take 500 mg by mouth Daily With Breakfast.      • Omega-3 Fatty Acids (fish oil) 1000 MG capsule capsule Take 1,000 mg by mouth Daily With Breakfast.      • oseltamivir (TAMIFLU) 75 MG capsule Take 75 mg by mouth 2 (Two) Times a Day.      • rosuvastatin (CRESTOR) 5 MG tablet Take 5 mg by mouth Every Night.          Allergies:  Patient has no known allergies.    Scheduled Meds:aspirin, 81 mg, Oral, Daily  budesonide, 0.5 mg, Nebulization, BID - RT  carvedilol, 6.25 mg, Oral, BID With Meals  docusate sodium, 100 mg, Oral, BID  empagliflozin, 10 mg, Oral, Daily  insulin glargine, 42 Units, Subcutaneous, Nightly  insulin lispro, 14 Units, Subcutaneous, TID With Meals  insulin lispro, 2-12 Units, Subcutaneous, TID With Meals  polyethylene glycol, 17 g, Oral, Daily  rosuvastatin, 5 mg, Oral, Nightly  sodium chloride, 10 mL, Intravenous, Q12H      Continuous Infusions:heparin, 9.71 Units/kg/hr, Last Rate: 20 Units/kg/hr (12/23/22 0335)      PRN Meds:.•  acetaminophen  •  ALPRAZolam  •  dextrose  •  dextrose  •  glucagon (human recombinant)  •  ipratropium-albuterol  •  melatonin  •  nitroglycerin  •  ondansetron  •  potassium chloride **OR** potassium chloride **OR** potassium chloride  •  sodium chloride  •  sodium chloride      OBJECTIVE    Vital Signs  Vitals:    12/23/22 0515 12/23/22 0815 12/23/22 0825 12/23/22 0828   BP: 102/51 114/54     BP Location: Right arm      Patient Position: Lying      Pulse: 70 76 75 76   Resp: 15  10 9   Temp: 97.4 °F (36.3 °C)      TempSrc: Oral      SpO2: 95%  96% 100%   Weight:       Height:           Flowsheet Rows    Flowsheet Row First Filed Value   Admission Height 177.8 cm (70\") Documented at 12/18/2022 1305   Admission " Weight 103 kg (227 lb 1.2 oz) Documented at 12/17/2022 0500            Intake/Output Summary (Last 24 hours) at 12/23/2022 1440  Last data filed at 12/22/2022 2312  Gross per 24 hour   Intake 240 ml   Output 1225 ml   Net -985 ml          Telemetry: Sinus rhythm    Physical Exam:  The patient is alert, oriented and in no distress.  Vital signs as noted above.  Head and neck revealed no carotid bruits or jugular venous distention.  No thyromegaly or lymphadenopathy is present  Lungs clear.  No wheezing.  Breath sounds are normal bilaterally.  Heart normal first and second heart sounds.  No murmur. No precordial rub is present.  No gallop is present.  Abdomen soft and nontender.  No organomegaly is present.  Extremities with good peripheral pulses without any pedal edema.  Skin warm and dry.  Musculoskeletal system is grossly normal.  CNS grossly normal.       Results Review:  I have personally reviewed the results from the time of this admission to 12/23/2022 14:40 EST and agree with these findings:  []  Laboratory  []  Microbiology  []  Radiology  []  EKG/Telemetry   []  Cardiology/Vascular   []  Pathology  []  Old records  []  Other:    Most notable findings include:    Lab Results (last 24 hours)     Procedure Component Value Units Date/Time    POC Glucose Once [[id number]]  (Abnormal) Collected: 12/23/22 1122    Specimen: Blood Updated: 12/23/22 1123     Glucose 372 mg/dL      Comment: Serial Number: [id number]:  [id number]       aPTT [[id number]]  (Normal) Collected: 12/23/22 0932    Specimen: Blood from Arm, Left Updated: 12/23/22 1002     PTT 75.0 seconds     POC Glucose Once [[id number]]  (Abnormal) Collected: 12/23/22 0706    Specimen: Blood Updated: 12/23/22 0708     Glucose 144 mg/dL      Comment: Serial Number: [id number]:  [id number]       Basic Metabolic Panel [[id number]]  (Abnormal) Collected: 12/23/22 0250    Specimen: Blood Updated: 12/23/22 0313     Glucose 95 mg/dL      BUN 42 mg/dL       Creatinine 2.12 mg/dL      Sodium 135 mmol/L      Potassium 5.0 mmol/L      Comment: Slight hemolysis detected by analyzer. Results may be affected.        Chloride 94 mmol/L      CO2 28.0 mmol/L      Calcium 9.7 mg/dL      BUN/Creatinine Ratio 19.8     Anion Gap 13.0 mmol/L      eGFR 31.9 mL/min/1.73      Comment: National Kidney Foundation and American Society of Nephrology (ASN) Task Force recommended calculation based on the Chronic Kidney Disease Epidemiology Collaboration (CKD-EPI) equation refit without adjustment for race.       Narrative:      GFR Normal >60  Chronic Kidney Disease <60  Kidney Failure <15    The GFR formula is only valid for adults with stable renal function between ages 18 and 70.    Magnesium [[id number]]  (Normal) Collected: 12/23/22 0250    Specimen: Blood Updated: 12/23/22 0313     Magnesium 2.1 mg/dL     aPTT [[id number]]  (Abnormal) Collected: 12/23/22 0250    Specimen: Blood from Arm, Left Updated: 12/23/22 0310     PTT 41.3 seconds     CBC & Differential [[id number]]  (Abnormal) Collected: 12/23/22 0250    Specimen: Blood Updated: 12/23/22 0258    Narrative:      The following orders were created for panel order CBC & Differential.  Procedure                               Abnormality         Status                     ---------                               -----------         ------                     CBC Auto Differential[[id number]]        Abnormal            Final result                 Please view results for these tests on the individual orders.    CBC Auto Differential [[id number]]  (Abnormal) Collected: 12/23/22 0250    Specimen: Blood Updated: 12/23/22 0258     WBC 9.90 10*3/mm3      RBC 4.71 10*6/mm3      Hemoglobin 13.8 g/dL      Hematocrit 42.0 %      MCV 89.3 fL      MCH 29.2 pg      MCHC 32.7 g/dL      RDW 14.6 %      RDW-SD 47.7 fl      MPV 8.6 fL      Platelets 346 10*3/mm3      Neutrophil % 58.9 %      Lymphocyte % 26.0 %      Monocyte % 10.9 %      Eosinophil % 3.2 %       Basophil % 1.0 %      Neutrophils, Absolute 5.80 10*3/mm3      Lymphocytes, Absolute 2.60 10*3/mm3      Monocytes, Absolute 1.10 10*3/mm3      Eosinophils, Absolute 0.30 10*3/mm3      Basophils, Absolute 0.10 10*3/mm3      nRBC 0.0 /100 WBC     aPTT [[id number]]  (Abnormal) Collected: 12/22/22 2023    Specimen: Blood from Arm, Left Updated: 12/22/22 2049     PTT 60.1 seconds     POC Glucose Once [[id number]]  (Abnormal) Collected: 12/22/22 1658    Specimen: Blood Updated: 12/22/22 1700     Glucose 261 mg/dL      Comment: Serial Number: [id number]:  [id number]             Imaging Results (Last 24 Hours)     ** No results found for the last 24 hours. **          LAB RESULTS (LAST 7 DAYS)    CBC  Results from last 7 days   Lab Units 12/23/22  0250 12/20/22  2313 12/19/22  0300 12/18/22  1235 12/17/22  0945   WBC 10*3/mm3 9.90 7.70 8.40 9.40 10.70   RBC 10*6/mm3 4.71 4.90 4.58 5.05 4.90   HEMOGLOBIN g/dL 13.8 14.3 13.5 14.8 14.5   HEMATOCRIT % 42.0 44.3 40.7 45.6 45.0   MCV fL 89.3 90.5 88.7 90.4 91.8   PLATELETS 10*3/mm3 346 336 267 267 337       BMP  Results from last 7 days   Lab Units 12/23/22  0250 12/22/22  0429 12/20/22  2313 12/20/22  1103 12/19/22  0300 12/17/22  2256 12/17/22  0945   SODIUM mmol/L 135* 135* 135* 130* 136 135* 134*   POTASSIUM mmol/L 5.0 4.3 4.4 4.7 4.4 4.3 4.2   CHLORIDE mmol/L 94* 92* 92* 91* 94* 94* 94*   CO2 mmol/L 28.0 28.0 29.0 28.0 29.0 29.0 26.0   BUN mg/dL 42* 31* 28* 28* 29* 37* 32*   CREATININE mg/dL 2.12* 1.26 1.21 1.10 1.06 1.31* 1.16   GLUCOSE mg/dL 95 107* 135* 374* 225* 215* 368*   MAGNESIUM mg/dL 2.1  --  1.8  --   --   --   --    PHOSPHORUS mg/dL  --   --  4.5  --   --   --   --        CMP   Results from last 7 days   Lab Units 12/23/22  0250 12/22/22  0429 12/20/22  2313 12/20/22  1103 12/19/22  0300 12/17/22  2256 12/17/22  0945   SODIUM mmol/L 135* 135* 135* 130* 136 135* 134*   POTASSIUM mmol/L 5.0 4.3 4.4 4.7 4.4 4.3 4.2   CHLORIDE mmol/L 94* 92* 92* 91* 94* 94*  94*   CO2 mmol/L 28.0 28.0 29.0 28.0 29.0 29.0 26.0   BUN mg/dL 42* 31* 28* 28* 29* 37* 32*   CREATININE mg/dL 2.12* 1.26 1.21 1.10 1.06 1.31* 1.16   GLUCOSE mg/dL 95 107* 135* 374* 225* 215* 368*   ALBUMIN g/dL  --   --   --   --   --   --  3.80   BILIRUBIN mg/dL  --   --   --   --   --   --  0.9   ALK PHOS U/L  --   --   --   --   --   --  90   AST (SGOT) U/L  --   --   --   --   --   --  16   ALT (SGPT) U/L  --   --   --   --   --   --  9       BNP        TROPONIN        CoAg  Results from last 7 days   Lab Units 12/23/22  0932 12/23/22  0250 12/22/22  2023 12/22/22  1236 12/22/22  0429 12/21/22  2133 12/21/22  1428 12/17/22  1451 12/17/22  0708   INR   --   --   --   --   --   --   --   --  1.06   APTT seconds 75.0 41.3* 60.1* 57.2* 76.0 76.9* 77.2*   < > 22.8*    < > = values in this interval not displayed.       Creatinine Clearance  Estimated Creatinine Clearance: 36 mL/min (A) (by C-G formula based on SCr of 2.12 mg/dL (H)).    ABG        Radiology  No radiology results for the last day      EKG  I personally viewed and interpreted the patient's EKG/Telemetry data:  ECG 12 Lead Chest Pain   Final Result   HEART RATE= 89  bpm   RR Interval= 672  ms   MI Interval= 176  ms   P Horizontal Axis= -5  deg   P Front Axis= 29  deg   QRSD Interval= 92  ms   QT Interval= 362  ms   QRS Axis= 16  deg   T Wave Axis= 163  deg   - ABNORMAL ECG -   Sinus rhythm   Probable left atrial enlargement   Probable anterior infarct, age indeterminate   Abnormal T, consider ischemia, lateral leads   No previous ECG available for comparison   Electronically Signed By: Alex Byrd (BERNY) 19-Dec-2022 08:52:18   Date and Time of Study: 2022-12-17 07:21:47      SCANNED - TELEMETRY     Final Result      SCANNED - TELEMETRY     Final Result      SCANNED - TELEMETRY     Final Result      SCANNED - TELEMETRY     Final Result      SCANNED - TELEMETRY     Final Result      SCANNED - TELEMETRY     Final Result      SCANNED - TELEMETRY      Final Result      SCANNED - TELEMETRY     Final Result      SCANNED - TELEMETRY     Final Result      SCANNED - TELEMETRY     Final Result      SCANNED - TELEMETRY     Final Result      SCANNED - TELEMETRY     Final Result      SCANNED - TELEMETRY     Final Result      SCANNED - TELEMETRY     Final Result      SCANNED - TELEMETRY     Final Result      SCANNED - TELEMETRY     Final Result      SCANNED - TELEMETRY     Final Result      SCANNED - TELEMETRY     Final Result      SCANNED - TELEMETRY     Final Result      SCANNED - TELEMETRY     Final Result      SCANNED - TELEMETRY     Final Result      SCANNED - TELEMETRY     Final Result      SCANNED - TELEMETRY     Final Result      SCANNED - TELEMETRY     Final Result      SCANNED - TELEMETRY     Final Result      SCANNED - TELEMETRY     Final Result      SCANNED - TELEMETRY     Final Result      SCANNED - TELEMETRY     Final Result      SCANNED - TELEMETRY     Final Result      SCANNED - TELEMETRY     Final Result      SCANNED - TELEMETRY     Final Result      SCANNED - TELEMETRY     Final Result      SCANNED - TELEMETRY     Final Result      SCANNED - TELEMETRY     Final Result      SCANNED - TELEMETRY     Final Result      SCANNED - TELEMETRY     Final Result      SCANNED - TELEMETRY     Final Result      SCANNED - TELEMETRY     Final Result            Echocardiogram:    Results for orders placed during the hospital encounter of 12/17/22    Adult Transthoracic Echo Complete W/ Cont if Necessary Per Protocol    Interpretation Summary  •  Calculated left ventricular EF = 40% Left ventricular ejection fraction appears to be 40 - 45%.  •  The left ventricular cavity is dilated.  •  Left ventricular diastolic function is consistent with (grade I) impaired relaxation.  •  Mild aortic valve stenosis is present.  •  Estimated right ventricular systolic pressure from tricuspid regurgitation is normal (<35 mmHg).        Stress Test:         Cardiac Catheterization:  No  results found for this or any previous visit.         Other:         ASSESSMENT & PLAN:    Principal Problem:    CAD (coronary artery disease), native coronary artery  Active Problems:    Type 2 diabetes mellitus with hyperglycemia, without long-term current use of insulin (HCC)    Mixed hyperlipidemia    Primary hypertension    Systolic and diastolic CHF, acute on chronic (HCC)    Influenza due to seasonal influenza virus    76-year-old man with multiple and complex cardiovascular risk factors including hypertension, hyperlipidemia, diabetes presented with non-ST elevation MI and acute kidney injury.  He is also been diagnosed with influenza.  Troponin peaked at 4.5 and then trended down.  His renal function is now gotten worse.  We will discontinue lisinopril and Lasix.  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  Initial echocardiogram with EF of 20%, repeat echocardiogram with EF of 40% with contrast.  He remains on heparin drip, aspirin, high intensity statin and beta-blocker.  We have also added Jardiance.  Continue to uptitrate beta-blocker as needed.  Plan is for CABG on 29th after he completely recovers from influenza and his renal function improves.  Will reintroduce ACE inhibitor once his renal function improves.  He is euvolemic, on the dry side.      Johann Morales MD  12/23/22  14:40 EST

## 2022-12-24 LAB
ANION GAP SERPL CALCULATED.3IONS-SCNC: 14 MMOL/L (ref 5–15)
APTT PPP: 101.9 SECONDS (ref 61–76.5)
APTT PPP: 81.7 SECONDS (ref 61–76.5)
APTT PPP: 87.6 SECONDS (ref 61–76.5)
BUN SERPL-MCNC: 52 MG/DL (ref 8–23)
BUN/CREAT SERPL: 27.7 (ref 7–25)
CALCIUM SPEC-SCNC: 9.2 MG/DL (ref 8.6–10.5)
CHLORIDE SERPL-SCNC: 93 MMOL/L (ref 98–107)
CO2 SERPL-SCNC: 24 MMOL/L (ref 22–29)
CREAT SERPL-MCNC: 1.88 MG/DL (ref 0.76–1.27)
EGFRCR SERPLBLD CKD-EPI 2021: 36.8 ML/MIN/1.73
GLUCOSE BLDC GLUCOMTR-MCNC: 151 MG/DL (ref 70–105)
GLUCOSE BLDC GLUCOMTR-MCNC: 164 MG/DL (ref 70–105)
GLUCOSE BLDC GLUCOMTR-MCNC: 175 MG/DL (ref 70–105)
GLUCOSE BLDC GLUCOMTR-MCNC: 219 MG/DL (ref 70–105)
GLUCOSE SERPL-MCNC: 146 MG/DL (ref 65–99)
POTASSIUM SERPL-SCNC: 4.4 MMOL/L (ref 3.5–5.2)
SODIUM SERPL-SCNC: 131 MMOL/L (ref 136–145)

## 2022-12-24 PROCEDURE — 94799 UNLISTED PULMONARY SVC/PX: CPT

## 2022-12-24 PROCEDURE — 94664 DEMO&/EVAL PT USE INHALER: CPT

## 2022-12-24 PROCEDURE — 99232 SBSQ HOSP IP/OBS MODERATE 35: CPT | Performed by: NURSE PRACTITIONER

## 2022-12-24 PROCEDURE — 63710000001 INSULIN LISPRO (HUMAN) PER 5 UNITS: Performed by: INTERNAL MEDICINE

## 2022-12-24 PROCEDURE — 63710000001 INSULIN GLARGINE PER 5 UNITS: Performed by: INTERNAL MEDICINE

## 2022-12-24 PROCEDURE — 85730 THROMBOPLASTIN TIME PARTIAL: CPT | Performed by: NURSE PRACTITIONER

## 2022-12-24 PROCEDURE — 25010000002 HEPARIN (PORCINE) 25000-0.45 UT/250ML-% SOLUTION: Performed by: THORACIC SURGERY (CARDIOTHORACIC VASCULAR SURGERY)

## 2022-12-24 PROCEDURE — 94761 N-INVAS EAR/PLS OXIMETRY MLT: CPT

## 2022-12-24 PROCEDURE — 85730 THROMBOPLASTIN TIME PARTIAL: CPT | Performed by: THORACIC SURGERY (CARDIOTHORACIC VASCULAR SURGERY)

## 2022-12-24 PROCEDURE — 99232 SBSQ HOSP IP/OBS MODERATE 35: CPT | Performed by: INTERNAL MEDICINE

## 2022-12-24 PROCEDURE — 82962 GLUCOSE BLOOD TEST: CPT

## 2022-12-24 PROCEDURE — 80048 BASIC METABOLIC PNL TOTAL CA: CPT | Performed by: NURSE PRACTITIONER

## 2022-12-24 RX ORDER — BUDESONIDE 0.5 MG/2ML
0.5 INHALANT ORAL
Status: DISCONTINUED | OUTPATIENT
Start: 2022-12-24 | End: 2022-12-29

## 2022-12-24 RX ORDER — INSULIN LISPRO 100 [IU]/ML
15 INJECTION, SOLUTION INTRAVENOUS; SUBCUTANEOUS
Status: DISCONTINUED | OUTPATIENT
Start: 2022-12-24 | End: 2022-12-27

## 2022-12-24 RX ADMIN — CARVEDILOL 6.25 MG: 6.25 TABLET, FILM COATED ORAL at 10:34

## 2022-12-24 RX ADMIN — INSULIN LISPRO 4 UNITS: 100 INJECTION, SOLUTION INTRAVENOUS; SUBCUTANEOUS at 13:30

## 2022-12-24 RX ADMIN — HEPARIN SODIUM 22 UNITS/KG/HR: 10000 INJECTION, SOLUTION INTRAVENOUS at 06:11

## 2022-12-24 RX ADMIN — INSULIN GLARGINE 42 UNITS: 100 INJECTION, SOLUTION SUBCUTANEOUS at 20:08

## 2022-12-24 RX ADMIN — INSULIN LISPRO 14 UNITS: 100 INJECTION, SOLUTION INTRAVENOUS; SUBCUTANEOUS at 13:30

## 2022-12-24 RX ADMIN — CARVEDILOL 6.25 MG: 6.25 TABLET, FILM COATED ORAL at 17:55

## 2022-12-24 RX ADMIN — Medication 10 ML: at 20:10

## 2022-12-24 RX ADMIN — INSULIN LISPRO 2 UNITS: 100 INJECTION, SOLUTION INTRAVENOUS; SUBCUTANEOUS at 10:34

## 2022-12-24 RX ADMIN — HEPARIN SODIUM 20 UNITS/KG/HR: 10000 INJECTION, SOLUTION INTRAVENOUS at 18:19

## 2022-12-24 RX ADMIN — BUDESONIDE 0.5 MG: 0.5 INHALANT RESPIRATORY (INHALATION) at 06:50

## 2022-12-24 RX ADMIN — INSULIN LISPRO 14 UNITS: 100 INJECTION, SOLUTION INTRAVENOUS; SUBCUTANEOUS at 10:34

## 2022-12-24 RX ADMIN — EMPAGLIFLOZIN 10 MG: 10 TABLET, FILM COATED ORAL at 10:33

## 2022-12-24 RX ADMIN — Medication 10 ML: at 10:35

## 2022-12-24 RX ADMIN — INSULIN LISPRO 15 UNITS: 100 INJECTION, SOLUTION INTRAVENOUS; SUBCUTANEOUS at 17:55

## 2022-12-24 RX ADMIN — ASPIRIN 81 MG CHEWABLE TABLET 81 MG: 81 TABLET CHEWABLE at 10:33

## 2022-12-24 RX ADMIN — BUDESONIDE 0.5 MG: 0.5 INHALANT RESPIRATORY (INHALATION) at 19:16

## 2022-12-24 RX ADMIN — ROSUVASTATIN CALCIUM 5 MG: 5 TABLET, COATED ORAL at 20:08

## 2022-12-24 RX ADMIN — INSULIN LISPRO 2 UNITS: 100 INJECTION, SOLUTION INTRAVENOUS; SUBCUTANEOUS at 17:55

## 2022-12-24 NOTE — PROGRESS NOTES
Referring Provider: Jaleel Huber MD    Reason for follow-up: Multivessel coronary artery disease, non-ST elevation MI, diabetes     Patient Care Team:  Melvina Hodge as PCP - General (Nurse Practitioner)      SUBJECTIVE  Sleeping comfortably in bed, denies any chest pain or shortness of breath.  Heart rate is better     ROS    Review of Systems   Constitutional: Negative for malaise/fatigue.   Cardiovascular: Negative for chest pain, dyspnea on exertion, leg swelling and palpitations.   Respiratory: Negative for cough and shortness of breath.    Gastrointestinal: Negative for abdominal pain, nausea and vomiting.   Neurological: Negative for dizziness, focal weakness, headaches, light-headedness and numbness.   All other systems reviewed and are negative.        Personal History:    Past Medical History:   Diagnosis Date   • CHF (congestive heart failure) (HCC)    • Diabetes mellitus (HCC)    • Elevated cholesterol    • Hyperlipidemia    • Hypertension        Past Surgical History:   Procedure Laterality Date   • HERNIA REPAIR     • TONSILLECTOMY         No family history on file.    Social History     Tobacco Use   • Smoking status: Former     Types: Cigarettes   Vaping Use   • Vaping Use: Never used   Substance Use Topics   • Alcohol use: Not Currently   • Drug use: Never        Medications Prior to Admission   Medication Sig Dispense Refill Last Dose   • aspirin 81 MG chewable tablet Chew 81 mg Daily.      • aspirin-acetaminophen-caffeine (EXCEDRIN MIGRAINE) 250-250-65 MG per tablet Take 1 tablet by mouth Every 6 (Six) Hours As Needed for Headache.      • carvedilol (COREG) 3.125 MG tablet Take 3.125 mg by mouth 2 (Two) Times a Day With Meals.      • furosemide (LASIX) 40 MG tablet Take 40 mg by mouth 2 (Two) Times a Day.      • glipizide (GLUCOTROL) 10 MG tablet Take 10 mg by mouth 2 (Two) Times a Day Before Meals.      • insulin NPH-insulin regular (humuLIN 70/30,novoLIN 70/30) (70-30) 100 UNIT/ML  injection Inject 27 Units under the skin into the appropriate area as directed Every Morning.      • insulin NPH-insulin regular (humuLIN 70/30,novoLIN 70/30) (70-30) 100 UNIT/ML injection Inject 22 Units under the skin into the appropriate area as directed Every Night.      • metFORMIN (GLUCOPHAGE) 500 MG tablet Take 500 mg by mouth Daily With Breakfast.      • Omega-3 Fatty Acids (fish oil) 1000 MG capsule capsule Take 1,000 mg by mouth Daily With Breakfast.      • oseltamivir (TAMIFLU) 75 MG capsule Take 75 mg by mouth 2 (Two) Times a Day.      • rosuvastatin (CRESTOR) 5 MG tablet Take 5 mg by mouth Every Night.          Allergies:  Patient has no known allergies.    Scheduled Meds:aspirin, 81 mg, Oral, Daily  budesonide, 0.5 mg, Nebulization, BID - RT  carvedilol, 6.25 mg, Oral, BID With Meals  docusate sodium, 100 mg, Oral, BID  empagliflozin, 10 mg, Oral, Daily  insulin glargine, 42 Units, Subcutaneous, Nightly  insulin lispro, 15 Units, Subcutaneous, TID With Meals  insulin lispro, 2-12 Units, Subcutaneous, TID With Meals  polyethylene glycol, 17 g, Oral, Daily  rosuvastatin, 5 mg, Oral, Nightly  sodium chloride, 10 mL, Intravenous, Q12H      Continuous Infusions:heparin, 9.71 Units/kg/hr, Last Rate: 20 Units/kg/hr (12/24/22 1337)      PRN Meds:.•  acetaminophen  •  dextrose  •  dextrose  •  glucagon (human recombinant)  •  ipratropium-albuterol  •  melatonin  •  nitroglycerin  •  ondansetron  •  potassium chloride **OR** potassium chloride **OR** potassium chloride  •  sodium chloride  •  sodium chloride      OBJECTIVE    Vital Signs  Vitals:    12/24/22 0650 12/24/22 0653 12/24/22 0708 12/24/22 1155   BP:   121/68 118/59   BP Location:   Left arm Left arm   Patient Position:   Lying Sitting   Pulse: 71 71 78 77   Resp: 16 16 20 13   Temp:   97.6 °F (36.4 °C) 98.4 °F (36.9 °C)   TempSrc:   Oral Oral   SpO2: 96% 94% 98% 98%   Weight:       Height:           Flowsheet Rows    Flowsheet Row First Filed Value  "  Admission Height 177.8 cm (70\") Documented at 12/18/2022 1305   Admission Weight 103 kg (227 lb 1.2 oz) Documented at 12/17/2022 0500            Intake/Output Summary (Last 24 hours) at 12/24/2022 1712  Last data filed at 12/24/2022 1400  Gross per 24 hour   Intake 1315 ml   Output 1000 ml   Net 315 ml          Telemetry: Sinus rhythm    Physical Exam:  The patient is alert, oriented and in no distress.  Vital signs as noted above.  Head and neck revealed no carotid bruits or jugular venous distention.  No thyromegaly or lymphadenopathy is present  Lungs clear.  No wheezing.  Breath sounds are normal bilaterally.  Heart normal first and second heart sounds.  No murmur. No precordial rub is present.  No gallop is present.  Abdomen soft and nontender.  No organomegaly is present.  Extremities with good peripheral pulses without any pedal edema.  Skin warm and dry.  Musculoskeletal system is grossly normal.  CNS grossly normal.       Results Review:  I have personally reviewed the results from the time of this admission to 12/24/2022 17:12 EST and agree with these findings:  []  Laboratory  []  Microbiology  []  Radiology  []  EKG/Telemetry   []  Cardiology/Vascular   []  Pathology  []  Old records  []  Other:    Most notable findings include:    Lab Results (last 24 hours)     Procedure Component Value Units Date/Time    POC Glucose Once [[id number]]  (Abnormal) Collected: 12/24/22 1710    Specimen: Blood Updated: 12/24/22 1711     Glucose 175 mg/dL      Comment: Serial Number: [id number]:  [id number]       aPTT [[id number]]  (Abnormal) Collected: 12/24/22 1228    Specimen: Blood Updated: 12/24/22 1306     PTT 81.7 seconds     POC Glucose Once [[id number]]  (Abnormal) Collected: 12/24/22 1152    Specimen: Blood Updated: 12/24/22 1158     Glucose 219 mg/dL      Comment: Serial Number: [id number]:  [id number]       POC Glucose Once [[id number]]  (Abnormal) Collected: 12/24/22 0713    Specimen: Blood Updated: " 12/24/22 0718     Glucose 151 mg/dL      Comment: Serial Number: [id number]:  [id number]       aPTT [[id number]]  (Abnormal) Collected: 12/24/22 0450    Specimen: Blood Updated: 12/24/22 0535     .9 seconds     Basic Metabolic Panel [[id number]]  (Abnormal) Collected: 12/24/22 0450    Specimen: Blood Updated: 12/24/22 0514     Glucose 146 mg/dL      BUN 52 mg/dL      Creatinine 1.88 mg/dL      Sodium 131 mmol/L      Potassium 4.4 mmol/L      Chloride 93 mmol/L      CO2 24.0 mmol/L      Calcium 9.2 mg/dL      BUN/Creatinine Ratio 27.7     Anion Gap 14.0 mmol/L      eGFR 36.8 mL/min/1.73      Comment: National Kidney Foundation and American Society of Nephrology (ASN) Task Force recommended calculation based on the Chronic Kidney Disease Epidemiology Collaboration (CKD-EPI) equation refit without adjustment for race.       Narrative:      GFR Normal >60  Chronic Kidney Disease <60  Kidney Failure <15    The GFR formula is only valid for adults with stable renal function between ages 18 and 70.    aPTT [[id number]]  (Abnormal) Collected: 12/23/22 2243    Specimen: Blood Updated: 12/23/22 2321     PTT 53.9 seconds     POC Glucose Once [[id number]]  (Abnormal) Collected: 12/23/22 1951    Specimen: Blood Updated: 12/23/22 1954     Glucose 244 mg/dL      Comment: Serial Number: [id number]:  [id number]       POC Glucose Once [[id number]]  (Abnormal) Collected: 12/23/22 1831    Specimen: Blood Updated: 12/23/22 1833     Glucose 179 mg/dL      Comment: Serial Number: [id number]:  [id number]       aPTT [[id number]]  (Abnormal) Collected: 12/23/22 1639    Specimen: Blood Updated: 12/23/22 1732     PTT 26.9 seconds           Imaging Results (Last 24 Hours)     ** No results found for the last 24 hours. **          LAB RESULTS (LAST 7 DAYS)    CBC  Results from last 7 days   Lab Units 12/23/22  0250 12/20/22  2313 12/19/22  0300 12/18/22  1235   WBC 10*3/mm3 9.90 7.70 8.40 9.40   RBC 10*6/mm3 4.71 4.90 4.58  5.05   HEMOGLOBIN g/dL 13.8 14.3 13.5 14.8   HEMATOCRIT % 42.0 44.3 40.7 45.6   MCV fL 89.3 90.5 88.7 90.4   PLATELETS 10*3/mm3 346 336 267 267       BMP  Results from last 7 days   Lab Units 12/24/22  0450 12/23/22  0250 12/22/22  0429 12/20/22  2313 12/20/22  1103 12/19/22  0300 12/17/22  2256   SODIUM mmol/L 131* 135* 135* 135* 130* 136 135*   POTASSIUM mmol/L 4.4 5.0 4.3 4.4 4.7 4.4 4.3   CHLORIDE mmol/L 93* 94* 92* 92* 91* 94* 94*   CO2 mmol/L 24.0 28.0 28.0 29.0 28.0 29.0 29.0   BUN mg/dL 52* 42* 31* 28* 28* 29* 37*   CREATININE mg/dL 1.88* 2.12* 1.26 1.21 1.10 1.06 1.31*   GLUCOSE mg/dL 146* 95 107* 135* 374* 225* 215*   MAGNESIUM mg/dL  --  2.1  --  1.8  --   --   --    PHOSPHORUS mg/dL  --   --   --  4.5  --   --   --        CMP   Results from last 7 days   Lab Units 12/24/22  0450 12/23/22  0250 12/22/22  0429 12/20/22 2313 12/20/22  1103 12/19/22  0300 12/17/22  2256   SODIUM mmol/L 131* 135* 135* 135* 130* 136 135*   POTASSIUM mmol/L 4.4 5.0 4.3 4.4 4.7 4.4 4.3   CHLORIDE mmol/L 93* 94* 92* 92* 91* 94* 94*   CO2 mmol/L 24.0 28.0 28.0 29.0 28.0 29.0 29.0   BUN mg/dL 52* 42* 31* 28* 28* 29* 37*   CREATININE mg/dL 1.88* 2.12* 1.26 1.21 1.10 1.06 1.31*   GLUCOSE mg/dL 146* 95 107* 135* 374* 225* 215*       BNP        TROPONIN        CoAg  Results from last 7 days   Lab Units 12/24/22  1228 12/24/22  0450 12/23/22  2243 12/23/22  1639 12/23/22  0932 12/23/22  0250 12/22/22 2023   APTT seconds 81.7* 101.9* 53.9* 26.9* 75.0 41.3* 60.1*       Creatinine Clearance  Estimated Creatinine Clearance: 40.8 mL/min (A) (by C-G formula based on SCr of 1.88 mg/dL (H)).    ABG        Radiology  No radiology results for the last day      EKG  I personally viewed and interpreted the patient's EKG/Telemetry data:  ECG 12 Lead Chest Pain   Final Result   HEART RATE= 89  bpm   RR Interval= 672  ms   DC Interval= 176  ms   P Horizontal Axis= -5  deg   P Front Axis= 29  deg   QRSD Interval= 92  ms   QT Interval= 362  ms   QRS  Axis= 16  deg   T Wave Axis= 163  deg   - ABNORMAL ECG -   Sinus rhythm   Probable left atrial enlargement   Probable anterior infarct, age indeterminate   Abnormal T, consider ischemia, lateral leads   No previous ECG available for comparison   Electronically Signed By: Alex Byrd (BERNY) 19-Dec-2022 08:52:18   Date and Time of Study: 2022-12-17 07:21:47      SCANNED - TELEMETRY     Final Result      SCANNED - TELEMETRY     Final Result      SCANNED - TELEMETRY     Final Result      SCANNED - TELEMETRY     Final Result      SCANNED - TELEMETRY     Final Result      SCANNED - TELEMETRY     Final Result      SCANNED - TELEMETRY     Final Result      SCANNED - TELEMETRY     Final Result      SCANNED - TELEMETRY     Final Result      SCANNED - TELEMETRY     Final Result      SCANNED - TELEMETRY     Final Result      SCANNED - TELEMETRY     Final Result      SCANNED - TELEMETRY     Final Result      SCANNED - TELEMETRY     Final Result      SCANNED - TELEMETRY     Final Result      SCANNED - TELEMETRY     Final Result      SCANNED - TELEMETRY     Final Result      SCANNED - TELEMETRY     Final Result      SCANNED - TELEMETRY     Final Result      SCANNED - TELEMETRY     Final Result      SCANNED - TELEMETRY     Final Result      SCANNED - TELEMETRY     Final Result      SCANNED - TELEMETRY     Final Result      SCANNED - TELEMETRY     Final Result      SCANNED - TELEMETRY     Final Result      SCANNED - TELEMETRY     Final Result      SCANNED - TELEMETRY     Final Result      SCANNED - TELEMETRY     Final Result      SCANNED - TELEMETRY     Final Result      SCANNED - TELEMETRY     Final Result      SCANNED - TELEMETRY     Final Result      SCANNED - TELEMETRY     Final Result      SCANNED - TELEMETRY     Final Result      SCANNED - TELEMETRY     Final Result      SCANNED - TELEMETRY     Final Result      SCANNED - TELEMETRY     Final Result      SCANNED - TELEMETRY     Final Result      SCANNED - TELEMETRY      Final Result      SCANNED - TELEMETRY     Final Result      SCANNED - TELEMETRY     Final Result      SCANNED - TELEMETRY     Final Result            Echocardiogram:    Results for orders placed during the hospital encounter of 12/17/22    Adult Transthoracic Echo Complete W/ Cont if Necessary Per Protocol    Interpretation Summary  •  Calculated left ventricular EF = 40% Left ventricular ejection fraction appears to be 40 - 45%.  •  The left ventricular cavity is dilated.  •  Left ventricular diastolic function is consistent with (grade I) impaired relaxation.  •  Mild aortic valve stenosis is present.  •  Estimated right ventricular systolic pressure from tricuspid regurgitation is normal (<35 mmHg).        Stress Test:         Cardiac Catheterization:  No results found for this or any previous visit.         Other:         ASSESSMENT & PLAN:    Principal Problem:    CAD (coronary artery disease), native coronary artery  Active Problems:    Type 2 diabetes mellitus with hyperglycemia, without long-term current use of insulin (HCC)    Mixed hyperlipidemia    Primary hypertension    Systolic and diastolic CHF, acute on chronic (HCC)    Influenza due to seasonal influenza virus    76-year-old man with multiple and complex cardiovascular risk factors including hypertension, hyperlipidemia, diabetes presented with non-ST elevation MI and acute kidney injury.  He is also been diagnosed with influenza.  Troponin peaked at 4.5 and then trended down.  His renal function is now gotten worse.  We will discontinue lisinopril and Lasix.  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  Initial echocardiogram with EF of 20%, repeat echocardiogram with EF of 40% with contrast.  He remains on heparin drip, aspirin, high intensity statin and beta-blocker.  We have also added Jardiance.  Continue  to uptitrate beta-blocker as needed.  Plan is for CABG on 29th after he completely recovers from influenza and his renal function improves.  Will reintroduce ACE inhibitor once his renal function improves.  He is euvolemic, on the dry side.  Patient is moved to the ICU and seen by cardiac surgeons also  Blood pressure and heart rate are stable      Konstantin Mcconnell MD  12/24/22  17:12 EST

## 2022-12-24 NOTE — PROGRESS NOTES
[facility]   Daily Progress Note    Patient Name: Sherman Baumann  : 1945  MRN: [id number]  Primary Care Physician:  Melvina Hodge  Date of admission: 2022  Date and Time of Service:       Subjective      Patient denies any chest pain shortness of breath  No lightheadedness today.  Denies any worsening lower extremity edema or orthopnea  No cough. Non productive   No chills or sweats  No nausea vomiting diarrhea or constipation.      Objective      Vitals:   Temp:  [97.4 °F (36.3 °C)-98 °F (36.7 °C)] 97.6 °F (36.4 °C)  Heart Rate:  [70-85] 78  Resp:  [12-20] 20  BP: (100-127)/(53-68) 121/68  Flow (L/min):  [1-2] 2    Physical Exam   General: No acute distress  HEENT: neck supple, normal oral mucosa, no masses, no lymphadenopathy  Lungs: Clear bilaterally, no wheezing, No crackles, No Rhonchi. Equal excursions.   CV - Normal S1/S2, no murmur, Regular rate and rhythm   Abdomin - Soft, non-tender, non-distended, normal bowel sounds  Extremities - no edema, no erythema  Neuro - No focal weakness, normal sensation  Psych - Alert and oriented x3  Skin - no wounds or lesions.        Result Review    Result Review:  I have personally reviewed the results from the time of this admission to 2022 11:30 EST and agree with these findings:  [x]  Laboratory  [x]  Microbiology  [x]  Radiology  [x]  EKG/Telemetry   [x]  Cardiology/Vascular   []  Pathology  [x]  Old records  []  Other:  Most notable findings include:           Assessment & Plan      Brief Patient Summary:  Sherman Baumann is a 76 y.o. male who       aspirin, 81 mg, Oral, Daily  budesonide, 0.5 mg, Nebulization, BID - RT  carvedilol, 6.25 mg, Oral, BID With Meals  docusate sodium, 100 mg, Oral, BID  empagliflozin, 10 mg, Oral, Daily  insulin glargine, 42 Units, Subcutaneous, Nightly  insulin lispro, 14 Units, Subcutaneous, TID With Meals  insulin lispro, 2-12 Units, Subcutaneous, TID With Meals  polyethylene  glycol, 17 g, Oral, Daily  rosuvastatin, 5 mg, Oral, Nightly  sodium chloride, 10 mL, Intravenous, Q12H       heparin, 9.71 Units/kg/hr, Last Rate: 22 Units/kg/hr (12/24/22 0611)         Active Hospital Problems:  Active Hospital Problems    Diagnosis    • **CAD (coronary artery disease), native coronary artery    • Influenza due to seasonal influenza virus    • Type 2 diabetes mellitus with hyperglycemia, without long-term current use of insulin (HCC)    • Mixed hyperlipidemia    • Primary hypertension    • Systolic and diastolic CHF, acute on chronic (HCC)      Plan:     Multivessel CAD/NSTEMI/ICMP  - Acute Systolic heart failure EF 20-25%  - Continue Hepatin gtt  - ASA, BB, Statin.   Good urine output   - Plan for CABG anticipated 12/29/22  - Repeat Echo with improved EF 40-45%    MENDEZ  - will d/c Lasix and Lisinopril and potassium  - monitor kidney function - improving  - Nephrology consulted.   - Strict I/O    Acute respiratory failure with hypoxia  - Influenza A  - Continue Tamiflu  - Pulm on consult. Wean off oxygen as tolerated  - Continue Nebs.     DM-2 with hyperglycemia  - managed by Endocrinology   - BS better controlled     DL - Statin    Discussed with Cardiology       DVT prophylaxis:  Medical DVT prophylaxis orders are present.    CODE STATUS:    Level Of Support Discussed With: Patient  Code Status (Patient has no pulse and is not breathing): CPR (Attempt to Resuscitate)  Medical Interventions (Patient has pulse or is breathing): Full Support      Disposition:  I expect patient to be discharged .    This patient has been  and discussed with . 12/24/22      Electronically signed by Artemio Sheets MD, 12/24/22, 11:30 EST.  [facility]ist Team

## 2022-12-24 NOTE — PROGRESS NOTES
Daily Progress Note          Assessment    Hypoxemia  Flu B  COPD  LOGAN  Cor Pulmonale   CHF: LV EF 20-25%  DM II  Hyperlipidemia  HTN  CAD with multivessel disease  MENDEZ        PLAN:  Oxygen supplement and titration to maintain saturation above 90%: Currently requiring 2 L per nasal cannula  CABG is scheduled 12/29/2022  Patient is at moderate risk for pulmonary complications like atelectasis and pneumonia with surgery but no absolute contraindication  Optimize Incentive spirometer and BD, ICS prior to surgery and after   Tamiflu: Complete  BIPAP while sleeping and as needed  Bronchodilators  Inhaled corticosteroids  IS/Flutter valve  Diuresis and fluid management as per nephrology  Electrolytes/ glycemic control  Anticoagulation: Heparin drip            LOS: 7 days     Subjective     Currently patient is feeling okay with no chest pain or cough    Objective     Vital signs for last 24 hours:  Vitals:    12/24/22 0650 12/24/22 0653 12/24/22 0708 12/24/22 1155   BP:   121/68 118/59   BP Location:   Left arm Left arm   Patient Position:   Lying Sitting   Pulse: 71 71 78 77   Resp: 16 16 20 13   Temp:   97.6 °F (36.4 °C) 98.4 °F (36.9 °C)   TempSrc:   Oral Oral   SpO2: 96% 94% 98% 98%   Weight:       Height:           Intake/Output last 3 shifts:  I/O last 3 completed shifts:  In: 715 [P.O.:340; I.V.:375]  Out: 875 [Urine:875]  Intake/Output this shift:  I/O this shift:  In: 480 [P.O.:480]  Out: 300 [Urine:300]      Radiology  Imaging Results (Last 24 Hours)     ** No results found for the last 24 hours. **          Labs:  Results from last 7 days   Lab Units 12/23/22  0250   WBC 10*3/mm3 9.90   HEMOGLOBIN g/dL 13.8   HEMATOCRIT % 42.0   PLATELETS 10*3/mm3 346     Results from last 7 days   Lab Units 12/24/22  0450   SODIUM mmol/L 131*   POTASSIUM mmol/L 4.4   CHLORIDE mmol/L 93*   CO2 mmol/L 24.0   BUN mg/dL 52*   CREATININE mg/dL 1.88*   CALCIUM mg/dL 9.2   GLUCOSE mg/dL 146*                     Results from last 7  days   Lab Units 12/23/22  0250   MAGNESIUM mg/dL 2.1     Results from last 7 days   Lab Units 12/24/22  0450 12/23/22  2243 12/23/22  1639   APTT seconds 101.9* 53.9* 26.9*     Results from last 7 days   Lab Units 12/18/22 2003   TSH uIU/mL 2.040           Meds:   SCHEDULE  aspirin, 81 mg, Oral, Daily  budesonide, 0.5 mg, Nebulization, BID - RT  carvedilol, 6.25 mg, Oral, BID With Meals  docusate sodium, 100 mg, Oral, BID  empagliflozin, 10 mg, Oral, Daily  insulin glargine, 42 Units, Subcutaneous, Nightly  insulin lispro, 14 Units, Subcutaneous, TID With Meals  insulin lispro, 2-12 Units, Subcutaneous, TID With Meals  polyethylene glycol, 17 g, Oral, Daily  rosuvastatin, 5 mg, Oral, Nightly  sodium chloride, 10 mL, Intravenous, Q12H      Infusions  heparin, 9.71 Units/kg/hr, Last Rate: 22 Units/kg/hr (12/24/22 0611)      PRNs  •  acetaminophen  •  dextrose  •  dextrose  •  glucagon (human recombinant)  •  ipratropium-albuterol  •  melatonin  •  nitroglycerin  •  ondansetron  •  potassium chloride **OR** potassium chloride **OR** potassium chloride  •  sodium chloride  •  sodium chloride    Physical Exam:  General Appearance:  Alert   HEENT:  Normocephalic, without obvious abnormality, Conjunctiva/corneas clear,.   Nares normal, no drainage     Neck:  Supple, symmetrical, trachea midline.   Lungs /Chest wall: Good air movement bilaterally without wheezing, respirations unlabored, symmetrical wall movement.     Heart:  Regular rate and rhythm, S1 S2 normal  Abdomen: Soft, non-tender, no masses, no organomegaly.    Extremities: No edema, no clubbing or cyanosis     ROS  Constitutional: Negative for chills, fever and malaise/fatigue.   HENT: Negative.    Eyes: Negative.    Cardiovascular: Negative.    Respiratory: Negative for cough and shortness of breath.    Skin: Negative.    Musculoskeletal: Negative.    Gastrointestinal: Negative.    Genitourinary: Negative.    Neurological: Negative.    Psychiatric/Behavioral:  Negative.      I reviewed the recent clinical results    Much of this encounter note is an electronic transcription/translation of spoken language to printed text using Dragon Software which might include inadvertent errors in transcription.

## 2022-12-24 NOTE — PROGRESS NOTES
Daily Progress Note    Patient Care Team:  Melvina Hodge as PCP - General (Nurse Practitioner)    Chief Complaint: Follow-up type 2 diabetes    HPI: Patient seen and examined today.  Clinically doing well.  Blood sugar log reviewed, blood sugars improving.  No complaints at this time.    ROS:   Constitutional:  Denies fatigue, tiredness.    Respiratory: denies cough, shortness of breath.   Cardiovascular:  denies chest pain, edema   GI:  Denies abdominal pain, nausea, vomiting.         Vitals:    12/24/22 1155   BP: 118/59   Pulse: 77   Resp: 13   Temp: 98.4 °F (36.9 °C)   SpO2: 98%     Body mass index is 32.55 kg/m².    Physical Exam:  GEN: NAD, conversant  CV: RRR  LUNG: CTA  PSYCH: Awake and coherent.      Results Review:     I reviewed the patient's new clinical results.    Glucose   Date Value Ref Range Status   12/24/2022 146 (H) 65 - 99 mg/dL Final     Sodium   Date Value Ref Range Status   12/24/2022 131 (L) 136 - 145 mmol/L Final     Potassium   Date Value Ref Range Status   12/24/2022 4.4 3.5 - 5.2 mmol/L Final     CO2   Date Value Ref Range Status   12/24/2022 24.0 22.0 - 29.0 mmol/L Final     Chloride   Date Value Ref Range Status   12/24/2022 93 (L) 98 - 107 mmol/L Final     Anion Gap   Date Value Ref Range Status   12/24/2022 14.0 5.0 - 15.0 mmol/L Final     Creatinine   Date Value Ref Range Status   12/24/2022 1.88 (H) 0.76 - 1.27 mg/dL Final     BUN   Date Value Ref Range Status   12/24/2022 52 (H) 8 - 23 mg/dL Final     BUN/Creatinine Ratio   Date Value Ref Range Status   12/24/2022 27.7 (H) 7.0 - 25.0 Final     Calcium   Date Value Ref Range Status   12/24/2022 9.2 8.6 - 10.5 mg/dL Final     Magnesium   Date Value Ref Range Status   12/23/2022 2.1 1.6 - 2.4 mg/dL Final     Lab Results   Component Value Date    HGBA1C 8.3 (H) 12/17/2022     No results found for: GLUF, MICROALBUR  Results from last 7 days   Lab Units 12/24/22  1152 12/24/22  0713 12/23/22  1951 12/23/22  1831 12/23/22  1122  12/23/22  0706   GLUCOSE mg/dL 219* 151* 244* 179* 372* 144*     Medication Review: Reviewed.     aspirin, 81 mg, Oral, Daily  budesonide, 0.5 mg, Nebulization, BID - RT  carvedilol, 6.25 mg, Oral, BID With Meals  docusate sodium, 100 mg, Oral, BID  empagliflozin, 10 mg, Oral, Daily  insulin glargine, 42 Units, Subcutaneous, Nightly  insulin lispro, 14 Units, Subcutaneous, TID With Meals  insulin lispro, 2-12 Units, Subcutaneous, TID With Meals  polyethylene glycol, 17 g, Oral, Daily  rosuvastatin, 5 mg, Oral, Nightly  sodium chloride, 10 mL, Intravenous, Q12H      Assessment and plan:  Diabetes mellitus type 2 with hyperglycemia: Uncontrolled, improving, blood sugars still mild high, will increase Humalog to 15 with each meal, continue glargine 42 units subcu nightly and continue Jardiance.  We will also continue Humalog sliding scale and follow blood sugars and make adjustments.    CAD: Awaiting CABG    Hyperlipidemia: Currently on rosuvastatin    Serg Quesada MD. FACE

## 2022-12-24 NOTE — PROGRESS NOTES
" LOS: 7 days   Patient Care Team:  Melvina Hodge PCP - General (Nurse Practitioner)    Chief Complaint: CAD    Subjective:  Symptoms:  No shortness of breath or chest pain.    Diet:  No nausea.    Activity level: Returning to normal.    Pain:  He reports no pain.          Vital Signs  Temp:  [97.4 °F (36.3 °C)-98 °F (36.7 °C)] 97.6 °F (36.4 °C)  Heart Rate:  [70-85] 78  Resp:  [12-20] 20  BP: (100-127)/(53-68) 121/68  Body mass index is 32.55 kg/m².    Intake/Output Summary (Last 24 hours) at 12/24/2022 1057  Last data filed at 12/24/2022 0903  Gross per 24 hour   Intake 1195 ml   Output 700 ml   Net 495 ml     I/O this shift:  In: 480 [P.O.:480]  Out: 300 [Urine:300]            12/22/22  0509 12/23/22  0500 12/24/22  0000   Weight: 101 kg (222 lb 14.2 oz) 102 kg (224 lb 10.4 oz) 103 kg (226 lb 13.7 oz)         Objective:  General Appearance:  Comfortable.    Vital signs: (most recent): Blood pressure 121/68, pulse 78, temperature 97.6 °F (36.4 °C), temperature source Oral, resp. rate 20, height 177.8 cm (70\"), weight 103 kg (226 lb 13.7 oz), SpO2 98 %.    Lungs:  Normal effort and normal respiratory rate.  (O2 at 2 liters)  Heart: Normal rate.  Regular rhythm.    Neurological: Patient is alert and oriented to person, place and time.    Skin:  Warm and dry.              Results Review:        WBC WBC   Date Value Ref Range Status   12/23/2022 9.90 3.40 - 10.80 10*3/mm3 Final      HGB Hemoglobin   Date Value Ref Range Status   12/23/2022 13.8 13.0 - 17.7 g/dL Final      HCT Hematocrit   Date Value Ref Range Status   12/23/2022 42.0 37.5 - 51.0 % Final      Platelets Platelets   Date Value Ref Range Status   12/23/2022 346 140 - 450 10*3/mm3 Final        PT/INR:  No results found for: PROTIME/No results found for: INR    Sodium Sodium   Date Value Ref Range Status   12/24/2022 131 (L) 136 - 145 mmol/L Final   12/23/2022 135 (L) 136 - 145 mmol/L Final   12/22/2022 135 (L) 136 - 145 mmol/L Final      Potassium " Potassium   Date Value Ref Range Status   12/24/2022 4.4 3.5 - 5.2 mmol/L Final   12/23/2022 5.0 3.5 - 5.2 mmol/L Final     Comment:     Slight hemolysis detected by analyzer. Results may be affected.   12/22/2022 4.3 3.5 - 5.2 mmol/L Final      Chloride Chloride   Date Value Ref Range Status   12/24/2022 93 (L) 98 - 107 mmol/L Final   12/23/2022 94 (L) 98 - 107 mmol/L Final   12/22/2022 92 (L) 98 - 107 mmol/L Final      Bicarbonate CO2   Date Value Ref Range Status   12/24/2022 24.0 22.0 - 29.0 mmol/L Final   12/23/2022 28.0 22.0 - 29.0 mmol/L Final   12/22/2022 28.0 22.0 - 29.0 mmol/L Final      BUN BUN   Date Value Ref Range Status   12/24/2022 52 (H) 8 - 23 mg/dL Final   12/23/2022 42 (H) 8 - 23 mg/dL Final   12/22/2022 31 (H) 8 - 23 mg/dL Final      Creatinine Creatinine   Date Value Ref Range Status   12/24/2022 1.88 (H) 0.76 - 1.27 mg/dL Final   12/23/2022 2.12 (H) 0.76 - 1.27 mg/dL Final   12/22/2022 1.26 0.76 - 1.27 mg/dL Final      Calcium Calcium   Date Value Ref Range Status   12/24/2022 9.2 8.6 - 10.5 mg/dL Final   12/23/2022 9.7 8.6 - 10.5 mg/dL Final   12/22/2022 9.8 8.6 - 10.5 mg/dL Final      Magnesium Magnesium   Date Value Ref Range Status   12/23/2022 2.1 1.6 - 2.4 mg/dL Final      Troponin No results found for: TROPONIN   BNP No results found for: BNP         aspirin, 81 mg, Oral, Daily  budesonide, 0.5 mg, Nebulization, BID - RT  carvedilol, 6.25 mg, Oral, BID With Meals  docusate sodium, 100 mg, Oral, BID  empagliflozin, 10 mg, Oral, Daily  insulin glargine, 42 Units, Subcutaneous, Nightly  insulin lispro, 14 Units, Subcutaneous, TID With Meals  insulin lispro, 2-12 Units, Subcutaneous, TID With Meals  polyethylene glycol, 17 g, Oral, Daily  rosuvastatin, 5 mg, Oral, Nightly  sodium chloride, 10 mL, Intravenous, Q12H      heparin, 9.71 Units/kg/hr, Last Rate: 22 Units/kg/hr (12/24/22 0611)        PRN Meds:.•  acetaminophen  •  dextrose  •  dextrose  •  glucagon (human recombinant)  •   ipratropium-albuterol  •  melatonin  •  nitroglycerin  •  ondansetron  •  potassium chloride **OR** potassium chloride **OR** potassium chloride  •  sodium chloride  •  sodium chloride    Patient Active Problem List   Diagnosis Code   • CAD (coronary artery disease), native coronary artery I25.10   • Type 2 diabetes mellitus with hyperglycemia, without long-term current use of insulin (HCC) E11.65   • Mixed hyperlipidemia E78.2   • Primary hypertension I10   • Systolic and diastolic CHF, acute on chronic (HCC) I50.43   • Influenza due to seasonal influenza virus J10.1       Assessment & Plan    - MV CAD with NSTEMI presentation at [place], EF 20-25% (echo)--transferred by our service for further care and evaluation, will start plavix prior to discharge  - ICM, acute HFrEF, NYHA class II-III--BNP 5580 at OSH, repeat echo 12/21 EF 40-45%---GDMT prior to discharge  - Influenza B diagnosed 12/15--ID signed off, Tamiflu x 5 days completed  - DM type 2 with hyperglycemia, a1c 8.3---- endocrine managing, on Jardiance   - HTN--stable  - HL---statin  - Past smoker  - [employer], hearing aids in place  - Obesity  - COPD---pulmonary following  - MENDEZ---renal consult     Preop studies:  Carotids: mild stenosis bilaterally  Vein mapping: adequate bilaterally  12/18 MRSA negative  12/17 cxr: no active disease  12/19 CT: emphysema, no significant aortic calcifications  12/17 ua:  ok     EF continued at 40-45%, repeat echo on Tues.  Tentatively scheduled for CABG with IABP on 12/29 (high risk), hold heparin gtt on call to OR. Will repeat COVID prior to OR, continue PT/OT.  Creatinine elevated last 2 days, lisinopril and lasix discontinued yesterday, consult renal.     Chyna Christian, APRN  12/24/22  10:57 EST

## 2022-12-25 LAB
ANION GAP SERPL CALCULATED.3IONS-SCNC: 12 MMOL/L (ref 5–15)
APTT PPP: 66.7 SECONDS (ref 61–76.5)
APTT PPP: 69.3 SECONDS (ref 61–76.5)
APTT PPP: 82.8 SECONDS (ref 61–76.5)
BASOPHILS # BLD AUTO: 0.1 10*3/MM3 (ref 0–0.2)
BASOPHILS NFR BLD AUTO: 0.6 % (ref 0–1.5)
BUN SERPL-MCNC: 42 MG/DL (ref 8–23)
BUN/CREAT SERPL: 31.3 (ref 7–25)
CALCIUM SPEC-SCNC: 9.4 MG/DL (ref 8.6–10.5)
CHLORIDE SERPL-SCNC: 94 MMOL/L (ref 98–107)
CO2 SERPL-SCNC: 26 MMOL/L (ref 22–29)
CREAT SERPL-MCNC: 1.34 MG/DL (ref 0.76–1.27)
DEPRECATED RDW RBC AUTO: 46.4 FL (ref 37–54)
EGFRCR SERPLBLD CKD-EPI 2021: 55.2 ML/MIN/1.73
EOSINOPHIL # BLD AUTO: 0.3 10*3/MM3 (ref 0–0.4)
EOSINOPHIL NFR BLD AUTO: 4 % (ref 0.3–6.2)
ERYTHROCYTE [DISTWIDTH] IN BLOOD BY AUTOMATED COUNT: 14.3 % (ref 12.3–15.4)
GLUCOSE BLDC GLUCOMTR-MCNC: 128 MG/DL (ref 70–105)
GLUCOSE BLDC GLUCOMTR-MCNC: 154 MG/DL (ref 70–105)
GLUCOSE BLDC GLUCOMTR-MCNC: 168 MG/DL (ref 70–105)
GLUCOSE BLDC GLUCOMTR-MCNC: 218 MG/DL (ref 70–105)
GLUCOSE SERPL-MCNC: 101 MG/DL (ref 65–99)
HCT VFR BLD AUTO: 42.3 % (ref 37.5–51)
HGB BLD-MCNC: 14.3 G/DL (ref 13–17.7)
LYMPHOCYTES # BLD AUTO: 2.2 10*3/MM3 (ref 0.7–3.1)
LYMPHOCYTES NFR BLD AUTO: 26.2 % (ref 19.6–45.3)
MCH RBC QN AUTO: 30.1 PG (ref 26.6–33)
MCHC RBC AUTO-ENTMCNC: 33.7 G/DL (ref 31.5–35.7)
MCV RBC AUTO: 89.4 FL (ref 79–97)
MONOCYTES # BLD AUTO: 0.8 10*3/MM3 (ref 0.1–0.9)
MONOCYTES NFR BLD AUTO: 9.3 % (ref 5–12)
NEUTROPHILS NFR BLD AUTO: 5 10*3/MM3 (ref 1.7–7)
NEUTROPHILS NFR BLD AUTO: 59.9 % (ref 42.7–76)
NRBC BLD AUTO-RTO: 0 /100 WBC (ref 0–0.2)
PLATELET # BLD AUTO: 305 10*3/MM3 (ref 140–450)
PMV BLD AUTO: 8.4 FL (ref 6–12)
POTASSIUM SERPL-SCNC: 4.6 MMOL/L (ref 3.5–5.2)
RBC # BLD AUTO: 4.73 10*6/MM3 (ref 4.14–5.8)
SODIUM SERPL-SCNC: 132 MMOL/L (ref 136–145)
WBC NRBC COR # BLD: 8.4 10*3/MM3 (ref 3.4–10.8)

## 2022-12-25 PROCEDURE — 82962 GLUCOSE BLOOD TEST: CPT

## 2022-12-25 PROCEDURE — 99232 SBSQ HOSP IP/OBS MODERATE 35: CPT | Performed by: INTERNAL MEDICINE

## 2022-12-25 PROCEDURE — 85025 COMPLETE CBC W/AUTO DIFF WBC: CPT | Performed by: THORACIC SURGERY (CARDIOTHORACIC VASCULAR SURGERY)

## 2022-12-25 PROCEDURE — 25010000002 HEPARIN (PORCINE) 25000-0.45 UT/250ML-% SOLUTION: Performed by: THORACIC SURGERY (CARDIOTHORACIC VASCULAR SURGERY)

## 2022-12-25 PROCEDURE — 94761 N-INVAS EAR/PLS OXIMETRY MLT: CPT

## 2022-12-25 PROCEDURE — 63710000001 INSULIN GLARGINE PER 5 UNITS: Performed by: INTERNAL MEDICINE

## 2022-12-25 PROCEDURE — 63710000001 INSULIN LISPRO (HUMAN) PER 5 UNITS: Performed by: INTERNAL MEDICINE

## 2022-12-25 PROCEDURE — 85730 THROMBOPLASTIN TIME PARTIAL: CPT | Performed by: THORACIC SURGERY (CARDIOTHORACIC VASCULAR SURGERY)

## 2022-12-25 PROCEDURE — 80048 BASIC METABOLIC PNL TOTAL CA: CPT | Performed by: HOSPITALIST

## 2022-12-25 PROCEDURE — 94799 UNLISTED PULMONARY SVC/PX: CPT

## 2022-12-25 PROCEDURE — 25010000002 FUROSEMIDE PER 20 MG: Performed by: INTERNAL MEDICINE

## 2022-12-25 RX ORDER — FUROSEMIDE 10 MG/ML
40 INJECTION INTRAMUSCULAR; INTRAVENOUS EVERY 12 HOURS
Status: DISCONTINUED | OUTPATIENT
Start: 2022-12-25 | End: 2022-12-26

## 2022-12-25 RX ORDER — ACETAMINOPHEN 500 MG
500 TABLET ORAL EVERY 4 HOURS PRN
Status: DISCONTINUED | OUTPATIENT
Start: 2022-12-25 | End: 2022-12-28 | Stop reason: SDUPTHER

## 2022-12-25 RX ADMIN — CARVEDILOL 6.25 MG: 6.25 TABLET, FILM COATED ORAL at 17:22

## 2022-12-25 RX ADMIN — INSULIN GLARGINE 45 UNITS: 100 INJECTION, SOLUTION SUBCUTANEOUS at 20:23

## 2022-12-25 RX ADMIN — INSULIN LISPRO 15 UNITS: 100 INJECTION, SOLUTION INTRAVENOUS; SUBCUTANEOUS at 17:22

## 2022-12-25 RX ADMIN — EMPAGLIFLOZIN 10 MG: 10 TABLET, FILM COATED ORAL at 08:46

## 2022-12-25 RX ADMIN — Medication 10 ML: at 20:24

## 2022-12-25 RX ADMIN — BUDESONIDE 0.5 MG: 0.5 INHALANT RESPIRATORY (INHALATION) at 07:53

## 2022-12-25 RX ADMIN — CARVEDILOL 6.25 MG: 6.25 TABLET, FILM COATED ORAL at 08:46

## 2022-12-25 RX ADMIN — FUROSEMIDE 40 MG: 10 INJECTION, SOLUTION INTRAMUSCULAR; INTRAVENOUS at 23:38

## 2022-12-25 RX ADMIN — INSULIN LISPRO 15 UNITS: 100 INJECTION, SOLUTION INTRAVENOUS; SUBCUTANEOUS at 08:46

## 2022-12-25 RX ADMIN — FUROSEMIDE 40 MG: 10 INJECTION, SOLUTION INTRAMUSCULAR; INTRAVENOUS at 12:05

## 2022-12-25 RX ADMIN — INSULIN LISPRO 15 UNITS: 100 INJECTION, SOLUTION INTRAVENOUS; SUBCUTANEOUS at 12:04

## 2022-12-25 RX ADMIN — HEPARIN SODIUM 16 UNITS/KG/HR: 10000 INJECTION, SOLUTION INTRAVENOUS at 08:52

## 2022-12-25 RX ADMIN — DOCUSATE SODIUM 100 MG: 100 CAPSULE, LIQUID FILLED ORAL at 20:23

## 2022-12-25 RX ADMIN — ROSUVASTATIN CALCIUM 5 MG: 5 TABLET, COATED ORAL at 20:23

## 2022-12-25 RX ADMIN — Medication 10 ML: at 08:46

## 2022-12-25 RX ADMIN — INSULIN LISPRO 4 UNITS: 100 INJECTION, SOLUTION INTRAVENOUS; SUBCUTANEOUS at 12:05

## 2022-12-25 RX ADMIN — INSULIN LISPRO 2 UNITS: 100 INJECTION, SOLUTION INTRAVENOUS; SUBCUTANEOUS at 17:22

## 2022-12-25 RX ADMIN — ASPIRIN 81 MG CHEWABLE TABLET 81 MG: 81 TABLET CHEWABLE at 08:46

## 2022-12-25 RX ADMIN — BUDESONIDE 0.5 MG: 0.5 INHALANT RESPIRATORY (INHALATION) at 18:26

## 2022-12-25 NOTE — PROGRESS NOTES
[facility] Medicine Services   Daily Progress Note    Patient Name: Sherman Baumann  : 1945  MRN: [id number]  Primary Care Physician:  Melvina Hodge  Date of admission: 2022  Date and Time of Service:       Subjective      Patient denies any chest pain shortness of breath  No lightheadedness today.  Denies any worsening lower extremity edema or orthopnea  No cough. Non productive   No chills or sweats  No nausea vomiting diarrhea or constipation.  Feels better today       Objective      Vitals:   Temp:  [97.6 °F (36.4 °C)-98.4 °F (36.9 °C)] 97.6 °F (36.4 °C)  Heart Rate:  [65-80] 80  Resp:  [11-19] 13  BP: (113-167)/(47-85) 167/85  Flow (L/min):  [2] 2    Physical Exam   General: No acute distress  HEENT: neck supple, normal oral mucosa, no masses, no lymphadenopathy  Lungs: Clear bilaterally, no wheezing, No crackles, No Rhonchi. Equal excursions.   CV - Normal S1/S2, no murmur, Regular rate and rhythm   Abdomin - Soft, non-tender, non-distended, normal bowel sounds  Extremities - no edema, no erythema  Neuro - No focal weakness, normal sensation  Psych - Alert and oriented x3  Skin - no wounds or lesions.        Result Review    Result Review:  I have personally reviewed the results from the time of this admission to 2022 09:13 EST and agree with these findings:  [x]  Laboratory  [x]  Microbiology  [x]  Radiology  [x]  EKG/Telemetry   [x]  Cardiology/Vascular   []  Pathology  [x]  Old records  []  Other:  Most notable findings include:           Assessment & Plan      Brief Patient Summary:  Sherman Baumann is a 76 y.o. male who       aspirin, 81 mg, Oral, Daily  budesonide, 0.5 mg, Nebulization, BID - RT  carvedilol, 6.25 mg, Oral, BID With Meals  docusate sodium, 100 mg, Oral, BID  empagliflozin, 10 mg, Oral, Daily  insulin glargine, 42 Units, Subcutaneous, Nightly  insulin lispro, 15 Units, Subcutaneous, TID With Meals  insulin lispro, 2-12 Units, Subcutaneous, TID With  Meals  polyethylene glycol, 17 g, Oral, Daily  rosuvastatin, 5 mg, Oral, Nightly  sodium chloride, 10 mL, Intravenous, Q12H       heparin, 9.71 Units/kg/hr, Last Rate: 16 Units/kg/hr (12/25/22 0852)         Active Hospital Problems:  Active Hospital Problems    Diagnosis    • **CAD (coronary artery disease), native coronary artery    • Influenza due to seasonal influenza virus    • Type 2 diabetes mellitus with hyperglycemia, without long-term current use of insulin (HCC)    • Mixed hyperlipidemia    • Primary hypertension    • Systolic and diastolic CHF, acute on chronic (HCC)      Plan:     Multivessel CAD/NSTEMI/ICMP  - Acute Systolic heart failure EF 20-25%  - Continue Hepatin gtt  - ASA, BB, Statin.   Good urine output   - Plan for CABG anticipated 12/29/22  - Repeat Echo 12/21 with improved EF 40-45%  - anticipated CABG planned 12/29  MENDEZ  - will d/c Lasix and Lisinopril and potassium  - monitor kidney function - improving  - Nephrology consulted.   - Strict I/O    Acute respiratory failure with hypoxia  - Influenza A  - completed Tamiflu  - Pulm on consult. Wean off oxygen as tolerated  - Continue Nebs.     DM-2 with hyperglycemia  - managed by Endocrinology   - BS better controlled     DL - Statin    Discussed with Cardiology       DVT prophylaxis:  Medical DVT prophylaxis orders are present.    CODE STATUS:    Level Of Support Discussed With: Patient  Code Status (Patient has no pulse and is not breathing): CPR (Attempt to Resuscitate)  Medical Interventions (Patient has pulse or is breathing): Full Support      Disposition:  I expect patient to be discharged .    This patient has been  and discussed with . 12/25/22      Electronically signed by Artemio Sheets MD, 12/25/22, 09:13 EST.  [facility] Hospitalist Team

## 2022-12-25 NOTE — PROGRESS NOTES
" LOS: 8 days   Patient Care Team:  Melvina Hodge as PCP - General (Nurse Practitioner)    Chief Complaint: preop open heart    Subjective:  Symptoms:  No shortness of breath or chest pain.    Diet:  No nausea.    Activity level: Normal.    Pain:  He reports no pain.          Vital Signs  Temp:  [97.6 °F (36.4 °C)-98.4 °F (36.9 °C)] 97.6 °F (36.4 °C)  Heart Rate:  [65-88] 80  Resp:  [11-19] 13  BP: (112-167)/(47-85) 167/85  Body mass index is 32.87 kg/m².    Intake/Output Summary (Last 24 hours) at 12/25/2022 0848  Last data filed at 12/25/2022 0600  Gross per 24 hour   Intake 910 ml   Output 1400 ml   Net -490 ml     No intake/output data recorded.            12/23/22  0500 12/24/22  0000 12/25/22  0000   Weight: 102 kg (224 lb 10.4 oz) 103 kg (226 lb 13.7 oz) 104 kg (229 lb 0.9 oz)         Objective:  General Appearance:  Comfortable.    Vital signs: (most recent): Blood pressure 167/85, pulse 80, temperature 97.6 °F (36.4 °C), temperature source Oral, resp. rate 13, height 177.8 cm (70\"), weight 104 kg (229 lb 0.9 oz), SpO2 91 %.    Lungs:  Normal effort and normal respiratory rate.  (Room air)  Heart: Normal rate.  Regular rhythm.    Neurological: Patient is alert and oriented to person, place and time.    Skin:  Warm and dry.              Results Review:        WBC WBC   Date Value Ref Range Status   12/25/2022 8.40 3.40 - 10.80 10*3/mm3 Final   12/23/2022 9.90 3.40 - 10.80 10*3/mm3 Final      HGB Hemoglobin   Date Value Ref Range Status   12/25/2022 14.3 13.0 - 17.7 g/dL Final   12/23/2022 13.8 13.0 - 17.7 g/dL Final      HCT Hematocrit   Date Value Ref Range Status   12/25/2022 42.3 37.5 - 51.0 % Final   12/23/2022 42.0 37.5 - 51.0 % Final      Platelets Platelets   Date Value Ref Range Status   12/25/2022 305 140 - 450 10*3/mm3 Final   12/23/2022 346 140 - 450 10*3/mm3 Final        PT/INR:  No results found for: PROTIME/No results found for: INR    Sodium Sodium   Date Value Ref Range Status   12/25/2022 " 132 (L) 136 - 145 mmol/L Final   12/24/2022 131 (L) 136 - 145 mmol/L Final   12/23/2022 135 (L) 136 - 145 mmol/L Final      Potassium Potassium   Date Value Ref Range Status   12/25/2022 4.6 3.5 - 5.2 mmol/L Final     Comment:     Slight hemolysis detected by analyzer. Results may be affected.   12/24/2022 4.4 3.5 - 5.2 mmol/L Final   12/23/2022 5.0 3.5 - 5.2 mmol/L Final     Comment:     Slight hemolysis detected by analyzer. Results may be affected.      Chloride Chloride   Date Value Ref Range Status   12/25/2022 94 (L) 98 - 107 mmol/L Final   12/24/2022 93 (L) 98 - 107 mmol/L Final   12/23/2022 94 (L) 98 - 107 mmol/L Final      Bicarbonate CO2   Date Value Ref Range Status   12/25/2022 26.0 22.0 - 29.0 mmol/L Final   12/24/2022 24.0 22.0 - 29.0 mmol/L Final   12/23/2022 28.0 22.0 - 29.0 mmol/L Final      BUN BUN   Date Value Ref Range Status   12/25/2022 42 (H) 8 - 23 mg/dL Final   12/24/2022 52 (H) 8 - 23 mg/dL Final   12/23/2022 42 (H) 8 - 23 mg/dL Final      Creatinine Creatinine   Date Value Ref Range Status   12/25/2022 1.34 (H) 0.76 - 1.27 mg/dL Final   12/24/2022 1.88 (H) 0.76 - 1.27 mg/dL Final   12/23/2022 2.12 (H) 0.76 - 1.27 mg/dL Final      Calcium Calcium   Date Value Ref Range Status   12/25/2022 9.4 8.6 - 10.5 mg/dL Final   12/24/2022 9.2 8.6 - 10.5 mg/dL Final   12/23/2022 9.7 8.6 - 10.5 mg/dL Final      Magnesium Magnesium   Date Value Ref Range Status   12/23/2022 2.1 1.6 - 2.4 mg/dL Final      Troponin No results found for: TROPONIN   BNP No results found for: BNP         aspirin, 81 mg, Oral, Daily  budesonide, 0.5 mg, Nebulization, BID - RT  carvedilol, 6.25 mg, Oral, BID With Meals  docusate sodium, 100 mg, Oral, BID  empagliflozin, 10 mg, Oral, Daily  insulin glargine, 42 Units, Subcutaneous, Nightly  insulin lispro, 15 Units, Subcutaneous, TID With Meals  insulin lispro, 2-12 Units, Subcutaneous, TID With Meals  polyethylene glycol, 17 g, Oral, Daily  rosuvastatin, 5 mg, Oral,  Nightly  sodium chloride, 10 mL, Intravenous, Q12H      heparin, 9.71 Units/kg/hr, Last Rate: 16 Units/kg/hr (12/25/22 0311)        PRN Meds:.•  acetaminophen  •  dextrose  •  dextrose  •  glucagon (human recombinant)  •  ipratropium-albuterol  •  melatonin  •  nitroglycerin  •  ondansetron  •  potassium chloride **OR** potassium chloride **OR** potassium chloride  •  sodium chloride  •  sodium chloride    Patient Active Problem List   Diagnosis Code   • CAD (coronary artery disease), native coronary artery I25.10   • Type 2 diabetes mellitus with hyperglycemia, without long-term current use of insulin (HCC) E11.65   • Mixed hyperlipidemia E78.2   • Primary hypertension I10   • Systolic and diastolic CHF, acute on chronic (HCC) I50.43   • Influenza due to seasonal influenza virus J10.1       Assessment & Plan    - MV CAD with NSTEMI presentation at [place], EF 20-25% (echo)--transferred by our service for further care and evaluation, will start plavix prior to discharge  - ICM, acute HFrEF, NYHA class II-III--BNP 5580 at OSH, repeat echo 12/21 EF 40-45%---GDMT prior to discharge  - Influenza B diagnosed 12/15--ID signed off, Tamiflu x 5 days completed  - DM type 2 with hyperglycemia, a1c 8.3---- endocrine managing, on Jardiance   - HTN--stable  - HL---statin  - Past smoker  - [facility], hearing aids in place  - Obesity  - COPD---pulmonary following  - MENDEZ on probable CKD III---peak creatinine preop 2.1, renal consult     Preop studies:  Carotids: mild stenosis bilaterally  Vein mapping: adequate bilaterally  12/18 MRSA negative  12/17 cxr: no active disease  12/19 CT: emphysema, no significant aortic calcifications  12/17 ua:  ok    Repeat echo on Tues.  Tentatively scheduled for CABG with IABP on 12/29 (high risk), hold heparin gtt on call to OR. Will repeat COVID prior to OR.    Chyna Christian, ROWAN  12/25/22  08:48 EST

## 2022-12-25 NOTE — PROGRESS NOTES
Referring Provider: Jaleel Huber MD    Reason for follow-up: Multivessel coronary artery disease, non-ST elevation MI, diabetes     Patient Care Team:  Melvina Hodge as PCP - General (Nurse Practitioner)      SUBJECTIVE  Sleeping comfortably in bed, denies any chest pain or shortness of breath.  Heart rate is better     ROS    Review of Systems   Constitutional: Negative for malaise/fatigue.   Cardiovascular: Negative for chest pain, dyspnea on exertion, leg swelling and palpitations.   Respiratory: Negative for cough and shortness of breath.    Gastrointestinal: Negative for abdominal pain, nausea and vomiting.   Neurological: Negative for dizziness, focal weakness, headaches, light-headedness and numbness.   All other systems reviewed and are negative.        Personal History:    Past Medical History:   Diagnosis Date   • CHF (congestive heart failure) (HCC)    • Diabetes mellitus (HCC)    • Elevated cholesterol    • Hyperlipidemia    • Hypertension        Past Surgical History:   Procedure Laterality Date   • HERNIA REPAIR     • TONSILLECTOMY         No family history on file.    Social History     Tobacco Use   • Smoking status: Former     Types: Cigarettes   Vaping Use   • Vaping Use: Never used   Substance Use Topics   • Alcohol use: Not Currently   • Drug use: Never        Medications Prior to Admission   Medication Sig Dispense Refill Last Dose   • aspirin 81 MG chewable tablet Chew 81 mg Daily.      • aspirin-acetaminophen-caffeine (EXCEDRIN MIGRAINE) 250-250-65 MG per tablet Take 1 tablet by mouth Every 6 (Six) Hours As Needed for Headache.      • carvedilol (COREG) 3.125 MG tablet Take 3.125 mg by mouth 2 (Two) Times a Day With Meals.      • furosemide (LASIX) 40 MG tablet Take 40 mg by mouth 2 (Two) Times a Day.      • glipizide (GLUCOTROL) 10 MG tablet Take 10 mg by mouth 2 (Two) Times a Day Before Meals.      • insulin NPH-insulin regular (humuLIN 70/30,novoLIN 70/30) (70-30) 100 UNIT/ML  injection Inject 27 Units under the skin into the appropriate area as directed Every Morning.      • insulin NPH-insulin regular (humuLIN 70/30,novoLIN 70/30) (70-30) 100 UNIT/ML injection Inject 22 Units under the skin into the appropriate area as directed Every Night.      • metFORMIN (GLUCOPHAGE) 500 MG tablet Take 500 mg by mouth Daily With Breakfast.      • Omega-3 Fatty Acids (fish oil) 1000 MG capsule capsule Take 1,000 mg by mouth Daily With Breakfast.      • oseltamivir (TAMIFLU) 75 MG capsule Take 75 mg by mouth 2 (Two) Times a Day.      • rosuvastatin (CRESTOR) 5 MG tablet Take 5 mg by mouth Every Night.          Allergies:  Patient has no known allergies.    Scheduled Meds:aspirin, 81 mg, Oral, Daily  budesonide, 0.5 mg, Nebulization, BID - RT  carvedilol, 6.25 mg, Oral, BID With Meals  docusate sodium, 100 mg, Oral, BID  empagliflozin, 10 mg, Oral, Daily  furosemide, 40 mg, Intravenous, Q12H  insulin glargine, 42 Units, Subcutaneous, Nightly  insulin lispro, 15 Units, Subcutaneous, TID With Meals  insulin lispro, 2-12 Units, Subcutaneous, TID With Meals  polyethylene glycol, 17 g, Oral, Daily  rosuvastatin, 5 mg, Oral, Nightly  sodium chloride, 10 mL, Intravenous, Q12H      Continuous Infusions:heparin, 9.71 Units/kg/hr, Last Rate: 16 Units/kg/hr (12/25/22 0852)      PRN Meds:.•  acetaminophen  •  dextrose  •  dextrose  •  glucagon (human recombinant)  •  ipratropium-albuterol  •  melatonin  •  nitroglycerin  •  ondansetron  •  potassium chloride **OR** potassium chloride **OR** potassium chloride  •  sodium chloride  •  sodium chloride      OBJECTIVE    Vital Signs  Vitals:    12/25/22 0753 12/25/22 0759 12/25/22 0819 12/25/22 1153   BP:   167/85    BP Location:   Right arm    Patient Position:   Lying    Pulse: 73 72 80 78   Resp: 12 11 13 16   Temp:   97.6 °F (36.4 °C) 98.2 °F (36.8 °C)   TempSrc:   Oral Oral   SpO2: 99% 100% 91% 93%   Weight:       Height:           Flowsheet Rows    Flowsheet Row  "First Filed Value   Admission Height 177.8 cm (70\") Documented at 12/18/2022 1305   Admission Weight 103 kg (227 lb 1.2 oz) Documented at 12/17/2022 0500            Intake/Output Summary (Last 24 hours) at 12/25/2022 1205  Last data filed at 12/25/2022 0914  Gross per 24 hour   Intake 430 ml   Output 1400 ml   Net -970 ml          Telemetry: Sinus rhythm    Physical Exam:  The patient is alert, oriented and in no distress.  Vital signs as noted above.  Head and neck revealed no carotid bruits or jugular venous distention.  No thyromegaly or lymphadenopathy is present  Lungs clear.  No wheezing.  Breath sounds are normal bilaterally.  Heart normal first and second heart sounds.  No murmur. No precordial rub is present.  No gallop is present.  Abdomen soft and nontender.  No organomegaly is present.  Extremities with good peripheral pulses without any pedal edema.  Skin warm and dry.  Musculoskeletal system is grossly normal.  CNS grossly normal.       Results Review:  I have personally reviewed the results from the time of this admission to 12/25/2022 12:05 EST and agree with these findings:  []  Laboratory  []  Microbiology  []  Radiology  []  EKG/Telemetry   []  Cardiology/Vascular   []  Pathology  []  Old records  []  Other:    Most notable findings include:    Lab Results (last 24 hours)     Procedure Component Value Units Date/Time    POC Glucose Once [[id number]]  (Abnormal) Collected: 12/25/22 1136    Specimen: Blood Updated: 12/25/22 1137     Glucose 218 mg/dL      Comment: Serial Number: [id number]:  [id number]       aPTT [[id number]]  (Normal) Collected: 12/25/22 0937    Specimen: Blood Updated: 12/25/22 0959     PTT 66.7 seconds     POC Glucose Once [[id number]]  (Abnormal) Collected: 12/25/22 0810    Specimen: Blood Updated: 12/25/22 0813     Glucose 128 mg/dL      Comment: Serial Number: [id number]:  [id number]       Basic Metabolic Panel [[id number]]  (Abnormal) Collected: 12/25/22 0243    " Specimen: Blood Updated: 12/25/22 0320     Glucose 101 mg/dL      BUN 42 mg/dL      Creatinine 1.34 mg/dL      Sodium 132 mmol/L      Potassium 4.6 mmol/L      Comment: Slight hemolysis detected by analyzer. Results may be affected.        Chloride 94 mmol/L      CO2 26.0 mmol/L      Calcium 9.4 mg/dL      BUN/Creatinine Ratio 31.3     Anion Gap 12.0 mmol/L      eGFR 55.2 mL/min/1.73      Comment: National Kidney Foundation and American Society of Nephrology (ASN) Task Force recommended calculation based on the Chronic Kidney Disease Epidemiology Collaboration (CKD-EPI) equation refit without adjustment for race.       Narrative:      GFR Normal >60  Chronic Kidney Disease <60  Kidney Failure <15    The GFR formula is only valid for adults with stable renal function between ages 18 and 70.    aPTT [[id number]]  (Abnormal) Collected: 12/25/22 0243    Specimen: Blood Updated: 12/25/22 0303     PTT 82.8 seconds     CBC & Differential [[id number]]  (Normal) Collected: 12/25/22 0243    Specimen: Blood Updated: 12/25/22 0252    Narrative:      The following orders were created for panel order CBC & Differential.  Procedure                               Abnormality         Status                     ---------                               -----------         ------                     CBC Auto Differential[[id number]]        Normal              Final result                 Please view results for these tests on the individual orders.    CBC Auto Differential [[id number]]  (Normal) Collected: 12/25/22 0243    Specimen: Blood Updated: 12/25/22 0252     WBC 8.40 10*3/mm3      RBC 4.73 10*6/mm3      Hemoglobin 14.3 g/dL      Hematocrit 42.3 %      MCV 89.4 fL      MCH 30.1 pg      MCHC 33.7 g/dL      RDW 14.3 %      RDW-SD 46.4 fl      MPV 8.4 fL      Platelets 305 10*3/mm3      Neutrophil % 59.9 %      Lymphocyte % 26.2 %      Monocyte % 9.3 %      Eosinophil % 4.0 %      Basophil % 0.6 %      Neutrophils, Absolute 5.00 10*3/mm3       Lymphocytes, Absolute 2.20 10*3/mm3      Monocytes, Absolute 0.80 10*3/mm3      Eosinophils, Absolute 0.30 10*3/mm3      Basophils, Absolute 0.10 10*3/mm3      nRBC 0.0 /100 WBC     aPTT [[id number]]  (Abnormal) Collected: 12/24/22 1945    Specimen: Blood Updated: 12/24/22 2007     PTT 87.6 seconds     POC Glucose Once [[id number]]  (Abnormal) Collected: 12/24/22 1918    Specimen: Blood Updated: 12/24/22 1920     Glucose 164 mg/dL      Comment: Serial Number: [id number]:  [id number]       POC Glucose Once [[id number]]  (Abnormal) Collected: 12/24/22 1710    Specimen: Blood Updated: 12/24/22 1711     Glucose 175 mg/dL      Comment: Serial Number: [id number]:  [id number]       aPTT [[id number]]  (Abnormal) Collected: 12/24/22 1228    Specimen: Blood Updated: 12/24/22 1306     PTT 81.7 seconds           Imaging Results (Last 24 Hours)     ** No results found for the last 24 hours. **          LAB RESULTS (LAST 7 DAYS)    CBC  Results from last 7 days   Lab Units 12/25/22  0243 12/23/22  0250 12/20/22  2313 12/19/22  0300 12/18/22  1235   WBC 10*3/mm3 8.40 9.90 7.70 8.40 9.40   RBC 10*6/mm3 4.73 4.71 4.90 4.58 5.05   HEMOGLOBIN g/dL 14.3 13.8 14.3 13.5 14.8   HEMATOCRIT % 42.3 42.0 44.3 40.7 45.6   MCV fL 89.4 89.3 90.5 88.7 90.4   PLATELETS 10*3/mm3 305 346 336 267 267       BMP  Results from last 7 days   Lab Units 12/25/22  0243 12/24/22  0450 12/23/22  0250 12/22/22  0429 12/20/22  2313 12/20/22  1103 12/19/22  0300   SODIUM mmol/L 132* 131* 135* 135* 135* 130* 136   POTASSIUM mmol/L 4.6 4.4 5.0 4.3 4.4 4.7 4.4   CHLORIDE mmol/L 94* 93* 94* 92* 92* 91* 94*   CO2 mmol/L 26.0 24.0 28.0 28.0 29.0 28.0 29.0   BUN mg/dL 42* 52* 42* 31* 28* 28* 29*   CREATININE mg/dL 1.34* 1.88* 2.12* 1.26 1.21 1.10 1.06   GLUCOSE mg/dL 101* 146* 95 107* 135* 374* 225*   MAGNESIUM mg/dL  --   --  2.1  --  1.8  --   --    PHOSPHORUS mg/dL  --   --   --   --  4.5  --   --        CMP   Results from last 7 days   Lab Units  12/25/22  0243 12/24/22  0450 12/23/22  0250 12/22/22  0429 12/20/22  2313 12/20/22  1103 12/19/22  0300   SODIUM mmol/L 132* 131* 135* 135* 135* 130* 136   POTASSIUM mmol/L 4.6 4.4 5.0 4.3 4.4 4.7 4.4   CHLORIDE mmol/L 94* 93* 94* 92* 92* 91* 94*   CO2 mmol/L 26.0 24.0 28.0 28.0 29.0 28.0 29.0   BUN mg/dL 42* 52* 42* 31* 28* 28* 29*   CREATININE mg/dL 1.34* 1.88* 2.12* 1.26 1.21 1.10 1.06   GLUCOSE mg/dL 101* 146* 95 107* 135* 374* 225*       BNP        TROPONIN        CoAg  Results from last 7 days   Lab Units 12/25/22  0937 12/25/22  0243 12/24/22  1945 12/24/22  1228 12/24/22  0450 12/23/22  2243 12/23/22  1639   APTT seconds 66.7 82.8* 87.6* 81.7* 101.9* 53.9* 26.9*       Creatinine Clearance  Estimated Creatinine Clearance: 57.5 mL/min (A) (by C-G formula based on SCr of 1.34 mg/dL (H)).    ABG        Radiology  No radiology results for the last day      EKG  I personally viewed and interpreted the patient's EKG/Telemetry data:  ECG 12 Lead Chest Pain   Final Result   HEART RATE= 89  bpm   RR Interval= 672  ms   [place] Interval= 176  ms   P Horizontal Axis= -5  deg   P Front Axis= 29  deg   QRSD Interval= 92  ms   QT Interval= 362  ms   QRS Axis= 16  deg   T Wave Axis= 163  deg   - ABNORMAL ECG -   Sinus rhythm   Probable left atrial enlargement   Probable anterior infarct, age indeterminate   Abnormal T, consider ischemia, lateral leads   No previous ECG available for comparison   Electronically Signed By: Alex Byrd (BERNY) 19-Dec-2022 08:52:18   Date and Time of Study: 2022-12-17 07:21:47      SCANNED - TELEMETRY     Final Result      SCANNED - TELEMETRY     Final Result      SCANNED - TELEMETRY     Final Result      SCANNED - TELEMETRY     Final Result      SCANNED - TELEMETRY     Final Result      SCANNED - TELEMETRY     Final Result      SCANNED - TELEMETRY     Final Result      SCANNED - TELEMETRY     Final Result      SCANNED - TELEMETRY     Final Result      SCANNED - TELEMETRY     Final Result       SCANNED - TELEMETRY     Final Result      SCANNED - TELEMETRY     Final Result      SCANNED - TELEMETRY     Final Result      SCANNED - TELEMETRY     Final Result      SCANNED - TELEMETRY     Final Result      SCANNED - TELEMETRY     Final Result      SCANNED - TELEMETRY     Final Result      SCANNED - TELEMETRY     Final Result      SCANNED - TELEMETRY     Final Result      SCANNED - TELEMETRY     Final Result      SCANNED - TELEMETRY     Final Result      SCANNED - TELEMETRY     Final Result      SCANNED - TELEMETRY     Final Result      SCANNED - TELEMETRY     Final Result      SCANNED - TELEMETRY     Final Result      SCANNED - TELEMETRY     Final Result      SCANNED - TELEMETRY     Final Result      SCANNED - TELEMETRY     Final Result      SCANNED - TELEMETRY     Final Result      SCANNED - TELEMETRY     Final Result      SCANNED - TELEMETRY     Final Result      SCANNED - TELEMETRY     Final Result      SCANNED - TELEMETRY     Final Result      SCANNED - TELEMETRY     Final Result      SCANNED - TELEMETRY     Final Result      SCANNED - TELEMETRY     Final Result      SCANNED - TELEMETRY     Final Result      SCANNED - TELEMETRY     Final Result      SCANNED - TELEMETRY     Final Result      SCANNED - TELEMETRY     Final Result      SCANNED - TELEMETRY     Final Result            Echocardiogram:    Results for orders placed during the hospital encounter of 12/17/22    Adult Transthoracic Echo Complete W/ Cont if Necessary Per Protocol    Interpretation Summary  •  Calculated left ventricular EF = 40% Left ventricular ejection fraction appears to be 40 - 45%.  •  The left ventricular cavity is dilated.  •  Left ventricular diastolic function is consistent with (grade I) impaired relaxation.  •  Mild aortic valve stenosis is present.  •  Estimated right ventricular systolic pressure from tricuspid regurgitation is normal (<35 mmHg).        Stress Test:         Cardiac Catheterization:  No results found for  this or any previous visit.         Other:         ASSESSMENT & PLAN:    Principal Problem:    CAD (coronary artery disease), native coronary artery  Active Problems:    Type 2 diabetes mellitus with hyperglycemia, without long-term current use of insulin (HCC)    Mixed hyperlipidemia    Primary hypertension    Systolic and diastolic CHF, acute on chronic (HCC)    Influenza due to seasonal influenza virus    76-year-old man with multiple and complex cardiovascular risk factors including hypertension, hyperlipidemia, diabetes presented with non-ST elevation MI and acute kidney injury.  He is also been diagnosed with influenza.  Troponin peaked at 4.5 and then trended down.  His renal function is now gotten worse.  We will discontinue lisinopril and Lasix.  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  Initial echocardiogram with EF of 20%, repeat echocardiogram with EF of 40% with contrast.  He remains on heparin drip, aspirin, high intensity statin and beta-blocker.  We have also added Jardiance.  Continue to uptitrate beta-blocker as needed.  Plan is for CABG on 29th after he completely recovers from influenza and his renal function improves.  Will reintroduce ACE inhibitor once his renal function improves.  He is euvolemic, on the dry side.  Patient is moved to the ICU and seen by cardiac surgeons also  Patient is currently on heparin also.  Blood pressure and heart rate are stable      Konstantin Mcconnell MD  12/25/22  12:05 EST

## 2022-12-25 NOTE — NURSING NOTE
This RN called to check on PTT. It was timed draw at 1530. The lab was collected at 1527 and has not changed in status since then. Lab reported they found tube in it should result before 1800.

## 2022-12-25 NOTE — CONSULTS
INITIAL CONSULT NOTE      Name: Sherman Baumann ADMIT: 2022   : 1945  PCP: Melvina Hodge    MRN: [id number] LOS: 8 days   AGE/SEX: 76 y.o. male  ROOM:      Date of Service: 2022                        Reason for Consult:         Acute kidney injury versus chronic kidney disease a stage III      History of Present Illness:       Patient is a 76-year-old man who has a history of hypertension, diabetes, coronary artery disease, congestive heart failure ejection fraction 15 to 20%, i suspected to have CKD stage III, on Care Everywhere creatinine on December 15, 2022 was around 1.21, with some proteinuria on urine analysis, was transferred from [place] to [facility] with chest pain and shortness of breath elevated troponin peaked at 4.5 subsequently patient underwent cardiac cath showed  of RCA, 90% being stenosed left circumflex, LAD and diagonal 95%, subsequently PCI versus CABG was recommended.  Patient also was diagnosed with influenza B.  Subsequently CT surgery was consulted, patient has been recommended to have coronary artery bypass grafting, his creatinine now has increased close to 2 and nephrology consultation has been requested.  Patient is not on any NSAIDs no ACE inhibitor's no angiotensin receptor blockers.         Review of Systems:         Constitutional: No distress, sitting in the chair, weakness.  HEENT:  No headache, otalgia, itchy eyes, nasal discharge or sore throat.  Cardiac:  No chest pain, dyspnea, orthopnea or PND.  Chest:  No cough, phlegm or wheezing.  Abdomen:  No abdominal pain, nausea or vomiting.  Neuro:  No focal weakness, abnormal movements or seizure-like activity.  :   No hematuria, no pyuria, no dysuria, no flank pain.  Extremities:  No  joint pains.  ROS was otherwise negative except as mentioned in the [place].     Past History/Allergies?Social History:     Past Medical History:   Diagnosis Date   • CHF (congestive heart failure)  (HCC)    • Diabetes mellitus (HCC)    • Elevated cholesterol    • Hyperlipidemia    • Hypertension          Past Surgical History :     Past Surgical History:   Procedure Laterality Date   • HERNIA REPAIR     • TONSILLECTOMY            Family History:     No family history on file.       Social History:     Social History     Tobacco Use   • Smoking status: Former     Types: Cigarettes   • Smokeless tobacco: Not on file   Substance Use Topics   • Alcohol use: Not Currently          Allergies:     No Known Allergies      Home meds:     Medications Prior to Admission   Medication Sig Dispense Refill Last Dose   • aspirin 81 MG chewable tablet Chew 81 mg Daily.      • aspirin-acetaminophen-caffeine (EXCEDRIN MIGRAINE) 250-250-65 MG per tablet Take 1 tablet by mouth Every 6 (Six) Hours As Needed for Headache.      • carvedilol (COREG) 3.125 MG tablet Take 3.125 mg by mouth 2 (Two) Times a Day With Meals.      • furosemide (LASIX) 40 MG tablet Take 40 mg by mouth 2 (Two) Times a Day.      • glipizide (GLUCOTROL) 10 MG tablet Take 10 mg by mouth 2 (Two) Times a Day Before Meals.      • insulin NPH-insulin regular (humuLIN 70/30,novoLIN 70/30) (70-30) 100 UNIT/ML injection Inject 27 Units under the skin into the appropriate area as directed Every Morning.      • insulin NPH-insulin regular (humuLIN 70/30,novoLIN 70/30) (70-30) 100 UNIT/ML injection Inject 22 Units under the skin into the appropriate area as directed Every Night.      • metFORMIN (GLUCOPHAGE) 500 MG tablet Take 500 mg by mouth Daily With Breakfast.      • Omega-3 Fatty Acids (fish oil) 1000 MG capsule capsule Take 1,000 mg by mouth Daily With Breakfast.      • oseltamivir (TAMIFLU) 75 MG capsule Take 75 mg by mouth 2 (Two) Times a Day.      • rosuvastatin (CRESTOR) 5 MG tablet Take 5 mg by mouth Every Night.            Scheduled meds:   aspirin, 81 mg, Oral, Daily  budesonide, 0.5 mg, Nebulization, BID - RT  carvedilol, 6.25 mg, Oral, BID With  "Meals  docusate sodium, 100 mg, Oral, BID  empagliflozin, 10 mg, Oral, Daily  insulin glargine, 42 Units, Subcutaneous, Nightly  insulin lispro, 15 Units, Subcutaneous, TID With Meals  insulin lispro, 2-12 Units, Subcutaneous, TID With Meals  polyethylene glycol, 17 g, Oral, Daily  rosuvastatin, 5 mg, Oral, Nightly  sodium chloride, 10 mL, Intravenous, Q12H          Objectives:     tMax 24 hrs: Temp (24hrs), Av °F (36.7 °C), Min:97.6 °F (36.4 °C), Max:98.4 °F (36.9 °C)      Vitals Ranges:   Temp:  [97.6 °F (36.4 °C)-98.4 °F (36.9 °C)] 97.6 °F (36.4 °C)  Heart Rate:  [65-88] 70  Resp:  [12-19] 12  BP: (112-167)/(47-84) 162/73    Intake and Output Last 3 Shifts:   I/O last 3 completed shifts:  In: 1625 [P.O.:940; I.V.:685]  Out: 2100 [Urine:2100]      Exam:     /85 (BP Location: Right arm, Patient Position: Lying)   Pulse 80   Temp 97.6 °F (36.4 °C) (Oral)   Resp 13   Ht 177.8 cm (70\")   Wt 104 kg (229 lb 0.9 oz)   SpO2 91%   BMI 32.87 kg/m²     General Appearance:   Appears chronically ill looking, no distress   Head:    Normocephalic, atraumatic   Eyes:     EOM's intact, sclerae anicteric        Ears:    TMs not observed   Nose:   Patent without discharge   Neck:   Supple, JVD   Lungs:     Clear to auscultation bilaterally,  mild rhonchi and crackles.   Chest wall:    No tenderness   Heart:    Regular rate and rhythm, S1 and S2 normal, no   rub    or gallop   Abdomen:     Soft, nontender, nondistended,  no masses, no organomegaly   Extremities:    NO edema   Neurologic:  No focal deficits.  Speech is fluent.  Conversation is coherent.         Data Review:       Labs reviewed    Imaging:      Radiology reviewed      Assessment:        CAD (coronary artery disease), native coronary artery    Type 2 diabetes mellitus with hyperglycemia, without long-term current use of insulin (HCC)    Mixed hyperlipidemia    Primary hypertension    Systolic and diastolic CHF, acute on chronic (HCC)    Influenza due to " seasonal influenza virus    · Acute kidney injury with chronic kidney disease a stage III, baseline creatinine December 17, 2022 on admission 1.16, now creatinine around 1.88-2.12.  Suspect variable creatinine secondary to hemodynamic and volume changes.  With history of diabetes, hypertension.  Urine analysis shows 100 mg of protein, no RBCs, no WBCs.  Urine output about 1 L.    · Volume overload with pulmonary edema.  · Diabetes type 2  · Hypertension  · Coronary artery disease multivalvular disease.  · Congestive heart failure ejection fraction 15 to 20%.  ·  Acute coronary syndrome.    Plan:     · Patient has chronic kidney disease a stage III renal function is variable, creatinine peaked at 2.12 now 1.88 now creatinine 1.34 with adequate urine output.  · I expect renal function to stabilize.  · Will monitor intake, output, daily weight.  · Chest x-ray shows pulmonary edema  · Will start patient on Lasix 40 mg IV every 12 hours x3 doses.  ·   Patient is currently not on any ACE inhibitor's or angiotensin receptor blockers.   ·  Will Check Renal Ultrasound to r/o element of obstruction and to assess the kidney size/echotexture.  · Comprehensive urine testing including Urinalysis, Urine sodium, potassium, chloride, Urine protein and creatinine to quantify the proteinuria if any at all. Will check urinary eosinophils as well.  · We will follow-up this patient closely, cardiology and CT surgery plan noted for CABG On December 29, 2022.      Duran Briggs MD  [facility]  12/25/2022  07:36 EST

## 2022-12-25 NOTE — PROGRESS NOTES
Daily Progress Note          Assessment    Hypoxemia  Flu B  COPD  LOGAN  Cor Pulmonale   CHF: LV EF 20-25%  DM II  Hyperlipidemia  HTN  CAD with multivessel disease  MENDEZ        PLAN:  Oxygen supplement and titration to maintain saturation above 90%: Currently requiring 2 L per nasal cannula  CABG is scheduled 12/29/2022  Patient is at moderate risk for pulmonary complications like atelectasis and pneumonia with surgery but no absolute contraindication  Optimize Incentive spirometer and BD, ICS prior to surgery and after   Tamiflu: Complete  BIPAP while sleeping and as needed  Bronchodilators  Inhaled corticosteroids  IS/Flutter valve  Diuresis and fluid management as per nephrology  Electrolytes/ glycemic control  Anticoagulation: Heparin drip            LOS: 8 days     Subjective     Currently patient is feeling okay with no chest pain or cough    Objective     Vital signs for last 24 hours:  Vitals:    12/25/22 1400 12/25/22 1500 12/25/22 1600 12/25/22 1646   BP:   142/75    BP Location:   Left arm    Patient Position:   Sitting    Pulse: 74 71 69    Resp:   16    Temp:    97.7 °F (36.5 °C)   TempSrc:    Oral   SpO2: 96% 95% 95%    Weight:       Height:           Intake/Output last 3 shifts:  I/O last 3 completed shifts:  In: 1625 [P.O.:940; I.V.:685]  Out: 2100 [Urine:2100]  Intake/Output this shift:  I/O this shift:  In: 919.1 [P.O.:840; I.V.:79.1]  Out: 1150 [Urine:1150]      Radiology  Imaging Results (Last 24 Hours)     ** No results found for the last 24 hours. **          Labs:  Results from last 7 days   Lab Units 12/25/22  0243   WBC 10*3/mm3 8.40   HEMOGLOBIN g/dL 14.3   HEMATOCRIT % 42.3   PLATELETS 10*3/mm3 305     Results from last 7 days   Lab Units 12/25/22  0243   SODIUM mmol/L 132*   POTASSIUM mmol/L 4.6   CHLORIDE mmol/L 94*   CO2 mmol/L 26.0   BUN mg/dL 42*   CREATININE mg/dL 1.34*   CALCIUM mg/dL 9.4   GLUCOSE mg/dL 101*                     Results from last 7 days   Lab Units 12/23/22  0250    MAGNESIUM mg/dL 2.1     Results from last 7 days   Lab Units 12/25/22  0937 12/25/22  0243 12/24/22  1945   APTT seconds 66.7 82.8* 87.6*     Results from last 7 days   Lab Units 12/18/22 2003   TSH uIU/mL 2.040           Meds:   SCHEDULE  aspirin, 81 mg, Oral, Daily  budesonide, 0.5 mg, Nebulization, BID - RT  carvedilol, 6.25 mg, Oral, BID With Meals  docusate sodium, 100 mg, Oral, BID  empagliflozin, 10 mg, Oral, Daily  furosemide, 40 mg, Intravenous, Q12H  insulin glargine, 45 Units, Subcutaneous, Nightly  insulin lispro, 15 Units, Subcutaneous, TID With Meals  insulin lispro, 2-12 Units, Subcutaneous, TID With Meals  polyethylene glycol, 17 g, Oral, Daily  rosuvastatin, 5 mg, Oral, Nightly  sodium chloride, 10 mL, Intravenous, Q12H      Infusions  heparin, 9.71 Units/kg/hr, Last Rate: 16 Units/kg/hr (12/25/22 0852)      PRNs  •  acetaminophen  •  dextrose  •  dextrose  •  glucagon (human recombinant)  •  ipratropium-albuterol  •  melatonin  •  nitroglycerin  •  ondansetron  •  potassium chloride **OR** potassium chloride **OR** potassium chloride  •  sodium chloride  •  sodium chloride    Physical Exam:  General Appearance:  Alert   HEENT:  Normocephalic, without obvious abnormality, Conjunctiva/corneas clear,.   Nares normal, no drainage     Neck:  Supple, symmetrical, trachea midline.   Lungs /Chest wall: Good air movement bilaterally without wheezing, respirations unlabored, symmetrical wall movement.     Heart:  Regular rate and rhythm, S1 S2 normal  Abdomen: Soft, non-tender, no masses, no organomegaly.    Extremities: No edema, no clubbing or cyanosis     ROS  Constitutional: Negative for chills, fever and malaise/fatigue.   HENT: Negative.    Eyes: Negative.    Cardiovascular: Negative.    Respiratory: Negative for cough and shortness of breath.    Skin: Negative.    Musculoskeletal: Negative.    Gastrointestinal: Negative.    Genitourinary: Negative.    Neurological: Negative.     Psychiatric/Behavioral: Negative.      I reviewed the recent clinical results    Much of this encounter note is an electronic transcription/translation of spoken language to printed text using Dragon Software which might include inadvertent errors in transcription.

## 2022-12-25 NOTE — CONSULTS
INITIAL CONSULT NOTE      Name: Sherman Baumann ADMIT: 2022   : 1945  PCP: Melvina Hodge    MRN: [id number] LOS: 7 days   AGE/SEX: 76 y.o. male  ROOM:      Date of Service: 2022                        Reason for Consult:         Acute kidney injury versus chronic kidney disease a stage III      History of Present Illness:       Patient is a 76-year-old man who has a history of hypertension, diabetes, coronary artery disease, congestive heart failure ejection fraction 15 to 20%, i suspected to have CKD stage III, on Care Everywhere creatinine on December 15, 2022 was around 1.21, with some proteinuria on urine analysis, was transferred from [place] to [facility] with chest pain and shortness of breath elevated troponin peaked at 4.5 subsequently patient underwent cardiac cath showed  of RCA, 90% being stenosed left circumflex, LAD and diagonal 95%, subsequently PCI versus CABG was recommended.  Patient also was diagnosed with influenza B.  Subsequently CT surgery was consulted, patient has been recommended to have coronary artery bypass grafting, his creatinine now has increased close to 2 and nephrology consultation has been requested.  Patient is not on any NSAIDs no ACE inhibitor's no angiotensin receptor blockers.        Past History/Allergies?Social History:     Past Medical History:   Diagnosis Date   • CHF (congestive heart failure) (HCC)    • Diabetes mellitus (HCC)    • Elevated cholesterol    • Hyperlipidemia    • Hypertension          Past Surgical History :     Past Surgical History:   Procedure Laterality Date   • HERNIA REPAIR     • TONSILLECTOMY            Family History:     No family history on file.       Social History:     Social History     Tobacco Use   • Smoking status: Former     Types: Cigarettes   • Smokeless tobacco: Not on file   Substance Use Topics   • Alcohol use: Not Currently          Allergies:     No Known Allergies      Home meds:      Medications Prior to Admission   Medication Sig Dispense Refill Last Dose   • aspirin 81 MG chewable tablet Chew 81 mg Daily.      • aspirin-acetaminophen-caffeine (EXCEDRIN MIGRAINE) 250-250-65 MG per tablet Take 1 tablet by mouth Every 6 (Six) Hours As Needed for Headache.      • carvedilol (COREG) 3.125 MG tablet Take 3.125 mg by mouth 2 (Two) Times a Day With Meals.      • furosemide (LASIX) 40 MG tablet Take 40 mg by mouth 2 (Two) Times a Day.      • glipizide (GLUCOTROL) 10 MG tablet Take 10 mg by mouth 2 (Two) Times a Day Before Meals.      • insulin NPH-insulin regular (humuLIN 70/30,novoLIN 70/30) (70-30) 100 UNIT/ML injection Inject 27 Units under the skin into the appropriate area as directed Every Morning.      • insulin NPH-insulin regular (humuLIN 70/30,novoLIN 70/30) (70-30) 100 UNIT/ML injection Inject 22 Units under the skin into the appropriate area as directed Every Night.      • metFORMIN (GLUCOPHAGE) 500 MG tablet Take 500 mg by mouth Daily With Breakfast.      • Omega-3 Fatty Acids (fish oil) 1000 MG capsule capsule Take 1,000 mg by mouth Daily With Breakfast.      • oseltamivir (TAMIFLU) 75 MG capsule Take 75 mg by mouth 2 (Two) Times a Day.      • rosuvastatin (CRESTOR) 5 MG tablet Take 5 mg by mouth Every Night.            Scheduled meds:   aspirin, 81 mg, Oral, Daily  budesonide, 0.5 mg, Nebulization, BID - RT  carvedilol, 6.25 mg, Oral, BID With Meals  docusate sodium, 100 mg, Oral, BID  empagliflozin, 10 mg, Oral, Daily  insulin glargine, 42 Units, Subcutaneous, Nightly  insulin lispro, 15 Units, Subcutaneous, TID With Meals  insulin lispro, 2-12 Units, Subcutaneous, TID With Meals  polyethylene glycol, 17 g, Oral, Daily  rosuvastatin, 5 mg, Oral, Nightly  sodium chloride, 10 mL, Intravenous, Q12H          Objectives:     tMax 24 hrs: Temp (24hrs), Av.9 °F (36.6 °C), Min:97.4 °F (36.3 °C), Max:98.4 °F (36.9 °C)      Vitals Ranges:   Temp:  [97.4 °F (36.3 °C)-98.4 °F (36.9 °C)] 98  °F (36.7 °C)  Heart Rate:  [69-88] 71  Resp:  [13-20] 16  BP: (103-140)/(47-70) 139/70    Intake and Output Last 3 Shifts:   I/O last 3 completed shifts:  In: 1315 [P.O.:940; I.V.:375]  Out: 1200 [Urine:1200]          Data Review:       Labs reviewed    Imaging:      Radiology reviewed      Assessment:        CAD (coronary artery disease), native coronary artery    Type 2 diabetes mellitus with hyperglycemia, without long-term current use of insulin ([facility])    Mixed hyperlipidemia    Primary hypertension    Systolic and diastolic CHF, acute on chronic (HCC)    Influenza due to seasonal influenza virus    · Acute kidney injury with chronic kidney disease a stage III, baseline creatinine December 17, 2022 on admission 1.16, now creatinine around 1.88-2.12.  Suspect variable creatinine secondary to hemodynamic and volume changes.  With history of diabetes, hypertension.  Urine analysis shows 100 mg of protein, no RBCs, no WBCs.  Urine output about 1 L.    · Diabetes type 2  · Hypertension  · Coronary artery disease multivalvular disease.  · Congestive heart failure ejection fraction 15 to 20%.  ·  Acute coronary syndrome.    Plan:     · Patient has chronic kidney disease a stage III renal function is variable, creatinine peaked at 2.12 now 1.88 with adequate urine output.  · I expect renal function to stabilize.  · Will monitor intake, output, daily weight.  · Will monitor volume closely, no need of diuretics at this time.  ·   Patient is currently not on any ACE inhibitor's or angiotensin receptor blockers.   ·  Will Check Renal Ultrasound to r/o element of obstruction and to assess the kidney size/echotexture.  · Comprehensive urine testing including Urinalysis, Urine sodium, potassium, chloride, Urine protein and creatinine to quantify the proteinuria if any at all. Will check urinary eosinophils as well.  · We will follow-up this patient closely, cardiology and CT surgery plan noted for CABG On December 29,  2022.      Duran Briggs MD  [facility]  12/24/2022  23:12 EST

## 2022-12-25 NOTE — PROGRESS NOTES
Daily Progress Note    Patient Care Team:  Melvina Hodge as PCP - General (Nurse Practitioner)    Chief Complaint: Follow-up type 2 diabetes    HPI: Patient seen and examined today.  Clinically doing well.  Blood sugar log reviewed, blood sugars are on the high side.  Eating well.    ROS:   Constitutional:  Denies fatigue, tiredness.    Respiratory: denies cough, shortness of breath.   Cardiovascular:  denies chest pain, edema   GI:  Denies abdominal pain, nausea, vomiting.         Vitals:    12/25/22 1153   BP:    Pulse: 78   Resp: 16   Temp: 98.2 °F (36.8 °C)   SpO2: 93%     Body mass index is 32.87 kg/m².    Physical Exam:  GEN: NAD, conversant  CV: RRR  LUNG: CTA  PSYCH: Awake and coherent      Results Review:     I reviewed the patient's new clinical results.    Glucose   Date Value Ref Range Status   12/25/2022 101 (H) 65 - 99 mg/dL Final     Sodium   Date Value Ref Range Status   12/25/2022 132 (L) 136 - 145 mmol/L Final     Potassium   Date Value Ref Range Status   12/25/2022 4.6 3.5 - 5.2 mmol/L Final     Comment:     Slight hemolysis detected by analyzer. Results may be affected.     CO2   Date Value Ref Range Status   12/25/2022 26.0 22.0 - 29.0 mmol/L Final     Chloride   Date Value Ref Range Status   12/25/2022 94 (L) 98 - 107 mmol/L Final     Anion Gap   Date Value Ref Range Status   12/25/2022 12.0 5.0 - 15.0 mmol/L Final     Creatinine   Date Value Ref Range Status   12/25/2022 1.34 (H) 0.76 - 1.27 mg/dL Final     BUN   Date Value Ref Range Status   12/25/2022 42 (H) 8 - 23 mg/dL Final     BUN/Creatinine Ratio   Date Value Ref Range Status   12/25/2022 31.3 (H) 7.0 - 25.0 Final     Calcium   Date Value Ref Range Status   12/25/2022 9.4 8.6 - 10.5 mg/dL Final     Magnesium   Date Value Ref Range Status   12/23/2022 2.1 1.6 - 2.4 mg/dL Final     Lab Results   Component Value Date    HGBA1C 8.3 (H) 12/17/2022     No results found for: GLUF, MICROALBUR  Results from last 7 days   Lab Units  12/25/22  1136 12/25/22  0810 12/24/22  1918 12/24/22  1710 12/24/22  1152 12/24/22  0713   GLUCOSE mg/dL 218* 128* 164* 175* 219* 151*             Medication Review: Reviewed.     aspirin, 81 mg, Oral, Daily  budesonide, 0.5 mg, Nebulization, BID - RT  carvedilol, 6.25 mg, Oral, BID With Meals  docusate sodium, 100 mg, Oral, BID  empagliflozin, 10 mg, Oral, Daily  furosemide, 40 mg, Intravenous, Q12H  insulin glargine, 42 Units, Subcutaneous, Nightly  insulin lispro, 15 Units, Subcutaneous, TID With Meals  insulin lispro, 2-12 Units, Subcutaneous, TID With Meals  polyethylene glycol, 17 g, Oral, Daily  rosuvastatin, 5 mg, Oral, Nightly  sodium chloride, 10 mL, Intravenous, Q12H              Assessment and plan:  Diabetes mellitus type 2 with hyperglycemia: Uncontrolled, improving, blood sugars still mild high, will increase increase Lantus to 45 units subcu nightly, continue Humalog 15 with each meal along with Jardiance and Humalog sliding scale.  We will follow blood sugars and make further adjustments as needed.       CAD: Awaiting CABG     Hyperlipidemia: Currently on rosuvastatin    Serg Quesada MD. FACE

## 2022-12-26 ENCOUNTER — APPOINTMENT (OUTPATIENT)
Dept: GENERAL RADIOLOGY | Facility: HOSPITAL | Age: 77
DRG: 235 | End: 2022-12-26
Payer: MEDICARE

## 2022-12-26 LAB
ANION GAP SERPL CALCULATED.3IONS-SCNC: 14 MMOL/L (ref 5–15)
APTT PPP: 68.3 SECONDS (ref 61–76.5)
BUN SERPL-MCNC: 41 MG/DL (ref 8–23)
BUN/CREAT SERPL: 26.3 (ref 7–25)
CALCIUM SPEC-SCNC: 9.7 MG/DL (ref 8.6–10.5)
CHLORIDE SERPL-SCNC: 89 MMOL/L (ref 98–107)
CO2 SERPL-SCNC: 28 MMOL/L (ref 22–29)
CREAT SERPL-MCNC: 1.56 MG/DL (ref 0.76–1.27)
EGFRCR SERPLBLD CKD-EPI 2021: 46 ML/MIN/1.73
GLUCOSE BLDC GLUCOMTR-MCNC: 167 MG/DL (ref 70–105)
GLUCOSE BLDC GLUCOMTR-MCNC: 190 MG/DL (ref 70–105)
GLUCOSE BLDC GLUCOMTR-MCNC: 202 MG/DL (ref 70–105)
GLUCOSE BLDC GLUCOMTR-MCNC: 234 MG/DL (ref 70–105)
GLUCOSE SERPL-MCNC: 130 MG/DL (ref 65–99)
POTASSIUM SERPL-SCNC: 4.3 MMOL/L (ref 3.5–5.2)
SODIUM SERPL-SCNC: 131 MMOL/L (ref 136–145)

## 2022-12-26 PROCEDURE — 85730 THROMBOPLASTIN TIME PARTIAL: CPT | Performed by: THORACIC SURGERY (CARDIOTHORACIC VASCULAR SURGERY)

## 2022-12-26 PROCEDURE — 94761 N-INVAS EAR/PLS OXIMETRY MLT: CPT

## 2022-12-26 PROCEDURE — 80048 BASIC METABOLIC PNL TOTAL CA: CPT | Performed by: HOSPITALIST

## 2022-12-26 PROCEDURE — 71045 X-RAY EXAM CHEST 1 VIEW: CPT

## 2022-12-26 PROCEDURE — 94664 DEMO&/EVAL PT USE INHALER: CPT

## 2022-12-26 PROCEDURE — 99233 SBSQ HOSP IP/OBS HIGH 50: CPT | Performed by: INTERNAL MEDICINE

## 2022-12-26 PROCEDURE — 25010000002 HEPARIN (PORCINE) 25000-0.45 UT/250ML-% SOLUTION: Performed by: THORACIC SURGERY (CARDIOTHORACIC VASCULAR SURGERY)

## 2022-12-26 PROCEDURE — 94799 UNLISTED PULMONARY SVC/PX: CPT

## 2022-12-26 PROCEDURE — 63710000001 INSULIN GLARGINE PER 5 UNITS: Performed by: INTERNAL MEDICINE

## 2022-12-26 PROCEDURE — 82962 GLUCOSE BLOOD TEST: CPT

## 2022-12-26 PROCEDURE — 63710000001 INSULIN LISPRO (HUMAN) PER 5 UNITS: Performed by: INTERNAL MEDICINE

## 2022-12-26 PROCEDURE — 97116 GAIT TRAINING THERAPY: CPT

## 2022-12-26 PROCEDURE — 25010000002 FUROSEMIDE PER 20 MG: Performed by: INTERNAL MEDICINE

## 2022-12-26 PROCEDURE — 99231 SBSQ HOSP IP/OBS SF/LOW 25: CPT | Performed by: NURSE PRACTITIONER

## 2022-12-26 RX ORDER — METOLAZONE 2.5 MG/1
10 TABLET ORAL DAILY
Status: DISCONTINUED | OUTPATIENT
Start: 2022-12-26 | End: 2022-12-27

## 2022-12-26 RX ORDER — FUROSEMIDE 10 MG/ML
40 INJECTION INTRAMUSCULAR; INTRAVENOUS EVERY 12 HOURS
Status: COMPLETED | OUTPATIENT
Start: 2022-12-26 | End: 2022-12-27

## 2022-12-26 RX ADMIN — Medication 10 ML: at 08:49

## 2022-12-26 RX ADMIN — EMPAGLIFLOZIN 10 MG: 10 TABLET, FILM COATED ORAL at 08:48

## 2022-12-26 RX ADMIN — DOCUSATE SODIUM 100 MG: 100 CAPSULE, LIQUID FILLED ORAL at 20:05

## 2022-12-26 RX ADMIN — POLYETHYLENE GLYCOL 3350 17 G: 17 POWDER, FOR SOLUTION ORAL at 08:48

## 2022-12-26 RX ADMIN — INSULIN LISPRO 15 UNITS: 100 INJECTION, SOLUTION INTRAVENOUS; SUBCUTANEOUS at 17:55

## 2022-12-26 RX ADMIN — INSULIN LISPRO 15 UNITS: 100 INJECTION, SOLUTION INTRAVENOUS; SUBCUTANEOUS at 08:49

## 2022-12-26 RX ADMIN — Medication 10 ML: at 20:06

## 2022-12-26 RX ADMIN — INSULIN LISPRO 15 UNITS: 100 INJECTION, SOLUTION INTRAVENOUS; SUBCUTANEOUS at 12:53

## 2022-12-26 RX ADMIN — BUDESONIDE 0.5 MG: 0.5 INHALANT RESPIRATORY (INHALATION) at 07:06

## 2022-12-26 RX ADMIN — ACETAMINOPHEN 500 MG: 500 TABLET ORAL at 20:05

## 2022-12-26 RX ADMIN — BUDESONIDE 0.5 MG: 0.5 INHALANT RESPIRATORY (INHALATION) at 18:43

## 2022-12-26 RX ADMIN — HEPARIN SODIUM 16 UNITS/KG/HR: 10000 INJECTION, SOLUTION INTRAVENOUS at 16:16

## 2022-12-26 RX ADMIN — FUROSEMIDE 40 MG: 10 INJECTION, SOLUTION INTRAMUSCULAR; INTRAVENOUS at 12:54

## 2022-12-26 RX ADMIN — ASPIRIN 81 MG CHEWABLE TABLET 81 MG: 81 TABLET CHEWABLE at 08:48

## 2022-12-26 RX ADMIN — METOLAZONE 10 MG: 2.5 TABLET ORAL at 20:05

## 2022-12-26 RX ADMIN — INSULIN LISPRO 2 UNITS: 100 INJECTION, SOLUTION INTRAVENOUS; SUBCUTANEOUS at 08:49

## 2022-12-26 RX ADMIN — CARVEDILOL 6.25 MG: 6.25 TABLET, FILM COATED ORAL at 17:55

## 2022-12-26 RX ADMIN — DOCUSATE SODIUM 100 MG: 100 CAPSULE, LIQUID FILLED ORAL at 08:48

## 2022-12-26 RX ADMIN — ROSUVASTATIN CALCIUM 5 MG: 5 TABLET, COATED ORAL at 20:05

## 2022-12-26 RX ADMIN — HEPARIN SODIUM 16 UNITS/KG/HR: 10000 INJECTION, SOLUTION INTRAVENOUS at 00:10

## 2022-12-26 RX ADMIN — INSULIN GLARGINE 45 UNITS: 100 INJECTION, SOLUTION SUBCUTANEOUS at 20:05

## 2022-12-26 RX ADMIN — INSULIN LISPRO 2 UNITS: 100 INJECTION, SOLUTION INTRAVENOUS; SUBCUTANEOUS at 17:55

## 2022-12-26 RX ADMIN — Medication 5 MG: at 20:05

## 2022-12-26 RX ADMIN — INSULIN LISPRO 4 UNITS: 100 INJECTION, SOLUTION INTRAVENOUS; SUBCUTANEOUS at 12:53

## 2022-12-26 RX ADMIN — CARVEDILOL 6.25 MG: 6.25 TABLET, FILM COATED ORAL at 08:48

## 2022-12-26 NOTE — PROGRESS NOTES
Daily Progress Note          Assessment    Hypoxemia  Flu B  COPD  LOGAN  Cor Pulmonale   CHF: LV EF 20-25%  DM II  Hyperlipidemia  HTN  CAD with multivessel disease  MENDEZ        PLAN:  Oxygen supplement and titration to maintain saturation above 90%: Currently requiring 2 L per nasal cannula  CABG is scheduled 12/29/2022  Patient is at moderate risk for pulmonary complications like atelectasis and pneumonia with surgery but no absolute contraindication  Optimize Incentive spirometer and BD, ICS prior to surgery and after   Tamiflu: Complete  BIPAP while sleeping and as needed  Bronchodilators  Inhaled corticosteroids  IS/Flutter valve  Diuresis and fluid management as per nephrology  Electrolytes/ glycemic control  Anticoagulation: Heparin drip            LOS: 9 days     Subjective     Currently patient is feeling okay with no chest pain or cough    Objective     Vital signs for last 24 hours:  Vitals:    12/26/22 0706 12/26/22 0711 12/26/22 0718 12/26/22 1217   BP:   145/77 149/85   BP Location:   Left arm Left arm   Patient Position:   Lying Sitting   Pulse: 70 75 75 77   Resp: 18 18 13 15   Temp:   97.4 °F (36.3 °C) 98.4 °F (36.9 °C)   TempSrc:   Oral Oral   SpO2: 97% 99% 97% 96%   Weight:       Height:           Intake/Output last 3 shifts:  I/O last 3 completed shifts:  In: 1499.1 [P.O.:960; I.V.:539.1]  Out: 3300 [Urine:3300]  Intake/Output this shift:  I/O this shift:  In: 240 [P.O.:240]  Out: 700 [Urine:700]      Radiology  Imaging Results (Last 24 Hours)     Procedure Component Value Units Date/Time    XR Chest 1 View [[id number]] Collected: 12/26/22 0718     Updated: 12/26/22 0722    Narrative:      DATE OF EXAM:  12/26/2022 5:42 AM     PROCEDURE:  XR CHEST 1 VW-     INDICATIONS:  CHF; I25.110-Atherosclerotic heart disease of native coronary artery  with unstable angina pectoris     COMPARISON:  12/17/2022 chest x-ray     TECHNIQUE:   Single radiographic view of the chest was obtained.     FINDINGS:  Lungs  normal expanded. Cardiomegaly. No pneumothorax or pleural  effusion. No focal pulmonary parenchymal opacity. Pulmonary vessels are  distinct.       Impression:      No acute cardiopulmonary abnormality.     Electronically Signed By-Kateryna Cuello MD On:12/26/2022 7:20 AM  This report was finalized on [id number] by  Kateryna Cuello MD.          Labs:  Results from last 7 days   Lab Units 12/25/22  0243   WBC 10*3/mm3 8.40   HEMOGLOBIN g/dL 14.3   HEMATOCRIT % 42.3   PLATELETS 10*3/mm3 305     Results from last 7 days   Lab Units 12/26/22  0443   SODIUM mmol/L 131*   POTASSIUM mmol/L 4.3   CHLORIDE mmol/L 89*   CO2 mmol/L 28.0   BUN mg/dL 41*   CREATININE mg/dL 1.56*   CALCIUM mg/dL 9.7   GLUCOSE mg/dL 130*                     Results from last 7 days   Lab Units 12/23/22  0250   MAGNESIUM mg/dL 2.1     Results from last 7 days   Lab Units 12/26/22  0309 12/25/22  1527 12/25/22  0937   APTT seconds 68.3 69.3 66.7               Meds:   SCHEDULE  aspirin, 81 mg, Oral, Daily  budesonide, 0.5 mg, Nebulization, BID - RT  carvedilol, 6.25 mg, Oral, BID With Meals  docusate sodium, 100 mg, Oral, BID  empagliflozin, 10 mg, Oral, Daily  furosemide, 40 mg, Intravenous, Q12H  insulin glargine, 45 Units, Subcutaneous, Nightly  insulin lispro, 15 Units, Subcutaneous, TID With Meals  insulin lispro, 2-12 Units, Subcutaneous, TID With Meals  polyethylene glycol, 17 g, Oral, Daily  rosuvastatin, 5 mg, Oral, Nightly  sodium chloride, 10 mL, Intravenous, Q12H      Infusions  heparin, 9.71 Units/kg/hr, Last Rate: 16 Units/kg/hr (12/26/22 0730)      PRNs  •  acetaminophen  •  dextrose  •  dextrose  •  glucagon (human recombinant)  •  ipratropium-albuterol  •  melatonin  •  nitroglycerin  •  ondansetron  •  potassium chloride **OR** potassium chloride **OR** potassium chloride  •  sodium chloride  •  sodium chloride    Physical Exam:  General Appearance:  Alert   HEENT:  Normocephalic, without obvious abnormality, Conjunctiva/corneas clear,.    Nares normal, no drainage     Neck:  Supple, symmetrical, trachea midline.   Lungs /Chest wall: Good air movement bilaterally without wheezing, respirations unlabored, symmetrical wall movement.     Heart:  Regular rate and rhythm, S1 S2 normal  Abdomen: Soft, non-tender, no masses, no organomegaly.    Extremities: No edema, no clubbing or cyanosis     ROS  Constitutional: Negative for chills, fever and malaise/fatigue.   HENT: Negative.    Eyes: Negative.    Cardiovascular: Negative.    Respiratory: Negative for cough and shortness of breath.    Skin: Negative.    Musculoskeletal: Negative.    Gastrointestinal: Negative.    Genitourinary: Negative.    Neurological: Negative.    Psychiatric/Behavioral: Negative.      I reviewed the recent clinical results    Much of this encounter note is an electronic transcription/translation of spoken language to printed text using Dragon Software which might include inadvertent errors in transcription.

## 2022-12-26 NOTE — PROGRESS NOTES
"      FOLLOW UP NOTE      Name: Sherman Baumann ADMIT: 2022   : 1945  PCP: Melvina Hodge    MRN: [id number] LOS: 9 days   AGE/SEX: 76 y.o. male  ROOM:      Date of Service: 2022                           CHIEF COMPLIANTS / REASON FOR FOLLOW UP          Acute kidney injury versus chronic kidney disease a stage III      Subjective:      Seen and examined  Resting in bed, no distress, no new complaints     Review of Systems:       Constitutional: No fever, no chills, no lethargy, no weakness.  HEENT:  No headache, otalgia, itchy eyes, nasal discharge or sore throat.  Cardiac:  No chest pain, dyspnea, orthopnea or PND.  Chest:   No cough, phlegm or wheezing.  Abdomen:  No abdominal pain, nausea or vomiting.  Neuro:  No focal weakness, abnormal movements or seizure-like activity.  :   No hematuria, no pyuria, no dysuria, no flank pain.  Extremities:  No  joint pains.  ROS was otherwise negative except as mentioned in the [place].        OBJECTIVE                                                                        Exam:  /77 (BP Location: Left arm, Patient Position: Lying)   Pulse 75   Temp 97.4 °F (36.3 °C) (Oral)   Resp 13   Ht 177.8 cm (70\")   Wt 101 kg (223 lb 8.7 oz)   SpO2 97%   BMI 32.08 kg/m²   Intake/Output last 3 shifts:  I/O last 3 completed shifts:  In: 1499.1 [P.O.:960; I.V.:539.1]  Out: 3300 [Urine:3300]  Intake/Output this shift:  I/O this shift:  In: 240 [P.O.:240]  Out: 200 [Urine:200]    General Appearance:  Alert, chronically ill appearing, cooperative, no distress, appears stated age  Head:  Normocephalic, without obvious abnormality, atraumatic  Eyes:  Sclerae anicteric, EOM's intact     Neck:  Supple,  no adenopathy;      Lungs:   Clear to auscultation bilaterally, respirations unlabored  Heart:  Regular rate and rhythm, S1 and S2 normal, no  rub   or gallop  Abdomen:  Soft, nontender,    no masses, no hepatomegaly, no splenomegaly  Extremities:  " Extremities normal, no edema  Neurologic:   Alert and oriented, no focal deficits    Scheduled Meds:aspirin, 81 mg, Oral, Daily  budesonide, 0.5 mg, Nebulization, BID - RT  carvedilol, 6.25 mg, Oral, BID With Meals  docusate sodium, 100 mg, Oral, BID  empagliflozin, 10 mg, Oral, Daily  furosemide, 40 mg, Intravenous, Q12H  insulin glargine, 45 Units, Subcutaneous, Nightly  insulin lispro, 15 Units, Subcutaneous, TID With Meals  insulin lispro, 2-12 Units, Subcutaneous, TID With Meals  polyethylene glycol, 17 g, Oral, Daily  rosuvastatin, 5 mg, Oral, Nightly  sodium chloride, 10 mL, Intravenous, Q12H      Continuous Infusions:heparin, 9.71 Units/kg/hr, Last Rate: 16 Units/kg/hr (12/26/22 0730)      PRN Meds:•  acetaminophen  •  dextrose  •  dextrose  •  glucagon (human recombinant)  •  ipratropium-albuterol  •  melatonin  •  nitroglycerin  •  ondansetron  •  potassium chloride **OR** potassium chloride **OR** potassium chloride  •  sodium chloride  •  sodium chloride         Data Review:                                                                           Labs reviewed      Imaging:                                                                           Imaging reviewed           ASSESSMENT:                                                                                CAD (coronary artery disease), native coronary artery    Type 2 diabetes mellitus with hyperglycemia, without long-term current use of insulin (HCC)    Mixed hyperlipidemia    Primary hypertension    Systolic and diastolic CHF, acute on chronic (HCC)    Influenza due to seasonal influenza virus    • Acute kidney injury with chronic kidney disease a stage III, baseline creatinine December 17, 2022 on admission 1.16, now creatinine around 1.88-2.12. Suspect variable creatinine secondary to hemodynamic and volume changes.  With history of diabetes, hypertension.  Urine analysis shows 100 mg of protein, no RBCs, no WBCs.  Urine output about 1 L.     • Volume overload with pulmonary edema.  • Diabetes type 2  • Hypertension  • Coronary artery disease multivalvular disease.  • Congestive heart failure ejection fraction 15 to 20%.  •  Acute coronary syndrome.     Plan:      • Patient has chronic kidney disease a stage III renal function is variable, creatinine peaked at 2.12, now creatinine 1.56 with good urine output 2.4L/24hour  • Electrolytes acceptable, mild hyponatremia  • I expect renal function to stabilize.  • Chest x-ray reviewed s/p lasix 40 mg IV every 12 hours x 3.  • Will give metolazone 10 mg po now  • Patient is currently not on any ACE inhibitor's or angiotensin receptor blockers. Noted cardiology plans to introduce when kidney function stabilizes  • Urine analysis with glucosuria, ketones, 100 mg/dL protein, no hematuria, no pyuria  • We will follow-up this patient closely, cardiology and CT surgery plan noted for CABG On December 29, 2022. Repeat echocardiogram tomorrow to re-eval LV function.           ROWAN Qureshi  [facility]  12/26/2022  11:43 EST      The note was transcribed by ROWAN Qureshi.  I personally have examined the patient and interviewed the patient and modified the history, exam. I have reviewed the history, data, problems, assessment and plan .and I concur . Exam as described in the Progress note, with comments additions, revisions as noted by me.         Duran Briggs MD  [facility]  12/26/2022  18:13 EST

## 2022-12-26 NOTE — THERAPY TREATMENT NOTE
"Subjective: Pt agreeable to therapeutic plan of care. Pt reports he is eager to walk. Does not want to get weak.    Objective:     Very [place]; wearing B hearing aids.    Bed mobility - Min-A  Transfers - CGA, Min-A and with rolling walker  Ambulation - 190 feet CGA and with rolling walker ` shuffled gait; faciliation of heel strike and lifting feet to avoid shuffling.     Precautions:  Mid-sternal incision; avoid scapular retraction and depression.  Cardiovascular impairment post-sx; encourage energy conservation strategies.    Vitals: WNL    Pain: 0 VAS   Location: n/a  Intervention for pain: Repositioned, Increased Activity and Therapeutic Presence    Education: Provided education on the importance of mobility in the acute care setting, Transfer Training, Gait Training and Post-Op Precautions    Assessment: Sherman Baumann is very eager to get strong and recooperate from pending sx. Reviewed post op precautions and practiced w/ transfers today. He presents with functional mobility impairments which indicate the need for skilled intervention. Tolerating session today without incident. Will continue to follow and progress as tolerated.     Plan/Recommendations:   Low Intensity Therapy recommended post-acute care - This is recommended as therapy feels this patient would require 2-3 visits per week. OP or HH would be the best option depending on patient's home bound status. Consider, if the patient has other  \"skilled\" needs such as wounds, IV antibiotics, etc. Combined with \"low intensity\" could also equate to a SNF. If patient is medically complex, consider LTAC.. Pt requires no DME at discharge.     Pt desires Home with Home Health at discharge. Pt cooperative; agreeable to therapeutic recommendations and plan of care.         Basic Mobility 6-click:  Rollin = Total, A lot = 2, A little = 3; 4 = None  Supine>Sit:   1 = Total, A lot = 2, A little = 3; 4 = None   Sit>Stand with arms:  1 = Total, A lot = " 2, A little = 3; 4 = None  Bed>Chair:   1 = Total, A lot = 2, A little = 3; 4 = None  Ambulate in room:  1 = Total, A lot = 2, A little = 3; 4 = None  3-5 Steps with railin = Total, A lot = 2, A little = 3; 4 = None  Score: 17    Modified [place]: N/A = No pre-op stroke/TIA    Post-Tx Position: Up in Chair, Alarms activated and Call light and personal items within reach; LEs elevated  PPE: gloves and surgical mask

## 2022-12-26 NOTE — PROGRESS NOTES
Referring Provider: Jaleel Huber MD    Reason for follow-up: Multivessel coronary disease, non-ST elevation MI, diabetes     Patient Care Team:  Melvina Hodge as PCP - General (Nurse Practitioner)      SUBJECTIVE  Patient is resting comfortably in bed, denies chest pain or shortness of breath.      ROS  Review of all systems negative except as indicated.    Since I have last seen, the patient has been without any chest discomfort, shortness of breath, palpitations, dizziness or syncope.  Denies having any headache, abdominal pain, nausea, vomiting, diarrhea, constipation, loss of weight or loss of appetite.  Denies having any excessive bruising, hematuria or blood in the stool.  ROS      Personal History:    Past Medical History:   Diagnosis Date   • CHF (congestive heart failure) (HCC)    • Diabetes mellitus (HCC)    • Elevated cholesterol    • Hyperlipidemia    • Hypertension        Past Surgical History:   Procedure Laterality Date   • HERNIA REPAIR     • TONSILLECTOMY         No family history on file.    Social History     Tobacco Use   • Smoking status: Former     Types: Cigarettes   Vaping Use   • Vaping Use: Never used   Substance Use Topics   • Alcohol use: Not Currently   • Drug use: Never        Medications Prior to Admission   Medication Sig Dispense Refill Last Dose   • aspirin 81 MG chewable tablet Chew 81 mg Daily.      • aspirin-acetaminophen-caffeine (EXCEDRIN MIGRAINE) 250-250-65 MG per tablet Take 1 tablet by mouth Every 6 (Six) Hours As Needed for Headache.      • carvedilol (COREG) 3.125 MG tablet Take 3.125 mg by mouth 2 (Two) Times a Day With Meals.      • furosemide (LASIX) 40 MG tablet Take 40 mg by mouth 2 (Two) Times a Day.      • glipizide (GLUCOTROL) 10 MG tablet Take 10 mg by mouth 2 (Two) Times a Day Before Meals.      • insulin NPH-insulin regular (humuLIN 70/30,novoLIN 70/30) (70-30) 100 UNIT/ML injection Inject 27 Units under the skin into the appropriate area as directed  "Every Morning.      • insulin NPH-insulin regular (humuLIN 70/30,novoLIN 70/30) (70-30) 100 UNIT/ML injection Inject 22 Units under the skin into the appropriate area as directed Every Night.      • metFORMIN (GLUCOPHAGE) 500 MG tablet Take 500 mg by mouth Daily With Breakfast.      • Omega-3 Fatty Acids (fish oil) 1000 MG capsule capsule Take 1,000 mg by mouth Daily With Breakfast.      • oseltamivir (TAMIFLU) 75 MG capsule Take 75 mg by mouth 2 (Two) Times a Day.      • rosuvastatin (CRESTOR) 5 MG tablet Take 5 mg by mouth Every Night.          Allergies:  Patient has no known allergies.    Scheduled Meds:aspirin, 81 mg, Oral, Daily  budesonide, 0.5 mg, Nebulization, BID - RT  carvedilol, 6.25 mg, Oral, BID With Meals  docusate sodium, 100 mg, Oral, BID  empagliflozin, 10 mg, Oral, Daily  furosemide, 40 mg, Intravenous, Q12H  insulin glargine, 45 Units, Subcutaneous, Nightly  insulin lispro, 15 Units, Subcutaneous, TID With Meals  insulin lispro, 2-12 Units, Subcutaneous, TID With Meals  polyethylene glycol, 17 g, Oral, Daily  rosuvastatin, 5 mg, Oral, Nightly  sodium chloride, 10 mL, Intravenous, Q12H      Continuous Infusions:heparin, 9.71 Units/kg/hr, Last Rate: 16 Units/kg/hr (12/26/22 0010)      PRN Meds:.•  acetaminophen  •  dextrose  •  dextrose  •  glucagon (human recombinant)  •  ipratropium-albuterol  •  melatonin  •  nitroglycerin  •  ondansetron  •  potassium chloride **OR** potassium chloride **OR** potassium chloride  •  sodium chloride  •  sodium chloride      OBJECTIVE    Vital Signs  Vitals:    12/26/22 0600 12/26/22 0706 12/26/22 0711 12/26/22 0718   BP: 130/68   145/77   BP Location:    Left arm   Patient Position:    Lying   Pulse: 71 70 75 75   Resp:  18 18 13   Temp:    97.4 °F (36.3 °C)   TempSrc:    Oral   SpO2: 97% 97% 99% 97%   Weight:       Height:           Flowsheet Rows    Flowsheet Row First Filed Value   Admission Height 177.8 cm (70\") Documented at 12/18/2022 1305   Admission " Weight 103 kg (227 lb 1.2 oz) Documented at 12/17/2022 0500            Intake/Output Summary (Last 24 hours) at 12/26/2022 0827  Last data filed at 12/26/2022 0718  Gross per 24 hour   Intake 1189.1 ml   Output 2600 ml   Net -1410.9 ml          Telemetry: Sinus rhythm    Physical Exam:  The patient is alert, oriented and in no distress.  Vital signs as noted above.  Head and neck revealed no carotid bruits or jugular venous distention.  No thyromegaly or lymphadenopathy is present  Lungs clear.  No wheezing.  Breath sounds are normal bilaterally.  Heart normal first and second heart sounds.  No murmur. No precordial rub is present.  No gallop is present.  Abdomen soft and nontender.  No organomegaly is present.  Extremities with good peripheral pulses without any pedal edema.  Skin warm and dry.  Musculoskeletal system is grossly normal.  CNS grossly normal.       Results Review:  I have personally reviewed the results from the time of this admission to 12/26/2022 08:27 EST and agree with these findings:  []  Laboratory  []  Microbiology  []  Radiology  []  EKG/Telemetry   []  Cardiology/Vascular   []  Pathology  []  Old records  []  Other:    Most notable findings include:    Lab Results (last 24 hours)     Procedure Component Value Units Date/Time    POC Glucose Once [[id number]]  (Abnormal) Collected: 12/26/22 0728    Specimen: Blood Updated: 12/26/22 0730     Glucose 167 mg/dL      Comment: Serial Number: [id number]:  [id number]       Basic Metabolic Panel [[id number]]  (Abnormal) Collected: 12/26/22 0443    Specimen: Blood Updated: 12/26/22 0513     Glucose 130 mg/dL      BUN 41 mg/dL      Creatinine 1.56 mg/dL      Sodium 131 mmol/L      Potassium 4.3 mmol/L      Comment: Slight hemolysis detected by analyzer. Results may be affected.        Chloride 89 mmol/L      CO2 28.0 mmol/L      Calcium 9.7 mg/dL      BUN/Creatinine Ratio 26.3     Anion Gap 14.0 mmol/L      eGFR 46.0 mL/min/1.73      Comment:  National Kidney Foundation and American Society of Nephrology (ASN) Task Force recommended calculation based on the Chronic Kidney Disease Epidemiology Collaboration (CKD-EPI) equation refit without adjustment for race.       Narrative:      GFR Normal >60  Chronic Kidney Disease <60  Kidney Failure <15    The GFR formula is only valid for adults with stable renal function between ages 18 and 70.    aPTT [[id number]]  (Normal) Collected: 12/26/22 0309    Specimen: Blood Updated: 12/26/22 0408     PTT 68.3 seconds     POC Glucose Once [[id number]]  (Abnormal) Collected: 12/25/22 1920    Specimen: Blood Updated: 12/25/22 1921     Glucose 168 mg/dL      Comment: Serial Number: [id number]:  [id number]       aPTT [[id number]]  (Normal) Collected: 12/25/22 1527    Specimen: Blood Updated: 12/25/22 1743     PTT 69.3 seconds     POC Glucose Once [[id number]]  (Abnormal) Collected: 12/25/22 1633    Specimen: Blood Updated: 12/25/22 1635     Glucose 154 mg/dL      Comment: Serial Number: [id number]:  [id number]       POC Glucose Once [[id number]]  (Abnormal) Collected: 12/25/22 1136    Specimen: Blood Updated: 12/25/22 1137     Glucose 218 mg/dL      Comment: Serial Number: [id number]:  [id number]       aPTT [[id number]]  (Normal) Collected: 12/25/22 0937    Specimen: Blood Updated: 12/25/22 0959     PTT 66.7 seconds           Imaging Results (Last 24 Hours)     Procedure Component Value Units Date/Time    XR Chest 1 View [[id number]] Collected: 12/26/22 0718     Updated: 12/26/22 0722    Narrative:      DATE OF EXAM:  12/26/2022 5:42 AM     PROCEDURE:  XR CHEST 1 VW-     INDICATIONS:  CHF; I25.110-Atherosclerotic heart disease of native coronary artery  with unstable angina pectoris     COMPARISON:  12/17/2022 chest x-ray     TECHNIQUE:   Single radiographic view of the chest was obtained.     FINDINGS:  Lungs normal expanded. Cardiomegaly. No pneumothorax or pleural  effusion. No focal pulmonary parenchymal  opacity. Pulmonary vessels are  distinct.       Impression:      No acute cardiopulmonary abnormality.     Electronically Signed By-Kateryna Cuello MD On:12/26/2022 7:20 AM  This report was finalized on [id number] by  Kateryna Cuello MD.          LAB RESULTS (LAST 7 DAYS)    CBC  Results from last 7 days   Lab Units 12/25/22 0243 12/23/22 0250 12/20/22 2313   WBC 10*3/mm3 8.40 9.90 7.70   RBC 10*6/mm3 4.73 4.71 4.90   HEMOGLOBIN g/dL 14.3 13.8 14.3   HEMATOCRIT % 42.3 42.0 44.3   MCV fL 89.4 89.3 90.5   PLATELETS 10*3/mm3 305 346 336       BMP  Results from last 7 days   Lab Units 12/26/22 0443 12/25/22 0243 12/24/22 0450 12/23/22 0250 12/22/22 0429 12/20/22 2313 12/20/22  1103   SODIUM mmol/L 131* 132* 131* 135* 135* 135* 130*   POTASSIUM mmol/L 4.3 4.6 4.4 5.0 4.3 4.4 4.7   CHLORIDE mmol/L 89* 94* 93* 94* 92* 92* 91*   CO2 mmol/L 28.0 26.0 24.0 28.0 28.0 29.0 28.0   BUN mg/dL 41* 42* 52* 42* 31* 28* 28*   CREATININE mg/dL 1.56* 1.34* 1.88* 2.12* 1.26 1.21 1.10   GLUCOSE mg/dL 130* 101* 146* 95 107* 135* 374*   MAGNESIUM mg/dL  --   --   --  2.1  --  1.8  --    PHOSPHORUS mg/dL  --   --   --   --   --  4.5  --        CMP   Results from last 7 days   Lab Units 12/26/22 0443 12/25/22 0243 12/24/22 0450 12/23/22 0250 12/22/22 0429 12/20/22 2313 12/20/22  1103   SODIUM mmol/L 131* 132* 131* 135* 135* 135* 130*   POTASSIUM mmol/L 4.3 4.6 4.4 5.0 4.3 4.4 4.7   CHLORIDE mmol/L 89* 94* 93* 94* 92* 92* 91*   CO2 mmol/L 28.0 26.0 24.0 28.0 28.0 29.0 28.0   BUN mg/dL 41* 42* 52* 42* 31* 28* 28*   CREATININE mg/dL 1.56* 1.34* 1.88* 2.12* 1.26 1.21 1.10   GLUCOSE mg/dL 130* 101* 146* 95 107* 135* 374*       BNP        TROPONIN        CoAg  Results from last 7 days   Lab Units 12/26/22  0309 12/25/22  1527 12/25/22  0937 12/25/22  0243 12/24/22  1945 12/24/22  1228 12/24/22  0450   APTT seconds 68.3 69.3 66.7 82.8* 87.6* 81.7* 101.9*       Creatinine Clearance  Estimated Creatinine Clearance: 48.7 mL/min (A) (by C-G  formula based on SCr of 1.56 mg/dL (H)).    ABG        Radiology  XR Chest 1 View    Result Date: 12/26/2022  No acute cardiopulmonary abnormality.  Electronically Signed By-Kateryna Cuello MD On:12/26/2022 7:20 AM This report was finalized on [id number] by  Kateryna Cuello MD.        EKG  I personally viewed and interpreted the patient's EKG/Telemetry data:  ECG 12 Lead Chest Pain   Final Result   HEART RATE= 89  bpm   RR Interval= 672  ms   ID Interval= 176  ms   P Horizontal Axis= -5  deg   P Front Axis= 29  deg   QRSD Interval= 92  ms   QT Interval= 362  ms   QRS Axis= 16  deg   T Wave Axis= 163  deg   - ABNORMAL ECG -   Sinus rhythm   Probable left atrial enlargement   Probable anterior infarct, age indeterminate   Abnormal T, consider ischemia, lateral leads   No previous ECG available for comparison   Electronically Signed By: Alex Byrd (BERNY) 19-Dec-2022 08:52:18   Date and Time of Study: 2022-12-17 07:21:47      SCANNED - TELEMETRY     Final Result      SCANNED - TELEMETRY     Final Result      SCANNED - TELEMETRY     Final Result      SCANNED - TELEMETRY     Final Result      SCANNED - TELEMETRY     Final Result      SCANNED - TELEMETRY     Final Result      SCANNED - TELEMETRY     Final Result      SCANNED - TELEMETRY     Final Result      SCANNED - TELEMETRY     Final Result      SCANNED - TELEMETRY     Final Result      SCANNED - TELEMETRY     Final Result      SCANNED - TELEMETRY     Final Result      SCANNED - TELEMETRY     Final Result      SCANNED - TELEMETRY     Final Result      SCANNED - TELEMETRY     Final Result      SCANNED - TELEMETRY     Final Result      SCANNED - TELEMETRY     Final Result      SCANNED - TELEMETRY     Final Result      SCANNED - TELEMETRY     Final Result      SCANNED - TELEMETRY     Final Result      SCANNED - TELEMETRY     Final Result      SCANNED - TELEMETRY     Final Result      SCANNED - TELEMETRY     Final Result      SCANNED - TELEMETRY     Final Result       SCANNED - TELEMETRY     Final Result      SCANNED - TELEMETRY     Final Result      SCANNED - TELEMETRY     Final Result      SCANNED - TELEMETRY     Final Result      SCANNED - TELEMETRY     Final Result      SCANNED - TELEMETRY     Final Result      SCANNED - TELEMETRY     Final Result      SCANNED - TELEMETRY     Final Result      SCANNED - TELEMETRY     Final Result      SCANNED - TELEMETRY     Final Result      SCANNED - TELEMETRY     Final Result      SCANNED - TELEMETRY     Final Result      SCANNED - TELEMETRY     Final Result      SCANNED - TELEMETRY     Final Result      SCANNED - TELEMETRY     Final Result      SCANNED - TELEMETRY     Final Result      SCANNED - TELEMETRY     Final Result            Echocardiogram:    Results for orders placed during the hospital encounter of 12/17/22    Adult Transthoracic Echo Complete W/ Cont if Necessary Per Protocol    Interpretation Summary  •  Calculated left ventricular EF = 40% Left ventricular ejection fraction appears to be 40 - 45%.  •  The left ventricular cavity is dilated.  •  Left ventricular diastolic function is consistent with (grade I) impaired relaxation.  •  Mild aortic valve stenosis is present.  •  Estimated right ventricular systolic pressure from tricuspid regurgitation is normal (<35 mmHg).        Stress Test:         Cardiac Catheterization:  No results found for this or any previous visit.         Other:         ASSESSMENT & PLAN:    Principal Problem:    CAD (coronary artery disease), native coronary artery  Active Problems:    Type 2 diabetes mellitus with hyperglycemia, without long-term current use of insulin (HCC)    Mixed hyperlipidemia    Primary hypertension    Systolic and diastolic CHF, acute on chronic (HCC)    Influenza due to seasonal influenza virus    76-year-old man with multivessel coronary disease presented with non-ST elevation MI and acute kidney injury.  He also has hypertension, hyperlipidemia and poorly controlled  diabetes.  He was diagnosed with influenza for which she has now completed treatment with Tamiflu.  Troponin peaked at 4.5 and creatinine peaked at 2.2.  His renal function normalized and then worsened again.  We discontinued ACE inhibitor and diuretics.  Nephrology is now on board and has reintroduced IV Lasix.  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  Initial EF 20% improved to 40% on repeat echo with contrast.  Currently on heparin drip, aspirin, high intensity statin, beta-blocker and Jardiance.  We will introduce low-dose ACE inhibitor once renal function stabilizes.  Plan is for CABG on the 29th.  CABG preferred over PCI due to multivessel coronary disease, diabetes, low EF.  High risk complex PCI with Impella and atherectomy can be offered if patient is turned down for CABG.  Repeat echocardiogram tomorrow.  Hoping for improvement in renal function after IV diuretics.          Johann Morales MD  12/26/22  08:27 EST

## 2022-12-26 NOTE — PROGRESS NOTES
CC:  Shortness of breath    F/U:  MV CAD/ NSTEMI/ severe LV dysfunction--[facility]  EF 20-25% (echo)    Subjective:  No c/o's, reports he feels much better     No events overnight  Drips:  heparin      PREOP studies:  Carotid duplex:  16-49% bilat  Vein zoraida:  adequate  A1c:  8.4 at [facility]   Plt agg:  pending  MRSA screen:  pending  COVID-19 screen:  Neg 12/17  UA:  Neg for UTI 12/17  Echo:  Repeat ordered 12/18      Intake/Output Summary (Last 24 hours) at 12/26/2022 0908  Last data filed at 12/26/2022 0800  Gross per 24 hour   Intake 1429.1 ml   Output 2600 ml   Net -1170.9 ml     Temp:  [97.1 °F (36.2 °C)-98.2 °F (36.8 °C)] 97.4 °F (36.3 °C)  Heart Rate:  [63-83] 75  Resp:  [12-18] 13  BP: ()/(49-90) 145/77      Results from last 7 days   Lab Units 12/25/22  0243 12/23/22  0250   WBC 10*3/mm3 8.40 9.90   HEMOGLOBIN g/dL 14.3 13.8   HEMATOCRIT % 42.3 42.0   PLATELETS 10*3/mm3 305 346     Results from last 7 days   Lab Units 12/26/22  0443 12/24/22  0450 12/23/22  0250 12/22/22  0429 12/20/22  2313   CREATININE mg/dL 1.56*   < > 2.12*   < > 1.21   POTASSIUM mmol/L 4.3   < > 5.0   < > 4.4   SODIUM mmol/L 131*   < > 135*   < > 135*   MAGNESIUM mg/dL  --   --  2.1  --  1.8   PHOSPHORUS mg/dL  --   --   --   --  4.5    < > = values in this interval not displayed.       Physical Exam:  Neuro intact, nad, resting in bed, [facility]  Tele:  SR 70s  Diminished bases, 94% 3L  Benign abd, + BM  No edema    Assessment/Plan:  Principal Problem:    CAD (coronary artery disease), native coronary artery  Active Problems:    Type 2 diabetes mellitus with hyperglycemia, without long-term current use of insulin (HCC)    Mixed hyperlipidemia    Primary hypertension    Systolic and diastolic CHF, acute on chronic (HCC)    Influenza due to seasonal influenza virus    - MV CAD with NSTEMI presentation, EF 20-25% (echo)--transferred for further care and evaluation  - Acute HFrEF, NYHA class II-III--BNP 5580 at OSH  - Influenzae B--ID  consulted, Tamiflu  - ICM  - DM type 2 with hyperglycemia--a1c 8.4 at OSH  - HTN--stable  - Past smoker  - Tribe, hearing aids in place  - Obesity  - COPD--pulm following  - MENDEZ on probable CKD stage 3--peak cr preop  2.1, renal following    Plans for CABG with [facility] on 12/29.  Echo tomorrow to re-eval LV function.  Last echo 12/21, EF 40-45%.  Completed Tamiflu.    Narda Duffy, APRN  12/26/2022  09:08 EST

## 2022-12-26 NOTE — CASE MANAGEMENT/SOCIAL WORK
Continued Stay Note  [facility]     Patient Name: Sherman Baumann  MRN: [id number]  Today's Date: 12/26/2022    Admit Date: 12/17/2022    Plan: D/C Plan: Anticipate routine home pending clinical course post-CABG (12/29). [facility] referral pending acceptance, order form needed.   Discharge Plan     Row Name 12/26/22 1447       Plan    Plan Comments Barriers to d/c: CABH planned for 12/27, Heparin gtt, O2 3L NC.                    Expected Discharge Date and Time     Expected Discharge Date Expected Discharge Time    Jan 4, 2023             Danita Shetty RN

## 2022-12-26 NOTE — PLAN OF CARE
"Goal Outcome Evaluation:         Assessment: Sherman Baumann is very eager to get strong and recooperate from pending sx. Reviewed post op precautions and practiced w/ transfers today. He presents with functional mobility impairments which indicate the need for skilled intervention. Tolerating session today without incident. Will continue to follow and progress as tolerated.     Plan/Recommendations:   Low Intensity Therapy recommended post-acute care - This is recommended as therapy feels this patient would require 2-3 visits per week. OP or HH would be the best option depending on patient's home bound status. Consider, if the patient has other  \"skilled\" needs such as wounds, IV antibiotics, etc. Combined with \"low intensity\" could also equate to a SNF. If patient is medically complex, consider LTAC.. Pt requires no DME at discharge.     Pt desires Home with Home Health at discharge. Pt cooperative; agreeable to therapeutic recommendations and plan of care.             "

## 2022-12-27 ENCOUNTER — APPOINTMENT (OUTPATIENT)
Dept: GENERAL RADIOLOGY | Facility: HOSPITAL | Age: 77
DRG: 235 | End: 2022-12-27
Payer: MEDICARE

## 2022-12-27 ENCOUNTER — APPOINTMENT (OUTPATIENT)
Dept: CARDIOLOGY | Facility: HOSPITAL | Age: 77
DRG: 235 | End: 2022-12-27
Payer: MEDICARE

## 2022-12-27 LAB
ANION GAP SERPL CALCULATED.3IONS-SCNC: 19 MMOL/L (ref 5–15)
APTT PPP: 59.7 SECONDS (ref 61–76.5)
APTT PPP: 76.5 SECONDS (ref 61–76.5)
APTT PPP: 77 SECONDS (ref 61–76.5)
BASOPHILS # BLD AUTO: 0.1 10*3/MM3 (ref 0–0.2)
BASOPHILS NFR BLD AUTO: 0.8 % (ref 0–1.5)
BILIRUB UR QL STRIP: NEGATIVE
BUN SERPL-MCNC: 51 MG/DL (ref 8–23)
BUN/CREAT SERPL: 34 (ref 7–25)
CALCIUM SPEC-SCNC: 10.4 MG/DL (ref 8.6–10.5)
CHLORIDE SERPL-SCNC: 82 MMOL/L (ref 98–107)
CLARITY UR: CLEAR
CO2 SERPL-SCNC: 26 MMOL/L (ref 22–29)
COLOR UR: YELLOW
CREAT SERPL-MCNC: 1.5 MG/DL (ref 0.76–1.27)
DEPRECATED RDW RBC AUTO: 46.8 FL (ref 37–54)
EGFRCR SERPLBLD CKD-EPI 2021: 48 ML/MIN/1.73
EOSINOPHIL # BLD AUTO: 0.2 10*3/MM3 (ref 0–0.4)
EOSINOPHIL NFR BLD AUTO: 2 % (ref 0.3–6.2)
ERYTHROCYTE [DISTWIDTH] IN BLOOD BY AUTOMATED COUNT: 14.4 % (ref 12.3–15.4)
GLUCOSE BLDC GLUCOMTR-MCNC: 134 MG/DL (ref 70–105)
GLUCOSE BLDC GLUCOMTR-MCNC: 166 MG/DL (ref 70–105)
GLUCOSE BLDC GLUCOMTR-MCNC: 181 MG/DL (ref 70–105)
GLUCOSE BLDC GLUCOMTR-MCNC: 278 MG/DL (ref 70–105)
GLUCOSE SERPL-MCNC: 284 MG/DL (ref 65–99)
GLUCOSE UR STRIP-MCNC: NEGATIVE MG/DL
HCT VFR BLD AUTO: 46.2 % (ref 37.5–51)
HGB BLD-MCNC: 15.5 G/DL (ref 13–17.7)
HGB UR QL STRIP.AUTO: NEGATIVE
KETONES UR QL STRIP: ABNORMAL
LEUKOCYTE ESTERASE UR QL STRIP.AUTO: NEGATIVE
LYMPHOCYTES # BLD AUTO: 2.4 10*3/MM3 (ref 0.7–3.1)
LYMPHOCYTES NFR BLD AUTO: 20.7 % (ref 19.6–45.3)
MCH RBC QN AUTO: 29.7 PG (ref 26.6–33)
MCHC RBC AUTO-ENTMCNC: 33.5 G/DL (ref 31.5–35.7)
MCV RBC AUTO: 88.6 FL (ref 79–97)
MONOCYTES # BLD AUTO: 1.3 10*3/MM3 (ref 0.1–0.9)
MONOCYTES NFR BLD AUTO: 10.9 % (ref 5–12)
NEUTROPHILS NFR BLD AUTO: 65.6 % (ref 42.7–76)
NEUTROPHILS NFR BLD AUTO: 7.7 10*3/MM3 (ref 1.7–7)
NITRITE UR QL STRIP: NEGATIVE
NRBC BLD AUTO-RTO: 0.1 /100 WBC (ref 0–0.2)
PH UR STRIP.AUTO: <=5 [PH] (ref 5–8)
PLATELET # BLD AUTO: 361 10*3/MM3 (ref 140–450)
PMV BLD AUTO: 8.6 FL (ref 6–12)
POTASSIUM SERPL-SCNC: 4.7 MMOL/L (ref 3.5–5.2)
PROT UR QL STRIP: NEGATIVE
RBC # BLD AUTO: 5.22 10*6/MM3 (ref 4.14–5.8)
SARS-COV-2 RNA PNL SPEC NAA+PROBE: NOT DETECTED
SODIUM SERPL-SCNC: 127 MMOL/L (ref 136–145)
SP GR UR STRIP: 1.02 (ref 1–1.03)
UROBILINOGEN UR QL STRIP: ABNORMAL
WBC NRBC COR # BLD: 11.8 10*3/MM3 (ref 3.4–10.8)

## 2022-12-27 PROCEDURE — 93325 DOPPLER ECHO COLOR FLOW MAPG: CPT | Performed by: INTERNAL MEDICINE

## 2022-12-27 PROCEDURE — 81003 URINALYSIS AUTO W/O SCOPE: CPT | Performed by: NURSE PRACTITIONER

## 2022-12-27 PROCEDURE — 97535 SELF CARE MNGMENT TRAINING: CPT

## 2022-12-27 PROCEDURE — 94799 UNLISTED PULMONARY SVC/PX: CPT

## 2022-12-27 PROCEDURE — 25010000002 FUROSEMIDE PER 20 MG: Performed by: INTERNAL MEDICINE

## 2022-12-27 PROCEDURE — 63710000001 INSULIN LISPRO (HUMAN) PER 5 UNITS: Performed by: INTERNAL MEDICINE

## 2022-12-27 PROCEDURE — 25010000002 HEPARIN (PORCINE) 25000-0.45 UT/250ML-% SOLUTION: Performed by: THORACIC SURGERY (CARDIOTHORACIC VASCULAR SURGERY)

## 2022-12-27 PROCEDURE — 71045 X-RAY EXAM CHEST 1 VIEW: CPT

## 2022-12-27 PROCEDURE — 93325 DOPPLER ECHO COLOR FLOW MAPG: CPT

## 2022-12-27 PROCEDURE — 93308 TTE F-UP OR LMTD: CPT

## 2022-12-27 PROCEDURE — 99231 SBSQ HOSP IP/OBS SF/LOW 25: CPT | Performed by: NURSE PRACTITIONER

## 2022-12-27 PROCEDURE — 85025 COMPLETE CBC W/AUTO DIFF WBC: CPT | Performed by: THORACIC SURGERY (CARDIOTHORACIC VASCULAR SURGERY)

## 2022-12-27 PROCEDURE — 87635 SARS-COV-2 COVID-19 AMP PRB: CPT | Performed by: PHYSICIAN ASSISTANT

## 2022-12-27 PROCEDURE — 99233 SBSQ HOSP IP/OBS HIGH 50: CPT | Performed by: INTERNAL MEDICINE

## 2022-12-27 PROCEDURE — 97116 GAIT TRAINING THERAPY: CPT

## 2022-12-27 PROCEDURE — 94664 DEMO&/EVAL PT USE INHALER: CPT

## 2022-12-27 PROCEDURE — 93308 TTE F-UP OR LMTD: CPT | Performed by: INTERNAL MEDICINE

## 2022-12-27 PROCEDURE — 85730 THROMBOPLASTIN TIME PARTIAL: CPT | Performed by: THORACIC SURGERY (CARDIOTHORACIC VASCULAR SURGERY)

## 2022-12-27 PROCEDURE — 80048 BASIC METABOLIC PNL TOTAL CA: CPT | Performed by: NURSE PRACTITIONER

## 2022-12-27 PROCEDURE — 63710000001 INSULIN GLARGINE PER 5 UNITS: Performed by: INTERNAL MEDICINE

## 2022-12-27 PROCEDURE — 82962 GLUCOSE BLOOD TEST: CPT

## 2022-12-27 PROCEDURE — 94761 N-INVAS EAR/PLS OXIMETRY MLT: CPT

## 2022-12-27 PROCEDURE — 25010000002 SULFUR HEXAFLUORIDE MICROSPH 60.7-25 MG RECONSTITUTED SUSPENSION: Performed by: THORACIC SURGERY (CARDIOTHORACIC VASCULAR SURGERY)

## 2022-12-27 RX ORDER — INSULIN LISPRO 100 [IU]/ML
18 INJECTION, SOLUTION INTRAVENOUS; SUBCUTANEOUS
Status: DISCONTINUED | OUTPATIENT
Start: 2022-12-27 | End: 2022-12-29

## 2022-12-27 RX ADMIN — DOCUSATE SODIUM 100 MG: 100 CAPSULE, LIQUID FILLED ORAL at 10:02

## 2022-12-27 RX ADMIN — INSULIN GLARGINE 45 UNITS: 100 INJECTION, SOLUTION SUBCUTANEOUS at 20:04

## 2022-12-27 RX ADMIN — POLYETHYLENE GLYCOL 3350 17 G: 17 POWDER, FOR SOLUTION ORAL at 10:01

## 2022-12-27 RX ADMIN — INSULIN LISPRO 15 UNITS: 100 INJECTION, SOLUTION INTRAVENOUS; SUBCUTANEOUS at 10:01

## 2022-12-27 RX ADMIN — ROSUVASTATIN CALCIUM 5 MG: 5 TABLET, COATED ORAL at 20:04

## 2022-12-27 RX ADMIN — FUROSEMIDE 40 MG: 10 INJECTION, SOLUTION INTRAMUSCULAR; INTRAVENOUS at 12:50

## 2022-12-27 RX ADMIN — Medication 10 ML: at 20:05

## 2022-12-27 RX ADMIN — INSULIN LISPRO 6 UNITS: 100 INJECTION, SOLUTION INTRAVENOUS; SUBCUTANEOUS at 12:07

## 2022-12-27 RX ADMIN — INSULIN LISPRO 18 UNITS: 100 INJECTION, SOLUTION INTRAVENOUS; SUBCUTANEOUS at 16:34

## 2022-12-27 RX ADMIN — HEPARIN SODIUM 17 UNITS/KG/HR: 10000 INJECTION, SOLUTION INTRAVENOUS at 09:20

## 2022-12-27 RX ADMIN — Medication 10 ML: at 10:02

## 2022-12-27 RX ADMIN — FUROSEMIDE 40 MG: 10 INJECTION, SOLUTION INTRAMUSCULAR; INTRAVENOUS at 00:44

## 2022-12-27 RX ADMIN — CARVEDILOL 6.25 MG: 6.25 TABLET, FILM COATED ORAL at 10:00

## 2022-12-27 RX ADMIN — INSULIN LISPRO 2 UNITS: 100 INJECTION, SOLUTION INTRAVENOUS; SUBCUTANEOUS at 16:35

## 2022-12-27 RX ADMIN — ACETAMINOPHEN 500 MG: 500 TABLET ORAL at 16:33

## 2022-12-27 RX ADMIN — ASPIRIN 81 MG CHEWABLE TABLET 81 MG: 81 TABLET CHEWABLE at 10:00

## 2022-12-27 RX ADMIN — EMPAGLIFLOZIN 10 MG: 10 TABLET, FILM COATED ORAL at 10:00

## 2022-12-27 RX ADMIN — BUDESONIDE 0.5 MG: 0.5 INHALANT RESPIRATORY (INHALATION) at 06:30

## 2022-12-27 RX ADMIN — DOCUSATE SODIUM 100 MG: 100 CAPSULE, LIQUID FILLED ORAL at 20:04

## 2022-12-27 RX ADMIN — CARVEDILOL 6.25 MG: 6.25 TABLET, FILM COATED ORAL at 16:55

## 2022-12-27 RX ADMIN — BUDESONIDE 0.5 MG: 0.5 INHALANT RESPIRATORY (INHALATION) at 18:24

## 2022-12-27 RX ADMIN — SULFUR HEXAFLUORIDE 3 ML: KIT at 14:05

## 2022-12-27 RX ADMIN — INSULIN LISPRO 2 UNITS: 100 INJECTION, SOLUTION INTRAVENOUS; SUBCUTANEOUS at 10:01

## 2022-12-27 RX ADMIN — INSULIN LISPRO 18 UNITS: 100 INJECTION, SOLUTION INTRAVENOUS; SUBCUTANEOUS at 12:07

## 2022-12-27 NOTE — CASE MANAGEMENT/SOCIAL WORK
Continued Stay Note  [facility]     Patient Name: Sherman Baumann  MRN: [id number]  Today's Date: 12/27/2022    Admit Date: 12/17/2022    Plan: DC Plan: Pending Clinical Course and PT/OT evaluations. [facility] following if needed. Plan for OHS 12/29/22.   Discharge Plan     Row Name 12/27/22 1151       Plan    Plan DC Plan: Pending Clinical Course and PT/OT evaluations. [facility] following if needed. Plan for [facility] 12/29/22.    Provided Post Acute Provider List? N/A    Provided Post Acute Provider Quality & Resource List? N/A    Plan Comments CM spoke with patient’s nurse and [facility] NP Chyna Christian to obtain clinical updates. Plan for [facility] 12/29/22 still active.CM will continue to follow for any additional needs that may develop and adjust discharge plan accordingly. DC Barriers: Pending OHS, Heparin gtt, and cardiac monitoring.           Expected Discharge Date and Time     Expected Discharge Date Expected Discharge Time    Jan 4, 2023         Phone communication or documentation only- no physical contact with patient or family.      Rebecca Dempsey RN     Office Phone: [phone]  Office Cell:     [phone]

## 2022-12-27 NOTE — PROGRESS NOTES
[facility] Medicine Services   Daily Progress Note    Patient Name: Sherman Baumann  : 1945  MRN: [id number]  Primary Care Physician:  Melvina Hodge  Date of admission: 2022  Date and Time of Service:       Subjective      Patient denies any chest pain shortness of breath  No lightheadedness today.  Denies any worsening lower extremity edema or orthopnea  No cough. Non productive   No chills or sweats  No nausea vomiting diarrhea or constipation.  Feels better on 2022.        Patient reports:        2022  Patient was seen and examined.  No overnight events noted.          Objective      Vitals:   Temp:  [97.3 °F (36.3 °C)-98.4 °F (36.9 °C)] 97.9 °F (36.6 °C)  Heart Rate:  [63-83] 75  Resp:  [12-18] 14  BP: ()/(49-90) 127/69  Flow (L/min):  [2-3] 3    Physical Exam   General: No acute distress  HEENT: neck supple, normal oral mucosa, no masses, no lymphadenopathy  Lungs: Clear bilaterally, no wheezing, No crackles, No Rhonchi. Equal excursions.   CV - Normal S1/S2, no murmur, Regular rate and rhythm   Abdomin - Soft, non-tender, non-distended, normal bowel sounds  Extremities - no edema, no erythema  Neuro - No focal weakness, normal sensation  Psych - Alert and oriented x3  Skin - no wounds or lesions.        Result Review    Result Review:  I have personally reviewed the results from the time of this admission to 2022 20:20 EST and agree with these findings:  [x]  Laboratory  [x]  Microbiology  [x]  Radiology  [x]  EKG/Telemetry   [x]  Cardiology/Vascular   []  Pathology  [x]  Old records  []  Other:  Most notable findings include:           Assessment & Plan    From previous notes and with minor updates.    Brief Patient Summary:      Patient is a 76-year-old gentleman presented to [facility] in [place] with complaints of shortness of breath for approximately 1 week.  He reports it is constant.  Associated symptoms included cough and generalized  weakness.  His O2 sats in the ED were in the 80s on 5 L O2 (per ED notes).  He was found to have influenza B.  BNP 5580 and troponin 2.77. Cardiology was consulted he was found to have three-vessel heavily calcified CAD with EF 20 to 25%.  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  Echo at OSH showed EF 15 to 25%, right 1 diastolic dysfunction, mild MR, trace AI, mild TR and mildly dilated ascending aorta.  PMHx includes DM type II, HTN, past tobacco abuse.  Pt is poor historian.  Dr. Huber was asked to evaluate pt for surgical revascularization/PCI.           aspirin, 81 mg, Oral, Daily  budesonide, 0.5 mg, Nebulization, BID - RT  carvedilol, 6.25 mg, Oral, BID With Meals  docusate sodium, 100 mg, Oral, BID  empagliflozin, 10 mg, Oral, Daily  furosemide, 40 mg, Intravenous, Q12H  insulin glargine, 45 Units, Subcutaneous, Nightly  insulin lispro, 15 Units, Subcutaneous, TID With Meals  insulin lispro, 2-12 Units, Subcutaneous, TID With Meals  metOLazone, 10 mg, Oral, Daily  polyethylene glycol, 17 g, Oral, Daily  rosuvastatin, 5 mg, Oral, Nightly  sodium chloride, 10 mL, Intravenous, Q12H       heparin, 9.71 Units/kg/hr, Last Rate: 16 Units/kg/hr (12/26/22 1616)         Active Hospital Problems:  Active Hospital Problems    Diagnosis    • **CAD (coronary artery disease), native coronary artery    • Systolic and diastolic CHF, acute on chronic (HCC)    • Influenza due to seasonal influenza virus    • Type 2 diabetes mellitus with hyperglycemia, without long-term current use of insulin (HCC)    • Mixed hyperlipidemia    • Primary hypertension      Plan:     Multivessel CAD/NSTEMI/ICMP  - Acute Systolic heart failure EF 20-25%  - Continue Hepatin gtt  - ASA, BB, Statin.   Good urine output   - Plan for CABG anticipated 12/29/22  - Repeat Echo 12/21 with improved EF 40-45%  - anticipated CABG planned 12/29  MENDEZ  - will d/c Lasix and Lisinopril and potassium  -  monitor kidney function - improving  - Nephrology consulted.   - Strict I/O    Acute respiratory failure with hypoxia  - Influenza A  - completed Tamiflu  - Pulm on consult. Wean off oxygen as tolerated  - Continue Nebs.     DM-2 with hyperglycemia  - managed by Endocrinology   - BS better controlled     DL - Statin    Discussed with Cardiology       DVT prophylaxis:  Medical DVT prophylaxis orders are present.    CODE STATUS:    Level Of Support Discussed With: Patient  Code Status (Patient has no pulse and is not breathing): CPR (Attempt to Resuscitate)  Medical Interventions (Patient has pulse or is breathing): Full Support      Disposition: This wonderful patient can discharge after surgery and recovery from surgery.    This patient has been  and discussed with . 12/26/22      Electronically signed by Danny Suggs MD, FACP, 12/26/22, 20:20 EST.      [facility]

## 2022-12-27 NOTE — PROGRESS NOTES
[facility] Medicine Services Daily Progress Note    Patient Name: Sherman Baumann  : 1945  MRN: [id number]  Primary Care Physician:  Melvina Hodge  Date of admission: 2022      Subjective      Chief Complaint: Shortness of breath      Patient denies shortness of breath or chest pain at this time.  Planning for upcoming cardiac surgery.    Review of Systems   All other systems reviewed and are negative.      Objective      Vitals:   Temp:  [97.3 °F (36.3 °C)-97.9 °F (36.6 °C)] 97.6 °F (36.4 °C)  Heart Rate:  [69-87] 87  Resp:  [13-18] 16  BP: (121-148)/() 130/101  Flow (L/min):  [3] 3    Physical Exam     General: Obese elderly male sitting up in bed breathing comfortably on 3 L via nasal cannula no acute distress  HEENT: NC/AT, EOMI, mucosa moist  Heart: Regular, rate controlled  Chest: Normal work of breathing moving air well no wheezing  Abdominal: Soft. NT/ND.   Musculoskeletal: Normal ROM.  No cyanosis. No calf tenderness.  Neurological: Awake and alert no focal deficits  Skin: Skin is warm and dry. No rash  Psychiatric: Normal mood and affect.         Result Review    Result Review:  I have personally reviewed the results from the time of this admission to 2022 15:15 EST and agree with these findings:  [x]  Laboratory  [x]  Microbiology  [x]  Radiology  [x]  EKG/Telemetry   [x]  Cardiology/Vascular   []  Pathology  []  Old records  []  Other:  Most notable findings include: Hyponatremia, renal insufficiency          Assessment & Plan      Brief Patient Summary:    Patient is a 76-year-old gentleman presented to [facility] in [place] with complaints of shortness of breath for approximately 1 week.  He reports it is constant.  Associated symptoms included cough and generalized weakness.  His O2 sats in the ED were in the 80s on 5 L O2 (per ED notes).  He was found to have influenza B.  BNP 5580 and troponin 2.77. Cardiology was consulted he was found to  have three-vessel heavily calcified CAD with EF 20 to 25%.  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  Echo at OSH showed EF 15 to 25%, right 1 diastolic dysfunction, mild MR, trace AI, mild TR and mildly dilated ascending aorta.  PMHx includes DM type II, HTN, past tobacco abuse.  Pt is poor historian.  Dr. Huber was asked to evaluate pt for surgical revascularization/PCI.    aspirin, 81 mg, Oral, Daily  budesonide, 0.5 mg, Nebulization, BID - RT  carvedilol, 6.25 mg, Oral, BID With Meals  docusate sodium, 100 mg, Oral, BID  empagliflozin, 10 mg, Oral, Daily  insulin glargine, 45 Units, Subcutaneous, Nightly  insulin lispro, 18 Units, Subcutaneous, TID With Meals  insulin lispro, 2-12 Units, Subcutaneous, TID With Meals  polyethylene glycol, 17 g, Oral, Daily  rosuvastatin, 5 mg, Oral, Nightly  sodium chloride, 10 mL, Intravenous, Q12H       heparin, 9.71 Units/kg/hr, Last Rate: 17 Units/kg/hr (12/27/22 0920)         Active Hospital Problems:  Active Hospital Problems    Diagnosis    • **CAD (coronary artery disease), native coronary artery    • Influenza due to seasonal influenza virus    • Type 2 diabetes mellitus with hyperglycemia, without long-term current use of insulin (HCC)    • Mixed hyperlipidemia    • Primary hypertension    • Systolic and diastolic CHF, acute on chronic (HCC)      Plan:     Shortness of breath-patient with history of significant acute heart failure with EF of 20 to 25% with findings of multivessel disease requiring upcoming CABG, complicated history of coronary artery disease findings of underlying NSTEMI  -Primary management per cardiology and cardiac surgery  -Volume status per nephrology  -Plan for CABG in the upcoming days we will sign off upon surgery  -Pulmonology consulted patient on Tamiflu for influenza A    Type 2 diabetes-indicate blood sugars less than 200  -Sliding scale  -Diabetic diet    Renal  insufficiency-likely volume related managed by nephrology  -Daily labs    Hyponatremia-likely at least partially volume related  -Daily labs    Obesity-BMI 31  -Lifestyle modification              DVT prophylaxis:  Medical DVT prophylaxis orders are present.    CODE STATUS:    Level Of Support Discussed With: Patient  Code Status (Patient has no pulse and is not breathing): CPR (Attempt to Resuscitate)  Medical Interventions (Patient has pulse or is breathing): Full Support      Disposition:  I expect patient to be discharged 6 to 8 days if improved.  Possibly by 1/4/2023    This patient has been examined wearing appropriate Personal Protective Equipment and discussed with hospital infection control department. 12/27/22      Electronically signed by Homero Moreno MD, 12/27/22, 15:15 EST.  [facility]

## 2022-12-27 NOTE — PLAN OF CARE
Problem: Adult Inpatient Plan of Care  Goal: Plan of Care Review  Outcome: Ongoing, Progressing  Goal: Patient-Specific Goal (Individualized)  Outcome: Ongoing, Progressing  Goal: Absence of Hospital-Acquired Illness or Injury  Outcome: Ongoing, Progressing  Intervention: Identify and Manage Fall Risk  Recent Flowsheet Documentation  Taken 12/27/2022 0400 by Luz Schroeder RN  Safety Promotion/Fall Prevention: safety round/check completed  Taken 12/27/2022 0200 by Luz Schroeder RN  Safety Promotion/Fall Prevention: safety round/check completed  Taken 12/27/2022 0000 by Luz Schroeder RN  Safety Promotion/Fall Prevention: safety round/check completed  Taken 12/26/2022 2200 by Luz Schroeder RN  Safety Promotion/Fall Prevention:   safety round/check completed   nonskid shoes/slippers when out of bed   fall prevention program maintained   assistive device/personal items within reach  Taken 12/26/2022 2000 by Luz Schroeder RN  Safety Promotion/Fall Prevention:   safety round/check completed   nonskid shoes/slippers when out of bed   fall prevention program maintained   assistive device/personal items within reach  Intervention: Prevent Skin Injury  Recent Flowsheet Documentation  Taken 12/27/2022 0000 by Luz Schroeder RN  Skin Protection: adhesive use limited  Taken 12/26/2022 2000 by Luz Schroeder RN  Skin Protection: adhesive use limited  Intervention: Prevent and Manage VTE (Venous Thromboembolism) Risk  Recent Flowsheet Documentation  Taken 12/26/2022 2000 by Luz Schroeder RN  VTE Prevention/Management: sequential compression devices off  Intervention: Prevent Infection  Recent Flowsheet Documentation  Taken 12/27/2022 0400 by Luz Schroeder RN  Infection Prevention:   single patient room provided   rest/sleep promoted   hand hygiene promoted  Taken 12/27/2022 0200 by Luz Schroeder RN  Infection Prevention: single patient room provided  Taken 12/27/2022 0000  by Luz Schroeder RN  Infection Prevention:   single patient room provided   rest/sleep promoted   hand hygiene promoted   cohorting utilized  Taken 12/26/2022 2200 by Luz Schroeder RN  Infection Prevention:   single patient room provided   rest/sleep promoted   hand hygiene promoted   equipment surfaces disinfected   environmental surveillance performed   cohorting utilized  Taken 12/26/2022 2000 by Luz Schroeder RN  Infection Prevention:   single patient room provided   rest/sleep promoted   hand hygiene promoted   equipment surfaces disinfected   environmental surveillance performed   cohorting utilized  Goal: Optimal Comfort and Wellbeing  Outcome: Ongoing, Progressing  Intervention: Monitor Pain and Promote Comfort  Recent Flowsheet Documentation  Taken 12/26/2022 2000 by Luz Schroeder RN  Pain Management Interventions: see MAR  Intervention: Provide Person-Centered Care  Recent Flowsheet Documentation  Taken 12/27/2022 0400 by Luz Schroeder RN  Trust Relationship/Rapport:   care explained   questions encouraged  Taken 12/26/2022 2000 by Luz Schroeder RN  Trust Relationship/Rapport:   care explained   reassurance provided  Goal: Readiness for Transition of Care  Outcome: Ongoing, Progressing     Problem: Diabetes Comorbidity  Goal: Blood Glucose Level Within Targeted Range  Outcome: Ongoing, Progressing  Intervention: Monitor and Manage Glycemia  Recent Flowsheet Documentation  Taken 12/27/2022 0000 by Luz Schroeder RN  Glycemic Management: blood glucose monitored  Taken 12/26/2022 2000 by Luz Schroeder RN  Glycemic Management: blood glucose monitored     Problem: Fall Injury Risk  Goal: Absence of Fall and Fall-Related Injury  Outcome: Ongoing, Progressing  Intervention: Identify and Manage Contributors  Recent Flowsheet Documentation  Taken 12/27/2022 0400 by Luz Schroeder RN  Medication Review/Management: medications reviewed  Taken 12/27/2022 0200 by  Luz Schroeder RN  Medication Review/Management: medications reviewed  Taken 12/27/2022 0000 by Luz Schroeder RN  Medication Review/Management: medications reviewed  Taken 12/26/2022 2200 by Luz Schroeder RN  Medication Review/Management: medications reviewed  Taken 12/26/2022 2000 by Luz Schroeder RN  Medication Review/Management: medications reviewed  Intervention: Promote Injury-Free Environment  Recent Flowsheet Documentation  Taken 12/27/2022 0400 by Luz Schroeder RN  Safety Promotion/Fall Prevention: safety round/check completed  Taken 12/27/2022 0200 by Luz Schroeder RN  Safety Promotion/Fall Prevention: safety round/check completed  Taken 12/27/2022 0000 by Luz Schroeder RN  Safety Promotion/Fall Prevention: safety round/check completed  Taken 12/26/2022 2200 by Luz Schroeder RN  Safety Promotion/Fall Prevention:   safety round/check completed   nonskid shoes/slippers when out of bed   fall prevention program maintained   assistive device/personal items within reach  Taken 12/26/2022 2000 by Luz Schroeder RN  Safety Promotion/Fall Prevention:   safety round/check completed   nonskid shoes/slippers when out of bed   fall prevention program maintained   assistive device/personal items within reach     Problem: Skin Injury Risk Increased  Goal: Skin Health and Integrity  Outcome: Ongoing, Progressing  Intervention: Optimize Skin Protection  Recent Flowsheet Documentation  Taken 12/27/2022 0000 by Luz Schroeder RN  Pressure Reduction Techniques: frequent weight shift encouraged  Pressure Reduction Devices: specialty bed utilized  Skin Protection: adhesive use limited  Taken 12/26/2022 2000 by Luz Schroeder RN  Pressure Reduction Techniques: frequent weight shift encouraged  Pressure Reduction Devices: specialty bed utilized  Skin Protection: adhesive use limited   Goal Outcome Evaluation:      No acute changes to note this shift patient remains on  heparin drip and is therapeutic.  Covid swab pre procedure sent to lab,  Patient received lasix IV 2nd of third dose.  Patient a/o x 4 pleasant and very [place] uses walker to ambulate with 1 assist

## 2022-12-27 NOTE — PROGRESS NOTES
Referring Provider: Jaleel Huber MD    Reason for follow-up: Multivessel coronary artery disease, non-ST ovation MI, acute kidney injury, diabetes     Patient Care Team:  Melvina Hodge as PCP - General (Nurse Practitioner)      SUBJECTIVE  Denies any chest pain, shortness of breath.  Awaiting CABG later this week.       ROS  Review of all systems negative except as indicated.    Since I have last seen, the patient has been without any chest discomfort, shortness of breath, palpitations, dizziness or syncope.  Denies having any headache, abdominal pain, nausea, vomiting, diarrhea, constipation, loss of weight or loss of appetite.  Denies having any excessive bruising, hematuria or blood in the stool.  ROS      Personal History:    Past Medical History:   Diagnosis Date   • CHF (congestive heart failure) (HCC)    • Diabetes mellitus (HCC)    • Elevated cholesterol    • Hyperlipidemia    • Hypertension        Past Surgical History:   Procedure Laterality Date   • HERNIA REPAIR     • TONSILLECTOMY         No family history on file.    Social History     Tobacco Use   • Smoking status: Former     Types: Cigarettes   Vaping Use   • Vaping Use: Never used   Substance Use Topics   • Alcohol use: Not Currently   • Drug use: Never        Medications Prior to Admission   Medication Sig Dispense Refill Last Dose   • aspirin 81 MG chewable tablet Chew 81 mg Daily.      • aspirin-acetaminophen-caffeine (EXCEDRIN MIGRAINE) 250-250-65 MG per tablet Take 1 tablet by mouth Every 6 (Six) Hours As Needed for Headache.      • carvedilol (COREG) 3.125 MG tablet Take 3.125 mg by mouth 2 (Two) Times a Day With Meals.      • furosemide (LASIX) 40 MG tablet Take 40 mg by mouth 2 (Two) Times a Day.      • glipizide (GLUCOTROL) 10 MG tablet Take 10 mg by mouth 2 (Two) Times a Day Before Meals.      • insulin NPH-insulin regular (humuLIN 70/30,novoLIN 70/30) (70-30) 100 UNIT/ML injection Inject 27 Units under the skin into the  appropriate area as directed Every Morning.      • insulin NPH-insulin regular (humuLIN 70/30,novoLIN 70/30) (70-30) 100 UNIT/ML injection Inject 22 Units under the skin into the appropriate area as directed Every Night.      • metFORMIN (GLUCOPHAGE) 500 MG tablet Take 500 mg by mouth Daily With Breakfast.      • Omega-3 Fatty Acids (fish oil) 1000 MG capsule capsule Take 1,000 mg by mouth Daily With Breakfast.      • oseltamivir (TAMIFLU) 75 MG capsule Take 75 mg by mouth 2 (Two) Times a Day.      • rosuvastatin (CRESTOR) 5 MG tablet Take 5 mg by mouth Every Night.          Allergies:  Patient has no known allergies.    Scheduled Meds:aspirin, 81 mg, Oral, Daily  budesonide, 0.5 mg, Nebulization, BID - RT  carvedilol, 6.25 mg, Oral, BID With Meals  docusate sodium, 100 mg, Oral, BID  empagliflozin, 10 mg, Oral, Daily  furosemide, 40 mg, Intravenous, Q12H  insulin glargine, 45 Units, Subcutaneous, Nightly  insulin lispro, 15 Units, Subcutaneous, TID With Meals  insulin lispro, 2-12 Units, Subcutaneous, TID With Meals  metOLazone, 10 mg, Oral, Daily  polyethylene glycol, 17 g, Oral, Daily  rosuvastatin, 5 mg, Oral, Nightly  sodium chloride, 10 mL, Intravenous, Q12H      Continuous Infusions:heparin, 9.71 Units/kg/hr, Last Rate: 17 Units/kg/hr (12/27/22 0514)      PRN Meds:.•  acetaminophen  •  dextrose  •  dextrose  •  glucagon (human recombinant)  •  ipratropium-albuterol  •  melatonin  •  nitroglycerin  •  ondansetron  •  potassium chloride **OR** potassium chloride **OR** potassium chloride  •  sodium chloride  •  sodium chloride      OBJECTIVE    Vital Signs  Vitals:    12/27/22 0434 12/27/22 0630 12/27/22 0635 12/27/22 0740   BP: 132/66   126/73   BP Location: Right arm      Patient Position: Lying   Lying   Pulse: 69 78 73 77   Resp: 15 18 18 16   Temp: 97.6 °F (36.4 °C)   97.3 °F (36.3 °C)   TempSrc: Oral   Oral   SpO2: 92% 98% 100% 97%   Weight: 100 kg (220 lb 10.9 oz)      Height:           Flowsheet Rows   "  Flowsheet Row First Filed Value   Admission Height 177.8 cm (70\") Documented at 12/18/2022 1305   Admission Weight 103 kg (227 lb 1.2 oz) Documented at 12/17/2022 0500            Intake/Output Summary (Last 24 hours) at 12/27/2022 0846  Last data filed at 12/27/2022 0600  Gross per 24 hour   Intake 375 ml   Output 2150 ml   Net -1775 ml          Telemetry: Sinus rhythm    Physical Exam:  The patient is alert, oriented and in no distress.  Vital signs as noted above.  Head and neck revealed no carotid bruits or jugular venous distention.  No thyromegaly or lymphadenopathy is present  Lungs clear.  No wheezing.  Breath sounds are normal bilaterally.  Heart normal first and second heart sounds.  No murmur. No precordial rub is present.  No gallop is present.  Abdomen soft and nontender.  No organomegaly is present.  Extremities with good peripheral pulses without any pedal edema.  Skin warm and dry.  Musculoskeletal system is grossly normal.  CNS grossly normal.       Results Review:  I have personally reviewed the results from the time of this admission to 12/27/2022 08:46 EST and agree with these findings:  []  Laboratory  []  Microbiology  []  Radiology  []  EKG/Telemetry   []  Cardiology/Vascular   []  Pathology  []  Old records  []  Other:    Most notable findings include:    Lab Results (last 24 hours)     Procedure Component Value Units Date/Time    POC Glucose Once [[id number]]  (Abnormal) Collected: 12/27/22 0723    Specimen: Blood Updated: 12/27/22 0724     Glucose 166 mg/dL      Comment: Serial Number: [id number]:  [id number]       aPTT [[id number]]  (Abnormal) Collected: 12/27/22 0427    Specimen: Blood Updated: 12/27/22 0452     PTT 59.7 seconds     CBC & Differential [[id number]]  (Abnormal) Collected: 12/27/22 0427    Specimen: Blood Updated: 12/27/22 0445    Narrative:      The following orders were created for panel order CBC & Differential.  Procedure                               Abnormality     "     Status                     ---------                               -----------         ------                     CBC Auto Differential[[id number]]        Abnormal            Final result                 Please view results for these tests on the individual orders.    CBC Auto Differential [[id number]]  (Abnormal) Collected: 12/27/22 0427    Specimen: Blood Updated: 12/27/22 0445     WBC 11.80 10*3/mm3      RBC 5.22 10*6/mm3      Hemoglobin 15.5 g/dL      Hematocrit 46.2 %      MCV 88.6 fL      MCH 29.7 pg      MCHC 33.5 g/dL      RDW 14.4 %      RDW-SD 46.8 fl      MPV 8.6 fL      Platelets 361 10*3/mm3      Neutrophil % 65.6 %      Lymphocyte % 20.7 %      Monocyte % 10.9 %      Eosinophil % 2.0 %      Basophil % 0.8 %      Neutrophils, Absolute 7.70 10*3/mm3      Lymphocytes, Absolute 2.40 10*3/mm3      Monocytes, Absolute 1.30 10*3/mm3      Eosinophils, Absolute 0.20 10*3/mm3      Basophils, Absolute 0.10 10*3/mm3      nRBC 0.1 /100 WBC     COVID PRE-OP / PRE-PROCEDURE SCREENING ORDER (NO ISOLATION) - Swab, Nasopharynx [[id number]]  (Normal) Collected: 12/27/22 0044    Specimen: Swab from Nasopharynx Updated: 12/27/22 0112    Narrative:      The following orders were created for panel order COVID PRE-OP / PRE-PROCEDURE SCREENING ORDER (NO ISOLATION) - Swab, Nasopharynx.  Procedure                               Abnormality         Status                     ---------                               -----------         ------                     COVID-19,CEPHEID/CAMERON,CO...[[id number]]  Normal              Final result                 Please view results for these tests on the individual orders.    COVID-19,CEPHEID/CAMERON,COR/BERNY/PAD/ASAF/MAD IN-HOUSE(OR EMERGENT/ADD-ON),NP SWAB IN TRANSPORT MEDIA 3-4 HR TAT, RT-PCR - Swab, Nasopharynx [[id number]]  (Normal) Collected: 12/27/22 0044    Specimen: Swab from Nasopharynx Updated: 12/27/22 0112     COVID19 Not Detected    Narrative:      Fact sheet for providers:  https://www.fda.gov/media/213322/download     Fact sheet for patients: https://www.fda.gov/media/378278/download  Fact sheet for providers: https://www.fda.gov/media/315824/download    Fact sheet for patients: https://www.fda.gov/media/942549/download    Test performed by PCR.    POC Glucose Once [[id number]]  (Abnormal) Collected: 12/26/22 1955    Specimen: Blood Updated: 12/26/22 1957     Glucose 202 mg/dL      Comment: Serial Number: [id number]:  [id number]       POC Glucose Once [[id number]]  (Abnormal) Collected: 12/26/22 1741    Specimen: Blood Updated: 12/26/22 1742     Glucose 190 mg/dL      Comment: Serial Number: [id number]:  [id number]       POC Glucose Once [[id number]]  (Abnormal) Collected: 12/26/22 1216    Specimen: Blood Updated: 12/26/22 1217     Glucose 234 mg/dL      Comment: Serial Number: [id number]:  [id number]             Imaging Results (Last 24 Hours)     ** No results found for the last 24 hours. **          LAB RESULTS (LAST 7 DAYS)    CBC  Results from last 7 days   Lab Units 12/27/22 0427 12/25/22 0243 12/23/22 0250 12/20/22 2313   WBC 10*3/mm3 11.80* 8.40 9.90 7.70   RBC 10*6/mm3 5.22 4.73 4.71 4.90   HEMOGLOBIN g/dL 15.5 14.3 13.8 14.3   HEMATOCRIT % 46.2 42.3 42.0 44.3   MCV fL 88.6 89.4 89.3 90.5   PLATELETS 10*3/mm3 361 305 346 336       BMP  Results from last 7 days   Lab Units 12/26/22  0443 12/25/22  0243 12/24/22  0450 12/23/22  0250 12/22/22  0429 12/20/22  2313 12/20/22  1103   SODIUM mmol/L 131* 132* 131* 135* 135* 135* 130*   POTASSIUM mmol/L 4.3 4.6 4.4 5.0 4.3 4.4 4.7   CHLORIDE mmol/L 89* 94* 93* 94* 92* 92* 91*   CO2 mmol/L 28.0 26.0 24.0 28.0 28.0 29.0 28.0   BUN mg/dL 41* 42* 52* 42* 31* 28* 28*   CREATININE mg/dL 1.56* 1.34* 1.88* 2.12* 1.26 1.21 1.10   GLUCOSE mg/dL 130* 101* 146* 95 107* 135* 374*   MAGNESIUM mg/dL  --   --   --  2.1  --  1.8  --    PHOSPHORUS mg/dL  --   --   --   --   --  4.5  --        CMP   Results from last 7 days   Lab Units  12/26/22  0443 12/25/22  0243 12/24/22  0450 12/23/22  0250 12/22/22  0429 12/20/22  2313 12/20/22  1103   SODIUM mmol/L 131* 132* 131* 135* 135* 135* 130*   POTASSIUM mmol/L 4.3 4.6 4.4 5.0 4.3 4.4 4.7   CHLORIDE mmol/L 89* 94* 93* 94* 92* 92* 91*   CO2 mmol/L 28.0 26.0 24.0 28.0 28.0 29.0 28.0   BUN mg/dL 41* 42* 52* 42* 31* 28* 28*   CREATININE mg/dL 1.56* 1.34* 1.88* 2.12* 1.26 1.21 1.10   GLUCOSE mg/dL 130* 101* 146* 95 107* 135* 374*       BNP        TROPONIN        CoAg  Results from last 7 days   Lab Units 12/27/22  0427 12/26/22  0309 12/25/22  1527 12/25/22  0937 12/25/22  0243 12/24/22  1945 12/24/22  1228   APTT seconds 59.7* 68.3 69.3 66.7 82.8* 87.6* 81.7*       Creatinine Clearance  Estimated Creatinine Clearance: 47.7 mL/min (A) (by C-G formula based on SCr of 1.56 mg/dL (H)).    ABG        Radiology  XR Chest 1 View    Result Date: 12/26/2022  No acute cardiopulmonary abnormality.  Electronically Signed By-Kateryna Cuello MD On:12/26/2022 7:20 AM This report was finalized on [id number] by  Kateryna Cuello MD.        EKG  I personally viewed and interpreted the patient's EKG/Telemetry data:  ECG 12 Lead Chest Pain   Final Result   HEART RATE= 89  bpm   RR Interval= 672  ms   GA Interval= 176  ms   P Horizontal Axis= -5  deg   P Front Axis= 29  deg   QRSD Interval= 92  ms   QT Interval= 362  ms   QRS Axis= 16  deg   T Wave Axis= 163  deg   - ABNORMAL ECG -   Sinus rhythm   Probable left atrial enlargement   Probable anterior infarct, age indeterminate   Abnormal T, consider ischemia, lateral leads   No previous ECG available for comparison   Electronically Signed By: Alex Byrd (BERNY) 19-Dec-2022 08:52:18   Date and Time of Study: 2022-12-17 07:21:47      SCANNED - TELEMETRY     Final Result      SCANNED - TELEMETRY     Final Result      SCANNED - TELEMETRY     Final Result      SCANNED - TELEMETRY     Final Result      SCANNED - TELEMETRY     Final Result      SCANNED - TELEMETRY     Final Result       SCANNED - TELEMETRY     Final Result      SCANNED - TELEMETRY     Final Result      SCANNED - TELEMETRY     Final Result      SCANNED - TELEMETRY     Final Result      SCANNED - TELEMETRY     Final Result      SCANNED - TELEMETRY     Final Result      SCANNED - TELEMETRY     Final Result      SCANNED - TELEMETRY     Final Result      SCANNED - TELEMETRY     Final Result      SCANNED - TELEMETRY     Final Result      SCANNED - TELEMETRY     Final Result      SCANNED - TELEMETRY     Final Result      SCANNED - TELEMETRY     Final Result      SCANNED - TELEMETRY     Final Result      SCANNED - TELEMETRY     Final Result      SCANNED - TELEMETRY     Final Result      SCANNED - TELEMETRY     Final Result      SCANNED - TELEMETRY     Final Result      SCANNED - TELEMETRY     Final Result      SCANNED - TELEMETRY     Final Result      SCANNED - TELEMETRY     Final Result      SCANNED - TELEMETRY     Final Result      SCANNED - TELEMETRY     Final Result      SCANNED - TELEMETRY     Final Result      SCANNED - TELEMETRY     Final Result      SCANNED - TELEMETRY     Final Result      SCANNED - TELEMETRY     Final Result      SCANNED - TELEMETRY     Final Result      SCANNED - TELEMETRY     Final Result      SCANNED - TELEMETRY     Final Result      SCANNED - TELEMETRY     Final Result      SCANNED - TELEMETRY     Final Result      SCANNED - TELEMETRY     Final Result      SCANNED - TELEMETRY     Final Result      SCANNED - TELEMETRY     Final Result            Echocardiogram:    Results for orders placed during the hospital encounter of 12/17/22    Adult Transthoracic Echo Complete W/ Cont if Necessary Per Protocol    Interpretation Summary  •  Calculated left ventricular EF = 40% Left ventricular ejection fraction appears to be 40 - 45%.  •  The left ventricular cavity is dilated.  •  Left ventricular diastolic function is consistent with (grade I) impaired relaxation.  •  Mild aortic valve stenosis is present.  •   Estimated right ventricular systolic pressure from tricuspid regurgitation is normal (<35 mmHg).        Stress Test:         Cardiac Catheterization:  No results found for this or any previous visit.         Other:         ASSESSMENT & PLAN:    Principal Problem:    CAD (coronary artery disease), native coronary artery  Active Problems:    Type 2 diabetes mellitus with hyperglycemia, without long-term current use of insulin (HCC)    Mixed hyperlipidemia    Primary hypertension    Systolic and diastolic CHF, acute on chronic (HCC)    Influenza due to seasonal influenza virus    76-year-old man with multivessel coronary artery disease presented with non-ST elevation MI and acute kidney injury in the setting of new diagnosis of influenza.  He has completed treatment for influenza  He also has hypertension, hyperlipidemia and poorly controlled diabetes.  Troponin peaked at 4.5 and creatinine peaked at 2.2.  Renal function is now improving.  Nephrology is on board and managing diuretics.  He will receive IV Lasix and metolazone today.  Creatinine is 1.57, pending repeat creatinine.  His initial echocardiogram at the outside hospital showed EF of 20%, repeat echo with contrast here showed EF of 40%.  Plan to repeat another echo today.  Unable to introduce ACE inhibitor due to renal dysfunction.  He is on aspirin, heparin drip, Coreg, Jardiance.  Heparin drip will be held on the day of CABG 12/29/2022.  If echocardiogram showed worsening of LV function and if he is turned down for CABG, high risk PCI can be offered with Impella and atherectomy.  Pending improvement in renal function and repeat echocardiogram.      Johann Morales MD  12/27/22  08:46 EST

## 2022-12-27 NOTE — PROGRESS NOTES
Daily Progress Note          Assessment    Hypoxemia  Flu B  COPD  LOGAN  Cor Pulmonale   CHF: LV EF 20-25%  DM II  Hyperlipidemia  HTN  CAD with multivessel disease  MENDEZ        PLAN:  Oxygen supplement and titration to maintain saturation above 90%: Currently requiring 2 L per nasal cannula  CABG is scheduled 12/29/2022  Patient is at moderate risk for pulmonary complications like atelectasis and pneumonia with surgery but no absolute contraindication  Optimize Incentive spirometer and BD, ICS prior to surgery and after   Tamiflu: Complete  BIPAP while sleeping and as needed  Bronchodilators  Inhaled corticosteroids  IS/Flutter valve  Diuresis and fluid management as per nephrology  Electrolytes/ glycemic control  Anticoagulation: Heparin drip            LOS: 10 days     Subjective     Currently patient is feeling okay with no chest pain or cough    Objective     Vital signs for last 24 hours:  Vitals:    12/27/22 0630 12/27/22 0635 12/27/22 0740 12/27/22 1200   BP:   126/73 (!) 130/101   BP Location:       Patient Position:   Lying Lying   Pulse: 78 73 77 87   Resp: 18 18 16 16   Temp:   97.3 °F (36.3 °C) 97.6 °F (36.4 °C)   TempSrc:   Oral Oral   SpO2: 98% 100% 97% 94%   Weight:       Height:           Intake/Output last 3 shifts:  I/O last 3 completed shifts:  In: 885 [P.O.:600; I.V.:285]  Out: 3600 [Urine:3600]  Intake/Output this shift:  I/O this shift:  In: 400 [P.O.:400]  Out: 950 [Urine:950]      Radiology  Imaging Results (Last 24 Hours)     ** No results found for the last 24 hours. **          Labs:  Results from last 7 days   Lab Units 12/27/22  0427   WBC 10*3/mm3 11.80*   HEMOGLOBIN g/dL 15.5   HEMATOCRIT % 46.2   PLATELETS 10*3/mm3 361     Results from last 7 days   Lab Units 12/27/22  1145   SODIUM mmol/L 127*   POTASSIUM mmol/L 4.7   CHLORIDE mmol/L 82*   CO2 mmol/L 26.0   BUN mg/dL 51*   CREATININE mg/dL 1.50*   CALCIUM mg/dL 10.4   GLUCOSE mg/dL 284*                     Results from last 7 days    Lab Units 12/23/22  0250   MAGNESIUM mg/dL 2.1     Results from last 7 days   Lab Units 12/27/22  1145 12/27/22  0427 12/26/22  0309   APTT seconds 76.5 59.7* 68.3               Meds:   SCHEDULE  aspirin, 81 mg, Oral, Daily  budesonide, 0.5 mg, Nebulization, BID - RT  carvedilol, 6.25 mg, Oral, BID With Meals  docusate sodium, 100 mg, Oral, BID  empagliflozin, 10 mg, Oral, Daily  insulin glargine, 45 Units, Subcutaneous, Nightly  insulin lispro, 18 Units, Subcutaneous, TID With Meals  insulin lispro, 2-12 Units, Subcutaneous, TID With Meals  metOLazone, 10 mg, Oral, Daily  polyethylene glycol, 17 g, Oral, Daily  rosuvastatin, 5 mg, Oral, Nightly  sodium chloride, 10 mL, Intravenous, Q12H      Infusions  heparin, 9.71 Units/kg/hr, Last Rate: 17 Units/kg/hr (12/27/22 0920)      PRNs  •  acetaminophen  •  dextrose  •  dextrose  •  glucagon (human recombinant)  •  ipratropium-albuterol  •  melatonin  •  nitroglycerin  •  ondansetron  •  potassium chloride **OR** potassium chloride **OR** potassium chloride  •  sodium chloride  •  sodium chloride    Physical Exam:  General Appearance:  Alert   HEENT:  Normocephalic, without obvious abnormality, Conjunctiva/corneas clear,.   Nares normal, no drainage     Neck:  Supple, symmetrical, trachea midline.   Lungs /Chest wall: Good air movement bilaterally without wheezing, respirations unlabored, symmetrical wall movement.     Heart:  Regular rate and rhythm, S1 S2 normal  Abdomen: Soft, non-tender, no masses, no organomegaly.    Extremities: No edema, no clubbing or cyanosis     ROS  Constitutional: Negative for chills, fever and malaise/fatigue.   HENT: Negative.    Eyes: Negative.    Cardiovascular: Negative.    Respiratory: Negative for cough and shortness of breath.    Skin: Negative.    Musculoskeletal: Negative.    Gastrointestinal: Negative.    Genitourinary: Negative.    Neurological: Negative.    Psychiatric/Behavioral: Negative.      I reviewed the recent clinical  results    Much of this encounter note is an electronic transcription/translation of spoken language to printed text using Dragon Software which might include inadvertent errors in transcription.

## 2022-12-27 NOTE — THERAPY TREATMENT NOTE
"Subjective: Pt agreeable to therapeutic plan of care.  Cognition: oriented to Person, Place, Time and Situation    Objective:     Bed Mobility: CGA   Functional Transfers: CGA  Functional Ambulation: CGA, additional person for lines/O2    Grooming: Supervision  ADL Position: supported sitting  ADL Comments: set up for washing face in supported sit, pt also provided supplies for cleaning dentures      Vitals: WNL    Pain: 0 VAS  Location: NA  Interventions for pain: Repositioned  Education: Provided education on the importance of mobility in the acute care setting, Verbal/Tactile Cues, Transfer Training, Gait Training and Energy conservation strategies    Assessment: Sherman Baumann presents with ADL impairments below baseline abilities which indicate the need for continued skilled intervention while inpatient. Pt supine at start of session. Pt completed bed mobility with CGA, HOB elevated, and rails used. Pt tolerated sitting EOB with Mod-I. Pt completed sit>stand with FWW and CGA. Pt ambulated from EOB>nurses station>recliner with CGA and FWW. Additional person required for management of lines/O2. Pt completed grooming in supported sit with set-up at end of session.Tolerating session today without incident. Will continue to follow and progress as tolerated.     Plan/Recommendations:   Low Intensity Therapy recommended post-acute care - This is recommended as therapy feels this patient would require 2-3 visits per week. OP or HH would be the best option depending on patient's home bound status. Consider, if the patient has other  \"skilled\" needs such as wounds, IV antibiotics, etc. Combined with \"low intensity\" could also equate to a SNF. If patient is medically complex, consider LTAC.. Pt requires no DME at discharge.     Pt desires Home with Home Health at discharge. Pt cooperative; agreeable to therapeutic recommendations and plan of care.     Modified Priscilla: N/A = No pre-op stroke/TIA    Post-Tx Position: Up " in Chair, Staff Present, Alarms activated and Call light and personal items within reach  PPE: gloves, surgical mask and eye protection

## 2022-12-27 NOTE — PLAN OF CARE
"Goal Outcome Evaluation:      Assessment: Sherman Baumann presents with ADL impairments below baseline abilities which indicate the need for continued skilled intervention while inpatient. Pt supine at start of session. Pt completed bed mobility with CGA, HOB elevated, and rails used. Pt tolerated sitting EOB with Mod-I. Pt completed sit>stand with FWW and CGA. Pt ambulated from EOB>nurses station>recliner with CGA and FWW. Additional person required for management of lines/O2. Pt completed grooming in supported sit with set-up at end of session.Tolerating session today without incident. Will continue to follow and progress as tolerated.     Plan/Recommendations:   Low Intensity Therapy recommended post-acute care - This is recommended as therapy feels this patient would require 2-3 visits per week. OP or HH would be the best option depending on patient's home bound status. Consider, if the patient has other  \"skilled\" needs such as wounds, IV antibiotics, etc. Combined with \"low intensity\" could also equate to a SNF. If patient is medically complex, consider LTAC.. Pt requires no DME at discharge.     Pt desires Home with Home Health at discharge. Pt cooperative; agreeable to therapeutic recommendations and plan of care.            "

## 2022-12-27 NOTE — THERAPY TREATMENT NOTE
"Subjective: Pt agreeable to therapeutic plan of care.    Objective:     Bed mobility - CGA  Transfers - CGA  Ambulation - 115 feet CGA and with rolling walker    Vitals: WNL    Pain: 1 VAS   Location: NA  Intervention for pain: Repositioned and Increased Activity    Education: Provided education on the importance of mobility in the acute care setting, Verbal/Tactile Cues, Transfer Training and Gait Training    Assessment: Sherman Baumann presents with functional mobility impairments which indicate the need for skilled intervention. Tolerating session today without incident. Pt tolerated treatment well this date, ambulating with RWx safely with CGA. Pt requires cueing for increased stride length and follows direction well. Increased stride seemed to improve balance when ambulating with RWx. Will continue to follow and progress as tolerated.     Plan/Recommendations:   Low Intensity Therapy recommended post-acute care - This is recommended as therapy feels this patient would require 2-3 visits per week. OP or HH would be the best option depending on patient's home bound status. Consider, if the patient has other  \"skilled\" needs such as wounds, IV antibiotics, etc. Combined with \"low intensity\" could also equate to a SNF. If patient is medically complex, consider LTAC.. Pt requires no DME at discharge.     Pt desires Home with family assist and and Home Health at discharge. Pt cooperative; agreeable to therapeutic recommendations and plan of care.         Basic Mobility 6-click:  Rollin = Total, A lot = 2, A little = 3; 4 = None  Supine>Sit:   1 = Total, A lot = 2, A little = 3; 4 = None   Sit>Stand with arms:  1 = Total, A lot = 2, A little = 3; 4 = None  Bed>Chair:   1 = Total, A lot = 2, A little = 3; 4 = None  Ambulate in room:  1 = Total, A lot = 2, A little = 3; 4 = None  3-5 Steps with railin = Total, A lot = 2, A little = 3; 4 = None  Score: 18    Modified Priscilla: N/A = No pre-op " nneds appointment for refills   stroke/TIA    Post-Tx Position: Up in Chair, Alarms activated and Call light and personal items within reach  PPE: gloves and surgical mask

## 2022-12-27 NOTE — PROGRESS NOTES
"      FOLLOW UP NOTE      Name: Sherman Baumann ADMIT: 2022   : 1945  PCP: Melvina Hodge    MRN: [id number] LOS: 10 days   AGE/SEX: 77 y.o. male  ROOM:      Date of Service: 2022                           CHIEF COMPLIANTS / REASON FOR FOLLOW UP          Acute kidney injury versus chronic kidney disease a stage III      Subjective:      Seen and examined  Resting in bed, no distress, no new complaints     Review of Systems:       Constitutional: No fever, no chills, no lethargy, no weakness.  HEENT:  No headache, otalgia, itchy eyes, nasal discharge or sore throat.  Cardiac:  No chest pain, dyspnea, orthopnea or PND.  Chest:   No cough, phlegm or wheezing.  Abdomen:  No abdominal pain, nausea or vomiting.  Neuro:  No focal weakness, abnormal movements or seizure-like activity.  :   No hematuria, no pyuria, no dysuria, no flank pain.  Extremities:  No  joint pains.  ROS was otherwise negative except as mentioned in the [place].        OBJECTIVE                                                                        Exam:  /73 (Patient Position: Lying)   Pulse 77   Temp 97.3 °F (36.3 °C) (Oral)   Resp 16   Ht 177.8 cm (70\")   Wt 100 kg (220 lb 10.9 oz)   SpO2 97%   BMI 31.66 kg/m²   Intake/Output last 3 shifts:  I/O last 3 completed shifts:  In: 885 [P.O.:600; I.V.:285]  Out: 3600 [Urine:3600]  Intake/Output this shift:  I/O this shift:  In: -   Out: 625 [Urine:625]    General Appearance:  Alert, chronically ill appearing, cooperative, no distress, appears stated age  Head:  Normocephalic, without obvious abnormality, atraumatic  Eyes:  Sclerae anicteric, EOM's intact     Neck:  Supple,  no adenopathy;      Lungs:   Clear to auscultation bilaterally, respirations unlabored  Heart:  Regular rate and rhythm, S1 and S2 normal, no  rub   or gallop  Abdomen:  Soft, nontender,    no masses, no hepatomegaly, no splenomegaly  Extremities:  Extremities normal, no edema  Neurologic:   " Alert and oriented, no focal deficits    Scheduled Meds:aspirin, 81 mg, Oral, Daily  budesonide, 0.5 mg, Nebulization, BID - RT  carvedilol, 6.25 mg, Oral, BID With Meals  docusate sodium, 100 mg, Oral, BID  empagliflozin, 10 mg, Oral, Daily  furosemide, 40 mg, Intravenous, Q12H  insulin glargine, 45 Units, Subcutaneous, Nightly  insulin lispro, 15 Units, Subcutaneous, TID With Meals  insulin lispro, 2-12 Units, Subcutaneous, TID With Meals  metOLazone, 10 mg, Oral, Daily  polyethylene glycol, 17 g, Oral, Daily  rosuvastatin, 5 mg, Oral, Nightly  sodium chloride, 10 mL, Intravenous, Q12H      Continuous Infusions:heparin, 9.71 Units/kg/hr, Last Rate: 17 Units/kg/hr (12/27/22 0920)      PRN Meds:•  acetaminophen  •  dextrose  •  dextrose  •  glucagon (human recombinant)  •  ipratropium-albuterol  •  melatonin  •  nitroglycerin  •  ondansetron  •  potassium chloride **OR** potassium chloride **OR** potassium chloride  •  sodium chloride  •  sodium chloride         Data Review:                                                                           Labs reviewed      Imaging:                                                                           Imaging reviewed           ASSESSMENT:                                                                                CAD (coronary artery disease), native coronary artery    Type 2 diabetes mellitus with hyperglycemia, without long-term current use of insulin (HCC)    Mixed hyperlipidemia    Primary hypertension    Systolic and diastolic CHF, acute on chronic (HCC)    Influenza due to seasonal influenza virus    • Acute kidney injury with chronic kidney disease a stage III, baseline creatinine December 17, 2022 on admission 1.16, now creatinine around 1.88-2.12. Suspect variable creatinine secondary to hemodynamic and volume changes.  With history of diabetes, hypertension.  Urine analysis shows 100 mg of protein, no RBCs, no WBCs.  Urine output about 1 L.    • Volume  overload with pulmonary edema.  • Diabetes type 2  • Hypertension  • Coronary artery disease multivalvular disease.  • Congestive heart failure ejection fraction 15 to 20%.  •  Acute coronary syndrome.     Plan:      • Patient has chronic kidney disease a stage III renal function is variable, creatinine peaked at 2.12, now creatinine 1.56 (12/26) with good urine output 2.3L/24hour  • NO chem today, will request stat  • Electrolytes acceptable, mild hyponatremia  • I expect renal function to stabilize.  • Volume improving, continue IV Lasix 40 BID  • Patient is currently not on any ACE inhibitor's or angiotensin receptor blockers. Noted cardiology plans to introduce when kidney function stabilizes  • Urine analysis with glucosuria, ketones, 100 mg/dL protein, no hematuria, no pyuria  • We will follow-up this patient closely, cardiology and CT surgery plan noted for CABG On December 29, 2022. Repeat echocardiogram tomorrow to re-eval LV function.       ROWAN Mackenzie  [facility]  12/27/2022  11:13 EST      The note was transcribed by ROWAN Qureshi.  I personally have examined the patient and interviewed the patient and modified the history, exam. I have reviewed the history, data, problems, assessment and plan .and I concur . Exam as described in the Progress note, with comments additions, revisions as noted by me.         Duran Briggs MD  [facility]  12/27/2022  12:12 EST

## 2022-12-27 NOTE — PROGRESS NOTES
" LOS: 10 days   Patient Care Team:  Melvina Hodge as PCP - General (Nurse Practitioner)  Johann Morales MD as Cardiologist (Cardiology)    Chief Complaint: preop open heart    Subjective:  Symptoms:  No shortness of breath or chest pain.    Diet:  No nausea.    Activity level: Returning to normal.    Pain:  He reports no pain.          Vital Signs  Temp:  [97.3 °F (36.3 °C)-98.4 °F (36.9 °C)] 97.3 °F (36.3 °C)  Heart Rate:  [69-82] 77  Resp:  [13-18] 16  BP: (121-149)/(63-90) 126/73  Body mass index is 31.66 kg/m².    Intake/Output Summary (Last 24 hours) at 12/27/2022 1158  Last data filed at 12/27/2022 1040  Gross per 24 hour   Intake 375 ml   Output 2775 ml   Net -2400 ml     I/O this shift:  In: -   Out: 625 [Urine:625]            12/25/22  0000 12/26/22  0402 12/27/22  0434   Weight: 104 kg (229 lb 0.9 oz) 101 kg (223 lb 8.7 oz) 100 kg (220 lb 10.9 oz)         Objective:  General Appearance:  Comfortable.    Vital signs: (most recent): Blood pressure (!) 130/101, pulse 87, temperature 97.6 °F (36.4 °C), temperature source Oral, resp. rate 16, height 177.8 cm (70\"), weight 100 kg (220 lb 10.9 oz), SpO2 94 %.    Lungs:  Normal effort and normal respiratory rate.  There are decreased breath sounds (bilateral bases).  (O2 ta 2 liters)  Heart: Normal rate.  Regular rhythm.    Extremities: There is no dependent edema.    Neurological: Patient is alert and oriented to person, place and time.    Skin:  Warm and dry.              Results Review:        WBC WBC   Date Value Ref Range Status   12/27/2022 11.80 (H) 3.40 - 10.80 10*3/mm3 Final   12/25/2022 8.40 3.40 - 10.80 10*3/mm3 Final      HGB Hemoglobin   Date Value Ref Range Status   12/27/2022 15.5 13.0 - 17.7 g/dL Final   12/25/2022 14.3 13.0 - 17.7 g/dL Final      HCT Hematocrit   Date Value Ref Range Status   12/27/2022 46.2 37.5 - 51.0 % Final   12/25/2022 42.3 37.5 - 51.0 % Final      Platelets Platelets   Date Value Ref Range Status   12/27/2022 361 140 - 450 " 10*3/mm3 Final   12/25/2022 305 140 - 450 10*3/mm3 Final        PT/INR:  No results found for: PROTIME/No results found for: INR    Sodium Sodium   Date Value Ref Range Status   12/26/2022 131 (L) 136 - 145 mmol/L Final   12/25/2022 132 (L) 136 - 145 mmol/L Final      Potassium Potassium   Date Value Ref Range Status   12/26/2022 4.3 3.5 - 5.2 mmol/L Final     Comment:     Slight hemolysis detected by analyzer. Results may be affected.   12/25/2022 4.6 3.5 - 5.2 mmol/L Final     Comment:     Slight hemolysis detected by analyzer. Results may be affected.      Chloride Chloride   Date Value Ref Range Status   12/26/2022 89 (L) 98 - 107 mmol/L Final   12/25/2022 94 (L) 98 - 107 mmol/L Final      Bicarbonate CO2   Date Value Ref Range Status   12/26/2022 28.0 22.0 - 29.0 mmol/L Final   12/25/2022 26.0 22.0 - 29.0 mmol/L Final      BUN BUN   Date Value Ref Range Status   12/26/2022 41 (H) 8 - 23 mg/dL Final   12/25/2022 42 (H) 8 - 23 mg/dL Final      Creatinine Creatinine   Date Value Ref Range Status   12/26/2022 1.56 (H) 0.76 - 1.27 mg/dL Final   12/25/2022 1.34 (H) 0.76 - 1.27 mg/dL Final      Calcium Calcium   Date Value Ref Range Status   12/26/2022 9.7 8.6 - 10.5 mg/dL Final   12/25/2022 9.4 8.6 - 10.5 mg/dL Final      Magnesium No results found for: MG   Troponin No results found for: TROPONIN   BNP No results found for: BNP         aspirin, 81 mg, Oral, Daily  budesonide, 0.5 mg, Nebulization, BID - RT  carvedilol, 6.25 mg, Oral, BID With Meals  docusate sodium, 100 mg, Oral, BID  empagliflozin, 10 mg, Oral, Daily  furosemide, 40 mg, Intravenous, Q12H  insulin glargine, 45 Units, Subcutaneous, Nightly  insulin lispro, 18 Units, Subcutaneous, TID With Meals  insulin lispro, 2-12 Units, Subcutaneous, TID With Meals  metOLazone, 10 mg, Oral, Daily  polyethylene glycol, 17 g, Oral, Daily  rosuvastatin, 5 mg, Oral, Nightly  sodium chloride, 10 mL, Intravenous, Q12H      heparin, 9.71 Units/kg/hr, Last Rate: 17  Units/kg/hr (12/27/22 0920)        PRN Meds:.•  acetaminophen  •  dextrose  •  dextrose  •  glucagon (human recombinant)  •  ipratropium-albuterol  •  melatonin  •  nitroglycerin  •  ondansetron  •  potassium chloride **OR** potassium chloride **OR** potassium chloride  •  sodium chloride  •  sodium chloride    Patient Active Problem List   Diagnosis Code   • CAD (coronary artery disease), native coronary artery I25.10   • Type 2 diabetes mellitus with hyperglycemia, without long-term current use of insulin (HCC) E11.65   • Mixed hyperlipidemia E78.2   • Primary hypertension I10   • Systolic and diastolic CHF, acute on chronic (HCC) I50.43   • Influenza due to seasonal influenza virus J10.1       Assessment & Plan    - MV CAD with NSTEMI presentation at [place], EF 20-25% (echo)--transferred by our service for further care and evaluation, will start plavix prior to discharge  - ICM, acute HFrEF, NYHA class II-III--BNP 5580 at OSH, repeat echo 12/21 EF 40-45%---GDMT prior to discharge  - Influenza B diagnosed 12/15--ID signed off, Tamiflu x 5 days completed  - DM type 2 with hyperglycemia, a1c 8.3---- endocrine managing, on Jardiance   - HTN--stable  - HL---statin  - Past smoker  - [place], hearing aids in place  - Obesity  - COPD---pulmonary following  - MENDEZ on probable CKD III---peak creatinine preop 2.1, renal consult     Preop studies:  Carotids: mild stenosis bilaterally  Vein mapping: adequate bilaterally  12/18 MRSA negative  12/17 cxr: no active disease  12/19 CT: emphysema, no significant aortic calcifications  12/17 ua:  ok  12/27 COVID negative    Await morning labs, on Lasix IV and zaroxolyn po per renal.  Echo pending today.  Plan for CABG with IABP on Thursday if LV fxn and renal status stable, hold heparin gtt on call to OR.     1300 addendum:  Sodium level down to 127, creatinine stable at 1.5, d/c zaroxolyn for now, repeat bmp in am.  White count elevated, afebrile, recheck in am, recheck u/a and cxr.      Chyna Christian, APRN  12/27/22  11:58 EST

## 2022-12-27 NOTE — PLAN OF CARE
Assessment: Sherman Baumann presents with functional mobility impairments which indicate the need for skilled intervention. Tolerating session today without incident. Pt tolerated treatment well this date, ambulating with RWx safely with CGA. Pt requires cueing for increased stride length and follows direction well. Increased stride seemed to improve balance when ambulating with RWx. Will continue to follow and progress as tolerated.

## 2022-12-27 NOTE — CONSULTS
Nutrition Services    Patient Name: Sherman Baumann  YOB: 1945  MRN: [id number]  Admission date: 12/17/2022    PPE Documentation        PPE Worn By Provider mask and eye protection   PPE Worn By Patient  None     NUTRITION SCREENING      Encounter Information: LOS x 10 d nutrition assessment. Visited pt in room to check on intakes. Pt reported improved intakes, eating 3 meals/d recently. Pt stated some meats are harder to eat, but finding it easier to eat options such as the pot roast. Pt reported a UBW of 240#, stating he feels like he has lost wt since admission. Pt reports no N/V and had no concerns or questions for writer. Will continue to monitor wt trends and intakes during admission. If wt continues to drop, pt may benefit from ONS.        PO Diet: Diet: Cardiac Diets, Diabetic Diets; Healthy Heart (2-3 Na+); Consistent Carbohydrate; Texture: Regular Texture (IDDSI 7); Fluid Consistency: Thin (IDDSI 0)   PO Supplements: None   PO Intake:  100% of recent meals       Labs (reviewed below): Hyponatremia  Hyperglycemia, moderate  -will continue to monitor labs.         GI Function:  Stool Output  Stool (mL): 0 mL (12/26/22 0600)  Stool Unmeasured Occurrence: 0 (12/26/22 1400)  Bowel Incontinence: No (12/24/22 0903)  Stool Amount: large (12/24/22 0903) (x3d)       Skin: Intact       Weight Hx Review: 12/27: Current wt: 220# (wt down 3% since admission x 2 wks) - will continue to monitor    Wt hx available x past 2 weeks; 220-230# range. Current wt lowest wt x 2 weeks.         Nutrition Intervention: Continue current diet.        Results from last 7 days   Lab Units 12/26/22  0443 12/25/22  0243 12/24/22  0450   SODIUM mmol/L 131* 132* 131*   POTASSIUM mmol/L 4.3 4.6 4.4   CHLORIDE mmol/L 89* 94* 93*   CO2 mmol/L 28.0 26.0 24.0   BUN mg/dL 41* 42* 52*   CREATININE mg/dL 1.56* 1.34* 1.88*   CALCIUM mg/dL 9.7 9.4 9.2   GLUCOSE mg/dL 130* 101* 146*     Results from last 7 days   Lab Units  12/27/22  0427 12/25/22  0243 12/23/22  0250 12/20/22  2313   MAGNESIUM mg/dL  --   --  2.1 1.8   PHOSPHORUS mg/dL  --   --   --  4.5   HEMOGLOBIN g/dL 15.5   < > 13.8 14.3   HEMATOCRIT % 46.2   < > 42.0 44.3    < > = values in this interval not displayed.     COVID19   Date Value Ref Range Status   12/27/2022 Not Detected Not Detected - Ref. Range Final     Lab Results   Component Value Date    HGBA1C 8.3 (H) 12/17/2022       RD to follow up per protocol.    Electronically signed by:  Melvina Cantrell  12/27/22 10:09 EST

## 2022-12-28 ENCOUNTER — APPOINTMENT (OUTPATIENT)
Dept: GENERAL RADIOLOGY | Facility: HOSPITAL | Age: 77
DRG: 235 | End: 2022-12-28
Payer: MEDICARE

## 2022-12-28 ENCOUNTER — ANESTHESIA EVENT (OUTPATIENT)
Dept: PERIOP | Facility: HOSPITAL | Age: 77
DRG: 235 | End: 2022-12-28
Payer: MEDICARE

## 2022-12-28 LAB
ABO GROUP BLD: NORMAL
ANION GAP SERPL CALCULATED.3IONS-SCNC: 17 MMOL/L (ref 5–15)
APTT PPP: 62.4 SECONDS (ref 61–76.5)
APTT PPP: 66.9 SECONDS (ref 61–76.5)
BASOPHILS # BLD AUTO: 0.1 10*3/MM3 (ref 0–0.2)
BASOPHILS NFR BLD AUTO: 0.7 % (ref 0–1.5)
BH CV ECHO MEAS - ACS: 1.37 CM
BH CV ECHO MEAS - AO ROOT DIAM: 3.2 CM
BH CV ECHO MEAS - EDV(CUBED): 108.1 ML
BH CV ECHO MEAS - EDV(MOD-SP4): 125.8 ML
BH CV ECHO MEAS - EF(MOD-BP): 48 %
BH CV ECHO MEAS - EF(MOD-SP4): 48 %
BH CV ECHO MEAS - ESV(CUBED): 51 ML
BH CV ECHO MEAS - ESV(MOD-SP4): 65.5 ML
BH CV ECHO MEAS - FS: 22.2 %
BH CV ECHO MEAS - IVS/LVPW: 0.77 CM
BH CV ECHO MEAS - IVSD: 1.1 CM
BH CV ECHO MEAS - LA DIMENSION: 4 CM
BH CV ECHO MEAS - LV DIASTOLIC VOL/BSA (35-75): 57.9 CM2
BH CV ECHO MEAS - LV MASS(C)D: 232.8 GRAMS
BH CV ECHO MEAS - LV SYSTOLIC VOL/BSA (12-30): 30.1 CM2
BH CV ECHO MEAS - LVIDD: 4.8 CM
BH CV ECHO MEAS - LVIDS: 3.7 CM
BH CV ECHO MEAS - LVOT AREA: 3.3 CM2
BH CV ECHO MEAS - LVOT DIAM: 2.06 CM
BH CV ECHO MEAS - LVPWD: 1.43 CM
BH CV ECHO MEAS - RVDD: 2.8 CM
BH CV ECHO MEAS - SI(MOD-SP4): 27.8 ML/M2
BH CV ECHO MEAS - SV(MOD-SP4): 60.3 ML
BLD GP AB SCN SERPL QL: NEGATIVE
BUN SERPL-MCNC: 59 MG/DL (ref 8–23)
BUN/CREAT SERPL: 31.9 (ref 7–25)
CALCIUM SPEC-SCNC: 10.3 MG/DL (ref 8.6–10.5)
CHLORIDE SERPL-SCNC: 83 MMOL/L (ref 98–107)
CLOSE TME COLL+ADP + EPINEP PNL BLD: 96 % (ref 86–100)
CO2 SERPL-SCNC: 25 MMOL/L (ref 22–29)
CREAT SERPL-MCNC: 1.85 MG/DL (ref 0.76–1.27)
DEPRECATED RDW RBC AUTO: 47.3 FL (ref 37–54)
EGFRCR SERPLBLD CKD-EPI 2021: 37.3 ML/MIN/1.73
EOSINOPHIL # BLD AUTO: 0.2 10*3/MM3 (ref 0–0.4)
EOSINOPHIL NFR BLD AUTO: 1.3 % (ref 0.3–6.2)
ERYTHROCYTE [DISTWIDTH] IN BLOOD BY AUTOMATED COUNT: 14.7 % (ref 12.3–15.4)
GLUCOSE BLDC GLUCOMTR-MCNC: 195 MG/DL (ref 70–105)
GLUCOSE BLDC GLUCOMTR-MCNC: 238 MG/DL (ref 70–105)
GLUCOSE BLDC GLUCOMTR-MCNC: 243 MG/DL (ref 70–105)
GLUCOSE SERPL-MCNC: 185 MG/DL (ref 65–99)
HCT VFR BLD AUTO: 47.3 % (ref 37.5–51)
HGB BLD-MCNC: 15.8 G/DL (ref 13–17.7)
INR PPP: 1.06 (ref 0.93–1.1)
LYMPHOCYTES # BLD AUTO: 2.4 10*3/MM3 (ref 0.7–3.1)
LYMPHOCYTES NFR BLD AUTO: 19.3 % (ref 19.6–45.3)
MAXIMAL PREDICTED HEART RATE: 144 BPM
MCH RBC QN AUTO: 29.5 PG (ref 26.6–33)
MCHC RBC AUTO-ENTMCNC: 33.5 G/DL (ref 31.5–35.7)
MCV RBC AUTO: 88 FL (ref 79–97)
MONOCYTES # BLD AUTO: 1.2 10*3/MM3 (ref 0.1–0.9)
MONOCYTES NFR BLD AUTO: 10.1 % (ref 5–12)
NEUTROPHILS NFR BLD AUTO: 68.6 % (ref 42.7–76)
NEUTROPHILS NFR BLD AUTO: 8.3 10*3/MM3 (ref 1.7–7)
NRBC BLD AUTO-RTO: 0 /100 WBC (ref 0–0.2)
NT-PROBNP SERPL-MCNC: 1699 PG/ML (ref 0–1800)
PLATELET # BLD AUTO: 327 10*3/MM3 (ref 140–450)
PMV BLD AUTO: 8.5 FL (ref 6–12)
POTASSIUM SERPL-SCNC: 4.3 MMOL/L (ref 3.5–5.2)
PROTHROMBIN TIME: 10.9 SECONDS (ref 9.6–11.7)
RBC # BLD AUTO: 5.38 10*6/MM3 (ref 4.14–5.8)
RH BLD: POSITIVE
SODIUM SERPL-SCNC: 125 MMOL/L (ref 136–145)
STRESS TARGET HR: 122 BPM
T&S EXPIRATION DATE: NORMAL
WBC NRBC COR # BLD: 12.2 10*3/MM3 (ref 3.4–10.8)

## 2022-12-28 PROCEDURE — 94799 UNLISTED PULMONARY SVC/PX: CPT

## 2022-12-28 PROCEDURE — 99024 POSTOP FOLLOW-UP VISIT: CPT | Performed by: NURSE PRACTITIONER

## 2022-12-28 PROCEDURE — 80048 BASIC METABOLIC PNL TOTAL CA: CPT | Performed by: NURSE PRACTITIONER

## 2022-12-28 PROCEDURE — 63710000001 INSULIN LISPRO (HUMAN) PER 5 UNITS: Performed by: INTERNAL MEDICINE

## 2022-12-28 PROCEDURE — 25010000002 HEPARIN (PORCINE) 25000-0.45 UT/250ML-% SOLUTION: Performed by: THORACIC SURGERY (CARDIOTHORACIC VASCULAR SURGERY)

## 2022-12-28 PROCEDURE — 94664 DEMO&/EVAL PT USE INHALER: CPT

## 2022-12-28 PROCEDURE — 83880 ASSAY OF NATRIURETIC PEPTIDE: CPT | Performed by: NURSE PRACTITIONER

## 2022-12-28 PROCEDURE — 86850 RBC ANTIBODY SCREEN: CPT | Performed by: NURSE PRACTITIONER

## 2022-12-28 PROCEDURE — 99232 SBSQ HOSP IP/OBS MODERATE 35: CPT | Performed by: INTERNAL MEDICINE

## 2022-12-28 PROCEDURE — 85730 THROMBOPLASTIN TIME PARTIAL: CPT | Performed by: THORACIC SURGERY (CARDIOTHORACIC VASCULAR SURGERY)

## 2022-12-28 PROCEDURE — 71045 X-RAY EXAM CHEST 1 VIEW: CPT

## 2022-12-28 PROCEDURE — 25010000002 FUROSEMIDE PER 20 MG: Performed by: INTERNAL MEDICINE

## 2022-12-28 PROCEDURE — 82962 GLUCOSE BLOOD TEST: CPT

## 2022-12-28 PROCEDURE — 86901 BLOOD TYPING SEROLOGIC RH(D): CPT

## 2022-12-28 PROCEDURE — 85576 BLOOD PLATELET AGGREGATION: CPT | Performed by: NURSE PRACTITIONER

## 2022-12-28 PROCEDURE — 86901 BLOOD TYPING SEROLOGIC RH(D): CPT | Performed by: NURSE PRACTITIONER

## 2022-12-28 PROCEDURE — 86900 BLOOD TYPING SEROLOGIC ABO: CPT | Performed by: NURSE PRACTITIONER

## 2022-12-28 PROCEDURE — 85610 PROTHROMBIN TIME: CPT | Performed by: NURSE PRACTITIONER

## 2022-12-28 PROCEDURE — 85025 COMPLETE CBC W/AUTO DIFF WBC: CPT | Performed by: NURSE PRACTITIONER

## 2022-12-28 PROCEDURE — 86923 COMPATIBILITY TEST ELECTRIC: CPT

## 2022-12-28 PROCEDURE — 94761 N-INVAS EAR/PLS OXIMETRY MLT: CPT

## 2022-12-28 PROCEDURE — 63710000001 INSULIN GLARGINE PER 5 UNITS: Performed by: INTERNAL MEDICINE

## 2022-12-28 PROCEDURE — 86900 BLOOD TYPING SEROLOGIC ABO: CPT

## 2022-12-28 RX ORDER — ACETAMINOPHEN 325 MG/1
650 TABLET ORAL EVERY 4 HOURS PRN
Status: DISCONTINUED | OUTPATIENT
Start: 2022-12-28 | End: 2022-12-28

## 2022-12-28 RX ORDER — DIPHENHYDRAMINE HCL 25 MG
25 CAPSULE ORAL NIGHTLY PRN
Status: DISCONTINUED | OUTPATIENT
Start: 2022-12-28 | End: 2022-12-29 | Stop reason: HOSPADM

## 2022-12-28 RX ORDER — HEPARIN SODIUM 10000 [USP'U]/100ML
9.71 INJECTION, SOLUTION INTRAVENOUS
Status: DISCONTINUED | OUTPATIENT
Start: 2022-12-29 | End: 2022-12-29

## 2022-12-28 RX ORDER — CHLORHEXIDINE GLUCONATE 0.12 MG/ML
15 RINSE ORAL ONCE
Status: COMPLETED | OUTPATIENT
Start: 2022-12-29 | End: 2022-12-29

## 2022-12-28 RX ORDER — FUROSEMIDE 10 MG/ML
40 INJECTION INTRAMUSCULAR; INTRAVENOUS EVERY 12 HOURS
Status: DISCONTINUED | OUTPATIENT
Start: 2022-12-28 | End: 2022-12-29

## 2022-12-28 RX ORDER — CHLORHEXIDINE GLUCONATE 500 MG/1
1 CLOTH TOPICAL EVERY 12 HOURS PRN
Status: DISCONTINUED | OUTPATIENT
Start: 2022-12-28 | End: 2022-12-29 | Stop reason: HOSPADM

## 2022-12-28 RX ORDER — ACETAMINOPHEN 500 MG
500 TABLET ORAL EVERY 4 HOURS PRN
Status: DISCONTINUED | OUTPATIENT
Start: 2022-12-28 | End: 2022-12-29

## 2022-12-28 RX ADMIN — INSULIN LISPRO 18 UNITS: 100 INJECTION, SOLUTION INTRAVENOUS; SUBCUTANEOUS at 18:07

## 2022-12-28 RX ADMIN — HEPARIN SODIUM 15 UNITS/KG/HR: 10000 INJECTION, SOLUTION INTRAVENOUS at 19:40

## 2022-12-28 RX ADMIN — Medication 10 ML: at 22:19

## 2022-12-28 RX ADMIN — BUDESONIDE 0.5 MG: 0.5 INHALANT RESPIRATORY (INHALATION) at 06:50

## 2022-12-28 RX ADMIN — EMPAGLIFLOZIN 10 MG: 10 TABLET, FILM COATED ORAL at 08:00

## 2022-12-28 RX ADMIN — BUDESONIDE 0.5 MG: 0.5 INHALANT RESPIRATORY (INHALATION) at 18:58

## 2022-12-28 RX ADMIN — HEPARIN SODIUM 15 UNITS/KG/HR: 10000 INJECTION, SOLUTION INTRAVENOUS at 01:36

## 2022-12-28 RX ADMIN — CARVEDILOL 6.25 MG: 6.25 TABLET, FILM COATED ORAL at 07:55

## 2022-12-28 RX ADMIN — POLYETHYLENE GLYCOL 3350 17 G: 17 POWDER, FOR SOLUTION ORAL at 08:00

## 2022-12-28 RX ADMIN — Medication 15 G: at 13:28

## 2022-12-28 RX ADMIN — MUPIROCIN 1 APPLICATION: 20 OINTMENT TOPICAL at 18:08

## 2022-12-28 RX ADMIN — FUROSEMIDE 40 MG: 10 INJECTION, SOLUTION INTRAMUSCULAR; INTRAVENOUS at 13:28

## 2022-12-28 RX ADMIN — INSULIN LISPRO 18 UNITS: 100 INJECTION, SOLUTION INTRAVENOUS; SUBCUTANEOUS at 13:28

## 2022-12-28 RX ADMIN — ASPIRIN 81 MG CHEWABLE TABLET 81 MG: 81 TABLET CHEWABLE at 08:00

## 2022-12-28 RX ADMIN — INSULIN LISPRO 4 UNITS: 100 INJECTION, SOLUTION INTRAVENOUS; SUBCUTANEOUS at 13:28

## 2022-12-28 RX ADMIN — DOCUSATE SODIUM 100 MG: 100 CAPSULE, LIQUID FILLED ORAL at 22:15

## 2022-12-28 RX ADMIN — INSULIN LISPRO 4 UNITS: 100 INJECTION, SOLUTION INTRAVENOUS; SUBCUTANEOUS at 18:08

## 2022-12-28 RX ADMIN — Medication 10 ML: at 08:00

## 2022-12-28 RX ADMIN — INSULIN LISPRO 18 UNITS: 100 INJECTION, SOLUTION INTRAVENOUS; SUBCUTANEOUS at 07:56

## 2022-12-28 RX ADMIN — CARVEDILOL 6.25 MG: 6.25 TABLET, FILM COATED ORAL at 18:07

## 2022-12-28 RX ADMIN — INSULIN LISPRO 2 UNITS: 100 INJECTION, SOLUTION INTRAVENOUS; SUBCUTANEOUS at 07:56

## 2022-12-28 RX ADMIN — DOCUSATE SODIUM 100 MG: 100 CAPSULE, LIQUID FILLED ORAL at 08:00

## 2022-12-28 RX ADMIN — ROSUVASTATIN CALCIUM 5 MG: 5 TABLET, COATED ORAL at 22:15

## 2022-12-28 RX ADMIN — INSULIN GLARGINE 45 UNITS: 100 INJECTION, SOLUTION SUBCUTANEOUS at 22:16

## 2022-12-28 NOTE — PLAN OF CARE
Goal Outcome Evaluation:  Plan of Care Reviewed With: patient        Progress: no change   Patient resting in bed. Polite and cooperative with care. Continues on heparin gtt. Ptt in am.

## 2022-12-28 NOTE — PROGRESS NOTES
" LOS: 11 days   Patient Care Team:  Melvina Hodge as PCP - General (Nurse Practitioner)  Johann Morales MD as Cardiologist (Cardiology)    Chief Complaint: CAD    Subjective:  Symptoms:  No shortness of breath or chest pain.    Diet:  No nausea.    Activity level: Returning to normal.    Pain:  He reports no pain.          Vital Signs  Temp:  [97.4 °F (36.3 °C)-98.7 °F (37.1 °C)] 98.3 °F (36.8 °C)  Heart Rate:  [67-87] 80  Resp:  [12-22] 13  BP: (117-158)/() 138/74  Body mass index is 31.19 kg/m².    Intake/Output Summary (Last 24 hours) at 12/28/2022 1134  Last data filed at 12/28/2022 1133  Gross per 24 hour   Intake 1840 ml   Output 1875 ml   Net -35 ml     I/O this shift:  In: 240 [P.O.:240]  Out: 350 [Urine:350]            12/27/22  0434 12/27/22  1641 12/28/22  0400   Weight: 100 kg (220 lb 10.9 oz) 99.8 kg (220 lb) 98.6 kg (217 lb 6 oz)         Objective:  General Appearance:  Comfortable.    Vital signs: (most recent): Blood pressure 138/74, pulse 80, temperature 98.3 °F (36.8 °C), temperature source Oral, resp. rate 13, height 177.8 cm (70\"), weight 98.6 kg (217 lb 6 oz), SpO2 98 %.    Lungs:  Normal effort and normal respiratory rate.    Heart: Normal rate.  Regular rhythm.    Neurological: Patient is alert and oriented to person, place and time.    Skin:  Warm and dry.              Results Review:        WBC WBC   Date Value Ref Range Status   12/28/2022 12.20 (H) 3.40 - 10.80 10*3/mm3 Final   12/27/2022 11.80 (H) 3.40 - 10.80 10*3/mm3 Final      HGB Hemoglobin   Date Value Ref Range Status   12/28/2022 15.8 13.0 - 17.7 g/dL Final   12/27/2022 15.5 13.0 - 17.7 g/dL Final      HCT Hematocrit   Date Value Ref Range Status   12/28/2022 47.3 37.5 - 51.0 % Final   12/27/2022 46.2 37.5 - 51.0 % Final      Platelets Platelets   Date Value Ref Range Status   12/28/2022 327 140 - 450 10*3/mm3 Final   12/27/2022 361 140 - 450 10*3/mm3 Final        PT/INR:    Protime   Date Value Ref Range Status   12/28/2022 " 10.9 9.6 - 11.7 Seconds Final   /  INR   Date Value Ref Range Status   12/28/2022 1.06 0.93 - 1.10 Final       Sodium Sodium   Date Value Ref Range Status   12/28/2022 125 (L) 136 - 145 mmol/L Final   12/27/2022 127 (L) 136 - 145 mmol/L Final   12/26/2022 131 (L) 136 - 145 mmol/L Final      Potassium Potassium   Date Value Ref Range Status   12/28/2022 4.3 3.5 - 5.2 mmol/L Final     Comment:     Slight hemolysis detected by analyzer. Results may be affected.   12/27/2022 4.7 3.5 - 5.2 mmol/L Final     Comment:     Slight hemolysis detected by analyzer. Results may be affected.   12/26/2022 4.3 3.5 - 5.2 mmol/L Final     Comment:     Slight hemolysis detected by analyzer. Results may be affected.      Chloride Chloride   Date Value Ref Range Status   12/28/2022 83 (L) 98 - 107 mmol/L Final   12/27/2022 82 (L) 98 - 107 mmol/L Final   12/26/2022 89 (L) 98 - 107 mmol/L Final      Bicarbonate CO2   Date Value Ref Range Status   12/28/2022 25.0 22.0 - 29.0 mmol/L Final   12/27/2022 26.0 22.0 - 29.0 mmol/L Final   12/26/2022 28.0 22.0 - 29.0 mmol/L Final      BUN BUN   Date Value Ref Range Status   12/28/2022 59 (H) 8 - 23 mg/dL Final   12/27/2022 51 (H) 8 - 23 mg/dL Final   12/26/2022 41 (H) 8 - 23 mg/dL Final      Creatinine Creatinine   Date Value Ref Range Status   12/28/2022 1.85 (H) 0.76 - 1.27 mg/dL Final   12/27/2022 1.50 (H) 0.76 - 1.27 mg/dL Final   12/26/2022 1.56 (H) 0.76 - 1.27 mg/dL Final      Calcium Calcium   Date Value Ref Range Status   12/28/2022 10.3 8.6 - 10.5 mg/dL Final   12/27/2022 10.4 8.6 - 10.5 mg/dL Final   12/26/2022 9.7 8.6 - 10.5 mg/dL Final      Magnesium No results found for: MG   Troponin No results found for: TROPONIN   BNP No results found for: BNP         aspirin, 81 mg, Oral, Daily  budesonide, 0.5 mg, Nebulization, BID - RT  carvedilol, 6.25 mg, Oral, BID With Meals  docusate sodium, 100 mg, Oral, BID  empagliflozin, 10 mg, Oral, Daily  insulin glargine, 45 Units, Subcutaneous,  Nightly  insulin lispro, 18 Units, Subcutaneous, TID With Meals  insulin lispro, 2-12 Units, Subcutaneous, TID With Meals  polyethylene glycol, 17 g, Oral, Daily  rosuvastatin, 5 mg, Oral, Nightly  sodium chloride, 10 mL, Intravenous, Q12H      heparin, 9.71 Units/kg/hr, Last Rate: 15 Units/kg/hr (12/28/22 0136)        PRN Meds:.•  acetaminophen  •  dextrose  •  dextrose  •  glucagon (human recombinant)  •  ipratropium-albuterol  •  melatonin  •  nitroglycerin  •  ondansetron  •  potassium chloride **OR** potassium chloride **OR** potassium chloride  •  sodium chloride  •  sodium chloride    Patient Active Problem List   Diagnosis Code   • CAD (coronary artery disease), native coronary artery I25.10   • Type 2 diabetes mellitus with hyperglycemia, without long-term current use of insulin (HCC) E11.65   • Mixed hyperlipidemia E78.2   • Primary hypertension I10   • Systolic and diastolic CHF, acute on chronic (HCC) I50.43   • Influenza due to seasonal influenza virus J10.1       Assessment & Plan    - MV CAD with NSTEMI presentation at [place], EF 20-25% (echo)--transferred by our service for further care and evaluation, will start plavix prior to discharge  - ICM, acute HFrEF, NYHA class II-III--BNP 5580 at OSH, repeat echo 12/21 EF 40-45%---GDMT prior to discharge  - Influenza B diagnosed 12/15--ID signed off, Tamiflu x 5 days completed  - DM type 2 with hyperglycemia, a1c 8.3---- endocrine managing, on Jardiance   - HTN--stable  - HL---statin  - Past smoker  - [place], hearing aids in place  - Obesity  - COPD---pulmonary following  - MENDEZ on probable CKD III---peak creatinine preop 2.1, renal consult     Preop studies:  Carotids: mild stenosis bilaterally  Vein mapping: adequate bilaterally  12/18 MRSA negative  12/17 cxr: no active disease  12/19 CT: emphysema, no significant aortic calcifications  12/17 ua:  ok  12/27 COVID negative    Serum sodium down to 125, creatinine 1.85, oxygen needs vary from room air to 4  liters, continue holding diuretics for now.  White count elevated again today at 12.2, cxr relatively unchanged and urine clear.  Echo report still pending, will discuss all with Dr. Huber with planned surgery for tomorrow.     Chyna Christian, ROWAN  12/28/22  11:34 EST

## 2022-12-28 NOTE — PROGRESS NOTES
Referring Provider: Jaleel Huber MD    Reason for follow-up: Multivessel coronary artery disease, HFpEF, acute kidney injury, non-ST elevation MI, diabetes     Patient Care Team:  Melvina Hodge as PCP - General (Nurse Practitioner)  Johann Morales MD as Cardiologist (Cardiology)      SUBJECTIVE  Denies chest pain or shortness of breath.  Awaiting CABG for tomorrow     ROS  Review of all systems negative except as indicated.    Since I have last seen, the patient has been without any chest discomfort, shortness of breath, palpitations, dizziness or syncope.  Denies having any headache, abdominal pain, nausea, vomiting, diarrhea, constipation, loss of weight or loss of appetite.  Denies having any excessive bruising, hematuria or blood in the stool.  ROS      Personal History:    Past Medical History:   Diagnosis Date   • CHF (congestive heart failure) (HCC)    • Diabetes mellitus (HCC)    • Elevated cholesterol    • Hyperlipidemia    • Hypertension        Past Surgical History:   Procedure Laterality Date   • HERNIA REPAIR     • TONSILLECTOMY         No family history on file.    Social History     Tobacco Use   • Smoking status: Former     Types: Cigarettes   Vaping Use   • Vaping Use: Never used   Substance Use Topics   • Alcohol use: Not Currently   • Drug use: Never        Medications Prior to Admission   Medication Sig Dispense Refill Last Dose   • aspirin 81 MG chewable tablet Chew 81 mg Daily.      • aspirin-acetaminophen-caffeine (EXCEDRIN MIGRAINE) 250-250-65 MG per tablet Take 1 tablet by mouth Every 6 (Six) Hours As Needed for Headache.      • carvedilol (COREG) 3.125 MG tablet Take 3.125 mg by mouth 2 (Two) Times a Day With Meals.      • furosemide (LASIX) 40 MG tablet Take 40 mg by mouth 2 (Two) Times a Day.      • glipizide (GLUCOTROL) 10 MG tablet Take 10 mg by mouth 2 (Two) Times a Day Before Meals.      • insulin NPH-insulin regular (humuLIN 70/30,novoLIN 70/30) (70-30) 100 UNIT/ML injection  Inject 27 Units under the skin into the appropriate area as directed Every Morning.      • insulin NPH-insulin regular (humuLIN 70/30,novoLIN 70/30) (70-30) 100 UNIT/ML injection Inject 22 Units under the skin into the appropriate area as directed Every Night.      • metFORMIN (GLUCOPHAGE) 500 MG tablet Take 500 mg by mouth Daily With Breakfast.      • Omega-3 Fatty Acids (fish oil) 1000 MG capsule capsule Take 1,000 mg by mouth Daily With Breakfast.      • oseltamivir (TAMIFLU) 75 MG capsule Take 75 mg by mouth 2 (Two) Times a Day.      • rosuvastatin (CRESTOR) 5 MG tablet Take 5 mg by mouth Every Night.          Allergies:  Patient has no known allergies.    Scheduled Meds:aspirin, 81 mg, Oral, Daily  budesonide, 0.5 mg, Nebulization, BID - RT  carvedilol, 6.25 mg, Oral, BID With Meals  [START ON 12/29/2022] ceFAZolin, 2 g, Intravenous, On Call to OR  [START ON 12/29/2022] chlorhexidine, 15 mL, Mouth/Throat, Once  docusate sodium, 100 mg, Oral, BID  empagliflozin, 10 mg, Oral, Daily  furosemide, 40 mg, Intravenous, Q12H  insulin glargine, 45 Units, Subcutaneous, Nightly  insulin lispro, 18 Units, Subcutaneous, TID With Meals  insulin lispro, 2-12 Units, Subcutaneous, TID With Meals  [START ON 12/29/2022] metoprolol tartrate, 12.5 mg, Oral, On Call to OR  mupirocin, 1 application, Each Nare, Q12H  polyethylene glycol, 17 g, Oral, Daily  rosuvastatin, 5 mg, Oral, Nightly  sodium chloride, 10 mL, Intravenous, Q12H      Continuous Infusions:heparin, 9.71 Units/kg/hr, Last Rate: 15 Units/kg/hr (12/28/22 0136)      PRN Meds:.•  acetaminophen  •  Chlorhexidine Gluconate Cloth  •  dextrose  •  dextrose  •  diphenhydrAMINE  •  glucagon (human recombinant)  •  ipratropium-albuterol  •  melatonin  •  nitroglycerin  •  ondansetron  •  potassium chloride **OR** potassium chloride **OR** potassium chloride  •  sodium chloride  •  sodium chloride      OBJECTIVE    Vital Signs  Vitals:    12/28/22 1133 12/28/22 1200 12/28/22 1300  "12/28/22 1400   BP: 138/74 149/74 130/71 127/73   BP Location: Left arm      Patient Position: Lying      Pulse: 80 82 80 77   Resp: 13      Temp: 98.3 °F (36.8 °C)      TempSrc: Oral      SpO2: 98% 98% 94% 98%   Weight:       Height:           Flowsheet Rows    Flowsheet Row First Filed Value   Admission Height 177.8 cm (70\") Documented at 12/18/2022 1305   Admission Weight 103 kg (227 lb 1.2 oz) Documented at 12/17/2022 0500            Intake/Output Summary (Last 24 hours) at 12/28/2022 1601  Last data filed at 12/28/2022 1400  Gross per 24 hour   Intake 1313 ml   Output 2125 ml   Net -812 ml          Telemetry: Sinus rhythm    Physical Exam:  The patient is alert, oriented and in no distress.  Vital signs as noted above.  Head and neck revealed no carotid bruits or jugular venous distention.  No thyromegaly or lymphadenopathy is present  Lungs clear.  No wheezing.  Breath sounds are normal bilaterally.  Heart normal first and second heart sounds.  No murmur. No precordial rub is present.  No gallop is present.  Abdomen soft and nontender.  No organomegaly is present.  Extremities with good peripheral pulses without any pedal edema.  Skin warm and dry.  Musculoskeletal system is grossly normal.  CNS grossly normal.       Results Review:  I have personally reviewed the results from the time of this admission to 12/28/2022 16:01 EST and agree with these findings:  []  Laboratory  []  Microbiology  []  Radiology  []  EKG/Telemetry   []  Cardiology/Vascular   []  Pathology  []  Old records  []  Other:    Most notable findings include:    Lab Results (last 24 hours)     Procedure Component Value Units Date/Time    POC Glucose Once [[id number]]  (Abnormal) Collected: 12/28/22 1131    Specimen: Blood Updated: 12/28/22 1140     Glucose 238 mg/dL      Comment: Serial Number: [id number]:  [id number]       aPTT [[id number]]  (Normal) Collected: 12/28/22 0905    Specimen: Blood Updated: 12/28/22 0932     PTT 62.4 seconds  "    POC Glucose Once [[id number]]  (Abnormal) Collected: 12/28/22 0717    Specimen: Blood Updated: 12/28/22 0720     Glucose 195 mg/dL      Comment: Serial Number: [id number]:  [id number]       Platelet Function ADP [[id number]]  (Normal) Collected: 12/28/22 0154    Specimen: Blood Updated: 12/28/22 0344     ADP Aggregation, % Platelet 96 %     Basic Metabolic Panel [[id number]]  (Abnormal) Collected: 12/28/22 0154    Specimen: Blood Updated: 12/28/22 0248     Glucose 185 mg/dL      BUN 59 mg/dL      Creatinine 1.85 mg/dL      Sodium 125 mmol/L      Potassium 4.3 mmol/L      Comment: Slight hemolysis detected by analyzer. Results may be affected.        Chloride 83 mmol/L      CO2 25.0 mmol/L      Calcium 10.3 mg/dL      BUN/Creatinine Ratio 31.9     Anion Gap 17.0 mmol/L      eGFR 37.3 mL/min/1.73      Comment: National Kidney Foundation and American Society of Nephrology (ASN) Task Force recommended calculation based on the Chronic Kidney Disease Epidemiology Collaboration (CKD-EPI) equation refit without adjustment for race.       Narrative:      GFR Normal >60  Chronic Kidney Disease <60  Kidney Failure <15    The GFR formula is only valid for adults with stable renal function between ages 18 and 70.    BNP [[id number]]  (Normal) Collected: 12/28/22 0154    Specimen: Blood Updated: 12/28/22 0248     proBNP 1,699.0 pg/mL     Narrative:      Among patients with dyspnea, NT-proBNP is highly sensitive for the detection of acute congestive heart failure. In addition NT-proBNP of <300 pg/ml effectively rules out acute congestive heart failure with 99% negative predictive value.      aPTT [[id number]]  (Normal) Collected: 12/28/22 0154    Specimen: Blood Updated: 12/28/22 0247     PTT 66.9 seconds     Protime-INR [[id number]]  (Normal) Collected: 12/28/22 0154    Specimen: Blood Updated: 12/28/22 0247     Protime 10.9 Seconds      INR 1.06    CBC & Differential [[id number]]  (Abnormal) Collected: 12/28/22 0154     Specimen: Blood Updated: 12/28/22 0209    Narrative:      The following orders were created for panel order CBC & Differential.  Procedure                               Abnormality         Status                     ---------                               -----------         ------                     CBC Auto Differential[[id number]]        Abnormal            Final result                 Please view results for these tests on the individual orders.    CBC Auto Differential [[id number]]  (Abnormal) Collected: 12/28/22 0154    Specimen: Blood Updated: 12/28/22 0209     WBC 12.20 10*3/mm3      RBC 5.38 10*6/mm3      Hemoglobin 15.8 g/dL      Hematocrit 47.3 %      MCV 88.0 fL      MCH 29.5 pg      MCHC 33.5 g/dL      RDW 14.7 %      RDW-SD 47.3 fl      MPV 8.5 fL      Platelets 327 10*3/mm3      Neutrophil % 68.6 %      Lymphocyte % 19.3 %      Monocyte % 10.1 %      Eosinophil % 1.3 %      Basophil % 0.7 %      Neutrophils, Absolute 8.30 10*3/mm3      Lymphocytes, Absolute 2.40 10*3/mm3      Monocytes, Absolute 1.20 10*3/mm3      Eosinophils, Absolute 0.20 10*3/mm3      Basophils, Absolute 0.10 10*3/mm3      nRBC 0.0 /100 WBC     aPTT [[id number]]  (Abnormal) Collected: 12/27/22 1913    Specimen: Blood Updated: 12/27/22 1936     PTT 77.0 seconds     POC Glucose Once [[id number]]  (Abnormal) Collected: 12/27/22 1932    Specimen: Blood Updated: 12/27/22 1934     Glucose 134 mg/dL      Comment: Serial Number: [id number]:  [id number]       Urinalysis With Culture If Indicated - Urine, Clean Catch [[id number]]  (Abnormal) Collected: 12/27/22 1652    Specimen: Urine, Clean Catch Updated: 12/27/22 1659     Color, UA Yellow     Appearance, UA Clear     pH, UA <=5.0     Specific Gravity, UA 1.021     Glucose, UA Negative     Ketones, UA Trace     Bilirubin, UA Negative     Blood, UA Negative     Protein, UA Negative     Leuk Esterase, UA Negative     Nitrite, UA Negative     Urobilinogen, UA 0.2 E.U./dL    Narrative:       In absence of clinical symptoms, the presence of pyuria, bacteria, and/or nitrites on the urinalysis result does not correlate with infection.  Urine microscopic not indicated.    POC Glucose Once [[id number]]  (Abnormal) Collected: 12/27/22 1605    Specimen: Blood Updated: 12/27/22 1607     Glucose 181 mg/dL      Comment: Serial Number: [id number]:  [id number]             Imaging Results (Last 24 Hours)     Procedure Component Value Units Date/Time    XR Chest 1 View [[id number]] Collected: 12/28/22 0801     Updated: 12/28/22 0804    Narrative:      DATE OF EXAM:  12/28/2022 6:24 AM     PROCEDURE:  XR CHEST 1 VW-     INDICATIONS:  preop open heart     COMPARISON:  12/27/2022     TECHNIQUE:   Single radiographic view of the chest was obtained.     FINDINGS:  There is lordotic rotation. No focal pulmonary consolidations are  evident. Left base is partially obscured by overlying heart border.  Pulmonary vascularity appears within normal limits. Heart size is  borderline prominent.       Impression:      No acute cardiopulmonary findings.     Electronically Signed By-Jeffrey Turner MD On:12/28/2022 8:02 AM  This report was finalized on [id number] by  Jeffrey Turner MD.    XR Chest 1 View [[id number]] Collected: 12/27/22 1636     Updated: 12/27/22 1638    Narrative:         DATE OF EXAM:   12/27/2022 4:16 PM     PROCEDURE:   XR CHEST 1 VW-     INDICATIONS:   preop open heart     COMPARISON:  12/26/2022     TECHNIQUE:   Portable chest radiograph.     FINDINGS:    The cardiomediastinal silhouette is within normal limits. The lungs are  clear. There is no pneumothorax, focal consolidation, or large pleural  effusion. Osseous structures grossly intact. Left midlung granuloma.       Impression:      No acute process.      Electronically Signed By-Wing Hodges MD On:12/27/2022 4:36 PM  This report was finalized on [id number] by  Wing Hodges MD.          LAB RESULTS (LAST 7 DAYS)    CBC  Results from last 7 days   Lab  Units 12/28/22  0154 12/27/22  0427 12/25/22  0243 12/23/22  0250   WBC 10*3/mm3 12.20* 11.80* 8.40 9.90   RBC 10*6/mm3 5.38 5.22 4.73 4.71   HEMOGLOBIN g/dL 15.8 15.5 14.3 13.8   HEMATOCRIT % 47.3 46.2 42.3 42.0   MCV fL 88.0 88.6 89.4 89.3   PLATELETS 10*3/mm3 327 361 305 346       BMP  Results from last 7 days   Lab Units 12/28/22  0154 12/27/22  1145 12/26/22  0443 12/25/22  0243 12/24/22  0450 12/23/22  0250 12/22/22  0429   SODIUM mmol/L 125* 127* 131* 132* 131* 135* 135*   POTASSIUM mmol/L 4.3 4.7 4.3 4.6 4.4 5.0 4.3   CHLORIDE mmol/L 83* 82* 89* 94* 93* 94* 92*   CO2 mmol/L 25.0 26.0 28.0 26.0 24.0 28.0 28.0   BUN mg/dL 59* 51* 41* 42* 52* 42* 31*   CREATININE mg/dL 1.85* 1.50* 1.56* 1.34* 1.88* 2.12* 1.26   GLUCOSE mg/dL 185* 284* 130* 101* 146* 95 107*   MAGNESIUM mg/dL  --   --   --   --   --  2.1  --        CMP   Results from last 7 days   Lab Units 12/28/22  0154 12/27/22  1145 12/26/22 0443 12/25/22  0243 12/24/22  0450 12/23/22  0250 12/22/22  0429   SODIUM mmol/L 125* 127* 131* 132* 131* 135* 135*   POTASSIUM mmol/L 4.3 4.7 4.3 4.6 4.4 5.0 4.3   CHLORIDE mmol/L 83* 82* 89* 94* 93* 94* 92*   CO2 mmol/L 25.0 26.0 28.0 26.0 24.0 28.0 28.0   BUN mg/dL 59* 51* 41* 42* 52* 42* 31*   CREATININE mg/dL 1.85* 1.50* 1.56* 1.34* 1.88* 2.12* 1.26   GLUCOSE mg/dL 185* 284* 130* 101* 146* 95 107*       BNP        TROPONIN        CoAg  Results from last 7 days   Lab Units 12/28/22  0905 12/28/22  0154 12/27/22  1913 12/27/22  1145 12/27/22  0427 12/26/22  0309 12/25/22  1527   INR   --  1.06  --   --   --   --   --    APTT seconds 62.4 66.9 77.0* 76.5 59.7* 68.3 69.3       Creatinine Clearance  Estimated Creatinine Clearance: 40 mL/min (A) (by C-G formula based on SCr of 1.85 mg/dL (H)).    ABG        Radiology  XR Chest 1 View    Result Date: 12/28/2022  No acute cardiopulmonary findings.  Electronically Signed By-Jeffrey Turner MD On:12/28/2022 8:02 AM This report was finalized on [id number] by  Jeffrey Turner,  MD.    XR Chest 1 View    Result Date: 12/27/2022  No acute process.  Electronically Signed By-Wing Hodges MD On:12/27/2022 4:36 PM This report was finalized on [id number] by  Wing Hodges MD.        EKG  I personally viewed and interpreted the patient's EKG/Telemetry data:  ECG 12 Lead Chest Pain   Final Result   HEART RATE= 89  bpm   RR Interval= 672  ms   MN Interval= 176  ms   P Horizontal Axis= -5  deg   P Front Axis= 29  deg   QRSD Interval= 92  ms   QT Interval= 362  ms   QRS Axis= 16  deg   T Wave Axis= 163  deg   - ABNORMAL ECG -   Sinus rhythm   Probable left atrial enlargement   Probable anterior infarct, age indeterminate   Abnormal T, consider ischemia, lateral leads   No previous ECG available for comparison   Electronically Signed By: Alex Byrd ([facility]) 19-Dec-2022 08:52:18   Date and Time of Study: 2022-12-17 07:21:47      SCANNED - TELEMETRY     Final Result      SCANNED - TELEMETRY     Final Result      SCANNED - TELEMETRY     Final Result      SCANNED - TELEMETRY     Final Result      SCANNED - TELEMETRY     Final Result      SCANNED - TELEMETRY     Final Result      SCANNED - TELEMETRY     Final Result      SCANNED - TELEMETRY     Final Result      SCANNED - TELEMETRY     Final Result      SCANNED - TELEMETRY     Final Result      SCANNED - TELEMETRY     Final Result      SCANNED - TELEMETRY     Final Result      SCANNED - TELEMETRY     Final Result      SCANNED - TELEMETRY     Final Result      SCANNED - TELEMETRY     Final Result      SCANNED - TELEMETRY     Final Result      SCANNED - TELEMETRY     Final Result      SCANNED - TELEMETRY     Final Result      SCANNED - TELEMETRY     Final Result      SCANNED - TELEMETRY     Final Result      SCANNED - TELEMETRY     Final Result      SCANNED - TELEMETRY     Final Result      SCANNED - TELEMETRY     Final Result      SCANNED - TELEMETRY     Final Result      SCANNED - TELEMETRY     Final Result      SCANNED - TELEMETRY     Final Result       SCANNED - TELEMETRY     Final Result      SCANNED - TELEMETRY     Final Result      SCANNED - TELEMETRY     Final Result      SCANNED - TELEMETRY     Final Result      SCANNED - TELEMETRY     Final Result      SCANNED - TELEMETRY     Final Result      SCANNED - TELEMETRY     Final Result      SCANNED - TELEMETRY     Final Result      SCANNED - TELEMETRY     Final Result      SCANNED - TELEMETRY     Final Result      SCANNED - TELEMETRY     Final Result      SCANNED - TELEMETRY     Final Result      SCANNED - TELEMETRY     Final Result      SCANNED - TELEMETRY     Final Result      SCANNED - TELEMETRY     Final Result            Echocardiogram:    Results for orders placed during the hospital encounter of 12/17/22    Adult Transthoracic Echo Limited W/ Cont if Necessary Per Protocol    Interpretation Summary  •  Left ventricular systolic function is low normal. Calculated left ventricular EF = 48% Left ventricular ejection fraction appears to be 46 - 50%.  •  The left ventricular cavity is moderately dilated.  •  Left ventricular wall thickness is consistent with mild concentric hypertrophy.  •  Left ventricular diastolic dysfunction is noted.  •  There is calcification of the aortic valve without stenosis        Stress Test:         Cardiac Catheterization:  No results found for this or any previous visit.         Other:         ASSESSMENT & PLAN:    Principal Problem:    CAD (coronary artery disease), native coronary artery  Active Problems:    Type 2 diabetes mellitus with hyperglycemia, without long-term current use of insulin (HCC)    Mixed hyperlipidemia    Primary hypertension    Systolic and diastolic CHF, acute on chronic (HCC)    Influenza due to seasonal influenza virus    77-year-old man with multivessel coronary artery disease presented with non-ST elevation MI and acute kidney injury in the setting of new diagnosis of influenza.  Overall he has improved with medical therapy and completed treatment  of influenza.  Troponin peaked at 4.5 and creatinine peaked at 2.2.  Nephrology is on board and renal function is improving.  He remains on furosemide IV twice daily.  Metolazone will be discontinued.  Creatinine has slightly worsened 1.8.  Repeat echocardiogram today shows EF of 48% with contrast.  ACE inhibitor is on hold due to renal dysfunction.  We have uptitrated GDMT with aspirin, heparin drip, Coreg and Jardiance.  White count is slightly elevated: No evidence of infection.  Has hyponatremia and still with uncontrolled blood sugars.      Johann Morales MD  12/28/22  16:01 EST

## 2022-12-28 NOTE — CASE MANAGEMENT/SOCIAL WORK
Continued Stay Note  [facility]     Patient Name: Sherman Baumann  MRN: [id number]  Today's Date: 12/28/2022    Admit Date: 12/17/2022    Plan: DC Plan: Pending Clinical Course and PT/OT evaluations. [facility] following if needed. Plan for [facility] 12/29/22.   Discharge Plan     Row Name 12/28/22 1502       Plan    Plan DC Plan: Pending Clinical Course and PT/OT evaluations. [facility] following if needed. Plan for [facility] 12/29/22.    Provided Post Acute Provider List? N/A    Provided Post Acute Provider Quality & Resource List? N/A    Plan Comments DC Barrier: Pending [facility] tomorrow 12/29/22           Expected Discharge Date and Time     Expected Discharge Date Expected Discharge Time    Jan 4, 2023         Phone communication or documentation only- no physical contact with patient or family.      Rebecca Dempsey RN     Office Phone: [phone]  Office Cell:     [phone]

## 2022-12-28 NOTE — PROGRESS NOTES
Daily Progress Note          Assessment    Hypoxemia  Flu B  COPD  LOGAN  Cor [place]   CHF: LV EF 20-25%  DM II  Hyperlipidemia  HTN  CAD with multivessel disease  MENDEZ        PLAN:  Oxygen supplement and titration to maintain saturation above 90%: Currently requiring 2 L per nasal cannula     CABG is scheduled 12/29/2022  Optimize Incentive spirometer and BD, ICS prior to surgery and after   Tamiflu: Complete  BIPAP while sleeping and as needed  Bronchodilators  Inhaled corticosteroids  IS/Flutter valve  Diuresis and fluid management as per nephrology  Electrolytes/ glycemic control  Anticoagulation: Heparin drip            LOS: 11 days     Subjective     Currently patient is feeling okay with no chest pain or cough    Objective     Vital signs for last 24 hours:  Vitals:    12/28/22 1133 12/28/22 1200 12/28/22 1300 12/28/22 1400   BP: 138/74 149/74 130/71 127/73   BP Location: Left arm      Patient Position: Lying      Pulse: 80 82 80 77   Resp: 13      Temp: 98.3 °F (36.8 °C)      TempSrc: Oral      SpO2: 98% 98% 94% 98%   Weight:       Height:           Intake/Output last 3 shifts:  I/O last 3 completed shifts:  In: 1600 [P.O.:1000; I.V.:600]  Out: 3100 [Urine:3100]  Intake/Output this shift:  I/O this shift:  In: 593 [P.O.:480; I.V.:113]  Out: 1525 [Urine:1525]      Radiology  Imaging Results (Last 24 Hours)     Procedure Component Value Units Date/Time    XR Chest 1 View [[id number]] Collected: 12/28/22 0801     Updated: 12/28/22 0804    Narrative:      DATE OF EXAM:  12/28/2022 6:24 AM     PROCEDURE:  XR CHEST 1 VW-     INDICATIONS:  preop open heart     COMPARISON:  12/27/2022     TECHNIQUE:   Single radiographic view of the chest was obtained.     FINDINGS:  There is lordotic rotation. No focal pulmonary consolidations are  evident. Left base is partially obscured by overlying heart border.  Pulmonary vascularity appears within normal limits. Heart size is  borderline prominent.       Impression:      No  acute cardiopulmonary findings.     Electronically Signed By-Jeffrey Turner MD On:12/28/2022 8:02 AM  This report was finalized on [id number] by  Jeffrey Turner MD.    XR Chest 1 View [[id number]] Collected: 12/27/22 1636     Updated: 12/27/22 1638    Narrative:         DATE OF EXAM:   12/27/2022 4:16 PM     PROCEDURE:   XR CHEST 1 VW-     INDICATIONS:   preop open heart     COMPARISON:  12/26/2022     TECHNIQUE:   Portable chest radiograph.     FINDINGS:    The cardiomediastinal silhouette is within normal limits. The lungs are  clear. There is no pneumothorax, focal consolidation, or large pleural  effusion. Osseous structures grossly intact. Left midlung granuloma.       Impression:      No acute process.      Electronically Signed By-Wing Hodges MD On:12/27/2022 4:36 PM  This report was finalized on [id number] by  Wing Hodges MD.          Labs:  Results from last 7 days   Lab Units 12/28/22  0154   WBC 10*3/mm3 12.20*   HEMOGLOBIN g/dL 15.8   HEMATOCRIT % 47.3   PLATELETS 10*3/mm3 327     Results from last 7 days   Lab Units 12/28/22  0154   SODIUM mmol/L 125*   POTASSIUM mmol/L 4.3   CHLORIDE mmol/L 83*   CO2 mmol/L 25.0   BUN mg/dL 59*   CREATININE mg/dL 1.85*   CALCIUM mg/dL 10.3   GLUCOSE mg/dL 185*                     Results from last 7 days   Lab Units 12/23/22  0250   MAGNESIUM mg/dL 2.1     Results from last 7 days   Lab Units 12/28/22  0905 12/28/22  0154 12/27/22  1913   INR   --  1.06  --    APTT seconds 62.4 66.9 77.0*               Meds:   SCHEDULE  aspirin, 81 mg, Oral, Daily  budesonide, 0.5 mg, Nebulization, BID - RT  carvedilol, 6.25 mg, Oral, BID With Meals  [START ON 12/29/2022] ceFAZolin, 2 g, Intravenous, On Call to OR  [START ON 12/29/2022] chlorhexidine, 15 mL, Mouth/Throat, Once  docusate sodium, 100 mg, Oral, BID  empagliflozin, 10 mg, Oral, Daily  furosemide, 40 mg, Intravenous, Q12H  insulin glargine, 45 Units, Subcutaneous, Nightly  insulin lispro, 18 Units, Subcutaneous, TID  With Meals  insulin lispro, 2-12 Units, Subcutaneous, TID With Meals  [START ON 12/29/2022] metoprolol tartrate, 12.5 mg, Oral, On Call to OR  mupirocin, 1 application, Each Nare, Q12H  polyethylene glycol, 17 g, Oral, Daily  rosuvastatin, 5 mg, Oral, Nightly  sodium chloride, 10 mL, Intravenous, Q12H      Infusions  heparin, 9.71 Units/kg/hr, Last Rate: 15 Units/kg/hr (12/28/22 0136)      PRNs  •  acetaminophen  •  Chlorhexidine Gluconate Cloth  •  dextrose  •  dextrose  •  diphenhydrAMINE  •  glucagon (human recombinant)  •  ipratropium-albuterol  •  melatonin  •  nitroglycerin  •  ondansetron  •  potassium chloride **OR** potassium chloride **OR** potassium chloride  •  sodium chloride  •  sodium chloride    Physical Exam:  General Appearance:  Alert   HEENT:  Normocephalic, without obvious abnormality, Conjunctiva/corneas clear,.   Nares normal, no drainage     Neck:  Supple, symmetrical, trachea midline.   Lungs /Chest wall: Good air movement bilaterally without wheezing, respirations unlabored, symmetrical wall movement.     Heart:  Regular rate and rhythm, S1 S2 normal  Abdomen: Soft, non-tender, no masses, no organomegaly.    Extremities: No edema, no clubbing or cyanosis     ROS  Constitutional: Negative for chills, fever and malaise/fatigue.   HENT: Negative.    Eyes: Negative.    Cardiovascular: Negative.    Respiratory: Negative for cough and shortness of breath.    Skin: Negative.    Musculoskeletal: Negative.    Gastrointestinal: Negative.    Genitourinary: Negative.    Neurological: Negative.    Psychiatric/Behavioral: Negative.      I reviewed the recent clinical results    Much of this encounter note is an electronic transcription/translation of spoken language to printed text using Dragon Software which might include inadvertent errors in transcription.

## 2022-12-28 NOTE — PROGRESS NOTES
"      FOLLOW UP NOTE      Name: Sherman Baumann ADMIT: 2022   : 1945  PCP: Melvina Hodge    MRN: [id number] LOS: 11 days   AGE/SEX: 77 y.o. male  ROOM:      Date of Service: 2022                           CHIEF COMPLIANTS / REASON FOR FOLLOW UP          Acute kidney injury versus chronic kidney disease a stage III      Subjective:      Seen and examined  Resting in bed, no distress, no new complaints     Review of Systems:       Constitutional: No fever, no chills, no lethargy, no weakness.  HEENT:  No headache, otalgia, itchy eyes, nasal discharge or sore throat.  Cardiac:  No chest pain, dyspnea, orthopnea or PND.  Chest:   No cough, phlegm or wheezing.  Abdomen:  No abdominal pain, nausea or vomiting.  Neuro:  No focal weakness, abnormal movements or seizure-like activity.  :   No hematuria, no pyuria, no dysuria, no flank pain.  Extremities:  No  joint pains.  ROS was otherwise negative except as mentioned in the [place].        OBJECTIVE                                                                        Exam:  /74 (BP Location: Left arm, Patient Position: Lying)   Pulse 80   Temp 98.3 °F (36.8 °C) (Oral)   Resp 13   Ht 177.8 cm (70\")   Wt 98.6 kg (217 lb 6 oz)   SpO2 98%   BMI 31.19 kg/m²   Intake/Output last 3 shifts:  I/O last 3 completed shifts:  In: 1600 [P.O.:1000; I.V.:600]  Out: 3100 [Urine:3100]  Intake/Output this shift:  I/O this shift:  In: 240 [P.O.:240]  Out: 350 [Urine:350]    General Appearance:  Alert, chronically ill appearing, cooperative, no distress, appears stated age  Head:  Normocephalic, without obvious abnormality, atraumatic  Eyes:  Sclerae anicteric, EOM's intact     Neck:  Supple,  no adenopathy;      Lungs:   Clear to auscultation bilaterally, respirations unlabored  Heart:  Regular rate and rhythm, S1 and S2 normal, no  rub   or gallop  Abdomen:  Soft, nontender,    no masses, no hepatomegaly, no splenomegaly  Extremities:  Extremities " normal, no edema  Neurologic:   Alert and oriented, no focal deficits    Scheduled Meds:aspirin, 81 mg, Oral, Daily  budesonide, 0.5 mg, Nebulization, BID - RT  carvedilol, 6.25 mg, Oral, BID With Meals  docusate sodium, 100 mg, Oral, BID  empagliflozin, 10 mg, Oral, Daily  insulin glargine, 45 Units, Subcutaneous, Nightly  insulin lispro, 18 Units, Subcutaneous, TID With Meals  insulin lispro, 2-12 Units, Subcutaneous, TID With Meals  polyethylene glycol, 17 g, Oral, Daily  rosuvastatin, 5 mg, Oral, Nightly  sodium chloride, 10 mL, Intravenous, Q12H      Continuous Infusions:heparin, 9.71 Units/kg/hr, Last Rate: 15 Units/kg/hr (12/28/22 0136)      PRN Meds:•  acetaminophen  •  dextrose  •  dextrose  •  glucagon (human recombinant)  •  ipratropium-albuterol  •  melatonin  •  nitroglycerin  •  ondansetron  •  potassium chloride **OR** potassium chloride **OR** potassium chloride  •  sodium chloride  •  sodium chloride         Data Review:                                                                           Labs reviewed      Imaging:                                                                           Imaging reviewed           ASSESSMENT:                                                                                CAD (coronary artery disease), native coronary artery    Type 2 diabetes mellitus with hyperglycemia, without long-term current use of insulin (HCC)    Mixed hyperlipidemia    Primary hypertension    Systolic and diastolic CHF, acute on chronic (HCC)    Influenza due to seasonal influenza virus    • Acute kidney injury with chronic kidney disease a stage III, baseline creatinine December 17, 2022 on admission 1.16, now creatinine around 1.88-2.12. Suspect variable creatinine secondary to hemodynamic and volume changes.  With history of diabetes, hypertension.  Urine analysis shows 100 mg of protein, no RBCs, no WBCs.  Urine output about 1 L.    • Volume overload with pulmonary  edema.  • Diabetes type 2  • Hypertension  • Coronary artery disease multivalvular disease.  • Congestive heart failure ejection fraction 15 to 20%.  •  Acute coronary syndrome.     Plan:      • Patient has chronic kidney disease a stage III renal function is variable, creatinine peaked at 2.12, now creatinine 1.86 slightly better than yesterday  • Electrolytes acceptable, hyponatremia noted which is getting worse since yesterday  • Repeat labs later today  • I expect renal function to stabilize.  We will give another dose of Bumex today as sodium level continue to on the low side  • Will hold further metolazone as that might be contributing to hyponatremia  • Patient is currently not on any ACE inhibitor's or angiotensin receptor blockers. Noted cardiology plans to introduce when kidney function stabilizes  • Open heart surgery tomorrow morning  • Urine analysis with glucosuria, ketones, 100 mg/dL protein, no hematuria, no pyuria  · Repeat echo result still pending we will follow-up Previous echo showed ejection fraction 40 to 45% left ventricular cavity is dilated.  With grade 1 diastolic dysfunctions.       Wade Jiang MD  [facility]  12/28/2022  12:14 EST

## 2022-12-28 NOTE — PROGRESS NOTES
[facility] Daily Progress Note    Patient Name: Sherman Baumann  : 1945  MRN: [id number]  Primary Care Physician:  Melvina Hodge  Date of admission: 2022      Subjective      Chief Complaint: Shortness of breath    Patient reports no new issues.  CABG planned in the next 24 hours.  Hospitalist service will sign off at this time      Review of Systems   All other systems reviewed and are negative.      Objective      Vitals:   Temp:  [97.4 °F (36.3 °C)-98.7 °F (37.1 °C)] 98.3 °F (36.8 °C)  Heart Rate:  [67-84] 80  Resp:  [12-22] 13  BP: (117-158)/() 138/74  Flow (L/min):  [1-4] 4    Physical Exam     General: Obese elderly male sitting up in bed breathing comfortably on 4 L via nasal cannula no acute distress  HEENT: NC/AT, EOMI, mucosa moist  Heart: Regular, rate controlled  Chest: Normal work of breathing moving air well no wheezing  Abdominal: Soft. NT/ND.   Musculoskeletal: Normal ROM.  No cyanosis. No calf tenderness.  Neurological: Awake and alert no focal deficits  Skin: Skin is warm and dry. No rash  Psychiatric: Normal mood and affect.         Result Review    Result Review:  I have personally reviewed the results from the time of this admission to 2022 13:26 EST and agree with these findings:  [x]  Laboratory  [x]  Microbiology  [x]  Radiology  [x]  EKG/Telemetry   [x]  Cardiology/Vascular   []  Pathology  []  Old records  []  Other:  Most notable findings include: Hyponatremia, renal insufficiency          Assessment & Plan      Brief Patient Summary:    Patient is a 76-year-old gentleman presented to [facility] in [place] with complaints of shortness of breath for approximately 1 week.  He reports it is constant.  Associated symptoms included cough and generalized weakness.  His O2 sats in the ED were in the 80s on 5 L O2 (per ED notes).  He was found to have influenza B.  BNP 5580 and troponin 2.77. Cardiology was consulted he  was found to have three-vessel heavily calcified CAD with EF 20 to 25%.  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  Echo at OSH showed EF 15 to 25%, right 1 diastolic dysfunction, mild MR, trace AI, mild TR and mildly dilated ascending aorta.  PMHx includes DM type II, HTN, past tobacco abuse.  Pt is poor historian.  Dr. Huber was asked to evaluate pt for surgical revascularization/PCI.    12/27/2022: Patient denies shortness of breath or chest pain at this time.  Planning for upcoming cardiac surgery.    aspirin, 81 mg, Oral, Daily  budesonide, 0.5 mg, Nebulization, BID - RT  carvedilol, 6.25 mg, Oral, BID With Meals  docusate sodium, 100 mg, Oral, BID  empagliflozin, 10 mg, Oral, Daily  furosemide, 40 mg, Intravenous, Q12H  insulin glargine, 45 Units, Subcutaneous, Nightly  insulin lispro, 18 Units, Subcutaneous, TID With Meals  insulin lispro, 2-12 Units, Subcutaneous, TID With Meals  polyethylene glycol, 17 g, Oral, Daily  rosuvastatin, 5 mg, Oral, Nightly  sodium chloride, 10 mL, Intravenous, Q12H  Urea, 15 g, Oral, Once       heparin, 9.71 Units/kg/hr, Last Rate: 15 Units/kg/hr (12/28/22 0136)         Active Hospital Problems:  Active Hospital Problems    Diagnosis    • **CAD (coronary artery disease), native coronary artery    • Influenza due to seasonal influenza virus    • Type 2 diabetes mellitus with hyperglycemia, without long-term current use of insulin (HCC)    • Mixed hyperlipidemia    • Primary hypertension    • Systolic and diastolic CHF, acute on chronic (HCC)      Plan:     Shortness of breath-patient with history of significant acute heart failure with EF of 20 to 25% with findings of multivessel disease requiring upcoming CABG, complicated history of coronary artery disease findings of underlying NSTEMI  -Primary management per cardiology and cardiac surgery  -Volume status per nephrology  -Plan for CABG in the upcoming days we will  sign off upon surgery  -Pulmonology consulted patient on Tamiflu for influenza A    Type 2 diabetes-indicate blood sugars less than 200  -Sliding scale  -Diabetic diet    Renal insufficiency-likely volume related managed by nephrology  -Daily labs    Hyponatremia-likely at least partially volume related  -Daily labs    Obesity-BMI 31  -Lifestyle modification              DVT prophylaxis:  Medical DVT prophylaxis orders are present.    CODE STATUS:    Level Of Support Discussed With: Patient  Code Status (Patient has no pulse and is not breathing): CPR (Attempt to Resuscitate)  Medical Interventions (Patient has pulse or is breathing): Full Support      Disposition:  I expect patient to be discharged 6 to 8 days if improved.  Possibly by 1/4/2023.  As CABG care will be assumed by CT surgery team hospitalist will sign off    This patient has been examined wearing appropriate Personal Protective Equipment and discussed with hospital infection control department. 12/28/22      Electronically signed by Homero Moreno MD, 12/28/22, 13:26 EST.  [facility]ist Team

## 2022-12-29 ENCOUNTER — APPOINTMENT (OUTPATIENT)
Dept: GENERAL RADIOLOGY | Facility: HOSPITAL | Age: 77
DRG: 235 | End: 2022-12-29
Payer: MEDICARE

## 2022-12-29 ENCOUNTER — ANESTHESIA (OUTPATIENT)
Dept: PERIOP | Facility: HOSPITAL | Age: 77
DRG: 235 | End: 2022-12-29
Payer: MEDICARE

## 2022-12-29 PROBLEM — I21.4 NSTEMI (NON-ST ELEVATED MYOCARDIAL INFARCTION): Status: ACTIVE | Noted: 2022-12-29

## 2022-12-29 LAB
ACT BLD: 119 SECONDS (ref 89–137)
ACT BLD: 143 SECONDS (ref 89–137)
ACT BLD: 365 SECONDS (ref 89–137)
ACT BLD: 401 SECONDS (ref 89–137)
ACT BLD: 426 SECONDS (ref 89–137)
ALBUMIN SERPL-MCNC: 3.7 G/DL (ref 3.5–5.2)
ALBUMIN SERPL-MCNC: 4 G/DL (ref 3.5–5.2)
ALBUMIN/GLOB SERPL: 1 G/DL
ALP SERPL-CCNC: 104 U/L (ref 39–117)
ALT SERPL W P-5'-P-CCNC: 12 U/L (ref 1–41)
ANION GAP SERPL CALCULATED.3IONS-SCNC: 15 MMOL/L (ref 5–15)
ANION GAP SERPL CALCULATED.3IONS-SCNC: 17 MMOL/L (ref 5–15)
APTT PPP: 30.3 SECONDS (ref 24–31)
APTT PPP: 64.1 SECONDS (ref 61–76.5)
ARTERIAL PATENCY WRIST A: ABNORMAL
AST SERPL-CCNC: 18 U/L (ref 1–40)
ATMOSPHERIC PRESS: ABNORMAL MM[HG]
BASE DEFICIT: ABNORMAL
BASE EXCESS BLDA CALC-SCNC: -1.1 MMOL/L (ref 0–3)
BASE EXCESS BLDA CALC-SCNC: -1.4 MMOL/L (ref 0–3)
BASE EXCESS BLDA CALC-SCNC: -2.1 MMOL/L (ref 0–3)
BASE EXCESS BLDA CALC-SCNC: -2.2 MMOL/L (ref 0–3)
BASE EXCESS BLDA CALC-SCNC: -2.6 MMOL/L (ref 0–3)
BASE EXCESS BLDA CALC-SCNC: -3.2 MMOL/L (ref 0–3)
BASE EXCESS BLDA CALC-SCNC: 3 MMOL/L (ref 0–3)
BASE EXCESS BLDA CALC-SCNC: 3 MMOL/L (ref 0–3)
BASE EXCESS BLDA CALC-SCNC: 4 MMOL/L (ref 0–3)
BASE EXCESS BLDA CALC-SCNC: 6 MMOL/L (ref 0–3)
BASOPHILS # BLD AUTO: 0.1 10*3/MM3 (ref 0–0.2)
BASOPHILS # BLD AUTO: 0.1 10*3/MM3 (ref 0–0.2)
BASOPHILS NFR BLD AUTO: 0.3 % (ref 0–1.5)
BASOPHILS NFR BLD AUTO: 0.6 % (ref 0–1.5)
BDY SITE: ABNORMAL
BH BB BLOOD EXPIRATION DATE: NORMAL
BH BB BLOOD EXPIRATION DATE: NORMAL
BH BB BLOOD TYPE BARCODE: 5100
BH BB BLOOD TYPE BARCODE: 5100
BH BB DISPENSE STATUS: NORMAL
BH BB DISPENSE STATUS: NORMAL
BH BB PRODUCT CODE: NORMAL
BH BB PRODUCT CODE: NORMAL
BH BB UNIT NUMBER: NORMAL
BH BB UNIT NUMBER: NORMAL
BILIRUB SERPL-MCNC: 0.8 MG/DL (ref 0–1.2)
BUN SERPL-MCNC: 69 MG/DL (ref 8–23)
BUN SERPL-MCNC: 79 MG/DL (ref 8–23)
BUN/CREAT SERPL: 39.4 (ref 7–25)
BUN/CREAT SERPL: 46.7 (ref 7–25)
CA-I BLDA-SCNC: 0.99 MMOL/L (ref 1.12–1.32)
CA-I BLDA-SCNC: 1 MMOL/L (ref 1.12–1.32)
CA-I BLDA-SCNC: 1.01 MMOL/L (ref 1.12–1.32)
CA-I BLDA-SCNC: 1.12 MMOL/L (ref 1.15–1.33)
CA-I BLDA-SCNC: 1.12 MMOL/L (ref 1.15–1.33)
CA-I BLDA-SCNC: 1.16 MMOL/L (ref 1.12–1.32)
CA-I BLDA-SCNC: 1.16 MMOL/L (ref 1.15–1.33)
CA-I BLDA-SCNC: 1.17 MMOL/L (ref 1.12–1.32)
CA-I BLDA-SCNC: 1.21 MMOL/L (ref 1.15–1.33)
CA-I BLDA-SCNC: 1.22 MMOL/L (ref 1.15–1.33)
CA-I BLDA-SCNC: 1.23 MMOL/L (ref 1.15–1.33)
CA-I BLDA-SCNC: 1.34 MMOL/L (ref 1.12–1.32)
CA-I SERPL ISE-MCNC: 1.19 MMOL/L (ref 1.2–1.3)
CALCIUM SPEC-SCNC: 10.5 MG/DL (ref 8.6–10.5)
CALCIUM SPEC-SCNC: 8.8 MG/DL (ref 8.6–10.5)
CHLORIDE SERPL-SCNC: 81 MMOL/L (ref 98–107)
CHLORIDE SERPL-SCNC: 89 MMOL/L (ref 98–107)
CO2 BLDA-SCNC: 30 MMOL/L (ref 23–27)
CO2 BLDA-SCNC: 30.1 MMOL/L (ref 22–29)
CO2 BLDA-SCNC: 31 MMOL/L (ref 23–27)
CO2 BLDA-SCNC: 32 MMOL/L (ref 23–27)
CO2 BLDA-SCNC: 32 MMOL/L (ref 23–27)
CO2 SERPL-SCNC: 24 MMOL/L (ref 22–29)
CO2 SERPL-SCNC: 28 MMOL/L (ref 22–29)
CREAT SERPL-MCNC: 1.69 MG/DL (ref 0.76–1.27)
CREAT SERPL-MCNC: 1.75 MG/DL (ref 0.76–1.27)
CROSSMATCH INTERPRETATION: NORMAL
CROSSMATCH INTERPRETATION: NORMAL
DEPRECATED RDW RBC AUTO: 42.4 FL (ref 37–54)
DEPRECATED RDW RBC AUTO: 46.4 FL (ref 37–54)
EGFRCR SERPLBLD CKD-EPI 2021: 39.9 ML/MIN/1.73
EGFRCR SERPLBLD CKD-EPI 2021: 41.6 ML/MIN/1.73
EOSINOPHIL # BLD AUTO: 0.2 10*3/MM3 (ref 0–0.4)
EOSINOPHIL # BLD AUTO: 0.2 10*3/MM3 (ref 0–0.4)
EOSINOPHIL NFR BLD AUTO: 0.6 % (ref 0.3–6.2)
EOSINOPHIL NFR BLD AUTO: 1.5 % (ref 0.3–6.2)
ERYTHROCYTE [DISTWIDTH] IN BLOOD BY AUTOMATED COUNT: 13.8 % (ref 12.3–15.4)
ERYTHROCYTE [DISTWIDTH] IN BLOOD BY AUTOMATED COUNT: 14.5 % (ref 12.3–15.4)
GLOBULIN UR ELPH-MCNC: 4.1 GM/DL
GLUCOSE BLDC GLUCOMTR-MCNC: 132 MG/DL (ref 70–105)
GLUCOSE BLDC GLUCOMTR-MCNC: 135 MG/DL (ref 74–100)
GLUCOSE BLDC GLUCOMTR-MCNC: 135 MG/DL (ref 74–100)
GLUCOSE BLDC GLUCOMTR-MCNC: 141 MG/DL (ref 74–100)
GLUCOSE BLDC GLUCOMTR-MCNC: 141 MG/DL (ref 74–100)
GLUCOSE BLDC GLUCOMTR-MCNC: 148 MG/DL (ref 70–105)
GLUCOSE BLDC GLUCOMTR-MCNC: 151 MG/DL (ref 70–105)
GLUCOSE BLDC GLUCOMTR-MCNC: 154 MG/DL (ref 74–100)
GLUCOSE BLDC GLUCOMTR-MCNC: 154 MG/DL (ref 74–100)
GLUCOSE BLDC GLUCOMTR-MCNC: 171 MG/DL (ref 74–100)
GLUCOSE BLDC GLUCOMTR-MCNC: 171 MG/DL (ref 74–100)
GLUCOSE BLDC GLUCOMTR-MCNC: 185 MG/DL (ref 74–100)
GLUCOSE BLDC GLUCOMTR-MCNC: 185 MG/DL (ref 74–100)
GLUCOSE BLDC GLUCOMTR-MCNC: 189 MG/DL (ref 74–100)
GLUCOSE BLDC GLUCOMTR-MCNC: 189 MG/DL (ref 74–100)
GLUCOSE BLDC GLUCOMTR-MCNC: 196 MG/DL (ref 70–105)
GLUCOSE BLDC GLUCOMTR-MCNC: 197 MG/DL (ref 70–105)
GLUCOSE BLDC GLUCOMTR-MCNC: 203 MG/DL (ref 70–105)
GLUCOSE BLDC GLUCOMTR-MCNC: 211 MG/DL (ref 70–105)
GLUCOSE BLDC GLUCOMTR-MCNC: 215 MG/DL (ref 70–105)
GLUCOSE BLDC GLUCOMTR-MCNC: 222 MG/DL (ref 70–105)
GLUCOSE BLDC GLUCOMTR-MCNC: 228 MG/DL (ref 70–105)
GLUCOSE BLDC GLUCOMTR-MCNC: 229 MG/DL (ref 70–105)
GLUCOSE BLDC GLUCOMTR-MCNC: 230 MG/DL (ref 70–105)
GLUCOSE BLDC GLUCOMTR-MCNC: 241 MG/DL (ref 70–105)
GLUCOSE SERPL-MCNC: 180 MG/DL (ref 65–99)
GLUCOSE SERPL-MCNC: 191 MG/DL (ref 65–99)
HCO3 BLDA-SCNC: 21.8 MMOL/L (ref 21–28)
HCO3 BLDA-SCNC: 22.7 MMOL/L (ref 21–28)
HCO3 BLDA-SCNC: 22.7 MMOL/L (ref 21–28)
HCO3 BLDA-SCNC: 23 MMOL/L (ref 21–28)
HCO3 BLDA-SCNC: 23.1 MMOL/L (ref 21–28)
HCO3 BLDA-SCNC: 24.8 MMOL/L (ref 21–28)
HCO3 BLDA-SCNC: 28.4 MMOL/L (ref 22–26)
HCO3 BLDA-SCNC: 28.8 MMOL/L (ref 21–28)
HCO3 BLDA-SCNC: 29.3 MMOL/L (ref 22–26)
HCO3 BLDA-SCNC: 29.6 MMOL/L (ref 22–26)
HCO3 BLDA-SCNC: 29.7 MMOL/L (ref 22–26)
HCO3 BLDA-SCNC: 30.3 MMOL/L (ref 22–26)
HCO3 BLDA-SCNC: 30.9 MMOL/L (ref 22–26)
HCT VFR BLD AUTO: 39.9 % (ref 37.5–51)
HCT VFR BLD AUTO: 46.3 % (ref 37.5–51)
HCT VFR BLDA CALC: 31 % (ref 38–51)
HCT VFR BLDA CALC: 33 % (ref 38–51)
HCT VFR BLDA CALC: 40 % (ref 38–51)
HCT VFR BLDA CALC: 41 % (ref 38–51)
HCT VFR BLDA CALC: 42 % (ref 38–51)
HCT VFR BLDA CALC: 42 % (ref 38–51)
HCT VFR BLDA CALC: 43 % (ref 38–51)
HCT VFR BLDA CALC: 45 % (ref 38–51)
HCT VFR BLDA CALC: 45 % (ref 38–51)
HEMODILUTION: NO
HGB BLD-MCNC: 13 G/DL (ref 13–17.7)
HGB BLD-MCNC: 16.1 G/DL (ref 13–17.7)
HGB BLDA-MCNC: 10.5 G/DL (ref 12–17)
HGB BLDA-MCNC: 11.2 G/DL (ref 12–17)
HGB BLDA-MCNC: 13.6 G/DL (ref 12–17)
HGB BLDA-MCNC: 13.8 G/DL (ref 12–17)
HGB BLDA-MCNC: 14.2 G/DL (ref 12–17)
HGB BLDA-MCNC: 14.3 G/DL (ref 12–17)
HGB BLDA-MCNC: 14.5 G/DL (ref 12–17)
HGB BLDA-MCNC: 15.2 G/DL (ref 12–17)
HGB BLDA-MCNC: 15.3 G/DL (ref 12–17)
INHALED O2 CONCENTRATION: 28 %
INHALED O2 CONCENTRATION: 40 %
INHALED O2 CONCENTRATION: 50 %
INHALED O2 CONCENTRATION: 70 %
INHALED O2 CONCENTRATION: 70 %
INR PPP: 1.16 (ref 0.93–1.1)
LYMPHOCYTES # BLD AUTO: 1.6 10*3/MM3 (ref 0.7–3.1)
LYMPHOCYTES # BLD AUTO: 2.3 10*3/MM3 (ref 0.7–3.1)
LYMPHOCYTES NFR BLD AUTO: 19.1 % (ref 19.6–45.3)
LYMPHOCYTES NFR BLD AUTO: 5.6 % (ref 19.6–45.3)
MCH RBC QN AUTO: 29.1 PG (ref 26.6–33)
MCH RBC QN AUTO: 30.5 PG (ref 26.6–33)
MCHC RBC AUTO-ENTMCNC: 32.6 G/DL (ref 31.5–35.7)
MCHC RBC AUTO-ENTMCNC: 34.7 G/DL (ref 31.5–35.7)
MCV RBC AUTO: 87.9 FL (ref 79–97)
MCV RBC AUTO: 89.3 FL (ref 79–97)
MODALITY: ABNORMAL
MONOCYTES # BLD AUTO: 0.6 10*3/MM3 (ref 0.1–0.9)
MONOCYTES # BLD AUTO: 1.2 10*3/MM3 (ref 0.1–0.9)
MONOCYTES NFR BLD AUTO: 1.9 % (ref 5–12)
MONOCYTES NFR BLD AUTO: 10 % (ref 5–12)
NEUTROPHILS NFR BLD AUTO: 26.7 10*3/MM3 (ref 1.7–7)
NEUTROPHILS NFR BLD AUTO: 68.8 % (ref 42.7–76)
NEUTROPHILS NFR BLD AUTO: 8.4 10*3/MM3 (ref 1.7–7)
NEUTROPHILS NFR BLD AUTO: 91.6 % (ref 42.7–76)
NRBC BLD AUTO-RTO: 0 /100 WBC (ref 0–0.2)
NRBC BLD AUTO-RTO: 0.2 /100 WBC (ref 0–0.2)
PCO2 BLDA: 34.8 MM HG (ref 35–48)
PCO2 BLDA: 37.4 MM HG (ref 35–48)
PCO2 BLDA: 38.4 MM HG (ref 35–48)
PCO2 BLDA: 40 MM HG (ref 35–48)
PCO2 BLDA: 40.2 MM HG (ref 35–48)
PCO2 BLDA: 42.8 MM HG (ref 35–48)
PCO2 BLDA: 50 MM HG (ref 35–48)
PCO2 BLDA: 51 MM HG (ref 35–45)
PCO2 BLDA: 52.9 MM HG (ref 35–45)
PCO2 BLDA: 56 MM HG (ref 35–45)
PCO2 BLDA: 56.2 MM HG (ref 35–45)
PCO2 BLDA: 56.5 MM HG (ref 35–45)
PCO2 BLDA: 57.4 MM HG (ref 35–45)
PEEP RESPIRATORY: 5 CM[H2O]
PEEP RESPIRATORY: 8 CM[H2O]
PH BLDA: 7.3 PH UNITS (ref 7.35–7.45)
PH BLDA: 7.33 PH UNITS (ref 7.35–7.45)
PH BLDA: 7.35 PH UNITS (ref 7.35–7.45)
PH BLDA: 7.36 PH UNITS (ref 7.35–7.45)
PH BLDA: 7.36 PH UNITS (ref 7.35–7.45)
PH BLDA: 7.37 PH UNITS (ref 7.35–7.45)
PH BLDA: 7.37 PH UNITS (ref 7.35–7.45)
PH BLDA: 7.4 PH UNITS (ref 7.35–7.45)
PH BLDA: 7.42 PH UNITS (ref 7.35–7.45)
PH BLDA: 7.44 PH UNITS (ref 7.35–7.45)
PHOSPHATE SERPL-MCNC: 5.4 MG/DL (ref 2.5–4.5)
PLATELET # BLD AUTO: 326 10*3/MM3 (ref 140–450)
PLATELET # BLD AUTO: 333 10*3/MM3 (ref 140–450)
PMV BLD AUTO: 8.4 FL (ref 6–12)
PMV BLD AUTO: 9.4 FL (ref 6–12)
PO2 BLDA: 102.3 MM HG (ref 83–108)
PO2 BLDA: 312 MM HG (ref 80–105)
PO2 BLDA: 355 MM HG (ref 80–105)
PO2 BLDA: 358 MM HG (ref 80–105)
PO2 BLDA: 414 MM HG (ref 80–105)
PO2 BLDA: 421 MM HG (ref 80–105)
PO2 BLDA: 49 MM HG (ref 80–105)
PO2 BLDA: 64.5 MM HG (ref 83–108)
PO2 BLDA: 75.7 MM HG (ref 83–108)
PO2 BLDA: 78.1 MM HG (ref 83–108)
PO2 BLDA: 82.2 MM HG (ref 83–108)
PO2 BLDA: 87.7 MM HG (ref 83–108)
PO2 BLDA: 94.6 MM HG (ref 83–108)
POTASSIUM BLDA-SCNC: 3 MMOL/L (ref 3.5–4.5)
POTASSIUM BLDA-SCNC: 3.5 MMOL/L (ref 3.5–4.5)
POTASSIUM BLDA-SCNC: 3.5 MMOL/L (ref 3.5–4.5)
POTASSIUM BLDA-SCNC: 3.7 MMOL/L (ref 3.5–4.9)
POTASSIUM BLDA-SCNC: 4.1 MMOL/L (ref 3.5–4.9)
POTASSIUM BLDA-SCNC: 4.1 MMOL/L (ref 3.5–4.9)
POTASSIUM BLDA-SCNC: 4.2 MMOL/L (ref 3.5–4.9)
POTASSIUM BLDA-SCNC: 4.4 MMOL/L (ref 3.5–4.9)
POTASSIUM BLDA-SCNC: 4.4 MMOL/L (ref 3.5–4.9)
POTASSIUM BLDA-SCNC: 5.5 MMOL/L (ref 3.5–4.5)
POTASSIUM SERPL-SCNC: 3.3 MMOL/L (ref 3.5–5.2)
POTASSIUM SERPL-SCNC: 3.7 MMOL/L (ref 3.5–5.2)
POTASSIUM SERPL-SCNC: 3.8 MMOL/L (ref 3.5–5.2)
POTASSIUM SERPL-SCNC: 4.2 MMOL/L (ref 3.5–5.2)
PROT SERPL-MCNC: 8.1 G/DL (ref 6–8.5)
PROTHROMBIN TIME: 11.8 SECONDS (ref 9.6–11.7)
PSV: 10 CMH2O
QT INTERVAL: 371 MS
RBC # BLD AUTO: 4.47 10*6/MM3 (ref 4.14–5.8)
RBC # BLD AUTO: 5.27 10*6/MM3 (ref 4.14–5.8)
RESPIRATORY RATE: 14
RESPIRATORY RATE: 16
RESPIRATORY RATE: 20
SAO2 % BLDCOA: 100 % (ref 95–98)
SAO2 % BLDCOA: 80 % (ref 95–98)
SAO2 % BLDCOA: 92.8 % (ref 94–98)
SAO2 % BLDCOA: 94.5 % (ref 94–98)
SAO2 % BLDCOA: 95.6 % (ref 94–98)
SAO2 % BLDCOA: 95.7 % (ref 94–98)
SAO2 % BLDCOA: 95.8 % (ref 94–98)
SAO2 % BLDCOA: 97.4 % (ref 94–98)
SAO2 % BLDCOA: 97.6 % (ref 94–98)
SODIUM BLD-SCNC: 123 MMOL/L (ref 138–146)
SODIUM BLD-SCNC: 124 MMOL/L (ref 138–146)
SODIUM BLD-SCNC: 124 MMOL/L (ref 138–146)
SODIUM BLD-SCNC: 125 MMOL/L (ref 138–146)
SODIUM BLD-SCNC: 125 MMOL/L (ref 138–146)
SODIUM BLD-SCNC: 127 MMOL/L (ref 138–146)
SODIUM BLD-SCNC: 129 MMOL/L (ref 138–146)
SODIUM BLD-SCNC: 129 MMOL/L (ref 138–146)
SODIUM BLD-SCNC: 130 MMOL/L (ref 138–146)
SODIUM BLD-SCNC: 131 MMOL/L (ref 138–146)
SODIUM BLD-SCNC: 132 MMOL/L (ref 138–146)
SODIUM BLD-SCNC: 132 MMOL/L (ref 138–146)
SODIUM SERPL-SCNC: 126 MMOL/L (ref 136–145)
SODIUM SERPL-SCNC: 128 MMOL/L (ref 136–145)
UNIT  ABO: NORMAL
UNIT  ABO: NORMAL
UNIT  RH: NORMAL
UNIT  RH: NORMAL
VENTILATOR MODE: ABNORMAL
VT ON VENT VENT: 650 ML
WBC NRBC COR # BLD: 12.2 10*3/MM3 (ref 3.4–10.8)
WBC NRBC COR # BLD: 29.2 10*3/MM3 (ref 3.4–10.8)

## 2022-12-29 PROCEDURE — 25010000002 HEPARIN (PORCINE) 25000-0.45 UT/250ML-% SOLUTION: Performed by: THORACIC SURGERY (CARDIOTHORACIC VASCULAR SURGERY)

## 2022-12-29 PROCEDURE — 25010000002 MAGNESIUM SULFATE IN D5W 1G/100ML (PREMIX) 1-5 GM/100ML-% SOLUTION: Performed by: NURSE PRACTITIONER

## 2022-12-29 PROCEDURE — 85025 COMPLETE CBC W/AUTO DIFF WBC: CPT | Performed by: NURSE PRACTITIONER

## 2022-12-29 PROCEDURE — P9047 ALBUMIN (HUMAN), 25%, 50ML: HCPCS | Performed by: NURSE PRACTITIONER

## 2022-12-29 PROCEDURE — 33508 ENDOSCOPIC VEIN HARVEST: CPT | Performed by: THORACIC SURGERY (CARDIOTHORACIC VASCULAR SURGERY)

## 2022-12-29 PROCEDURE — 33533 CABG ARTERIAL SINGLE: CPT | Performed by: THORACIC SURGERY (CARDIOTHORACIC VASCULAR SURGERY)

## 2022-12-29 PROCEDURE — 84100 ASSAY OF PHOSPHORUS: CPT | Performed by: THORACIC SURGERY (CARDIOTHORACIC VASCULAR SURGERY)

## 2022-12-29 PROCEDURE — 25010000002 PAPAVERINE PER 60 MG: Performed by: THORACIC SURGERY (CARDIOTHORACIC VASCULAR SURGERY)

## 2022-12-29 PROCEDURE — C1713 ANCHOR/SCREW BN/BN,TIS/BN: HCPCS | Performed by: THORACIC SURGERY (CARDIOTHORACIC VASCULAR SURGERY)

## 2022-12-29 PROCEDURE — 25010000002 PROTAMINE SULFATE PER 10 MG: Performed by: ANESTHESIOLOGY

## 2022-12-29 PROCEDURE — 85018 HEMOGLOBIN: CPT

## 2022-12-29 PROCEDURE — 93005 ELECTROCARDIOGRAM TRACING: CPT | Performed by: NURSE PRACTITIONER

## 2022-12-29 PROCEDURE — 25010000002 HEPARIN (PORCINE) PER 1000 UNITS: Performed by: THORACIC SURGERY (CARDIOTHORACIC VASCULAR SURGERY)

## 2022-12-29 PROCEDURE — C1729 CATH, DRAINAGE: HCPCS | Performed by: THORACIC SURGERY (CARDIOTHORACIC VASCULAR SURGERY)

## 2022-12-29 PROCEDURE — 84132 ASSAY OF SERUM POTASSIUM: CPT | Performed by: NURSE PRACTITIONER

## 2022-12-29 PROCEDURE — 85347 COAGULATION TIME ACTIVATED: CPT

## 2022-12-29 PROCEDURE — 84132 ASSAY OF SERUM POTASSIUM: CPT | Performed by: THORACIC SURGERY (CARDIOTHORACIC VASCULAR SURGERY)

## 2022-12-29 PROCEDURE — 25010000002 FENTANYL CITRATE (PF) 250 MCG/5ML SOLUTION: Performed by: ANESTHESIOLOGY

## 2022-12-29 PROCEDURE — B24BZZ4 ULTRASONOGRAPHY OF HEART WITH AORTA, TRANSESOPHAGEAL: ICD-10-PCS | Performed by: THORACIC SURGERY (CARDIOTHORACIC VASCULAR SURGERY)

## 2022-12-29 PROCEDURE — 02100Z9 BYPASS CORONARY ARTERY, ONE ARTERY FROM LEFT INTERNAL MAMMARY, OPEN APPROACH: ICD-10-PCS | Performed by: THORACIC SURGERY (CARDIOTHORACIC VASCULAR SURGERY)

## 2022-12-29 PROCEDURE — 25010000002 HEPARIN (PORCINE) PER 1000 UNITS: Performed by: ANESTHESIOLOGY

## 2022-12-29 PROCEDURE — 93318 ECHO TRANSESOPHAGEAL INTRAOP: CPT | Performed by: ANESTHESIOLOGY

## 2022-12-29 PROCEDURE — 25010000002 ALBUMIN HUMAN 25% PER 50 ML: Performed by: NURSE PRACTITIONER

## 2022-12-29 PROCEDURE — 85730 THROMBOPLASTIN TIME PARTIAL: CPT | Performed by: NURSE PRACTITIONER

## 2022-12-29 PROCEDURE — 94799 UNLISTED PULMONARY SVC/PX: CPT

## 2022-12-29 PROCEDURE — 82330 ASSAY OF CALCIUM: CPT

## 2022-12-29 PROCEDURE — C1751 CATH, INF, PER/CENT/MIDLINE: HCPCS | Performed by: ANESTHESIOLOGY

## 2022-12-29 PROCEDURE — 06BQ4ZZ EXCISION OF LEFT SAPHENOUS VEIN, PERCUTANEOUS ENDOSCOPIC APPROACH: ICD-10-PCS | Performed by: THORACIC SURGERY (CARDIOTHORACIC VASCULAR SURGERY)

## 2022-12-29 PROCEDURE — 25010000002 CALCIUM GLUCONATE 2-0.675 GM/100ML-% SOLUTION: Performed by: NURSE PRACTITIONER

## 2022-12-29 PROCEDURE — 0 MILRINONE LACTATE IN DEXTROSE 20-5 MG/100ML-% SOLUTION: Performed by: ANESTHESIOLOGY

## 2022-12-29 PROCEDURE — 0212093 BYPASS CORONARY ARTERY, THREE ARTERIES FROM CORONARY ARTERY WITH AUTOLOGOUS VENOUS TISSUE, OPEN APPROACH: ICD-10-PCS | Performed by: THORACIC SURGERY (CARDIOTHORACIC VASCULAR SURGERY)

## 2022-12-29 PROCEDURE — 82330 ASSAY OF CALCIUM: CPT | Performed by: NURSE PRACTITIONER

## 2022-12-29 PROCEDURE — 33519 CABG ARTERY-VEIN THREE: CPT | Performed by: THORACIC SURGERY (CARDIOTHORACIC VASCULAR SURGERY)

## 2022-12-29 PROCEDURE — 0 POTASSIUM CHLORIDE 10 MEQ/100ML SOLUTION: Performed by: NURSE PRACTITIONER

## 2022-12-29 PROCEDURE — 84295 ASSAY OF SERUM SODIUM: CPT

## 2022-12-29 PROCEDURE — 80053 COMPREHEN METABOLIC PANEL: CPT | Performed by: THORACIC SURGERY (CARDIOTHORACIC VASCULAR SURGERY)

## 2022-12-29 PROCEDURE — 25010000002 CEFAZOLIN PER 500 MG: Performed by: NURSE PRACTITIONER

## 2022-12-29 PROCEDURE — 82962 GLUCOSE BLOOD TEST: CPT

## 2022-12-29 PROCEDURE — 25010000002 MAGNESIUM SULFATE PER 500 MG OF MAGNESIUM: Performed by: ANESTHESIOLOGY

## 2022-12-29 PROCEDURE — 5A1221Z PERFORMANCE OF CARDIAC OUTPUT, CONTINUOUS: ICD-10-PCS | Performed by: THORACIC SURGERY (CARDIOTHORACIC VASCULAR SURGERY)

## 2022-12-29 PROCEDURE — 85730 THROMBOPLASTIN TIME PARTIAL: CPT | Performed by: THORACIC SURGERY (CARDIOTHORACIC VASCULAR SURGERY)

## 2022-12-29 PROCEDURE — 25010000002 EPINEPHRINE 1 MG/ML SOLUTION 30 ML VIAL: Performed by: ANESTHESIOLOGY

## 2022-12-29 PROCEDURE — 63710000001 INSULIN REGULAR HUMAN PER 5 UNITS: Performed by: ANESTHESIOLOGY

## 2022-12-29 PROCEDURE — 25010000002 MIDAZOLAM PER 1 MG: Performed by: ANESTHESIOLOGY

## 2022-12-29 PROCEDURE — 84132 ASSAY OF SERUM POTASSIUM: CPT

## 2022-12-29 PROCEDURE — 25010000002 ALBUMIN HUMAN 25% PER 50 ML: Performed by: THORACIC SURGERY (CARDIOTHORACIC VASCULAR SURGERY)

## 2022-12-29 PROCEDURE — 0 POTASSIUM CHLORIDE 10 MEQ/100ML SOLUTION

## 2022-12-29 PROCEDURE — 0 MILRINONE LACTATE IN DEXTROSE 20-5 MG/100ML-% SOLUTION: Performed by: NURSE PRACTITIONER

## 2022-12-29 PROCEDURE — 85014 HEMATOCRIT: CPT

## 2022-12-29 PROCEDURE — C1900 LEAD, CORONARY VENOUS: HCPCS | Performed by: THORACIC SURGERY (CARDIOTHORACIC VASCULAR SURGERY)

## 2022-12-29 PROCEDURE — 93010 ELECTROCARDIOGRAM REPORT: CPT | Performed by: INTERNAL MEDICINE

## 2022-12-29 PROCEDURE — 25010000002 AMIODARONE IN DEXTROSE 5% 360-4.14 MG/200ML-% SOLUTION: Performed by: NURSE PRACTITIONER

## 2022-12-29 PROCEDURE — A4648 IMPLANTABLE TISSUE MARKER: HCPCS | Performed by: THORACIC SURGERY (CARDIOTHORACIC VASCULAR SURGERY)

## 2022-12-29 PROCEDURE — 71045 X-RAY EXAM CHEST 1 VIEW: CPT

## 2022-12-29 PROCEDURE — 82947 ASSAY GLUCOSE BLOOD QUANT: CPT

## 2022-12-29 PROCEDURE — 82803 BLOOD GASES ANY COMBINATION: CPT

## 2022-12-29 PROCEDURE — A4648 IMPLANTABLE TISSUE MARKER: HCPCS

## 2022-12-29 PROCEDURE — 25010000002 EPINEPHRINE 1 MG/ML SOLUTION 30 ML VIAL: Performed by: NURSE PRACTITIONER

## 2022-12-29 PROCEDURE — 94002 VENT MGMT INPAT INIT DAY: CPT

## 2022-12-29 PROCEDURE — 36600 WITHDRAWAL OF ARTERIAL BLOOD: CPT

## 2022-12-29 PROCEDURE — 85610 PROTHROMBIN TIME: CPT | Performed by: NURSE PRACTITIONER

## 2022-12-29 PROCEDURE — 99232 SBSQ HOSP IP/OBS MODERATE 35: CPT | Performed by: INTERNAL MEDICINE

## 2022-12-29 PROCEDURE — 25010000002 AMIODARONE PER 30 MG: Performed by: ANESTHESIOLOGY

## 2022-12-29 PROCEDURE — P9047 ALBUMIN (HUMAN), 25%, 50ML: HCPCS | Performed by: THORACIC SURGERY (CARDIOTHORACIC VASCULAR SURGERY)

## 2022-12-29 PROCEDURE — 80051 ELECTROLYTE PANEL: CPT

## 2022-12-29 DEVICE — DEV CONTRL TISS STRATAFIX SPIRAL MNCRYL UD 3/0 PLS 30CM: Type: IMPLANTABLE DEVICE | Site: CHEST | Status: FUNCTIONAL

## 2022-12-29 DEVICE — DEV CONTRL TISS STRATAFIXSPIRALMNCRYL PLSPS2 REV3/0 15CM: Type: IMPLANTABLE DEVICE | Site: LEG | Status: FUNCTIONAL

## 2022-12-29 DEVICE — CLIP LIGAT VASC HORIZON TI SM/WD RED 24CT: Type: IMPLANTABLE DEVICE | Site: MEDIASTINUM | Status: FUNCTIONAL

## 2022-12-29 DEVICE — SS SUTURE, 6 PER SLEEVE
Type: IMPLANTABLE DEVICE | Site: STERNUM | Status: FUNCTIONAL
Brand: MYO/WIRE II

## 2022-12-29 DEVICE — ABSORBABLE HEMOSTAT (OXIDIZED REGENERATED CELLULOSE, U.S.P.)
Type: IMPLANTABLE DEVICE | Site: MEDIASTINUM | Status: FUNCTIONAL
Brand: SURGICEL

## 2022-12-29 DEVICE — CLIP LIGAT VASC HORIZON TI MD BLU 24CT: Type: IMPLANTABLE DEVICE | Site: MEDIASTINUM | Status: FUNCTIONAL

## 2022-12-29 RX ORDER — FENTANYL CITRATE 50 UG/ML
INJECTION, SOLUTION INTRAMUSCULAR; INTRAVENOUS AS NEEDED
Status: DISCONTINUED | OUTPATIENT
Start: 2022-12-29 | End: 2022-12-29 | Stop reason: SURG

## 2022-12-29 RX ORDER — NITROGLYCERIN 20 MG/100ML
5-50 INJECTION INTRAVENOUS CONTINUOUS PRN
Status: DISCONTINUED | OUTPATIENT
Start: 2022-12-29 | End: 2022-12-30

## 2022-12-29 RX ORDER — HEPARIN SODIUM 1000 [USP'U]/ML
INJECTION, SOLUTION INTRAVENOUS; SUBCUTANEOUS AS NEEDED
Status: DISCONTINUED | OUTPATIENT
Start: 2022-12-29 | End: 2022-12-29 | Stop reason: SURG

## 2022-12-29 RX ORDER — ACETAMINOPHEN 160 MG/5ML
650 SOLUTION ORAL EVERY 6 HOURS
Status: COMPLETED | OUTPATIENT
Start: 2022-12-29 | End: 2022-12-30

## 2022-12-29 RX ORDER — HYDROCODONE BITARTRATE AND ACETAMINOPHEN 5; 325 MG/1; MG/1
1 TABLET ORAL EVERY 4 HOURS PRN
Status: DISCONTINUED | OUTPATIENT
Start: 2022-12-29 | End: 2023-01-03 | Stop reason: HOSPADM

## 2022-12-29 RX ORDER — MORPHINE SULFATE 2 MG/ML
2 INJECTION, SOLUTION INTRAMUSCULAR; INTRAVENOUS
Status: DISCONTINUED | OUTPATIENT
Start: 2022-12-29 | End: 2023-01-01

## 2022-12-29 RX ORDER — CHLORHEXIDINE GLUCONATE 0.12 MG/ML
15 RINSE ORAL EVERY 12 HOURS
Status: DISCONTINUED | OUTPATIENT
Start: 2022-12-29 | End: 2023-01-02

## 2022-12-29 RX ORDER — POTASSIUM CHLORIDE 1.5 G/1.77G
20 POWDER, FOR SOLUTION ORAL AS NEEDED
Status: DISCONTINUED | OUTPATIENT
Start: 2022-12-30 | End: 2023-01-03 | Stop reason: HOSPADM

## 2022-12-29 RX ORDER — PANTOPRAZOLE SODIUM 40 MG/10ML
40 INJECTION, POWDER, LYOPHILIZED, FOR SOLUTION INTRAVENOUS ONCE
Status: COMPLETED | OUTPATIENT
Start: 2022-12-29 | End: 2022-12-29

## 2022-12-29 RX ORDER — SENNA PLUS 8.6 MG/1
2 TABLET ORAL NIGHTLY
Status: DISCONTINUED | OUTPATIENT
Start: 2022-12-30 | End: 2022-12-31 | Stop reason: SDUPTHER

## 2022-12-29 RX ORDER — ASPIRIN 325 MG
325 TABLET ORAL ONCE
Status: COMPLETED | OUTPATIENT
Start: 2022-12-29 | End: 2022-12-29

## 2022-12-29 RX ORDER — OLANZAPINE 10 MG/2ML
1 INJECTION, POWDER, LYOPHILIZED, FOR SOLUTION INTRAMUSCULAR
Status: DISCONTINUED | OUTPATIENT
Start: 2022-12-29 | End: 2023-01-01

## 2022-12-29 RX ORDER — POTASSIUM CHLORIDE 7.45 MG/ML
INJECTION INTRAVENOUS
Status: COMPLETED
Start: 2022-12-29 | End: 2022-12-29

## 2022-12-29 RX ORDER — DEXMEDETOMIDINE HYDROCHLORIDE 4 UG/ML
.2-1.5 INJECTION, SOLUTION INTRAVENOUS
Status: DISCONTINUED | OUTPATIENT
Start: 2022-12-29 | End: 2022-12-30

## 2022-12-29 RX ORDER — ACETAMINOPHEN 650 MG/1
650 SUPPOSITORY RECTAL EVERY 4 HOURS PRN
Status: DISCONTINUED | OUTPATIENT
Start: 2022-12-30 | End: 2023-01-02

## 2022-12-29 RX ORDER — ONDANSETRON 2 MG/ML
4 INJECTION INTRAMUSCULAR; INTRAVENOUS EVERY 6 HOURS PRN
Status: DISCONTINUED | OUTPATIENT
Start: 2022-12-29 | End: 2023-01-03 | Stop reason: HOSPADM

## 2022-12-29 RX ORDER — ATORVASTATIN CALCIUM 40 MG/1
40 TABLET, FILM COATED ORAL NIGHTLY
Status: DISCONTINUED | OUTPATIENT
Start: 2022-12-30 | End: 2023-01-03 | Stop reason: HOSPADM

## 2022-12-29 RX ORDER — NICOTINE POLACRILEX 4 MG
15 LOZENGE BUCCAL
Status: DISCONTINUED | OUTPATIENT
Start: 2022-12-29 | End: 2023-01-01

## 2022-12-29 RX ORDER — MIDAZOLAM HYDROCHLORIDE 1 MG/ML
INJECTION INTRAMUSCULAR; INTRAVENOUS AS NEEDED
Status: DISCONTINUED | OUTPATIENT
Start: 2022-12-29 | End: 2022-12-29 | Stop reason: SURG

## 2022-12-29 RX ORDER — ACETAMINOPHEN 325 MG/1
650 TABLET ORAL EVERY 6 HOURS
Status: COMPLETED | OUTPATIENT
Start: 2022-12-29 | End: 2022-12-30

## 2022-12-29 RX ORDER — AMIODARONE HCL/D5W 450 MG/250
PLASTIC BAG, INJECTION (ML) INTRAVENOUS CONTINUOUS PRN
Status: DISCONTINUED | OUTPATIENT
Start: 2022-12-29 | End: 2022-12-29 | Stop reason: SURG

## 2022-12-29 RX ORDER — AMIODARONE HYDROCHLORIDE 50 MG/ML
INJECTION, SOLUTION INTRAVENOUS AS NEEDED
Status: DISCONTINUED | OUTPATIENT
Start: 2022-12-29 | End: 2022-12-29 | Stop reason: SURG

## 2022-12-29 RX ORDER — AMINOCAPROIC ACID 250 MG/ML
INJECTION, SOLUTION INTRAVENOUS AS NEEDED
Status: DISCONTINUED | OUTPATIENT
Start: 2022-12-29 | End: 2022-12-29 | Stop reason: SURG

## 2022-12-29 RX ORDER — NALOXONE HCL 0.4 MG/ML
0.4 VIAL (ML) INJECTION
Status: DISCONTINUED | OUTPATIENT
Start: 2022-12-29 | End: 2023-01-03 | Stop reason: HOSPADM

## 2022-12-29 RX ORDER — POLYETHYLENE GLYCOL 3350 17 G/17G
17 POWDER, FOR SOLUTION ORAL 2 TIMES DAILY
Status: DISCONTINUED | OUTPATIENT
Start: 2022-12-30 | End: 2023-01-03 | Stop reason: HOSPADM

## 2022-12-29 RX ORDER — ACETAMINOPHEN 160 MG/5ML
650 SOLUTION ORAL EVERY 4 HOURS PRN
Status: DISCONTINUED | OUTPATIENT
Start: 2022-12-30 | End: 2023-01-01

## 2022-12-29 RX ORDER — DEXTROSE MONOHYDRATE 25 G/50ML
10-50 INJECTION, SOLUTION INTRAVENOUS
Status: DISCONTINUED | OUTPATIENT
Start: 2022-12-29 | End: 2023-01-01

## 2022-12-29 RX ORDER — ACETAMINOPHEN 325 MG/1
650 TABLET ORAL EVERY 4 HOURS PRN
Status: DISCONTINUED | OUTPATIENT
Start: 2022-12-30 | End: 2023-01-02

## 2022-12-29 RX ORDER — MILRINONE LACTATE 0.2 MG/ML
INJECTION, SOLUTION INTRAVENOUS CONTINUOUS PRN
Status: DISCONTINUED | OUTPATIENT
Start: 2022-12-29 | End: 2022-12-29 | Stop reason: SURG

## 2022-12-29 RX ORDER — POTASSIUM CHLORIDE 7.45 MG/ML
10 INJECTION INTRAVENOUS
Status: DISCONTINUED | OUTPATIENT
Start: 2022-12-29 | End: 2022-12-30

## 2022-12-29 RX ORDER — PANTOPRAZOLE SODIUM 40 MG/1
40 TABLET, DELAYED RELEASE ORAL
Status: DISCONTINUED | OUTPATIENT
Start: 2022-12-30 | End: 2023-01-03 | Stop reason: HOSPADM

## 2022-12-29 RX ORDER — MAGNESIUM HYDROXIDE 1200 MG/15ML
LIQUID ORAL AS NEEDED
Status: DISCONTINUED | OUTPATIENT
Start: 2022-12-29 | End: 2022-12-29 | Stop reason: HOSPADM

## 2022-12-29 RX ORDER — SODIUM CHLORIDE 9 MG/ML
200 INJECTION, SOLUTION INTRAVENOUS ONCE
Status: COMPLETED | OUTPATIENT
Start: 2022-12-29 | End: 2022-12-29

## 2022-12-29 RX ORDER — MAGNESIUM SULFATE 1 G/100ML
1 INJECTION INTRAVENOUS EVERY 8 HOURS
Status: COMPLETED | OUTPATIENT
Start: 2022-12-29 | End: 2022-12-30

## 2022-12-29 RX ORDER — NOREPINEPHRINE BIT/0.9 % NACL 8 MG/250ML
INFUSION BOTTLE (ML) INTRAVENOUS CONTINUOUS PRN
Status: DISCONTINUED | OUTPATIENT
Start: 2022-12-29 | End: 2022-12-29 | Stop reason: SURG

## 2022-12-29 RX ORDER — ALBUMIN (HUMAN) 12.5 G/50ML
12.5 SOLUTION INTRAVENOUS
Status: DISCONTINUED | OUTPATIENT
Start: 2022-12-29 | End: 2022-12-31

## 2022-12-29 RX ORDER — ACETAMINOPHEN 10 MG/ML
INJECTION, SOLUTION INTRAVENOUS AS NEEDED
Status: DISCONTINUED | OUTPATIENT
Start: 2022-12-29 | End: 2022-12-29 | Stop reason: SURG

## 2022-12-29 RX ORDER — ALBUMIN (HUMAN) 12.5 G/50ML
12.5 SOLUTION INTRAVENOUS ONCE
Status: COMPLETED | OUTPATIENT
Start: 2022-12-29 | End: 2022-12-29

## 2022-12-29 RX ORDER — AMOXICILLIN 250 MG
2 CAPSULE ORAL 2 TIMES DAILY
Status: DISCONTINUED | OUTPATIENT
Start: 2022-12-30 | End: 2023-01-03 | Stop reason: HOSPADM

## 2022-12-29 RX ORDER — MILRINONE LACTATE 0.2 MG/ML
0.12 INJECTION, SOLUTION INTRAVENOUS
Status: DISCONTINUED | OUTPATIENT
Start: 2022-12-29 | End: 2023-01-01

## 2022-12-29 RX ORDER — ASPIRIN 81 MG/1
81 TABLET ORAL DAILY
Status: DISCONTINUED | OUTPATIENT
Start: 2022-12-30 | End: 2023-01-01

## 2022-12-29 RX ORDER — POTASSIUM CHLORIDE 20 MEQ/1
20 TABLET, EXTENDED RELEASE ORAL AS NEEDED
Status: DISCONTINUED | OUTPATIENT
Start: 2022-12-30 | End: 2023-01-03 | Stop reason: HOSPADM

## 2022-12-29 RX ORDER — CALCIUM GLUCONATE 20 MG/ML
2 INJECTION, SOLUTION INTRAVENOUS ONCE
Status: COMPLETED | OUTPATIENT
Start: 2022-12-29 | End: 2022-12-29

## 2022-12-29 RX ORDER — NITROGLYCERIN 20 MG/100ML
INJECTION INTRAVENOUS CONTINUOUS PRN
Status: DISCONTINUED | OUTPATIENT
Start: 2022-12-29 | End: 2022-12-29 | Stop reason: SURG

## 2022-12-29 RX ORDER — NOREPINEPHRINE BIT/0.9 % NACL 8 MG/250ML
.02-.06 INFUSION BOTTLE (ML) INTRAVENOUS CONTINUOUS PRN
Status: DISCONTINUED | OUTPATIENT
Start: 2022-12-29 | End: 2022-12-31

## 2022-12-29 RX ORDER — ENOXAPARIN SODIUM 100 MG/ML
40 INJECTION SUBCUTANEOUS DAILY
Status: DISCONTINUED | OUTPATIENT
Start: 2022-12-30 | End: 2023-01-03 | Stop reason: HOSPADM

## 2022-12-29 RX ORDER — MAGNESIUM SULFATE HEPTAHYDRATE 500 MG/ML
INJECTION, SOLUTION INTRAMUSCULAR; INTRAVENOUS AS NEEDED
Status: DISCONTINUED | OUTPATIENT
Start: 2022-12-29 | End: 2022-12-29 | Stop reason: SURG

## 2022-12-29 RX ORDER — SODIUM CHLORIDE 9 MG/ML
INJECTION, SOLUTION INTRAVENOUS CONTINUOUS PRN
Status: DISCONTINUED | OUTPATIENT
Start: 2022-12-29 | End: 2022-12-29 | Stop reason: SURG

## 2022-12-29 RX ORDER — MEPERIDINE HYDROCHLORIDE 25 MG/ML
25 INJECTION INTRAMUSCULAR; INTRAVENOUS; SUBCUTANEOUS ONCE AS NEEDED
Status: DISCONTINUED | OUTPATIENT
Start: 2022-12-29 | End: 2022-12-30

## 2022-12-29 RX ORDER — SODIUM CHLORIDE 9 MG/ML
30 INJECTION, SOLUTION INTRAVENOUS CONTINUOUS
Status: DISPENSED | OUTPATIENT
Start: 2022-12-29 | End: 2023-01-01

## 2022-12-29 RX ORDER — ACETAMINOPHEN 650 MG/1
650 SUPPOSITORY RECTAL EVERY 6 HOURS
Status: COMPLETED | OUTPATIENT
Start: 2022-12-29 | End: 2022-12-30

## 2022-12-29 RX ORDER — VECURONIUM BROMIDE 1 MG/ML
INJECTION, POWDER, LYOPHILIZED, FOR SOLUTION INTRAVENOUS AS NEEDED
Status: DISCONTINUED | OUTPATIENT
Start: 2022-12-29 | End: 2022-12-29 | Stop reason: SURG

## 2022-12-29 RX ORDER — SODIUM CHLORIDE 9 MG/ML
INJECTION, SOLUTION INTRAVENOUS AS NEEDED
Status: DISCONTINUED | OUTPATIENT
Start: 2022-12-29 | End: 2022-12-29 | Stop reason: HOSPADM

## 2022-12-29 RX ADMIN — ALBUMIN (HUMAN) 12.5 G: 0.25 INJECTION, SOLUTION INTRAVENOUS at 13:49

## 2022-12-29 RX ADMIN — SODIUM CHLORIDE 200 ML: 9 INJECTION, SOLUTION INTRAVENOUS at 13:52

## 2022-12-29 RX ADMIN — MUPIROCIN 1 APPLICATION: 20 OINTMENT TOPICAL at 06:08

## 2022-12-29 RX ADMIN — METOPROLOL TARTRATE 12.5 MG: 25 TABLET, FILM COATED ORAL at 06:05

## 2022-12-29 RX ADMIN — DEXMEDETOMIDINE HYDROCHLORIDE 0.4 MCG/KG/HR: 100 INJECTION, SOLUTION INTRAVENOUS at 08:01

## 2022-12-29 RX ADMIN — POTASSIUM CHLORIDE 10 MEQ: 7.46 INJECTION, SOLUTION INTRAVENOUS at 23:49

## 2022-12-29 RX ADMIN — ACETAMINOPHEN 650 MG: 650 SOLUTION ORAL at 17:15

## 2022-12-29 RX ADMIN — POTASSIUM CHLORIDE 10 MEQ: 7.46 INJECTION, SOLUTION INTRAVENOUS at 18:22

## 2022-12-29 RX ADMIN — SODIUM CHLORIDE 30 ML/HR: 9 INJECTION, SOLUTION INTRAVENOUS at 13:07

## 2022-12-29 RX ADMIN — HEPARIN SODIUM 15 UNITS/KG/HR: 10000 INJECTION, SOLUTION INTRAVENOUS at 01:00

## 2022-12-29 RX ADMIN — HEPARIN SODIUM 28000 UNITS: 1000 INJECTION INTRAVENOUS; SUBCUTANEOUS at 09:00

## 2022-12-29 RX ADMIN — VECURONIUM BROMIDE 2 MG: 10 INJECTION, POWDER, LYOPHILIZED, FOR SOLUTION INTRAVENOUS at 09:00

## 2022-12-29 RX ADMIN — MUPIROCIN 1 APPLICATION: 20 OINTMENT TOPICAL at 20:07

## 2022-12-29 RX ADMIN — FENTANYL CITRATE 500 MCG: 0.05 INJECTION, SOLUTION INTRAMUSCULAR; INTRAVENOUS at 07:28

## 2022-12-29 RX ADMIN — ALBUMIN (HUMAN) 12.5 G: 0.25 INJECTION, SOLUTION INTRAVENOUS at 14:30

## 2022-12-29 RX ADMIN — Medication 0.09 MCG/KG/MIN: at 22:14

## 2022-12-29 RX ADMIN — Medication 0.04 MCG/KG/MIN: at 08:58

## 2022-12-29 RX ADMIN — AMINOCAPROIC ACID 10 G: 250 INJECTION, SOLUTION INTRAVENOUS at 11:01

## 2022-12-29 RX ADMIN — Medication 0.12 MCG/KG/MIN: at 12:10

## 2022-12-29 RX ADMIN — EPINEPHRINE 0.08 MCG/KG/MIN: 1 INJECTION INTRAMUSCULAR; INTRAVENOUS; SUBCUTANEOUS at 10:43

## 2022-12-29 RX ADMIN — VECURONIUM BROMIDE 2 MG: 10 INJECTION, POWDER, LYOPHILIZED, FOR SOLUTION INTRAVENOUS at 10:05

## 2022-12-29 RX ADMIN — CHLORHEXIDINE GLUCONATE 15 ML: 1.2 SOLUTION ORAL at 06:05

## 2022-12-29 RX ADMIN — Medication 1 MCG/MIN: at 10:44

## 2022-12-29 RX ADMIN — NITROGLYCERIN 5 MCG/MIN: 20 INJECTION INTRAVENOUS at 08:14

## 2022-12-29 RX ADMIN — FENTANYL CITRATE 250 MCG: 0.05 INJECTION, SOLUTION INTRAMUSCULAR; INTRAVENOUS at 11:13

## 2022-12-29 RX ADMIN — FENTANYL CITRATE 150 MCG: 0.05 INJECTION, SOLUTION INTRAMUSCULAR; INTRAVENOUS at 09:22

## 2022-12-29 RX ADMIN — MILRINONE LACTATE 0.38 MCG/KG/MIN: 0.2 INJECTION, SOLUTION INTRAVENOUS at 10:28

## 2022-12-29 RX ADMIN — SODIUM CHLORIDE: 0.9 INJECTION, SOLUTION INTRAVENOUS at 06:56

## 2022-12-29 RX ADMIN — DEXMEDETOMIDINE HYDROCHLORIDE 0.7 MCG/KG/HR: 4 INJECTION, SOLUTION INTRAVENOUS at 12:30

## 2022-12-29 RX ADMIN — MAGNESIUM SULFATE IN DEXTROSE 1 G: 10 INJECTION, SOLUTION INTRAVENOUS at 18:01

## 2022-12-29 RX ADMIN — POTASSIUM CHLORIDE 10 MEQ: 7.46 INJECTION, SOLUTION INTRAVENOUS at 20:21

## 2022-12-29 RX ADMIN — CEFAZOLIN 2 G: 2 INJECTION, POWDER, FOR SOLUTION INTRAMUSCULAR; INTRAVENOUS at 10:59

## 2022-12-29 RX ADMIN — POTASSIUM CHLORIDE 10 MEQ: 7.46 INJECTION, SOLUTION INTRAVENOUS at 19:21

## 2022-12-29 RX ADMIN — MILRINONE LACTATE 0.25 MCG/KG/MIN: 0.2 INJECTION, SOLUTION INTRAVENOUS at 12:15

## 2022-12-29 RX ADMIN — SODIUM CHLORIDE: 0.9 INJECTION, SOLUTION INTRAVENOUS at 09:43

## 2022-12-29 RX ADMIN — PROTAMINE SULFATE 300 MG: 10 INJECTION, SOLUTION INTRAVENOUS at 10:50

## 2022-12-29 RX ADMIN — AMIODARONE HYDROCHLORIDE 0.5 MG/MIN: 1.8 INJECTION, SOLUTION INTRAVENOUS at 12:45

## 2022-12-29 RX ADMIN — FENTANYL CITRATE 250 MCG: 0.05 INJECTION, SOLUTION INTRAMUSCULAR; INTRAVENOUS at 10:05

## 2022-12-29 RX ADMIN — AMIODARONE HYDROCHLORIDE 0.5 MG/MIN: 1.8 INJECTION, SOLUTION INTRAVENOUS at 17:57

## 2022-12-29 RX ADMIN — VECURONIUM BROMIDE 2 MG: 10 INJECTION, POWDER, LYOPHILIZED, FOR SOLUTION INTRAVENOUS at 09:29

## 2022-12-29 RX ADMIN — AMIODARONE HYDROCHLORIDE 0.5 MG/MIN: 1.8 INJECTION, SOLUTION INTRAVENOUS at 19:17

## 2022-12-29 RX ADMIN — CEFAZOLIN 2 G: 2 INJECTION, POWDER, FOR SOLUTION INTRAMUSCULAR; INTRAVENOUS at 07:30

## 2022-12-29 RX ADMIN — CHLORHEXIDINE GLUCONATE 15 ML: 1.2 SOLUTION ORAL at 20:07

## 2022-12-29 RX ADMIN — CALCIUM GLUCONATE 2 G: 20 INJECTION, SOLUTION INTRAVENOUS at 13:53

## 2022-12-29 RX ADMIN — AMIODARONE HYDROCHLORIDE 150 MG: 50 INJECTION, SOLUTION INTRAVENOUS at 10:42

## 2022-12-29 RX ADMIN — SODIUM CHLORIDE 200 ML: 9 INJECTION, SOLUTION INTRAVENOUS at 14:32

## 2022-12-29 RX ADMIN — INSULIN HUMAN 2.5 UNITS/HR: 1 INJECTION, SOLUTION INTRAVENOUS at 13:06

## 2022-12-29 RX ADMIN — VECURONIUM BROMIDE 10 MG: 10 INJECTION, POWDER, LYOPHILIZED, FOR SOLUTION INTRAVENOUS at 07:28

## 2022-12-29 RX ADMIN — EPINEPHRINE 0.05 MCG/KG/MIN: 1 INJECTION INTRAMUSCULAR; INTRAVENOUS; SUBCUTANEOUS at 12:15

## 2022-12-29 RX ADMIN — FENTANYL CITRATE 100 MCG: 0.05 INJECTION, SOLUTION INTRAMUSCULAR; INTRAVENOUS at 07:00

## 2022-12-29 RX ADMIN — DEXMEDETOMIDINE HYDROCHLORIDE 0.5 MCG/KG/HR: 4 INJECTION, SOLUTION INTRAVENOUS at 15:24

## 2022-12-29 RX ADMIN — PANTOPRAZOLE SODIUM 40 MG: 40 INJECTION, POWDER, FOR SOLUTION INTRAVENOUS at 18:01

## 2022-12-29 RX ADMIN — ACETAMINOPHEN 650 MG: 650 SOLUTION ORAL at 23:24

## 2022-12-29 RX ADMIN — VECURONIUM BROMIDE 2 MG: 10 INJECTION, POWDER, LYOPHILIZED, FOR SOLUTION INTRAVENOUS at 08:00

## 2022-12-29 RX ADMIN — AMINOCAPROIC ACID 10 G: 250 INJECTION, SOLUTION INTRAVENOUS at 07:45

## 2022-12-29 RX ADMIN — CEFAZOLIN 2 G: 2 INJECTION, POWDER, FOR SOLUTION INTRAMUSCULAR; INTRAVENOUS at 19:17

## 2022-12-29 RX ADMIN — ACETAMINOPHEN 1000 MG: 10 INJECTION, SOLUTION INTRAVENOUS at 11:40

## 2022-12-29 RX ADMIN — MIDAZOLAM 4 MG: 1 INJECTION INTRAMUSCULAR; INTRAVENOUS at 07:00

## 2022-12-29 RX ADMIN — ALBUMIN (HUMAN) 12.5 G: 0.25 INJECTION, SOLUTION INTRAVENOUS at 12:18

## 2022-12-29 RX ADMIN — MAGNESIUM SULFATE HEPTAHYDRATE 2 G: 500 INJECTION, SOLUTION INTRAMUSCULAR; INTRAVENOUS at 10:47

## 2022-12-29 RX ADMIN — FENTANYL CITRATE 250 MCG: 0.05 INJECTION, SOLUTION INTRAMUSCULAR; INTRAVENOUS at 10:55

## 2022-12-29 RX ADMIN — ASPIRIN 325 MG ORAL TABLET 325 MG: 325 PILL ORAL at 17:15

## 2022-12-29 RX ADMIN — SODIUM CHLORIDE 200 ML: 9 INJECTION, SOLUTION INTRAVENOUS at 12:22

## 2022-12-29 RX ADMIN — MILRINONE LACTATE 0.25 MCG/KG/MIN: 0.2 INJECTION, SOLUTION INTRAVENOUS at 22:15

## 2022-12-29 RX ADMIN — SODIUM CHLORIDE 2 UNITS/HR: 9 INJECTION, SOLUTION INTRAVENOUS at 08:01

## 2022-12-29 NOTE — ANESTHESIA POSTPROCEDURE EVALUATION
Patient: Sherman Baumann    Procedure Summary     Date: 12/29/22 Room / Location:  [facility] /  [facility]    Anesthesia Start: 0656 Anesthesia Stop: 1200    Procedures:       CORONARY ARTERY BYPASS WITH INTERNAL MAMMARY ARTERY GRAFT with IABP (Chest)      TRANSESOPHAGEAL ECHOCARDIOGRAM WITH ANESTHESIA (Chest) Diagnosis:       Coronary artery disease involving native coronary artery of native heart with unstable angina pectoris (HCC)      (Coronary artery disease involving native coronary artery of native heart with unstable angina pectoris (HCC) [I25.110])    Surgeons: Jaleel Huber MD Provider: Brannon Guardado MD    Anesthesia Type: general ASA Status: 4          Anesthesia Type: general    Vitals  No vitals data found for the desired time range.          Post Anesthesia Care and Evaluation    Patient location during evaluation: bedside  Patient participation: complete - patient participated  Level of consciousness: awake and alert  Pain score: 1  Pain management: adequate    Airway patency: patent  Anesthetic complications: No anesthetic complications  PONV Status: none  Cardiovascular status: acceptable  Respiratory status: acceptable  Hydration status: acceptable  Post Neuraxial Block status: Motor and sensory function returned to baseline

## 2022-12-29 NOTE — PLAN OF CARE
Goal Outcome Evaluation:  Plan of Care Reviewed With: patient, spouse, family        Progress: improving  Outcome Evaluation: Patient had CABG x4 w/ LIMA. patient came to [facility] at 1210 and Time out of OR was 1157. Patient has a right IJ, swan at 50, and Left [place]. 3 Albumin boluses were given due to low CI and high SVR and low BP. Dr. Huber updated on hemodynamics and is okay with CI of 1.7-1.9. Call MD if need more albumin or if CI is lower. Patient is on Amio, Epi, Levo, Milrinone, and Insulin gtts. Patient is still intubated but is spontaneously breathing on the vent, follows commands and opens eyes to voice. Dr. Huber has been updated on last two ABGs and gives okay to wean to extubate.  Patient has had adequate urine output through ng catheter. Left pleural and mediastinal chest tubes in place. Midsternal incision with scant drainage that is marked.  Family has been updated this evening. Will continue to monitor.

## 2022-12-29 NOTE — ANESTHESIA PROCEDURE NOTES
Arterial Line      Patient reassessed immediately prior to procedure    Patient location during procedure: OR  Start time: 12/29/2022 7:00 AM  Stop Time:12/29/2022 7:05 AM       Line placed for hemodynamic monitoring.  Performed By   Anesthesiologist: Brannon Guardado MD   Preanesthetic Checklist  Completed: patient identified, IV checked, site marked, risks and benefits discussed, surgical consent, monitors and equipment checked, pre-op evaluation and timeout performed  Arterial Line Prep    Sterile Tech: cap, gloves, gown, mask and sterile barriers  Prep: ChloraPrep  Patient monitoring: blood pressure monitoring, continuous pulse oximetry and EKG  Arterial Line Procedure   Laterality:left  Location:  radial artery  Catheter size: 20 G   Guidance: ultrasound guided  PROCEDURE NOTE/ULTRASOUND INTERPRETATION.  Using ultrasound guidance the potential vascular sites for insertion of the catheter were visualized to determine the patency of the vessel to be used for vascular access.  After selecting the appropriate site for insertion, the needle was visualized under ultrasound being inserted into the radial artery, followed by ultrasound confirmation of wire and catheter placement. There were no abnormalities seen on ultrasound; an image was taken; and the patient tolerated the procedure with no complications.   Number of attempts: 1  Successful placement: yes Images: still images obtained, printed/placed on the chart  Post Assessment   Dressing Type: secured with tape and wrist guard applied.   Complications no  Circ/Move/Sens Assessment: normal.   Patient Tolerance: patient tolerated the procedure well with no apparent complications

## 2022-12-29 NOTE — PROGRESS NOTES
Daily Progress Note          Assessment    Hypoxemia  Flu B  COPD  LOGAN  Cor Pulmonale   CHF: LV EF 20-25%  DM II  Hyperlipidemia  HTN  CAD with multivessel disease: Status post CABG x4 12/29/2022  MENDEZ        PLAN:  Ventilator management: Proceed with weaning as per protocol with decreasing FiO2 and PEEP as tolerated  Hemodynamic support  Sedation: Precedex    Glucose control: Currently on insulin drip         LOS: 12 days     Subjective     Patient is intubated and sedated    Objective     Vital signs for last 24 hours:  Vitals:    12/29/22 1530 12/29/22 1600 12/29/22 1700 12/29/22 1737   BP:  102/55 102/55    Pulse: 89 89 89 89   Resp:       Temp: 97.3 °F (36.3 °C) 97.3 °F (36.3 °C) 97.3 °F (36.3 °C) 97.3 °F (36.3 °C)   TempSrc:       SpO2: 96% 96% 97% 95%   Weight:       Height:           Intake/Output last 3 shifts:  I/O last 3 completed shifts:  In: 2099 [P.O.:1260; I.V.:839]  Out: 3180 [Urine:3180]  Intake/Output this shift:  I/O this shift:  In: 3602 [I.V.:2752; Blood:700; IV Piggyback:150]  Out: 4290 [Urine:1655; Other:1600; Blood:1000; Chest Tube:35]      Radiology  Imaging Results (Last 24 Hours)     Procedure Component Value Units Date/Time    XR Chest 1 View [[id number]] Collected: 12/29/22 1300     Updated: 12/29/22 1306    Narrative:      EXAMINATION: XR CHEST 1 VW-     DATE OF EXAM: 12/29/2022 12:39 PM     INDICATION: Post-Op Check Line & Tube Placement;  I25.110-Atherosclerotic heart disease of native coronary artery with  unstable angina pectoris.     COMPARISON: Chest radiograph dated 12/28/2022     TECHNIQUE: Portable AP view of the chest was obtained.     FINDINGS:  The endotracheal tube tip is 7.2 cm above the luisa. There is a gastric  suction tube which courses below left hemidiaphragm. There is right IJ  central line with tip terminating at the pulmonary artery outflow. There  is a left-sided chest tube present. The cardiac silhouette is within  normal limits. There are postsurgical changes of  CABG. There are streaky  left basilar atelectasis. There is no definite pneumothorax. There is no  pleural effusion. Median sternotomy wires are present and intact.       Impression:      1. Support devices appear in expected position.  2. Left sided chest tube without visible pneumothorax.  3. Streaky left basilar airspace opacity, likely atelectasis.     Electronically Signed By-Bradley Moore MD On:12/29/2022 1:04 PM  This report was finalized on [id number] by  Bradley Moore MD.          Labs:  Results from last 7 days   Lab Units 12/29/22  1739 12/29/22  1247 12/29/22  1210   WBC 10*3/mm3  --   --  29.20*   HEMOGLOBIN g/dL  --   --  13.0   HEMOGLOBIN, POC g/dL 14.3   < >  --    HEMATOCRIT %  --   --  39.9   HEMATOCRIT POC % 42   < >  --    PLATELETS 10*3/mm3  --   --  333    < > = values in this interval not displayed.     Results from last 7 days   Lab Units 12/29/22  1753 12/29/22  1210 12/29/22  0419   SODIUM mmol/L  --  128* 126*   POTASSIUM mmol/L 3.3* 3.8 4.2   CHLORIDE mmol/L  --  89* 81*   CO2 mmol/L  --  24.0 28.0   BUN mg/dL  --  69* 79*   CREATININE mg/dL  --  1.75* 1.69*   CALCIUM mg/dL  --  8.8 10.5   BILIRUBIN mg/dL  --   --  0.8   ALK PHOS U/L  --   --  104   ALT (SGPT) U/L  --   --  12   AST (SGOT) U/L  --   --  18   GLUCOSE mg/dL  --  191* 180*     Results from last 7 days   Lab Units 12/29/22  1739   PH, ARTERIAL pH units 7.368   PO2 ART mm Hg 82.2*   PCO2, ARTERIAL mm Hg 40.0   HCO3 ART mmol/L 23.0     Results from last 7 days   Lab Units 12/29/22  1210 12/29/22  0419   ALBUMIN g/dL 3.7 4.0             Results from last 7 days   Lab Units 12/23/22  0250   MAGNESIUM mg/dL 2.1     Results from last 7 days   Lab Units 12/29/22  1210 12/29/22  0420 12/28/22  0905 12/28/22  0154   INR  1.16*  --   --  1.06   APTT seconds 30.3 64.1 62.4 66.9               Meds:   SCHEDULE  acetaminophen, 650 mg, Oral, Q6H   Or  acetaminophen, 650 mg, Oral, Q6H   Or  acetaminophen, 650 mg, Rectal,  Q6H  [START ON 12/30/2022] aspirin, 81 mg, Oral, Daily  [START ON 12/30/2022] atorvastatin, 40 mg, Oral, Nightly  ceFAZolin, 2 g, Intravenous, Q8H  chlorhexidine, 15 mL, Mouth/Throat, Q12H  [START ON 12/30/2022] enoxaparin, 40 mg, Subcutaneous, Daily  magnesium sulfate, 1 g, Intravenous, Q8H  mupirocin, 1 application, Each Nare, BID  [START ON 12/30/2022] pantoprazole, 40 mg, Oral, Q AM  [START ON 12/30/2022] polyethylene glycol, 17 g, Oral, BID  potassium chloride, , ,   [START ON 12/30/2022] senna, 2 tablet, Oral, Nightly  [START ON 12/30/2022] senna-docusate sodium, 2 tablet, Oral, BID      Infusions  amiodarone, 0.5 mg/min, Last Rate: 0.5 mg/min (12/29/22 1245)   Followed by  amiodarone, 0.5 mg/min, Last Rate: 0.5 mg/min (12/29/22 1757)  dexmedetomidine, 0.2-1.5 mcg/kg/hr, Last Rate: Stopped (12/29/22 1655)  EPINEPHrine, 0.02-0.05 mcg/kg/min, Last Rate: 0.05 mcg/kg/min (12/29/22 1215)  insulin, 0-100 Units/hr, Last Rate: 1.2 Units/hr (12/29/22 1743)  milrinone, 0.25 mcg/kg/min, Last Rate: 0.25 mcg/kg/min (12/29/22 1215)  niCARdipine, 5-15 mg/hr  nitroglycerin, 5-50 mcg/min  norepinephrine, 0.02-0.06 mcg/kg/min, Last Rate: 0.07 mcg/kg/min (12/29/22 1245)  sodium chloride, 30 mL/hr, Last Rate: 30 mL/hr (12/29/22 1307)      PRNs  •  [START ON 12/30/2022] acetaminophen **OR** [START ON 12/30/2022] acetaminophen **OR** [START ON 12/30/2022] acetaminophen  •  albumin human  •  dextrose  •  dextrose  •  glucagon (human recombinant)  •  HYDROcodone-acetaminophen  •  meperidine  •  Morphine **AND** naloxone  •  niCARdipine  •  nitroglycerin  •  norepinephrine  •  ondansetron  •  [START ON 12/30/2022] potassium chloride **OR** [START ON 12/30/2022] potassium chloride  •  potassium chloride **OR** potassium chloride  •  sodium chloride    Physical Exam:  General Appearance: Intubated and sedated  HEENT:  Normocephalic, without obvious abnormality, Conjunctiva/corneas clear,.   Nares normal, no drainage     Neck:  Supple,  symmetrical, trachea midline.   Lungs /Chest wall: Good air movement bilaterally without wheezing, chest tubes in place, symmetrical wall movement.     Heart: Rate controlled, S1 S2 normal  Abdomen: Soft, non-tender, no masses, no organomegaly.    Extremities: No edema, no clubbing or cyanosis     ROS  Unobtainable due to intubation      I reviewed the recent clinical results    Much of this encounter note is an electronic transcription/translation of spoken language to printed text using Dragon Software which might include inadvertent errors in transcription.

## 2022-12-29 NOTE — ANESTHESIA PROCEDURE NOTES
Emergent/Open-Heart Anesthesia REESE    Procedure Performed: Emergent/Open-Heart Anesthesia REESE       Start Time:        End Time:      Preanesthesia Checklist:  Patient identified, IV assessed, risks and benefits discussed, monitors and equipment assessed, procedure being performed at surgeon's request and anesthesia consent obtained.    General Procedure Information  REESE Placed for monitoring purposes only -- This is not a diagnostic REESE

## 2022-12-29 NOTE — PROGRESS NOTES
Referring Provider: Jaleel Huber MD    Reason for follow-up: Multivessel coronary artery disease, HFrEF, acute kidney injury, non-ST elevation MI, diabetes     Patient Care Team:  Melvina Hodge as PCP - General (Nurse Practitioner)  Johann Morales MD as Cardiologist (Cardiology)      SUBJECTIVE  Seen after CABGX4  Currently intubated and sedated  On pressors with 2 chest tubes in place     ROS  Unable to obtain due to being intubated and sedated    Personal History:    Past Medical History:   Diagnosis Date   • CHF (congestive heart failure) (HCC)    • Diabetes mellitus (HCC)    • Elevated cholesterol    • Hyperlipidemia    • Hypertension        Past Surgical History:   Procedure Laterality Date   • HERNIA REPAIR     • TONSILLECTOMY         History reviewed. No pertinent family history.    Social History     Tobacco Use   • Smoking status: Former     Types: Cigarettes   Vaping Use   • Vaping Use: Never used   Substance Use Topics   • Alcohol use: Not Currently   • Drug use: Never        Medications Prior to Admission   Medication Sig Dispense Refill Last Dose   • aspirin 81 MG chewable tablet Chew 81 mg Daily.      • aspirin-acetaminophen-caffeine (EXCEDRIN MIGRAINE) 250-250-65 MG per tablet Take 1 tablet by mouth Every 6 (Six) Hours As Needed for Headache.      • carvedilol (COREG) 3.125 MG tablet Take 3.125 mg by mouth 2 (Two) Times a Day With Meals.      • furosemide (LASIX) 40 MG tablet Take 40 mg by mouth 2 (Two) Times a Day.      • glipizide (GLUCOTROL) 10 MG tablet Take 10 mg by mouth 2 (Two) Times a Day Before Meals.      • insulin NPH-insulin regular (humuLIN 70/30,novoLIN 70/30) (70-30) 100 UNIT/ML injection Inject 27 Units under the skin into the appropriate area as directed Every Morning.      • insulin NPH-insulin regular (humuLIN 70/30,novoLIN 70/30) (70-30) 100 UNIT/ML injection Inject 22 Units under the skin into the appropriate area as directed Every Night.      • metFORMIN (GLUCOPHAGE) 500  MG tablet Take 500 mg by mouth Daily With Breakfast.      • Omega-3 Fatty Acids (fish oil) 1000 MG capsule capsule Take 1,000 mg by mouth Daily With Breakfast.      • oseltamivir (TAMIFLU) 75 MG capsule Take 75 mg by mouth 2 (Two) Times a Day.      • rosuvastatin (CRESTOR) 5 MG tablet Take 5 mg by mouth Every Night.          Allergies:  Patient has no known allergies.    Scheduled Meds:[MAR Hold] aspirin, 81 mg, Oral, Daily  [MAR Hold] budesonide, 0.5 mg, Nebulization, BID - RT  carvedilol, 6.25 mg, Oral, BID With Meals  [MAR Hold] docusate sodium, 100 mg, Oral, BID  [MAR Hold] empagliflozin, 10 mg, Oral, Daily  furosemide, 40 mg, Intravenous, Q12H  [MAR Hold] insulin glargine, 45 Units, Subcutaneous, Nightly  [MAR Hold] insulin lispro, 18 Units, Subcutaneous, TID With Meals  [MAR Hold] insulin lispro, 2-12 Units, Subcutaneous, TID With Meals  [MAR Hold] polyethylene glycol, 17 g, Oral, Daily  [MAR Hold] rosuvastatin, 5 mg, Oral, Nightly  [MAR Hold] sodium chloride, 10 mL, Intravenous, Q12H      Continuous Infusions:EPINEPHrine, 0.02-0.3 mcg/kg/min      PRN Meds:.•  [MAR Hold] acetaminophen  •  Chlorhexidine Gluconate Cloth  •  [MAR Hold] dextrose  •  [MAR Hold] dextrose  •  diphenhydrAMINE  •  gelatin absorbable  •  [MAR Hold] glucagon (human recombinant)  •  [MAR Hold] ipratropium-albuterol  •  [MAR Hold] melatonin  •  [MAR Hold] nitroglycerin  •  [MAR Hold] ondansetron  •  [MAR Hold] potassium chloride **OR** [MAR Hold] potassium chloride **OR** [MAR Hold] potassium chloride  •  sodium chloride  •  [MAR Hold] sodium chloride  •  [MAR Hold] sodium chloride  •  sodium chloride  •  BH OR OTHER MIXTURE BUILDER  •  BH OR OTHER MIXTURE BUILDER  •  sterile water      OBJECTIVE    Vital Signs  Vitals:    12/29/22 0400 12/29/22 0500 12/29/22 0600 12/29/22 0605   BP: 118/59   133/70   Pulse: 74 74 76 76   Resp:       Temp:       TempSrc:       SpO2: 98%  97%    Weight:   92.2 kg (203 lb 4.2 oz)    Height:      "      Flowsheet Rows      Flowsheet Row First Filed Value   Admission Height 177.8 cm (70\") Documented at 12/18/2022 1305   Admission Weight 103 kg (227 lb 1.2 oz) Documented at 12/17/2022 0500              Intake/Output Summary (Last 24 hours) at 12/29/2022 0850  Last data filed at 12/29/2022 0730  Gross per 24 hour   Intake 1239 ml   Output 2205 ml   Net -966 ml          Telemetry: Paced rhythm    Physical Exam:  The patient is intubated and sedated  Vital signs as noted above.  Head and neck revealed no carotid bruits or jugular venous distention.  No thyromegaly or lymphadenopathy is present  Lungs clear.  No wheezing.  Breath sounds are normal bilaterally.  Heart normal first and second heart sounds.  No murmur. No precordial rub is present.  No gallop is present.  Chest tubes in place  Abdomen soft and nontender.  No organomegaly is present.  Extremities with good peripheral pulses without any pedal edema.  Skin warm and dry.        Results Review:  I have personally reviewed the results from the time of this admission to 12/29/2022 08:50 EST and agree with these findings:  []  Laboratory  []  Microbiology  []  Radiology  []  EKG/Telemetry   []  Cardiology/Vascular   []  Pathology  []  Old records  []  Other:    Most notable findings include:    Lab Results (last 24 hours)       Procedure Component Value Units Date/Time    POC Glucose Once [[id number]]  (Abnormal) Collected: 12/29/22 0611    Specimen: Blood Updated: 12/29/22 0613     Glucose 228 mg/dL      Comment: Serial Number: [id number]:  [id number]       Comprehensive Metabolic Panel [[id number]]  (Abnormal) Collected: 12/29/22 0419    Specimen: Blood Updated: 12/29/22 0547     Glucose 180 mg/dL      BUN 79 mg/dL      Creatinine 1.69 mg/dL      Sodium 126 mmol/L      Potassium 4.2 mmol/L      Comment: Slight hemolysis detected by analyzer. Results may be affected.        Chloride 81 mmol/L      CO2 28.0 mmol/L      Calcium 10.5 mg/dL      Total " Protein 8.1 g/dL      Albumin 4.0 g/dL      ALT (SGPT) 12 U/L      AST (SGOT) 18 U/L      Comment: Slight hemolysis detected by analyzer. Results may be affected.        Alkaline Phosphatase 104 U/L      Total Bilirubin 0.8 mg/dL      Globulin 4.1 gm/dL      A/G Ratio 1.0 g/dL      BUN/Creatinine Ratio 46.7     Anion Gap 17.0 mmol/L      eGFR 41.6 mL/min/1.73      Comment: National Kidney Foundation and American Society of Nephrology (ASN) Task Force recommended calculation based on the Chronic Kidney Disease Epidemiology Collaboration (CKD-EPI) equation refit without adjustment for race.       Narrative:      GFR Normal >60  Chronic Kidney Disease <60  Kidney Failure <15    The GFR formula is only valid for adults with stable renal function between ages 18 and 70.    aPTT [[id number]]  (Normal) Collected: 12/29/22 0420    Specimen: Blood Updated: 12/29/22 0524     PTT 64.1 seconds     CBC & Differential [[id number]]  (Abnormal) Collected: 12/29/22 0419    Specimen: Blood Updated: 12/29/22 0518    Narrative:      The following orders were created for panel order CBC & Differential.  Procedure                               Abnormality         Status                     ---------                               -----------         ------                     CBC Auto Differential[[id number]]        Abnormal            Final result                 Please view results for these tests on the individual orders.    CBC Auto Differential [[id number]]  (Abnormal) Collected: 12/29/22 0419    Specimen: Blood Updated: 12/29/22 0518     WBC 12.20 10*3/mm3      RBC 5.27 10*6/mm3      Hemoglobin 16.1 g/dL      Hematocrit 46.3 %      MCV 87.9 fL      MCH 30.5 pg      MCHC 34.7 g/dL      RDW 14.5 %      RDW-SD 46.4 fl      MPV 9.4 fL      Platelets 326 10*3/mm3      Neutrophil % 68.8 %      Lymphocyte % 19.1 %      Monocyte % 10.0 %      Eosinophil % 1.5 %      Basophil % 0.6 %      Neutrophils, Absolute 8.40 10*3/mm3      Lymphocytes,  Absolute 2.30 10*3/mm3      Monocytes, Absolute 1.20 10*3/mm3      Eosinophils, Absolute 0.20 10*3/mm3      Basophils, Absolute 0.10 10*3/mm3      nRBC 0.2 /100 WBC     Blood Gas, Arterial - [[id number]]  (Abnormal) Collected: 12/29/22 0335    Specimen: Arterial Blood Updated: 12/29/22 0353     Site Left Brachial     Tulio's Test N/A     pH, Arterial 7.437 pH units      pCO2, Arterial 42.8 mm Hg      pO2, Arterial 64.5 mm Hg      HCO3, Arterial 28.8 mmol/L      Base Excess, Arterial 4.0 mmol/L      Comment: Serial Number: [id number]:  [id number]        O2 Saturation, Arterial 92.8 %      CO2 Content 30.1 mmol/L      Barometric Pressure for Blood Gas --     Comment: N/A        Modality Cannula     FIO2 28 %      Hemodilution No    POC Glucose Once [[id number]]  (Abnormal) Collected: 12/28/22 1717    Specimen: Blood Updated: 12/28/22 1718     Glucose 243 mg/dL      Comment: Serial Number: [id number]:  [id number]       POC Glucose Once [[id number]]  (Abnormal) Collected: 12/28/22 1131    Specimen: Blood Updated: 12/28/22 1140     Glucose 238 mg/dL      Comment: Serial Number: [id number]:  [id number]       aPTT [[id number]]  (Normal) Collected: 12/28/22 0905    Specimen: Blood Updated: 12/28/22 0932     PTT 62.4 seconds             Imaging Results (Last 24 Hours)       ** No results found for the last 24 hours. **            LAB RESULTS (LAST 7 DAYS)    CBC  Results from last 7 days   Lab Units 12/29/22  0419 12/28/22  0154 12/27/22  0427 12/25/22  0243 12/23/22  0250   WBC 10*3/mm3 12.20* 12.20* 11.80* 8.40 9.90   RBC 10*6/mm3 5.27 5.38 5.22 4.73 4.71   HEMOGLOBIN g/dL 16.1 15.8 15.5 14.3 13.8   HEMATOCRIT % 46.3 47.3 46.2 42.3 42.0   MCV fL 87.9 88.0 88.6 89.4 89.3   PLATELETS 10*3/mm3 326 327 361 305 346       BMP  Results from last 7 days   Lab Units 12/29/22  0419 12/28/22  0154 12/27/22  1145 12/26/22  0443 12/25/22  0243 12/24/22  0450 12/23/22  0250   SODIUM mmol/L 126* 125* 127* 131* 132* 131* 135*    POTASSIUM mmol/L 4.2 4.3 4.7 4.3 4.6 4.4 5.0   CHLORIDE mmol/L 81* 83* 82* 89* 94* 93* 94*   CO2 mmol/L 28.0 25.0 26.0 28.0 26.0 24.0 28.0   BUN mg/dL 79* 59* 51* 41* 42* 52* 42*   CREATININE mg/dL 1.69* 1.85* 1.50* 1.56* 1.34* 1.88* 2.12*   GLUCOSE mg/dL 180* 185* 284* 130* 101* 146* 95   MAGNESIUM mg/dL  --   --   --   --   --   --  2.1       CMP   Results from last 7 days   Lab Units 12/29/22  0419 12/28/22  0154 12/27/22  1145 12/26/22  0443 12/25/22  0243 12/24/22  0450 12/23/22  0250   SODIUM mmol/L 126* 125* 127* 131* 132* 131* 135*   POTASSIUM mmol/L 4.2 4.3 4.7 4.3 4.6 4.4 5.0   CHLORIDE mmol/L 81* 83* 82* 89* 94* 93* 94*   CO2 mmol/L 28.0 25.0 26.0 28.0 26.0 24.0 28.0   BUN mg/dL 79* 59* 51* 41* 42* 52* 42*   CREATININE mg/dL 1.69* 1.85* 1.50* 1.56* 1.34* 1.88* 2.12*   GLUCOSE mg/dL 180* 185* 284* 130* 101* 146* 95   ALBUMIN g/dL 4.0  --   --   --   --   --   --    BILIRUBIN mg/dL 0.8  --   --   --   --   --   --    ALK PHOS U/L 104  --   --   --   --   --   --    AST (SGOT) U/L 18  --   --   --   --   --   --    ALT (SGPT) U/L 12  --   --   --   --   --   --        BNP        TROPONIN        CoAg  Results from last 7 days   Lab Units 12/29/22  0420 12/28/22  0905 12/28/22  0154 12/27/22  1913 12/27/22  1145 12/27/22  0427 12/26/22  0309   INR   --   --  1.06  --   --   --   --    APTT seconds 64.1 62.4 66.9 77.0* 76.5 59.7* 68.3       Creatinine Clearance  Estimated Creatinine Clearance: 42.4 mL/min (A) (by C-G formula based on SCr of 1.69 mg/dL (H)).    ABG  Results from last 7 days   Lab Units 12/29/22  0335   PH, ARTERIAL pH units 7.437   PCO2, ARTERIAL mm Hg 42.8   PO2 ART mm Hg 64.5*   O2 SATURATION ART % 92.8*   BASE EXCESS ART mmol/L 4.0*       Radiology  XR Chest 1 View    Result Date: 12/28/2022  No acute cardiopulmonary findings.  Electronically Signed By-Jeffrey Turner MD On:12/28/2022 8:02 AM This report was finalized on [id number] by  Jeffrey Turner MD.    XR Chest 1 View    Result Date:  12/27/2022  No acute process.  Electronically Signed By-Wing Hodges MD On:12/27/2022 4:36 PM This report was finalized on [id number] by  Wing Hodges MD.        EKG  I personally viewed and interpreted the patient's EKG/Telemetry data:  ECG 12 Lead Chest Pain   Final Result   HEART RATE= 89  bpm   RR Interval= 672  ms   AZ Interval= 176  ms   P Horizontal Axis= -5  deg   P Front Axis= 29  deg   QRSD Interval= 92  ms   QT Interval= 362  ms   QRS Axis= 16  deg   T Wave Axis= 163  deg   - ABNORMAL ECG -   Sinus rhythm   Probable left atrial enlargement   Probable anterior infarct, age indeterminate   Abnormal T, consider ischemia, lateral leads   No previous ECG available for comparison   Electronically Signed By: Alex Byrd ([facility]) 19-Dec-2022 08:52:18   Date and Time of Study: 2022-12-17 07:21:47      SCANNED - TELEMETRY     Final Result      SCANNED - TELEMETRY     Final Result      SCANNED - TELEMETRY     Final Result      SCANNED - TELEMETRY     Final Result      SCANNED - TELEMETRY     Final Result      SCANNED - TELEMETRY     Final Result      SCANNED - TELEMETRY     Final Result      SCANNED - TELEMETRY     Final Result      SCANNED - TELEMETRY     Final Result      SCANNED - TELEMETRY     Final Result      SCANNED - TELEMETRY     Final Result      SCANNED - TELEMETRY     Final Result      SCANNED - TELEMETRY     Final Result      SCANNED - TELEMETRY     Final Result      SCANNED - TELEMETRY     Final Result      SCANNED - TELEMETRY     Final Result      SCANNED - TELEMETRY     Final Result      SCANNED - TELEMETRY     Final Result      SCANNED - TELEMETRY     Final Result      SCANNED - TELEMETRY     Final Result      SCANNED - TELEMETRY     Final Result      SCANNED - TELEMETRY     Final Result      SCANNED - TELEMETRY     Final Result      SCANNED - TELEMETRY     Final Result      SCANNED - TELEMETRY     Final Result      SCANNED - TELEMETRY     Final Result      SCANNED - TELEMETRY     Final  Result      SCANNED - TELEMETRY     Final Result      SCANNED - TELEMETRY     Final Result      SCANNED - TELEMETRY     Final Result      SCANNED - TELEMETRY     Final Result      SCANNED - TELEMETRY     Final Result      SCANNED - TELEMETRY     Final Result      SCANNED - TELEMETRY     Final Result      SCANNED - TELEMETRY     Final Result      SCANNED - TELEMETRY     Final Result      SCANNED - TELEMETRY     Final Result      SCANNED - TELEMETRY     Final Result      SCANNED - TELEMETRY     Final Result      SCANNED - TELEMETRY     Final Result      SCANNED - TELEMETRY     Final Result            Echocardiogram:    Results for orders placed during the hospital encounter of 12/17/22    Adult Transthoracic Echo Limited W/ Cont if Necessary Per Protocol    Interpretation Summary  •  Left ventricular systolic function is low normal. Calculated left ventricular EF = 48% Left ventricular ejection fraction appears to be 46 - 50%.  •  The left ventricular cavity is moderately dilated.  •  Left ventricular wall thickness is consistent with mild concentric hypertrophy.  •  Left ventricular diastolic dysfunction is noted.  •  There is calcification of the aortic valve without stenosis        Stress Test:         Cardiac Catheterization:  No results found for this or any previous visit.         Other:         ASSESSMENT & PLAN:    Principal Problem:    CAD (coronary artery disease), native coronary artery  Active Problems:    Type 2 diabetes mellitus with hyperglycemia, without long-term current use of insulin ([facility])    Mixed hyperlipidemia    Primary hypertension    Systolic and diastolic CHF, acute on chronic (HCC)    Influenza due to seasonal influenza virus    NSTEMI (non-ST elevated myocardial infarction) ([facility])    77-year-old man with multiple cardiovascular comorbidities presented with non-ST elevation MI and acute kidney injury in the setting of influenza.  He was medically treated while he completed influenza  treatment.  Cardiac catheterization showed multivessel disease and echocardiogram initially showed EF of 20% which improved to 40 to 45%.  His renal function also improved.  Creatinine is now 1.7  He is now status post CABG x4: LIMA to LAD, vein graft to diagonal, ramus and PDA.  He is currently on epi, levo, milrinone and amiodarone drip.  We will wean off pressors as tolerated.  We will start GDMT once he is off pressors and extubated.   No beta-blocker till pacer wire is in place.  Aspirin will be started once chest tubes have been removed.  He will eventually need cardiac rehab.  Will continue to follow postoperatively.  We will start statin and needs better diabetes management.      Johann Morales MD  12/29/22  08:50 EST

## 2022-12-29 NOTE — CASE MANAGEMENT/SOCIAL WORK
Continued Stay Note  [facility]     Patient Name: Sherman Baumann  MRN: [id number]  Today's Date: 12/29/2022    Admit Date: 12/17/2022    Plan: DC Plan: Pending Clinical Course and PT/OT evaluations. [facility] following if needed.  CABG x4 w/ IABP assist 12/29/22.   Discharge Plan     Row Name 12/29/22 1239       Plan    Plan DC Plan: Pending Clinical Course and PT/OT evaluations. [facility] following if needed.  CABG x4 w/ IABP assist 12/29/22.    Provided Post Acute Provider List? N/A    Provided Post Acute Provider Quality & Resource List? N/A    Plan Comments DC Barrier: POD 0 OHS.           Expected Discharge Date and Time     Expected Discharge Date Expected Discharge Time    Jan 4, 2023         Phone communication or documentation only- no physical contact with patient or family.      Rebecca Dempsey RN     Office Phone: [phone]  Office Cell:     [phone]

## 2022-12-29 NOTE — ANESTHESIA PREPROCEDURE EVALUATION
Anesthesia Evaluation     Patient summary reviewed and Nursing notes reviewed   NPO Solid Status: > 6 hours  NPO Liquid Status: > 6 hours           Airway   Mallampati: II  TM distance: >3 FB  Neck ROM: full  No difficulty expected  Dental - normal exam     Pulmonary - negative pulmonary ROS and normal exam    breath sounds clear to auscultation  Cardiovascular - normal exam    ECG reviewed  Rhythm: regular  Rate: normal    (+) hypertension, CAD, CHF , hyperlipidemia,       Neuro/Psych- negative ROS  GI/Hepatic/Renal/Endo    (+)   diabetes mellitus,     Musculoskeletal (-) negative ROS    Abdominal  - normal exam    Abdomen: soft.  Bowel sounds: normal.   Substance History - negative use     OB/GYN negative ob/gyn ROS         Other - negative ROS                       Anesthesia Plan    ASA 4     general     intravenous induction   Postoperative Plan: Expected vent after surgery  Anesthetic plan, risks, benefits, and alternatives have been provided, discussed and informed consent has been obtained with: patient.    Use of blood products discussed with patient .       CODE STATUS:    Level Of Support Discussed With: Patient  Code Status (Patient has no pulse and is not breathing): CPR (Attempt to Resuscitate)  Medical Interventions (Patient has pulse or is breathing): Full Support

## 2022-12-29 NOTE — OP NOTE
BCS OPERATIVE NOTE    Date of procedure:12/29/2022    Pre-op Diagnosis: Non-ST elevation myocardial infarction, ischemic cardiomyopathy, severe three vessel coronary artery disease, systolic congestive heart failure (NYHA grade III-IV), chronic kidney disease stage 3b with acute kidney injury, hypertension, hyperlipidemia, diabetes mellitus, obstructive sleep apnea, COPD.    Post-op Diagnosis: Same.    Procedure: Urgent CABG x4 with LIMA to distal LAD, saphenous vein to Diag, saphenous vein to Ramus, saphenous vein to PDA.  Left lower extremity endoscopic vein harvest, PRP application to LIMA bed and sternum. Intraoperative transesophageal echocardiogram.    Surgeon: Jaleel Huber MD    Assistant: BUTCH Harris (Assistant performed saphenous vein harvest and provided specialized assistance including suctioning, cutting of sutures, and aiding in exposure; this was necessary for the success of this surgical procedure).    Cardiologist: ANDREY Harris MD/ JEN Morales MD    Anesthesia: GET    Findings: Atherosclerotic plaque at base of innominate trunk, LIMA and saphenous vein adequate. PDA 1.75 mm, OM 1.5 mm (not bypassed), Ramus 1.75 mm, Diag 1.75 mm, distal LAD 2 mm.     Aortic cross-clamp time: 70 min    Cardiopulmonary bypass time: 88 min    Cellsaver/EBL: 700 ml    Antegrade and retrograde cardioplegia.    Arterial cannula: 20 Singaporean    Venous cannula: 29/37 Singaporean    Specimen: None.    History of present illness: This is a pleasant 77-year-old  gentleman multiple severe comorbidities.  He was admitted to [place] with left main and three-vessel coronary disease.  He was found to have a left ventricular ejection fraction of 10 to 15%.  He was originally transferred here to [facility] for further evaluation and possible high risk PCI.  However, upon evaluation he was found to have an improving left ventricular ejection fraction.  Although he did have severe coronary disease and had multiple comorbidities,  it was felt that his best chances of long-term survival would be surgical revascularization.  This required careful optimization and extended preoperative inpatient course.  Eventually he was optimized for surgery.  His STS morbidity and mortality risks were calculated and discussed prior to surgery.  The dangers of tobacco abuse and illicit drug use were discussed in detail with the patient; this included the benefits of tobacco and illicit drug use cessation.    Details: The patient was identified in the prep and holding area.  He was taken to the OR.  Following induction he was intubated.  He received the PA catheter, radial arterial line, and Priest catheter.  He was prepped and draped.    Transesophageal echocardiogram showed a greatly reduced ejection fraction at 20 to 25%.  There was trace mitral regurgitation at worst.    A midline sternotomy was performed.  Left internal mammary artery was harvested as a pedicle.  The patient was heparinized and the pedicle was transected distally.  A left pleural chest was placed.  During LIMA harvest the patient underwent endoscopic aspiration of his left lower extremity.  This revealed adequate saphenous vein conduit which was harvested and prepared.    The sternum was retracted and the pericardium was opened.  A well was created.  The aorta was palpated.  There was some atherosclerotic plaque at the base of the innominate trunk; this was avoided during the rest of the surgery.  ACT was confirmed.  The ascending aorta and right atrium were cannulated.  Antegrade and retrograde cardioplegia lines were inserted.  Cardiopulmonary bypass was instituted and the patient was cooled to 34 °C.  An aortic cross-clamp was applied.  The patient received antegrade and retrograde cardioplegia, resulting in diastolic arrest.  At regular intervals additional retrograde cardioplegia doses were given.    The heart was retracted.  The PDA was identified.  An arteriotomy was created.  A  saphenous vein segment was anastomosed to this.  The proximal end was anastomosed to an aortotomy and marked with a metallic ring.    The lateral wall was exposed.  The ramus branch was identified.  An arteriotomy was created.  A saphenous vein segment was anastomosed to this.  The proximal end was anastomosed to an aortotomy and marked with a second metallic ring.    The OM branch was identified.  This was very small vessel and felt to be a poor surgical target.  This was not bypassed.    The diagonal branch was identified.  An arteriotomy was created.  A saphenous vein segment was anastomosed to this.  The proximal end of the saphenous vein was anastomosed to a third aortotomy and marked with a third metallic ring.    A pericardial window was used to deliver the LIMA pedicle into the well.  A LIMA to distal LAD anastomosis was created.  The pedicle was anchored to the epicardial surface.    Following a hotshot antegrade cardioplegia dose the aortic cross-clamp was removed and the bulldog clamp was removed from the LIMA.  The patient recovered into a slow sinus rhythm.  The saphenous veins were de-aired and their bulldog clamps were also removed.  The proximal and distal anastomoses were hemostatic.  The retrograde cardioplegia line was removed.  Pacing wires were applied to the right atrium and right ventricle.  After sufficient de-airing the antegrade cardioplegia line/vent was removed.  The patient was weaned off cardiopulmonary bypass.      The venous line was removed.  Following a protamine dose the arterial line was removed.  Cannulation sites were reinforced.  The pericardium was approximated loosely.  A substernal chest was placed.  Steel wires were used to close the sternum.  Sponge counts, instrument counts, needle counts were correct.  The fascia layer, subdermal layer, and subcuticular were closed.  The leg incision was closed in similar fashion.  Bandages were applied.  The patient was transferred to  the CVCU on moderate dose inotropes.    Jaleel Huber MD  12/29/2022  11:31 EST

## 2022-12-29 NOTE — ANESTHESIA PROCEDURE NOTES
Central Line      Patient reassessed immediately prior to procedure    Patient location during procedure: OR  Start time: 12/29/2022 7:10 AM  Stop Time:12/29/2022 7:23 AM  Indications: vascular access and MD/Surgeon request  Staff  Anesthesiologist: Brannon Guardado MD  Preanesthetic Checklist  Completed: patient identified, IV checked, site marked, risks and benefits discussed, surgical consent, monitors and equipment checked, pre-op evaluation and timeout performed  Central Line Prep  Sterile Tech:cap, gloves, gown, mask and sterile barriers  Prep: chloraprep  Patient monitoring: blood pressure monitoring, continuous pulse oximetry and EKG  Central Line Procedure  Laterality:right  Location:internal jugular  Catheter Type:triple lumen and Cordis  Catheter Size:8.5 Fr  Guidance:ultrasound guided  PROCEDURE NOTE/ULTRASOUND INTERPRETATION.  Using ultrasound guidance the potential vascular sites for insertion of the catheter were visualized to determine the patency of the vessel to be used for vascular access.  After selecting the appropriate site for insertion, the needle was visualized under ultrasound being inserted into the internal jugular vein, followed by ultrasound confirmation of wire and catheter placement. There were no abnormalities seen on ultrasound; an image was taken; and the patient tolerated the procedure with no complications. Images: still images obtained, printed/placed on chart  Assessment  Post procedure:biopatch applied, line sutured and occlusive dressing applied  Assessement:blood return through all ports, free fluid flow and Clarke Test  Complications:no  Patient Tolerance:patient tolerated the procedure well with no apparent complications

## 2022-12-29 NOTE — ANESTHESIA PROCEDURE NOTES
Airway  Urgency: elective    Date/Time: 12/29/2022 7:30 AM  Airway not difficult    General Information and Staff    Patient location during procedure: OR  Anesthesiologist: Brannon Guardado MD    Indications and Patient Condition  Indications for airway management: airway protection    Preoxygenated: yes  MILS maintained throughout  Mask difficulty assessment: 1 - vent by mask    Final Airway Details  Final airway type: endotracheal airway      Successful airway: ETT  Cuffed: yes   Successful intubation technique: direct laryngoscopy  Endotracheal tube insertion site: oral  Blade: Stephenson  Blade size: 4  ETT size (mm): 7.5  Cormack-Lehane Classification: grade I - full view of glottis  Placement verified by: chest auscultation and capnometry   Measured from: gums  ETT/EBT to gums (cm): 23  Number of attempts at approach: 1  Assessment: lips, teeth, and gum same as pre-op and atraumatic intubation

## 2022-12-30 ENCOUNTER — APPOINTMENT (OUTPATIENT)
Dept: GENERAL RADIOLOGY | Facility: HOSPITAL | Age: 77
DRG: 235 | End: 2022-12-30
Payer: MEDICARE

## 2022-12-30 LAB
ALBUMIN SERPL-MCNC: 3.8 G/DL (ref 3.5–5.2)
ANION GAP SERPL CALCULATED.3IONS-SCNC: 13 MMOL/L (ref 5–15)
ANION GAP SERPL CALCULATED.3IONS-SCNC: 14 MMOL/L (ref 5–15)
ARTERIAL PATENCY WRIST A: ABNORMAL
ARTERIAL PATENCY WRIST A: ABNORMAL
ATMOSPHERIC PRESS: ABNORMAL MM[HG]
ATMOSPHERIC PRESS: ABNORMAL MM[HG]
BASE EXCESS BLDA CALC-SCNC: -3.1 MMOL/L (ref 0–3)
BASE EXCESS BLDA CALC-SCNC: -3.8 MMOL/L (ref 0–3)
BASOPHILS # BLD AUTO: 0 10*3/MM3 (ref 0–0.2)
BASOPHILS NFR BLD AUTO: 0.2 % (ref 0–1.5)
BDY SITE: ABNORMAL
BDY SITE: ABNORMAL
BUN SERPL-MCNC: 53 MG/DL (ref 8–23)
BUN SERPL-MCNC: 67 MG/DL (ref 8–23)
BUN/CREAT SERPL: 36.8 (ref 7–25)
BUN/CREAT SERPL: 38.4 (ref 7–25)
CA-I SERPL ISE-MCNC: 1.23 MMOL/L (ref 1.2–1.3)
CALCIUM SPEC-SCNC: 8.8 MG/DL (ref 8.6–10.5)
CALCIUM SPEC-SCNC: 9.2 MG/DL (ref 8.6–10.5)
CHLORIDE SERPL-SCNC: 96 MMOL/L (ref 98–107)
CHLORIDE SERPL-SCNC: 97 MMOL/L (ref 98–107)
CO2 BLDA-SCNC: 23 MMOL/L (ref 22–29)
CO2 BLDA-SCNC: 23.2 MMOL/L (ref 22–29)
CO2 SERPL-SCNC: 22 MMOL/L (ref 22–29)
CO2 SERPL-SCNC: 26 MMOL/L (ref 22–29)
CREAT SERPL-MCNC: 1.38 MG/DL (ref 0.76–1.27)
CREAT SERPL-MCNC: 1.82 MG/DL (ref 0.76–1.27)
DEPRECATED RDW RBC AUTO: 47.7 FL (ref 37–54)
EGFRCR SERPLBLD CKD-EPI 2021: 38 ML/MIN/1.73
EGFRCR SERPLBLD CKD-EPI 2021: 53 ML/MIN/1.73
EOSINOPHIL # BLD AUTO: 0 10*3/MM3 (ref 0–0.4)
EOSINOPHIL NFR BLD AUTO: 0 % (ref 0.3–6.2)
ERYTHROCYTE [DISTWIDTH] IN BLOOD BY AUTOMATED COUNT: 14.7 % (ref 12.3–15.4)
GLUCOSE BLDC GLUCOMTR-MCNC: 100 MG/DL (ref 70–105)
GLUCOSE BLDC GLUCOMTR-MCNC: 100 MG/DL (ref 70–105)
GLUCOSE BLDC GLUCOMTR-MCNC: 128 MG/DL (ref 70–105)
GLUCOSE BLDC GLUCOMTR-MCNC: 137 MG/DL (ref 70–105)
GLUCOSE BLDC GLUCOMTR-MCNC: 142 MG/DL (ref 70–105)
GLUCOSE BLDC GLUCOMTR-MCNC: 145 MG/DL (ref 70–105)
GLUCOSE BLDC GLUCOMTR-MCNC: 146 MG/DL (ref 70–105)
GLUCOSE BLDC GLUCOMTR-MCNC: 147 MG/DL (ref 70–105)
GLUCOSE BLDC GLUCOMTR-MCNC: 155 MG/DL (ref 70–105)
GLUCOSE BLDC GLUCOMTR-MCNC: 168 MG/DL (ref 70–105)
GLUCOSE BLDC GLUCOMTR-MCNC: 177 MG/DL (ref 70–105)
GLUCOSE BLDC GLUCOMTR-MCNC: 186 MG/DL (ref 70–105)
GLUCOSE BLDC GLUCOMTR-MCNC: 198 MG/DL (ref 70–105)
GLUCOSE BLDC GLUCOMTR-MCNC: 200 MG/DL (ref 70–105)
GLUCOSE BLDC GLUCOMTR-MCNC: 206 MG/DL (ref 70–105)
GLUCOSE BLDC GLUCOMTR-MCNC: 206 MG/DL (ref 70–105)
GLUCOSE BLDC GLUCOMTR-MCNC: 224 MG/DL (ref 70–105)
GLUCOSE BLDC GLUCOMTR-MCNC: 258 MG/DL (ref 70–105)
GLUCOSE BLDC GLUCOMTR-MCNC: 88 MG/DL (ref 70–105)
GLUCOSE SERPL-MCNC: 224 MG/DL (ref 65–99)
GLUCOSE SERPL-MCNC: 95 MG/DL (ref 65–99)
HCO3 BLDA-SCNC: 21.8 MMOL/L (ref 21–28)
HCO3 BLDA-SCNC: 22 MMOL/L (ref 21–28)
HCT VFR BLD AUTO: 35.2 % (ref 37.5–51)
HEMODILUTION: NO
HEMODILUTION: NO
HGB BLD-MCNC: 11.8 G/DL (ref 13–17.7)
INHALED O2 CONCENTRATION: 40 %
INHALED O2 CONCENTRATION: 52 %
INR PPP: 1.06 (ref 0.93–1.1)
LYMPHOCYTES # BLD AUTO: 0.4 10*3/MM3 (ref 0.7–3.1)
LYMPHOCYTES NFR BLD AUTO: 2 % (ref 19.6–45.3)
MAGNESIUM SERPL-MCNC: 3 MG/DL (ref 1.6–2.4)
MCH RBC QN AUTO: 29.3 PG (ref 26.6–33)
MCHC RBC AUTO-ENTMCNC: 33.5 G/DL (ref 31.5–35.7)
MCV RBC AUTO: 87.4 FL (ref 79–97)
MODALITY: ABNORMAL
MODALITY: ABNORMAL
MONOCYTES # BLD AUTO: 1 10*3/MM3 (ref 0.1–0.9)
MONOCYTES NFR BLD AUTO: 4.7 % (ref 5–12)
NEUTROPHILS NFR BLD AUTO: 19.6 10*3/MM3 (ref 1.7–7)
NEUTROPHILS NFR BLD AUTO: 93.1 % (ref 42.7–76)
NRBC BLD AUTO-RTO: 0 /100 WBC (ref 0–0.2)
PCO2 BLDA: 38.8 MM HG (ref 35–48)
PCO2 BLDA: 40.3 MM HG (ref 35–48)
PEEP RESPIRATORY: 5 CM[H2O]
PH BLDA: 7.34 PH UNITS (ref 7.35–7.45)
PH BLDA: 7.36 PH UNITS (ref 7.35–7.45)
PHOSPHATE SERPL-MCNC: 5.6 MG/DL (ref 2.5–4.5)
PLATELET # BLD AUTO: 268 10*3/MM3 (ref 140–450)
PMV BLD AUTO: 9.1 FL (ref 6–12)
PO2 BLDA: 124.5 MM HG (ref 83–108)
PO2 BLDA: 96.8 MM HG (ref 83–108)
POTASSIUM SERPL-SCNC: 4 MMOL/L (ref 3.5–5.2)
POTASSIUM SERPL-SCNC: 4.3 MMOL/L (ref 3.5–5.2)
PROTHROMBIN TIME: 10.9 SECONDS (ref 9.6–11.7)
PSV: 6 CMH2O
QT INTERVAL: 453 MS
RBC # BLD AUTO: 4.03 10*6/MM3 (ref 4.14–5.8)
SAO2 % BLDCOA: 97.3 % (ref 94–98)
SAO2 % BLDCOA: 98.6 % (ref 94–98)
SODIUM SERPL-SCNC: 133 MMOL/L (ref 136–145)
SODIUM SERPL-SCNC: 135 MMOL/L (ref 136–145)
VENTILATOR MODE: ABNORMAL
WBC NRBC COR # BLD: 21 10*3/MM3 (ref 3.4–10.8)

## 2022-12-30 PROCEDURE — 25010000002 MAGNESIUM SULFATE IN D5W 1G/100ML (PREMIX) 1-5 GM/100ML-% SOLUTION: Performed by: NURSE PRACTITIONER

## 2022-12-30 PROCEDURE — 94799 UNLISTED PULMONARY SVC/PX: CPT

## 2022-12-30 PROCEDURE — 97168 OT RE-EVAL EST PLAN CARE: CPT

## 2022-12-30 PROCEDURE — 25010000002 ALBUMIN HUMAN 25% PER 50 ML: Performed by: THORACIC SURGERY (CARDIOTHORACIC VASCULAR SURGERY)

## 2022-12-30 PROCEDURE — 97535 SELF CARE MNGMENT TRAINING: CPT

## 2022-12-30 PROCEDURE — 99024 POSTOP FOLLOW-UP VISIT: CPT | Performed by: THORACIC SURGERY (CARDIOTHORACIC VASCULAR SURGERY)

## 2022-12-30 PROCEDURE — 97530 THERAPEUTIC ACTIVITIES: CPT

## 2022-12-30 PROCEDURE — 97110 THERAPEUTIC EXERCISES: CPT

## 2022-12-30 PROCEDURE — 0 POTASSIUM CHLORIDE 10 MEQ/100ML SOLUTION: Performed by: NURSE PRACTITIONER

## 2022-12-30 PROCEDURE — 85025 COMPLETE CBC W/AUTO DIFF WBC: CPT | Performed by: NURSE PRACTITIONER

## 2022-12-30 PROCEDURE — 85610 PROTHROMBIN TIME: CPT | Performed by: NURSE PRACTITIONER

## 2022-12-30 PROCEDURE — 25010000002 CEFAZOLIN PER 500 MG: Performed by: NURSE PRACTITIONER

## 2022-12-30 PROCEDURE — P9047 ALBUMIN (HUMAN), 25%, 50ML: HCPCS | Performed by: THORACIC SURGERY (CARDIOTHORACIC VASCULAR SURGERY)

## 2022-12-30 PROCEDURE — 99232 SBSQ HOSP IP/OBS MODERATE 35: CPT | Performed by: INTERNAL MEDICINE

## 2022-12-30 PROCEDURE — 80069 RENAL FUNCTION PANEL: CPT | Performed by: NURSE PRACTITIONER

## 2022-12-30 PROCEDURE — 82962 GLUCOSE BLOOD TEST: CPT

## 2022-12-30 PROCEDURE — 93010 ELECTROCARDIOGRAM REPORT: CPT | Performed by: INTERNAL MEDICINE

## 2022-12-30 PROCEDURE — 25010000002 AMIODARONE IN DEXTROSE 5% 360-4.14 MG/200ML-% SOLUTION: Performed by: NURSE PRACTITIONER

## 2022-12-30 PROCEDURE — 71045 X-RAY EXAM CHEST 1 VIEW: CPT

## 2022-12-30 PROCEDURE — 0 MILRINONE LACTATE IN DEXTROSE 20-5 MG/100ML-% SOLUTION: Performed by: NURSE PRACTITIONER

## 2022-12-30 PROCEDURE — 80048 BASIC METABOLIC PNL TOTAL CA: CPT | Performed by: INTERNAL MEDICINE

## 2022-12-30 PROCEDURE — 94003 VENT MGMT INPAT SUBQ DAY: CPT

## 2022-12-30 PROCEDURE — 93005 ELECTROCARDIOGRAM TRACING: CPT | Performed by: NURSE PRACTITIONER

## 2022-12-30 PROCEDURE — 82330 ASSAY OF CALCIUM: CPT | Performed by: NURSE PRACTITIONER

## 2022-12-30 PROCEDURE — 97164 PT RE-EVAL EST PLAN CARE: CPT

## 2022-12-30 PROCEDURE — 83735 ASSAY OF MAGNESIUM: CPT | Performed by: NURSE PRACTITIONER

## 2022-12-30 PROCEDURE — 99231 SBSQ HOSP IP/OBS SF/LOW 25: CPT | Performed by: INTERNAL MEDICINE

## 2022-12-30 PROCEDURE — 82803 BLOOD GASES ANY COMBINATION: CPT

## 2022-12-30 PROCEDURE — 25010000002 ENOXAPARIN PER 10 MG: Performed by: NURSE PRACTITIONER

## 2022-12-30 RX ORDER — ALBUMIN (HUMAN) 12.5 G/50ML
25 SOLUTION INTRAVENOUS ONCE
Status: COMPLETED | OUTPATIENT
Start: 2022-12-30 | End: 2022-12-30

## 2022-12-30 RX ORDER — AMIODARONE HYDROCHLORIDE 200 MG/1
400 TABLET ORAL 2 TIMES DAILY WITH MEALS
Status: DISCONTINUED | OUTPATIENT
Start: 2022-12-30 | End: 2023-01-02

## 2022-12-30 RX ADMIN — POLYETHYLENE GLYCOL 3350 17 G: 17 POWDER, FOR SOLUTION ORAL at 08:41

## 2022-12-30 RX ADMIN — ASPIRIN 81 MG: 81 TABLET, COATED ORAL at 08:42

## 2022-12-30 RX ADMIN — MAGNESIUM SULFATE IN DEXTROSE 1 G: 10 INJECTION, SOLUTION INTRAVENOUS at 02:44

## 2022-12-30 RX ADMIN — AMIODARONE HYDROCHLORIDE 0.5 MG/MIN: 1.8 INJECTION, SOLUTION INTRAVENOUS at 07:33

## 2022-12-30 RX ADMIN — CEFAZOLIN 2 G: 2 INJECTION, POWDER, FOR SOLUTION INTRAMUSCULAR; INTRAVENOUS at 18:20

## 2022-12-30 RX ADMIN — ALBUMIN (HUMAN) 25 G: 0.25 INJECTION, SOLUTION INTRAVENOUS at 08:40

## 2022-12-30 RX ADMIN — ENOXAPARIN SODIUM 40 MG: 100 INJECTION SUBCUTANEOUS at 15:44

## 2022-12-30 RX ADMIN — SENNOSIDES 2 TABLET: 8.6 TABLET, FILM COATED ORAL at 20:53

## 2022-12-30 RX ADMIN — HYDROCODONE BITARTRATE AND ACETAMINOPHEN 1 TABLET: 5; 325 TABLET ORAL at 20:53

## 2022-12-30 RX ADMIN — MUPIROCIN 1 APPLICATION: 20 OINTMENT TOPICAL at 08:41

## 2022-12-30 RX ADMIN — CEFAZOLIN 2 G: 2 INJECTION, POWDER, FOR SOLUTION INTRAMUSCULAR; INTRAVENOUS at 11:09

## 2022-12-30 RX ADMIN — ATORVASTATIN CALCIUM 40 MG: 40 TABLET, FILM COATED ORAL at 20:53

## 2022-12-30 RX ADMIN — POTASSIUM CHLORIDE 10 MEQ: 7.46 INJECTION, SOLUTION INTRAVENOUS at 00:53

## 2022-12-30 RX ADMIN — AMIODARONE HYDROCHLORIDE 400 MG: 200 TABLET ORAL at 18:16

## 2022-12-30 RX ADMIN — MUPIROCIN 1 APPLICATION: 20 OINTMENT TOPICAL at 20:57

## 2022-12-30 RX ADMIN — SENNOSIDES AND DOCUSATE SODIUM 2 TABLET: 50; 8.6 TABLET ORAL at 08:42

## 2022-12-30 RX ADMIN — CEFAZOLIN 2 G: 2 INJECTION, POWDER, FOR SOLUTION INTRAMUSCULAR; INTRAVENOUS at 03:25

## 2022-12-30 RX ADMIN — PANTOPRAZOLE SODIUM 40 MG: 40 TABLET, DELAYED RELEASE ORAL at 06:08

## 2022-12-30 RX ADMIN — HYDROCODONE BITARTRATE AND ACETAMINOPHEN 1 TABLET: 5; 325 TABLET ORAL at 06:08

## 2022-12-30 RX ADMIN — MAGNESIUM SULFATE IN DEXTROSE 1 G: 10 INJECTION, SOLUTION INTRAVENOUS at 11:09

## 2022-12-30 RX ADMIN — ACETAMINOPHEN 650 MG: 325 TABLET, FILM COATED ORAL at 11:19

## 2022-12-30 RX ADMIN — POLYETHYLENE GLYCOL 3350 17 G: 17 POWDER, FOR SOLUTION ORAL at 20:53

## 2022-12-30 RX ADMIN — MILRINONE LACTATE 0.12 MCG/KG/MIN: 0.2 INJECTION, SOLUTION INTRAVENOUS at 14:58

## 2022-12-30 RX ADMIN — AMIODARONE HYDROCHLORIDE 400 MG: 200 TABLET ORAL at 11:10

## 2022-12-30 RX ADMIN — ACETAMINOPHEN 650 MG: 325 TABLET, FILM COATED ORAL at 06:08

## 2022-12-30 RX ADMIN — CHLORHEXIDINE GLUCONATE 15 ML: 1.2 SOLUTION ORAL at 08:41

## 2022-12-30 RX ADMIN — CHLORHEXIDINE GLUCONATE 15 ML: 1.2 SOLUTION ORAL at 20:53

## 2022-12-30 RX ADMIN — INSULIN HUMAN 4.7 UNITS/HR: 1 INJECTION, SOLUTION INTRAVENOUS at 14:59

## 2022-12-30 RX ADMIN — POTASSIUM CHLORIDE 10 MEQ: 7.46 INJECTION, SOLUTION INTRAVENOUS at 02:13

## 2022-12-30 NOTE — PROGRESS NOTES
Referring Provider: Jaleel Huber MD    Reason for follow-up: Multivessel coronary disease, HFrEF, acute kidney injury, non-ST elevation MI, diabetes     Patient Care Team:  Melvina Hodge as PCP - General (Nurse Practitioner)  Johann Morales MD as Cardiologist (Cardiology)      SUBJECTIVE  Successfully extubated, sitting up.  Complains of typical postoperative chest discomfort     ROS  Review of all systems negative except as indicated.    Since I have last seen, the patient has been without any chest discomfort, shortness of breath, palpitations, dizziness or syncope.  Denies having any headache, abdominal pain, nausea, vomiting, diarrhea, constipation, loss of weight or loss of appetite.  Denies having any excessive bruising, hematuria or blood in the stool.  ROS      Personal History:    Past Medical History:   Diagnosis Date   • CHF (congestive heart failure) (HCC)    • Diabetes mellitus (HCC)    • Elevated cholesterol    • Hyperlipidemia    • Hypertension        Past Surgical History:   Procedure Laterality Date   • HERNIA REPAIR     • TONSILLECTOMY         History reviewed. No pertinent family history.    Social History     Tobacco Use   • Smoking status: Former     Types: Cigarettes   Vaping Use   • Vaping Use: Never used   Substance Use Topics   • Alcohol use: Not Currently   • Drug use: Never        Medications Prior to Admission   Medication Sig Dispense Refill Last Dose   • aspirin 81 MG chewable tablet Chew 81 mg Daily.      • aspirin-acetaminophen-caffeine (EXCEDRIN MIGRAINE) 250-250-65 MG per tablet Take 1 tablet by mouth Every 6 (Six) Hours As Needed for Headache.      • carvedilol (COREG) 3.125 MG tablet Take 3.125 mg by mouth 2 (Two) Times a Day With Meals.      • furosemide (LASIX) 40 MG tablet Take 40 mg by mouth 2 (Two) Times a Day.      • glipizide (GLUCOTROL) 10 MG tablet Take 10 mg by mouth 2 (Two) Times a Day Before Meals.      • insulin NPH-insulin regular (humuLIN 70/30,novoLIN 70/30)  (70-30) 100 UNIT/ML injection Inject 27 Units under the skin into the appropriate area as directed Every Morning.      • insulin NPH-insulin regular (humuLIN 70/30,novoLIN 70/30) (70-30) 100 UNIT/ML injection Inject 22 Units under the skin into the appropriate area as directed Every Night.      • metFORMIN (GLUCOPHAGE) 500 MG tablet Take 500 mg by mouth Daily With Breakfast.      • Omega-3 Fatty Acids (fish oil) 1000 MG capsule capsule Take 1,000 mg by mouth Daily With Breakfast.      • oseltamivir (TAMIFLU) 75 MG capsule Take 75 mg by mouth 2 (Two) Times a Day.      • rosuvastatin (CRESTOR) 5 MG tablet Take 5 mg by mouth Every Night.          Allergies:  Patient has no known allergies.    Scheduled Meds:amiodarone, 400 mg, Oral, BID With Meals  aspirin, 81 mg, Oral, Daily  atorvastatin, 40 mg, Oral, Nightly  ceFAZolin, 2 g, Intravenous, Q8H  chlorhexidine, 15 mL, Mouth/Throat, Q12H  enoxaparin, 40 mg, Subcutaneous, Daily  mupirocin, 1 application, Each Nare, BID  pantoprazole, 40 mg, Oral, Q AM  polyethylene glycol, 17 g, Oral, BID  senna, 2 tablet, Oral, Nightly  senna-docusate sodium, 2 tablet, Oral, BID      Continuous Infusions:EPINEPHrine, 0.02-0.05 mcg/kg/min, Last Rate: Stopped (12/30/22 1415)  insulin, 0-100 Units/hr, Last Rate: 4.7 Units/hr (12/30/22 1459)  milrinone, 0.25 mcg/kg/min, Last Rate: 0.125 mcg/kg/min (12/30/22 1458)  norepinephrine, 0.02-0.06 mcg/kg/min, Last Rate: 0.02 mcg/kg/min (12/30/22 1624)  sodium chloride, 30 mL/hr, Last Rate: 30 mL/hr (12/29/22 1307)      PRN Meds:.•  acetaminophen **OR** acetaminophen **OR** acetaminophen  •  albumin human  •  dextrose  •  dextrose  •  glucagon (human recombinant)  •  HYDROcodone-acetaminophen  •  Morphine **AND** naloxone  •  norepinephrine  •  ondansetron  •  potassium chloride **OR** potassium chloride  •  sodium chloride      OBJECTIVE    Vital Signs  Vitals:    12/30/22 1500 12/30/22 1541 12/30/22 1600 12/30/22 1620   BP:  123/55 110/47 131/63  "  BP Location:  Right arm     Patient Position:  Lying     Pulse: 89 89 89 90   Resp:  21     Temp: 97.9 °F (36.6 °C) 97.7 °F (36.5 °C)     TempSrc:  Core     SpO2: 97% 98% 97% 97%   Weight:       Height:           Flowsheet Rows    Flowsheet Row First Filed Value   Admission Height 177.8 cm (70\") Documented at 12/18/2022 1305   Admission Weight 103 kg (227 lb 1.2 oz) Documented at 12/17/2022 0500            Intake/Output Summary (Last 24 hours) at 12/30/2022 1642  Last data filed at 12/30/2022 1400  Gross per 24 hour   Intake 4052 ml   Output 2170 ml   Net 1882 ml          Telemetry: Paced rhythm    Physical Exam:  The patient is alert, oriented and in no distress.  Vital signs as noted above.  Head and neck revealed no carotid bruits or jugular venous distention.  No thyromegaly or lymphadenopathy is present  Lungs clear.  No wheezing.  Breath sounds are normal bilaterally.  Heart normal first and second heart sounds.  No murmur. No precordial rub is present.  No gallop is present.  Abdomen soft and nontender.  No organomegaly is present.  Extremities with good peripheral pulses without any pedal edema.  Skin warm and dry.  Musculoskeletal system is grossly normal.  CNS grossly normal.       Results Review:  I have personally reviewed the results from the time of this admission to 12/30/2022 16:42 EST and agree with these findings:  []  Laboratory  []  Microbiology  []  Radiology  []  EKG/Telemetry   []  Cardiology/Vascular   []  Pathology  []  Old records  []  Other:    Most notable findings include:    Lab Results (last 24 hours)     Procedure Component Value Units Date/Time    POC Glucose Once [[id number]]  (Normal) Collected: 12/30/22 1627    Specimen: Blood Updated: 12/30/22 1634     Glucose 100 mg/dL      Comment: Serial Number: [id number]:  [id number]       POC Glucose Once [[id number]]  (Abnormal) Collected: 12/30/22 1447    Specimen: Blood Updated: 12/30/22 1448     Glucose 137 mg/dL      Comment: " Serial Number: [id number]:  [id number]       POC Glucose Once [[id number]]  (Abnormal) Collected: 12/30/22 1325    Specimen: Blood Updated: 12/30/22 1326     Glucose 155 mg/dL      Comment: Serial Number: [id number]:  [id number]       POC Glucose Once [[id number]]  (Abnormal) Collected: 12/30/22 1211    Specimen: Blood Updated: 12/30/22 1213     Glucose 200 mg/dL      Comment: Serial Number: [id number]:  [id number]       POC Glucose Once [[id number]]  (Abnormal) Collected: 12/30/22 1052    Specimen: Blood Updated: 12/30/22 1054     Glucose 258 mg/dL      Comment: Serial Number: [id number]:  [id number]       POC Glucose Once [[id number]]  (Abnormal) Collected: 12/30/22 0900    Specimen: Blood Updated: 12/30/22 0926     Glucose 128 mg/dL      Comment: Serial Number: [id number]:  [id number]       POC Glucose Once [[id number]]  (Abnormal) Collected: 12/30/22 0646    Specimen: Blood Updated: 12/30/22 0706     Glucose 145 mg/dL      Comment: Serial Number: [id number]:  [id number]       POC Glucose Once [[id number]]  (Abnormal) Collected: 12/30/22 0538    Specimen: Blood Updated: 12/30/22 0539     Glucose 147 mg/dL      Comment: Serial Number: [id number]:  [id number]       POC Glucose Once [[id number]]  (Abnormal) Collected: 12/30/22 0415    Specimen: Blood Updated: 12/30/22 0539     Glucose 177 mg/dL      Comment: Serial Number: [id number]:  [id number]       Renal Function Panel [[id number]]  (Abnormal) Collected: 12/30/22 0317    Specimen: Blood Updated: 12/30/22 0358     Glucose 224 mg/dL      BUN 67 mg/dL      Creatinine 1.82 mg/dL      Sodium 133 mmol/L      Potassium 4.3 mmol/L      Chloride 97 mmol/L      CO2 22.0 mmol/L      Calcium 9.2 mg/dL      Albumin 3.8 g/dL      Phosphorus 5.6 mg/dL      Anion Gap 14.0 mmol/L      BUN/Creatinine Ratio 36.8     eGFR 38.0 mL/min/1.73      Comment: National Kidney Foundation and American Society of Nephrology (ASN) Task Force recommended  calculation based on the Chronic Kidney Disease Epidemiology Collaboration (CKD-EPI) equation refit without adjustment for race.       Narrative:      GFR Normal >60  Chronic Kidney Disease <60  Kidney Failure <15    The GFR formula is only valid for adults with stable renal function between ages 18 and 70.    Magnesium [[id number]]  (Abnormal) Collected: 12/30/22 0317    Specimen: Blood Updated: 12/30/22 0358     Magnesium 3.0 mg/dL     Protime-INR [[id number]]  (Normal) Collected: 12/30/22 0317    Specimen: Blood Updated: 12/30/22 0339     Protime 10.9 Seconds      INR 1.06    Calcium, Ionized [[id number]]  (Normal) Collected: 12/30/22 0317    Specimen: Blood Updated: 12/30/22 0333     Ionized Calcium 1.23 mmol/L     CBC & Differential [[id number]]  (Abnormal) Collected: 12/30/22 0317    Specimen: Blood Updated: 12/30/22 0326    Narrative:      The following orders were created for panel order CBC & Differential.  Procedure                               Abnormality         Status                     ---------                               -----------         ------                     CBC Auto Differential[[id number]]        Abnormal            Final result                 Please view results for these tests on the individual orders.    CBC Auto Differential [[id number]]  (Abnormal) Collected: 12/30/22 0317    Specimen: Blood Updated: 12/30/22 0326     WBC 21.00 10*3/mm3      RBC 4.03 10*6/mm3      Hemoglobin 11.8 g/dL      Hematocrit 35.2 %      MCV 87.4 fL      MCH 29.3 pg      MCHC 33.5 g/dL      RDW 14.7 %      RDW-SD 47.7 fl      MPV 9.1 fL      Platelets 268 10*3/mm3      Neutrophil % 93.1 %      Lymphocyte % 2.0 %      Monocyte % 4.7 %      Eosinophil % 0.0 %      Basophil % 0.2 %      Neutrophils, Absolute 19.60 10*3/mm3      Lymphocytes, Absolute 0.40 10*3/mm3      Monocytes, Absolute 1.00 10*3/mm3      Eosinophils, Absolute 0.00 10*3/mm3      Basophils, Absolute 0.00 10*3/mm3      nRBC 0.0 /100 WBC     POC  Glucose Once [[id number]]  (Abnormal) Collected: 12/30/22 0312    Specimen: Blood Updated: 12/30/22 0314     Glucose 206 mg/dL      Comment: Serial Number: [id number]:  [id number]       POC Glucose Once [[id number]]  (Abnormal) Collected: 12/30/22 0216    Specimen: Blood Updated: 12/30/22 0217     Glucose 206 mg/dL      Comment: Serial Number: [id number]:  [id number]       Blood Gas, Arterial - [[id number]]  (Abnormal) Collected: 12/30/22 0151    Specimen: Arterial Blood Updated: 12/30/22 0154     Site Arterial Line     Tulio's Test N/A     pH, Arterial 7.340 pH units      pCO2, Arterial 40.3 mm Hg      pO2, Arterial 124.5 mm Hg      HCO3, Arterial 21.8 mmol/L      Base Excess, Arterial -3.8 mmol/L      Comment: Serial Number: [id number]:  [id number]        O2 Saturation, Arterial 98.6 %      CO2 Content 23.0 mmol/L      Barometric Pressure for Blood Gas --     Comment: N/A        Modality HFNC     FIO2 52 %      Hemodilution No    POC Glucose Once [[id number]]  (Abnormal) Collected: 12/30/22 0106    Specimen: Blood Updated: 12/30/22 0109     Glucose 224 mg/dL      Comment: Serial Number: [id number]:  [id number]       Blood Gas, Arterial - [[id number]]  (Abnormal) Collected: 12/30/22 0019    Specimen: Arterial Blood Updated: 12/30/22 0023     Site Arterial Line     Tulio's Test N/A     pH, Arterial 7.361 pH units      pCO2, Arterial 38.8 mm Hg      pO2, Arterial 96.8 mm Hg      HCO3, Arterial 22.0 mmol/L      Base Excess, Arterial -3.1 mmol/L      Comment: Serial Number: [id number]:  [id number]        O2 Saturation, Arterial 97.3 %      CO2 Content 23.2 mmol/L      Barometric Pressure for Blood Gas --     Comment: N/A        Modality Adult Vent     FIO2 40 %      Ventilator Mode ;CPAP/PS     PEEP 5     PSV 6 cmH2O      Hemodilution No    POC Glucose Once [[id number]]  (Abnormal) Collected: 12/30/22 0004    Specimen: Blood Updated: 12/30/22 0007     Glucose 198 mg/dL      Comment: Serial Number:  [id number]:  [id number]       Potassium [[id number]]  (Normal) Collected: 12/29/22 2315    Specimen: Blood Updated: 12/29/22 2335     Potassium 3.7 mmol/L     POC Glucose Once [[id number]]  (Abnormal) Collected: 12/29/22 2253    Specimen: Blood Updated: 12/29/22 2312     Glucose 229 mg/dL      Comment: Serial Number: [id number]:  [id number]       POC Glucose Once [[id number]]  (Abnormal) Collected: 12/29/22 2148    Specimen: Blood Updated: 12/29/22 2254     Glucose 215 mg/dL      Comment: Serial Number: [id number]:  [id number]       POC Glucose Once [[id number]]  (Abnormal) Collected: 12/29/22 2049    Specimen: Blood Updated: 12/29/22 2055     Glucose 151 mg/dL      Comment: Serial Number: [id number]:  [id number]       POC Glucose Once [[id number]]  (Abnormal) Collected: 12/29/22 1948    Specimen: Blood Updated: 12/29/22 1952     Glucose 197 mg/dL      Comment: Serial Number: [id number]:  [id number]       POC Glucose Once [[id number]]  (Abnormal) Collected: 12/29/22 1829    Specimen: Blood Updated: 12/29/22 1833     Glucose 189 mg/dL      Comment: Serial Number: [id number]:  [id number]       POCT Electrolytes +HGB +HCT [[id number]]  (Abnormal) Collected: 12/29/22 1829    Specimen: Blood Updated: 12/29/22 1833     Sodium 132 mmol/L      POC Potassium 3.0 mmol/L      Ionized Calcium 1.21 mmol/L      Comment: Serial Number: [id number]:  [id number]        Glucose 189 mg/dL      Hematocrit 40 %      Hemoglobin 13.6 g/dL     Blood Gas, Arterial - [[id number]]  (Abnormal) Collected: 12/29/22 1829    Specimen: Arterial Blood Updated: 12/29/22 1833     Site Arterial Line     Tulio's Test N/A     pH, Arterial 7.362 pH units      pCO2, Arterial 38.4 mm Hg      pO2, Arterial 75.7 mm Hg      HCO3, Arterial 21.8 mmol/L      Base Excess, Arterial -3.2 mmol/L      Comment: Serial Number: [id number]:  [id number]        O2 Saturation, Arterial 94.5 %      Barometric Pressure for Blood Gas --     Comment: N/A         Modality Adult Vent     FIO2 40 %      Ventilator Mode ;CPAP/PS     PEEP 5     PSV 10 cmH2O      Hemodilution No    Potassium [[id number]]  (Abnormal) Collected: 12/29/22 1753    Specimen: Blood Updated: 12/29/22 1812     Potassium 3.3 mmol/L     POC Glucose Once [[id number]]  (Abnormal) Collected: 12/29/22 1739    Specimen: Blood Updated: 12/29/22 1744     Glucose 171 mg/dL      Comment: Serial Number: [id number]:  [id number]       POCT Electrolytes +HGB +HCT [[id number]]  (Abnormal) Collected: 12/29/22 1739    Specimen: Blood Updated: 12/29/22 1744     Sodium 131 mmol/L      POC Potassium 3.0 mmol/L      Ionized Calcium 1.23 mmol/L      Comment: Serial Number: [id number]:  [id number]        Glucose 171 mg/dL      Hematocrit 42 %      Hemoglobin 14.3 g/dL     Blood Gas, Arterial - [[id number]]  (Abnormal) Collected: 12/29/22 1739    Specimen: Arterial Blood Updated: 12/29/22 1744     Site Arterial Line     Tulio's Test N/A     pH, Arterial 7.368 pH units      pCO2, Arterial 40.0 mm Hg      pO2, Arterial 82.2 mm Hg      HCO3, Arterial 23.0 mmol/L      Base Excess, Arterial -2.1 mmol/L      Comment: Serial Number: [id number]:  [id number]        O2 Saturation, Arterial 95.7 %      Barometric Pressure for Blood Gas --     Comment: N/A        Modality Adult Vent     FIO2 40 %      Ventilator Mode ;AC     Set Tidal Volume 650     PEEP 5     Hemodilution No     Respiratory Rate 16          Imaging Results (Last 24 Hours)     Procedure Component Value Units Date/Time    XR Chest 1 View [[id number]] Collected: 12/30/22 0712     Updated: 12/30/22 0716    Narrative:         DATE OF EXAM:   12/30/2022 6:11 AM     PROCEDURE:   XR CHEST 1 VW-     INDICATIONS:   Post-Op Heart Surgery; I25.110-Atherosclerotic heart disease of native  coronary artery with unstable angina pectoris     COMPARISON:  12/29/2022 and prior     TECHNIQUE:   Portable Chest     FINDINGS:      Study is limited by overlying support and monitoring apparatus.      Patient is status post median sternotomy and CABG. Heart size appears  stable. Vascularity appears unchanged.      Small left-sided pleural effusion and dependent opacities at the left  base appears similar to slightly improved in aeration the comparison.  Left-sided chest tube is unchanged. No definite pneumothorax is noted.     The patient has been extubated and the NG tube has been removed. Right  IJ sheath with [place]-Sonia catheter remains in place. The tip of the  [place]-Sonia catheter extends to the expected position of the distal main  pulmonary artery or proximal right main pulmonary artery. Catheter  differences in technique this is unchanged.     The right lung demonstrates no focal consolidation. Osseous structures  appear stable        Impression:         1. Lines and tubes as above  2. Slight improved aeration at the left base[     Electronically Signed By-Ha Rocha On:12/30/2022 7:14 AM  This report was finalized on [id number] by  Ha Rocha, .          LAB RESULTS (LAST 7 DAYS)    CBC  Results from last 7 days   Lab Units 12/30/22  0317 12/29/22  1829 12/29/22  1739 12/29/22  1637 12/29/22  1518 12/29/22  1417 12/29/22  1247 12/29/22  1210 12/29/22  0758 12/29/22  0419 12/28/22  0154 12/27/22  0427 12/25/22  0243   WBC 10*3/mm3 21.00*  --   --   --   --   --   --  29.20*  --  12.20* 12.20* 11.80* 8.40   RBC 10*6/mm3 4.03*  --   --   --   --   --   --  4.47  --  5.27 5.38 5.22 4.73   HEMOGLOBIN g/dL 11.8*  --   --   --   --   --   --  13.0  --  16.1 15.8 15.5 14.3   HEMOGLOBIN, POC g/dL  --  13.6 14.3 14.2 13.8 14.5 15.2  --    < >  --   --   --   --    HEMATOCRIT % 35.2*  --   --   --   --   --   --  39.9  --  46.3 47.3 46.2 42.3   HEMATOCRIT POC %  --  40 42 42 41 43 45  --    < >  --   --   --   --    MCV fL 87.4  --   --   --   --   --   --  89.3  --  87.9 88.0 88.6 89.4   PLATELETS 10*3/mm3 268  --   --   --   --   --   --  333  --  326 327 361 305    < > = values in this interval  not displayed.       BMP  Results from last 7 days   Lab Units 12/30/22 0317 12/29/22 2315 12/29/22 1753 12/29/22 1210 12/29/22 0419 12/28/22  0154 12/27/22  1145 12/26/22  0443 12/25/22  0243   SODIUM mmol/L 133*  --   --  128* 126* 125* 127* 131* 132*   POTASSIUM mmol/L 4.3 3.7 3.3* 3.8 4.2 4.3 4.7 4.3 4.6   CHLORIDE mmol/L 97*  --   --  89* 81* 83* 82* 89* 94*   CO2 mmol/L 22.0  --   --  24.0 28.0 25.0 26.0 28.0 26.0   BUN mg/dL 67*  --   --  69* 79* 59* 51* 41* 42*   CREATININE mg/dL 1.82*  --   --  1.75* 1.69* 1.85* 1.50* 1.56* 1.34*   GLUCOSE mg/dL 224*  --   --  191* 180* 185* 284* 130* 101*   MAGNESIUM mg/dL 3.0*  --   --   --   --   --   --   --   --    PHOSPHORUS mg/dL 5.6*  --   --  5.4*  --   --   --   --   --        CMP   Results from last 7 days   Lab Units 12/30/22 0317 12/29/22 2315 12/29/22 1753 12/29/22 1210 12/29/22 0419 12/28/22  0154 12/27/22  1145 12/26/22  0443 12/25/22  0243   SODIUM mmol/L 133*  --   --  128* 126* 125* 127* 131* 132*   POTASSIUM mmol/L 4.3 3.7 3.3* 3.8 4.2 4.3 4.7 4.3 4.6   CHLORIDE mmol/L 97*  --   --  89* 81* 83* 82* 89* 94*   CO2 mmol/L 22.0  --   --  24.0 28.0 25.0 26.0 28.0 26.0   BUN mg/dL 67*  --   --  69* 79* 59* 51* 41* 42*   CREATININE mg/dL 1.82*  --   --  1.75* 1.69* 1.85* 1.50* 1.56* 1.34*   GLUCOSE mg/dL 224*  --   --  191* 180* 185* 284* 130* 101*   ALBUMIN g/dL 3.8  --   --  3.7 4.0  --   --   --   --    BILIRUBIN mg/dL  --   --   --   --  0.8  --   --   --   --    ALK PHOS U/L  --   --   --   --  104  --   --   --   --    AST (SGOT) U/L  --   --   --   --  18  --   --   --   --    ALT (SGPT) U/L  --   --   --   --  12  --   --   --   --        BNP        TROPONIN        CoAg  Results from last 7 days   Lab Units 12/30/22  0317 12/29/22  1210 12/29/22  0420 12/28/22  0905 12/28/22  0154 12/27/22  1913 12/27/22  1145 12/27/22  0427   INR  1.06 1.16*  --   --  1.06  --   --   --    APTT seconds  --  30.3 64.1 62.4 66.9 77.0* 76.5 59.7*        Creatinine Clearance  Estimated Creatinine Clearance: 41.9 mL/min (A) (by C-G formula based on SCr of 1.82 mg/dL (H)).    ABG  Results from last 7 days   Lab Units 12/30/22  0151 12/30/22  0019 12/29/22  1829 12/29/22  1739 12/29/22  1637 12/29/22  1518 12/29/22  1417   PH, ARTERIAL pH units 7.340* 7.361 7.362 7.368 7.424 7.400 7.360   PCO2, ARTERIAL mm Hg 40.3 38.8 38.4 40.0 34.8* 37.4 40.2   PO2 ART mm Hg 124.5* 96.8 75.7* 82.2* 78.1* 94.6 102.3   O2 SATURATION ART % 98.6* 97.3 94.5 95.7 95.8 97.4 97.6   BASE EXCESS ART mmol/L -3.8* -3.1* -3.2* -2.1* -1.1* -1.4* -2.6*       Radiology  XR Chest 1 View    Result Date: 12/30/2022   1. Lines and tubes as above 2. Slight improved aeration at the left base[  Electronically Signed By-Ha Rocha On:12/30/2022 7:14 AM This report was finalized on [id number] by  Ha Rocha, .    XR Chest 1 View    Result Date: 12/29/2022  1. Support devices appear in expected position. 2. Left sided chest tube without visible pneumothorax. 3. Streaky left basilar airspace opacity, likely atelectasis.  Electronically Signed By-Bradley Moore MD On:12/29/2022 1:04 PM This report was finalized on [id number] by  Bradley Moore MD.        EKG  I personally viewed and interpreted the patient's EKG/Telemetry data:  ECG 12 Lead Drug Monitoring; Amiodarone   Preliminary Result   HEART RATE= 60  bpm   RR Interval= 996  ms   MO Interval= 177  ms   P Horizontal Axis= -52  deg   P Front Axis= 38  deg   QRSD Interval= 89  ms   QT Interval= 453  ms   QRS Axis= 13  deg   T Wave Axis= 96  deg   - ABNORMAL ECG -   Sinus rhythm   Nonspecific T abnrm, anterolateral leads   Borderline ST elevation, inferior leads   Electronically Signed By:    Date and Time of Study: 2022-12-30 03:33:03      ECG 12 Lead Drug Monitoring; Amiodarone   Final Result   HEART RATE= 89  bpm   RR Interval= 671  ms   MO Interval= 219  ms   P Horizontal Axis= -87  deg   P Front Axis=   deg   QRSD Interval=  100  ms   QT Interval= 371  ms   QRS Axis= 20  deg   T Wave Axis= 177  deg   - ABNORMAL ECG -   Atrial-paced rhythm   Abnormal T, consider ischemia, lateral leads   When compared with ECG of 17-Dec-2022 7:21:47,   Significant change in rhythm   Significant axis, voltage or hypertrophy change   Electronically Signed By: Sampson Torres ([facility]) 29-Dec-2022 20:09:23   Date and Time of Study: 2022-12-29 16:41:22      ECG 12 Lead Chest Pain   Final Result   HEART RATE= 89  bpm   RR Interval= 672  ms   MI Interval= 176  ms   P Horizontal Axis= -5  deg   P Front Axis= 29  deg   QRSD Interval= 92  ms   QT Interval= 362  ms   QRS Axis= 16  deg   T Wave Axis= 163  deg   - ABNORMAL ECG -   Sinus rhythm   Probable left atrial enlargement   Probable anterior infarct, age indeterminate   Abnormal T, consider ischemia, lateral leads   No previous ECG available for comparison   Electronically Signed By: Alex Byrd ([facility]) 19-Dec-2022 08:52:18   Date and Time of Study: 2022-12-17 07:21:47      SCANNED - TELEMETRY     Final Result      SCANNED - TELEMETRY     Final Result      SCANNED - TELEMETRY     Final Result      SCANNED - TELEMETRY     Final Result      SCANNED - TELEMETRY     Final Result      SCANNED - TELEMETRY     Final Result      SCANNED - TELEMETRY     Final Result      SCANNED - TELEMETRY     Final Result      SCANNED - TELEMETRY     Final Result      SCANNED - TELEMETRY     Final Result      SCANNED - TELEMETRY     Final Result      SCANNED - TELEMETRY     Final Result      SCANNED - TELEMETRY     Final Result      SCANNED - TELEMETRY     Final Result      SCANNED - TELEMETRY     Final Result      SCANNED - TELEMETRY     Final Result      SCANNED - TELEMETRY     Final Result      SCANNED - TELEMETRY     Final Result      SCANNED - TELEMETRY     Final Result      SCANNED - TELEMETRY     Final Result      SCANNED - TELEMETRY     Final Result      SCANNED - TELEMETRY     Final Result      SCANNED - TELEMETRY      Final Result      SCANNED - TELEMETRY     Final Result      SCANNED - TELEMETRY     Final Result      SCANNED - TELEMETRY     Final Result      SCANNED - TELEMETRY     Final Result      SCANNED - TELEMETRY     Final Result      SCANNED - TELEMETRY     Final Result      SCANNED - TELEMETRY     Final Result      SCANNED - TELEMETRY     Final Result      SCANNED - TELEMETRY     Final Result      SCANNED - TELEMETRY     Final Result      SCANNED - TELEMETRY     Final Result      SCANNED - TELEMETRY     Final Result      SCANNED - TELEMETRY     Final Result      SCANNED - TELEMETRY     Final Result      SCANNED - TELEMETRY     Final Result      SCANNED - TELEMETRY     Final Result      SCANNED - TELEMETRY     Final Result      SCANNED - TELEMETRY     Final Result      ECG 12 Lead    (Results Pending)   ECG 12 Lead    (Results Pending)         Echocardiogram:    Results for orders placed during the hospital encounter of 12/17/22    Adult Transthoracic Echo Limited W/ Cont if Necessary Per Protocol    Interpretation Summary  •  Left ventricular systolic function is low normal. Calculated left ventricular EF = 48% Left ventricular ejection fraction appears to be 46 - 50%.  •  The left ventricular cavity is moderately dilated.  •  Left ventricular wall thickness is consistent with mild concentric hypertrophy.  •  Left ventricular diastolic dysfunction is noted.  •  There is calcification of the aortic valve without stenosis        Stress Test:         Cardiac Catheterization:  No results found for this or any previous visit.         Other:         ASSESSMENT & PLAN:    Principal Problem:    CAD (coronary artery disease), native coronary artery  Active Problems:    Type 2 diabetes mellitus with hyperglycemia, without long-term current use of insulin (HCC)    Mixed hyperlipidemia    Primary hypertension    Systolic and diastolic CHF, acute on chronic (HCC)    Influenza due to seasonal influenza virus    NSTEMI (non-ST  elevated myocardial infarction) (HCC)    77-year-old man with multiple cardiovascular comorbidities presented with non-ST elevation MI and acute kidney injury in the setting of influenza.  He was medically treated and completed influenza treatment.  Cardiac catheterization showed multivessel coronary artery disease and echocardiogram showed initial EF of 20% which improved to 40%.  He is now status post CABG with LIMA to LAD, vein graft to diagonal, vein graft to ramus and vein graft to PDA.  He is now extubated and remains on pressors.  Levophed has been weaned off, he is currently on epi, amiodarone and milrinone.  Continue to wean off inotrope and pressor as tolerated.  Start oral drugs including amiodarone for A. fib prevention.  He will need Plavix once chest tubes have been removed as initial presentation was a non-STEMI.  Still has pacer wires in place and therefore beta-blockers are on hold.  Will start GDMT once he is off pressor, inotrope.  Start high intensity statin, he will also need better diabetes management.  He will need cardiac rehab.      Johann Morales MD  12/30/22  16:42 EST

## 2022-12-30 NOTE — PROGRESS NOTES
Nutrition Services    Patient Name: Sherman Baumann  YOB: 1945  MRN: [id number]  Admission date: 12/17/2022    PPE Documentation        PPE Worn By Provider Did not enter room for this encounter   PPE Worn By Patient  N/A     PROGRESS NOTE      Encounter Information: Check on for PO intakes.  S/P REESE CABG x 4 on 12/29.  Exubated and advanced to CLD.         PO Diet: Diet: Liquid Diets, Diabetic Diets, Cardiac Diets; Clear Liquid; Healthy Heart (2-3 Na+); Consistent Carbohydrate; Texture: Regular Texture (IDDSI 7); Fluid Consistency: Thin (IDDSI 0)   PO Supplements: None ordered   PO Intake:  %       Current nutrition support:    Nutrition support review:        Labs (reviewed below): Hyponatremia  Elevated BUN/Cr  Hyperglycemia  Hypermagnesemia  Hyperphosphatemia         GI Function:  Last BM 12/24 (x 6 days) - bowel regimen noted        Nutrition Intervention Updates: Diet advancement per MD       Results from last 7 days   Lab Units 12/30/22  0317 12/29/22  2315 12/29/22  1753 12/29/22  1210 12/29/22  0419   SODIUM mmol/L 133*  --   --  128* 126*   POTASSIUM mmol/L 4.3 3.7 3.3* 3.8 4.2   CHLORIDE mmol/L 97*  --   --  89* 81*   CO2 mmol/L 22.0  --   --  24.0 28.0   BUN mg/dL 67*  --   --  69* 79*   CREATININE mg/dL 1.82*  --   --  1.75* 1.69*   CALCIUM mg/dL 9.2  --   --  8.8 10.5   BILIRUBIN mg/dL  --   --   --   --  0.8   ALK PHOS U/L  --   --   --   --  104   ALT (SGPT) U/L  --   --   --   --  12   AST (SGOT) U/L  --   --   --   --  18   GLUCOSE mg/dL 224*  --   --  191* 180*     Results from last 7 days   Lab Units 12/30/22  0317   MAGNESIUM mg/dL 3.0*   PHOSPHORUS mg/dL 5.6*   HEMOGLOBIN g/dL 11.8*   HEMATOCRIT % 35.2*     COVID19   Date Value Ref Range Status   12/27/2022 Not Detected Not Detected - Ref. Range Final     Lab Results   Component Value Date    HGBA1C 8.3 (H) 12/17/2022       RD to follow up per protocol.    Electronically signed by:  Staci Delarosa RD  12/30/22 08:18  EST

## 2022-12-30 NOTE — PROGRESS NOTES
Daily Progress Note          Assessment    Hypoxemia  Flu B  COPD  LOGAN  Cor Pulmonale   CHF: LV EF 20-25%  DM II  Hyperlipidemia  HTN  CAD with multivessel disease: Status post CABG x4 12/29/2022  MENDEZ        PLAN:  Extubated earlier this morning  Oxygen supplement and titration: Currently requiring 3-1/2 L per nasal cannula  Encourage use of incentive spirometer  Chest tube management as per CTS  Aspirin  DVT prophylaxis  Glucose control: Currently on insulin drip         LOS: 13 days     Subjective     Patient reports mild shortness of breath and cough    Objective     Vital signs for last 24 hours:  Vitals:    12/30/22 1400 12/30/22 1500 12/30/22 1541 12/30/22 1600   BP: 123/53  123/55 110/47   BP Location:   Right arm    Patient Position:   Lying    Pulse: 89 89 89 89   Resp:   21    Temp: 97.7 °F (36.5 °C) 97.9 °F (36.6 °C) 97.7 °F (36.5 °C)    TempSrc:   Core    SpO2: 96% 97% 98% 97%   Weight:       Height:           Intake/Output last 3 shifts:  I/O last 3 completed shifts:  In: 6313 [P.O.:420; I.V.:5043; Blood:700; IV Piggyback:150]  Out: 5890 [Urine:3190; Other:1600; Blood:1000; Chest Tube:100]  Intake/Output this shift:  I/O this shift:  In: 1647 [P.O.:480; I.V.:1167]  Out: 765 [Urine:700; Chest Tube:65]      Radiology  Imaging Results (Last 24 Hours)     Procedure Component Value Units Date/Time    XR Chest 1 View [[id number]] Collected: 12/30/22 0712     Updated: 12/30/22 0716    Narrative:         DATE OF EXAM:   12/30/2022 6:11 AM     PROCEDURE:   XR CHEST 1 VW-     INDICATIONS:   Post-Op Heart Surgery; I25.110-Atherosclerotic heart disease of native  coronary artery with unstable angina pectoris     COMPARISON:  12/29/2022 and prior     TECHNIQUE:   Portable Chest     FINDINGS:      Study is limited by overlying support and monitoring apparatus.     Patient is status post median sternotomy and CABG. Heart size appears  stable. Vascularity appears unchanged.      Small left-sided pleural effusion and  dependent opacities at the left  base appears similar to slightly improved in aeration the comparison.  Left-sided chest tube is unchanged. No definite pneumothorax is noted.     The patient has been extubated and the NG tube has been removed. Right  IJ sheath with Merna-Sonia catheter remains in place. The tip of the  Merna-Sonia catheter extends to the expected position of the distal main  pulmonary artery or proximal right main pulmonary artery. Catheter  differences in technique this is unchanged.     The right lung demonstrates no focal consolidation. Osseous structures  appear stable        Impression:         1. Lines and tubes as above  2. Slight improved aeration at the left base[     Electronically Signed By-Ha Rocha On:12/30/2022 7:14 AM  This report was finalized on [id number] by  Ha Rocha, .          Labs:  Results from last 7 days   Lab Units 12/30/22 0317   WBC 10*3/mm3 21.00*   HEMOGLOBIN g/dL 11.8*   HEMATOCRIT % 35.2*   PLATELETS 10*3/mm3 268     Results from last 7 days   Lab Units 12/30/22 0317 12/29/22  1210 12/29/22 0419   SODIUM mmol/L 133*   < > 126*   POTASSIUM mmol/L 4.3   < > 4.2   CHLORIDE mmol/L 97*   < > 81*   CO2 mmol/L 22.0   < > 28.0   BUN mg/dL 67*   < > 79*   CREATININE mg/dL 1.82*   < > 1.69*   CALCIUM mg/dL 9.2   < > 10.5   BILIRUBIN mg/dL  --   --  0.8   ALK PHOS U/L  --   --  104   ALT (SGPT) U/L  --   --  12   AST (SGOT) U/L  --   --  18   GLUCOSE mg/dL 224*   < > 180*    < > = values in this interval not displayed.     Results from last 7 days   Lab Units 12/30/22  0151   PH, ARTERIAL pH units 7.340*   PO2 ART mm Hg 124.5*   PCO2, ARTERIAL mm Hg 40.3   HCO3 ART mmol/L 21.8     Results from last 7 days   Lab Units 12/30/22 0317 12/29/22  1210 12/29/22  0419   ALBUMIN g/dL 3.8 3.7 4.0             Results from last 7 days   Lab Units 12/30/22 0317   MAGNESIUM mg/dL 3.0*     Results from last 7 days   Lab Units 12/30/22  0317 12/29/22  1210 12/29/22  0429  12/28/22  0905 12/28/22  0154   INR  1.06 1.16*  --   --  1.06   APTT seconds  --  30.3 64.1 62.4 66.9               Meds:   SCHEDULE  amiodarone, 400 mg, Oral, BID With Meals  aspirin, 81 mg, Oral, Daily  atorvastatin, 40 mg, Oral, Nightly  ceFAZolin, 2 g, Intravenous, Q8H  chlorhexidine, 15 mL, Mouth/Throat, Q12H  enoxaparin, 40 mg, Subcutaneous, Daily  mupirocin, 1 application, Each Nare, BID  pantoprazole, 40 mg, Oral, Q AM  polyethylene glycol, 17 g, Oral, BID  senna, 2 tablet, Oral, Nightly  senna-docusate sodium, 2 tablet, Oral, BID      Infusions  EPINEPHrine, 0.02-0.05 mcg/kg/min, Last Rate: Stopped (12/30/22 1415)  insulin, 0-100 Units/hr, Last Rate: 4.7 Units/hr (12/30/22 1459)  milrinone, 0.25 mcg/kg/min, Last Rate: 0.125 mcg/kg/min (12/30/22 1458)  norepinephrine, 0.02-0.06 mcg/kg/min, Last Rate: 0.02 mcg/kg/min (12/30/22 1624)  sodium chloride, 30 mL/hr, Last Rate: 30 mL/hr (12/29/22 1307)      PRNs  •  acetaminophen **OR** acetaminophen **OR** acetaminophen  •  albumin human  •  dextrose  •  dextrose  •  glucagon (human recombinant)  •  HYDROcodone-acetaminophen  •  Morphine **AND** naloxone  •  norepinephrine  •  ondansetron  •  potassium chloride **OR** potassium chloride  •  sodium chloride    Physical Exam:  General Appearance: Alert and following commands  HEENT:  Normocephalic, without obvious abnormality, Conjunctiva/corneas clear,.   Nares normal, no drainage     Neck:  Supple, symmetrical, trachea midline.   Lungs /Chest wall: Good air movement bilaterally bibasilar crackles, chest tubes in place, symmetrical wall movement.     Heart: Rate controlled, S1 S2 normal  Abdomen: Soft, non-tender, no masses, no organomegaly.    Extremities: No edema, no clubbing or cyanosis     ROS  Constitutional: Negative for chills, fever and malaise/fatigue.   HENT: Negative.    Eyes: Negative.    Cardiovascular: Expected postoperative incisional pain  Respiratory: Positive for cough and shortness of breath.     Skin: Negative.    Musculoskeletal: Negative.    Gastrointestinal: Negative.    Genitourinary: Negative.    Neurological: Negative.    Psychiatric/Behavioral: Negative.      I reviewed the recent clinical results    Much of this encounter note is an electronic transcription/translation of spoken language to printed text using Dragon Software which might include inadvertent errors in transcription.

## 2022-12-30 NOTE — CASE MANAGEMENT/SOCIAL WORK
Continued Stay Note   Titi     Patient Name: Sherman Baumann  MRN: [id number]  Today's Date: 12/30/2022    Admit Date: 12/17/2022    Plan: DC Plan: SNF pending patient/family decision. SNF list provided. Awaiting choices. CABG x4 w/IABP assist 12/29/22.   Discharge Plan     Row Name 12/30/22 1511       Plan    Plan DC Plan: SNF pending patient/family decision. SNF list provided. Awaiting choices. CABG x4 w/IABP assist 12/29/22.    Provided Post Acute Provider List? N/A    Provided Post Acute Provider Quality & Resource List? N/A    Plan Comments Update: PT evlauation completed today and recommendation has changed to SNF. CM took SNF list to bedside and discussed with patient. Patient seemed slightly confused and nurse confirmed. CM contacted patietn spouse Marta to discuss new recommendations and informed that a list left at bedside to for them to review for placement. Marta verbalized understanding and states it will have to be close to home for them. Dc Barriers unchanged.            Expected Discharge Date and Time     Expected Discharge Date Expected Discharge Time    Jan 4, 2023         Met with patient in room wearing PPE: mask,     Maintain distance greater than six feet and spent less than fifteen minutes in the room.      Rebecca Dempsey RN      Office Phone: [phone]  Office Cell:     [phone]

## 2022-12-30 NOTE — CASE MANAGEMENT/SOCIAL WORK
Continued Stay Note   Titi     Patient Name: Sherman Baumann  MRN: [id number]  Today's Date: 12/30/2022    Admit Date: 12/17/2022    Plan: DC Plan: [facility] accepted and following. CABG x4 w/IABP assist 12/29/22.   Discharge Plan     Row Name 12/30/22 1233       Plan    Plan DC Plan: [facility] accepted and following. CABG x4 w/IABP assist 12/29/22.    Provided Post Acute Provider List? N/A    Provided Post Acute Provider Quality & Resource List? N/A    Plan Comments CM spoke with patient’s nurse and [facility] NP Narda Villalba to obtain clinical updates. PT/OT recommending home with home health services pre op. Patient already set up with [facility] and they are following.CM will continue to follow for any additional needs that may develop and adjust discharge plan accordingly. DC Barriers: POD 1 OHS, Central line, arterial line, pacer wires, chest tubes x2, amiodarone gtt, epinepherine gtt, primacor gtt, Insulin gtt, IV abx, and abnormal labs.           Expected Discharge Date and Time     Expected Discharge Date Expected Discharge Time    Jan 4, 2023         Phone communication or documentation only- no physical contact with patient or family.    Rebecca Dempsey RN     Office Phone: [phone]  Office Cell:     [phone]

## 2022-12-30 NOTE — PROGRESS NOTES
S/P POD# 1 urgent CABG x4 with LIMA--Cecile  EF 20-25% (echo)    Subjective:  Reports mild confusion this morning pertaining to a computer and the time    Extubated ~ 0030  Drips:  Epi .05, milrinone .25, norepi .03, amio .5, insulin  CI 3.1, CVP 10,   Wt is up 2 kgs from preop  UF -1600  CXR:  Small left effusion      Intake/Output Summary (Last 24 hours) at 12/30/2022 0836  Last data filed at 12/30/2022 0600  Gross per 24 hour   Intake 5907 ml   Output 5460 ml   Net 447 ml     Temp:  [95.4 °F (35.2 °C)-97.7 °F (36.5 °C)] 97 °F (36.1 °C)  Heart Rate:  [80-90] 89  Resp:  [16] 16  BP: ()/(45-55) 113/50  Arterial Line BP: ()/(39-89) 114/89  FiO2 (%):  [40 %-70 %] 40 %  CT 55/8    Results from last 7 days   Lab Units 12/30/22  0317 12/29/22  1829 12/29/22  1247 12/29/22  1210 12/29/22  0419 12/28/22  0154   WBC 10*3/mm3 21.00*  --   --  29.20*   < > 12.20*   HEMOGLOBIN g/dL 11.8*  --   --  13.0   < > 15.8   HEMOGLOBIN, POC g/dL  --  13.6   < >  --    < >  --    HEMATOCRIT % 35.2*  --   --  39.9   < > 47.3   HEMATOCRIT POC %  --  40   < >  --    < >  --    PLATELETS 10*3/mm3 268  --   --  333   < > 327   INR  1.06  --   --  1.16*  --  1.06    < > = values in this interval not displayed.     Results from last 7 days   Lab Units 12/30/22  0317   CREATININE mg/dL 1.82*   POTASSIUM mmol/L 4.3   SODIUM mmol/L 133*   MAGNESIUM mg/dL 3.0*   PHOSPHORUS mg/dL 5.6*     ica 1.23    Physical Exam:  Neuro intact, nad, up in recliner  Tele:  AAI 90, underlying SR 60s  Diminished bases, 96% 4L  dsg c/d/i, no drainage  Benign abd, no BM  No edema    Assessment/Plan:  Principal Problem:    CAD (coronary artery disease), native coronary artery  Active Problems:    Type 2 diabetes mellitus with hyperglycemia, without long-term current use of insulin (HCC)    Mixed hyperlipidemia    Primary hypertension    Systolic and diastolic CHF, acute on chronic (HCC)    Influenza due to seasonal influenza virus    NSTEMI (non-ST  elevated myocardial infarction) (HCC)    - MV CAD with NSTEMI presentation, EF 20-25% (echo)--s/p urgent CABG x4 with LIMA (Cecile)  - Acute HFrEF, NYHA class II-III--BNP 5580 at OSH, work towards GDMT  - Influenzae B, preop--ID consulted, Tamiflu completed  - ICM  - DM type 2 with hyperglycemia--a1c 8.4 at OSH, insulin drip  - HTN--hypotensive postop  - Past smoker  - [place], hearing aids in place  - Obesity  - COPD--pulm following  - MENDEZ on probable CKD stage 3--peak cr preop  2.1, renal following  - Postop leukocytosis, likely reactive--watch closely    POD# 1.  Doing well.  Intraop EF 20% per Dr. Huber.  Weaning inotropic support slowly.  Receiving 500 cc albumin currently per Dr. Hubre.  Renal following.  On asa/statin, hold bb for now.  Work towards GDMT prior to discharge.  Plavix at discharge d/t NSTEMI presentation.  Transition to oral amiodarone.  Mobilize.  Avoid narcotics, use Tylenol unless severe pain.    Routine care--as above  Did NOT de-escal  D/w pt/nsg, Dr. Cecile TIM plan TBD    Narda Duffy, APRN  12/30/2022  08:36 EST

## 2022-12-30 NOTE — PLAN OF CARE
Goal Outcome Evaluation:  Plan of Care Reviewed With: patient           Outcome Evaluation: Pt re-evaled following CABG 12/29/22.  See initial eval for PLOF. Pt with O2, Pacer, IJ, [employer]onia, chest tube, heart monitor, hi-flow O2, and [place]. Assessment limited due to [employer]Sonia in place, not to be removed this date. Patient very [place] and has difficulty answering questions, though does acknowledge incorrect answers when questions are repeated. Appears below stated baseline cognitively at this time. Completes sit<>stand with Min A. Demos poor recall for sternal precautions, and requires tactile and visual cues for compliance.  Sit<>stand with Min A and Min A for balance/safety. Provided and reviewed Cardiac Rehab HEP, requiring Max A for completion this date. Patient appears below stated baseline and will rrequire SNF at d/c for short-term rehab.

## 2022-12-30 NOTE — PROGRESS NOTES
"      FOLLOW UP NOTE      Name: Sherman Baumann ADMIT: 2022   : 1945  PCP: Melvina Hodge    MRN: [id number] LOS: 13 days   AGE/SEX: 77 y.o. male  ROOM:      Date of Service: 2022                           CHIEF COMPLIANTS / REASON FOR FOLLOW UP          Acute kidney injury versus chronic kidney disease a stage III      Subjective:      Seen and examined  Resting in bed, no distress, no new complaints     Review of Systems:       Constitutional: No fever, no chills, no lethargy, no weakness.  HEENT:  No headache, otalgia, itchy eyes, nasal discharge or sore throat.  Cardiac:  No chest pain, dyspnea, orthopnea or PND.  Chest:   No cough, phlegm or wheezing.  Abdomen:  No abdominal pain, nausea or vomiting.  Neuro:  No focal weakness, abnormal movements or seizure-like activity.  :   No hematuria, no pyuria, no dysuria, no flank pain.  Extremities:  No  joint pains.  ROS was otherwise negative except as mentioned in the [place].        OBJECTIVE                                                                        Exam:  /50 (BP Location: Right arm, Patient Position: Lying)   Pulse 89   Temp 97 °F (36.1 °C) (Core)   Resp 16   Ht 177.8 cm (70\")   Wt 105 kg (231 lb 7.7 oz)   SpO2 96%   BMI 33.21 kg/m²   Intake/Output last 3 shifts:  I/O last 3 completed shifts:  In: 6313 [P.O.:420; I.V.:5043; Blood:700; IV Piggyback:150]  Out: 5890 [Urine:3190; Other:1600; Blood:1000; Chest Tube:100]  Intake/Output this shift:  No intake/output data recorded.    General Appearance:  Alert, chronically ill appearing, cooperative, no distress, appears stated age  Head:  Normocephalic, without obvious abnormality, atraumatic  Eyes:  Sclerae anicteric, EOM's intact     Neck:  Supple,  no adenopathy;      Lungs:   Clear to auscultation bilaterally, respirations unlabored  Heart:  Regular rate and rhythm, S1 and S2 normal, no  rub   or gallop  Abdomen:  Soft, nontender,    no masses, no hepatomegaly, " no splenomegaly  Extremities:  Extremities normal, no edema  Neurologic:   Alert and oriented, no focal deficits    Scheduled Meds:acetaminophen, 650 mg, Oral, Q6H   Or  acetaminophen, 650 mg, Oral, Q6H   Or  acetaminophen, 650 mg, Rectal, Q6H  aspirin, 81 mg, Oral, Daily  atorvastatin, 40 mg, Oral, Nightly  ceFAZolin, 2 g, Intravenous, Q8H  chlorhexidine, 15 mL, Mouth/Throat, Q12H  enoxaparin, 40 mg, Subcutaneous, Daily  magnesium sulfate, 1 g, Intravenous, Q8H  mupirocin, 1 application, Each Nare, BID  pantoprazole, 40 mg, Oral, Q AM  polyethylene glycol, 17 g, Oral, BID  senna, 2 tablet, Oral, Nightly  senna-docusate sodium, 2 tablet, Oral, BID      Continuous Infusions:amiodarone, 0.5 mg/min, Last Rate: 0.5 mg/min (12/30/22 0733)  EPINEPHrine, 0.02-0.05 mcg/kg/min, Last Rate: 0.05 mcg/kg/min (12/29/22 1215)  insulin, 0-100 Units/hr, Last Rate: 4.2 Units/hr (12/30/22 0906)  milrinone, 0.25 mcg/kg/min, Last Rate: 0.25 mcg/kg/min (12/29/22 2215)  norepinephrine, 0.02-0.06 mcg/kg/min, Last Rate: 0.02 mcg/kg/min (12/30/22 0907)  sodium chloride, 30 mL/hr, Last Rate: 30 mL/hr (12/29/22 1307)      PRN Meds:•  acetaminophen **OR** acetaminophen **OR** acetaminophen  •  albumin human  •  dextrose  •  dextrose  •  glucagon (human recombinant)  •  HYDROcodone-acetaminophen  •  Morphine **AND** naloxone  •  norepinephrine  •  ondansetron  •  potassium chloride **OR** potassium chloride  •  sodium chloride         Data Review:                                                                           Labs reviewed      Imaging:                                                                           Imaging reviewed           ASSESSMENT:                                                                                CAD (coronary artery disease), native coronary artery    Type 2 diabetes mellitus with hyperglycemia, without long-term current use of insulin (HCC)    Mixed hyperlipidemia    Primary hypertension    Systolic and  diastolic CHF, acute on chronic (HCC)    Influenza due to seasonal influenza virus    NSTEMI (non-ST elevated myocardial infarction) ([facility])    • Acute kidney injury with chronic kidney disease a stage III, baseline creatinine December 17, 2022 on admission 1.16, now creatinine around 1.88-2.12. Suspect variable creatinine secondary to hemodynamic and volume changes.  With history of diabetes, hypertension.  Urine analysis shows 100 mg of protein, no RBCs, no WBCs.  Urine output about 1 L.    • Volume overload with pulmonary edema.  • Diabetes type 2  • Hypertension  • Coronary artery disease multivalvular disease.  • Congestive heart failure ejection fraction 15 to 20%.  •  Acute coronary syndrome.  • S/p CABG x4     Plan:      • Patient has chronic kidney disease a stage III renal function is variable, creatinine peaked at 2.12, now creatinine 1.86 slightly better than yesterday  • Electrolytes acceptable, hyponatremia noted which is getting worse since yesterday  • CABG x4 12/29/2022 creatinine 1.8 slightly higher than yesterday  • Agree with extra albumin but if CVP is above 12 we will give a dose of diuretics later today  • No more metolazone or loop diuretics  • We will start low-dose p.o. diuretics by tomorrow morning depending upon the sodium level and patient volume status  • Patient is currently not on any ACE inhibitor's or angiotensin receptor blockers. Noted cardiology plans to introduce when kidney function stabilizes.  Most likely ACE and ARB can be started in 2 to 3 days  • Urine analysis with glucosuria, ketones, 100 mg/dL protein, no hematuria, no pyuria  · Repeat echo result still pending we will follow-up Previous echo showed ejection fraction 48% left ventricular cavity is dilated.  With grade 1 diastolic dysfunctions.       Wade Jiang MD  [facility]  12/30/2022  09:09 EST

## 2022-12-30 NOTE — THERAPY RE-EVALUATION
Patient Name: Sherman Baumann  : 1945    MRN: [id number]                              Today's Date: 2022       Admit Date: 2022    Visit Dx:     ICD-10-CM ICD-9-CM   1. Coronary artery disease involving native coronary artery of native heart with unstable angina pectoris (HCC)  I25.110 414.01     411.1     Patient Active Problem List   Diagnosis   • CAD (coronary artery disease), native coronary artery   • Type 2 diabetes mellitus with hyperglycemia, without long-term current use of insulin ([facility])   • Mixed hyperlipidemia   • Primary hypertension   • Systolic and diastolic CHF, acute on chronic ([facility])   • Influenza due to seasonal influenza virus   • NSTEMI (non-ST elevated myocardial infarction) ([facility])     Past Medical History:   Diagnosis Date   • CHF (congestive heart failure) ([facility])    • Diabetes mellitus ([facility])    • Elevated cholesterol    • Hyperlipidemia    • Hypertension      Past Surgical History:   Procedure Laterality Date   • HERNIA REPAIR     • TONSILLECTOMY        General Information     Row Name 22 1250          Physical Therapy Time and Intention    Document Type re-evaluation  -     Mode of Treatment physical therapy  -     Row Name 22 1250          General Information    Patient Profile Reviewed yes  -CM     Existing Precautions/Restrictions oxygen therapy device and L/min;fall;cardiac;sternal  -     Barriers to Rehab medically complex  -     Row Name 22 1250          Living Environment    People in Home spouse;other (see comments)  pt reports wife ambulates w/ rw for short distances and uses power w/c for longer distances  -     Row Name 22 1250          Cognition    Orientation Status (Cognition) oriented to;person;place  has difficulty answering orientation questions; despite being hard of hearing, still has difficulty, even when he can repeat question back  -     Row Name 22 1250          Safety Issues, Functional Mobility    Safety  Issues Affecting Function (Mobility) safety precaution awareness;judgment;problem-solving;safety precautions follow-through/compliance;sequencing abilities  -CM     Impairments Affecting Function (Mobility) endurance/activity tolerance;shortness of breath;balance;cognition;postural/trunk control;pain  -CM           User Key  (r) = Recorded By, (t) = Taken By, (c) = Cosigned By    Initials Name Provider Type    CM Cris Riley, PT Physical Therapist               Mobility     Row Name 12/30/22 1252          Bed Mobility    Bed Mobility bed mobility (all) activities  -CM     All Activities, [place] (Bed Mobility) not tested  -CM     Comment, (Bed Mobility) in chair when PT arrived  -CM     Row Name 12/30/22 1252          Bed-Chair Transfer    Bed-Chair [place] (Transfers) not tested  -CM     Comment, (Bed-Chair Transfer) has swan shannon in place; not likely to be removed today per RN  -CM     Row Name 12/30/22 1252          Sit-Stand Transfer    Sit-Stand [place] (Transfers) minimum assist (75% patient effort);1 person assist;1 person to manage equipment  -CM     Comment, (Sit-Stand Transfer) stands impulsively; needs cuing for sternal precautions and use of heart hugger. Standing balance - sways  in static standing. Performed marching x 5 for each LE in standing, but pt not steady  -CM     Row Name 12/30/22 1252          Gait/Stairs (Locomotion)    [place] Level (Gait) unable to assess  -CM     Distance in Feet (Gait) still has swan shannon in place; unable to amb forward as result  -CM     Comment, (Gait/Stairs) CARDIAC LEVEL II  -CM     Row Name 12/30/22 1252          Mobility    Extremity Weight-bearing Status left upper extremity;right upper extremity  -CM     Left Upper Extremity (Weight-bearing Status) touch down weight-bearing (TDWB)  -CM     Right Upper Extremity (Weight-bearing Status) touch down weight-bearing (TDWB)  -CM           User Key  (r) = Recorded By, (t) = Taken By, (c) =  Cosigned By    Initials Name Provider Type    Cris Carmona, PT Physical Therapist               Obj/Interventions     Row Name 12/30/22 1256          Range of Motion Comprehensive    General Range of Motion bilateral lower extremity ROM WFL  -CM     Row Name 12/30/22 1256          Strength Comprehensive (MMT)    General Manual Muscle Testing (MMT) Assessment lower extremity strength deficits identified  -CM     Comment, General Manual Muscle Testing (MMT) Assessment BLEs 3+/5  -CM     Row Name 12/30/22 1256          Balance    Static Sitting Balance supervision  -CM     Dynamic Sitting Balance contact guard  -CM     Position, Sitting Balance sitting in chair;other (see comments)  tends to list laterally  while in chair  -CM     Static Standing Balance minimal assist;moderate assist  -CM     Dynamic Standing Balance moderate assist  -CM     Position/Device Used, Standing Balance supported  -CM     Row Name 12/30/22 1256          Sensory Assessment (Somatosensory)    Sensory Assessment (Somatosensory) sensation intact  -CM           User Key  (r) = Recorded By, (t) = Taken By, (c) = Cosigned By    Initials Name Provider Type    Cris Carmona, PT Physical Therapist               Goals/Plan     Row Name 12/30/22 1308          Bed Mobility Goal 1 (PT)    Activity/Assistive Device (Bed Mobility Goal 1, PT) bed mobility activities, all  -CM     [place] Level/Cues Needed (Bed Mobility Goal 1, PT) contact guard required;minimum assist (75% or more patient effort)  -CM     Time Frame (Bed Mobility Goal 1, PT) 2 weeks  -CM     Row Name 12/30/22 1308          Transfer Goal 1 (PT)    Activity/Assistive Device (Transfer Goal 1, PT) transfers, all;walker, rolling  -CM     [place] Level/Cues Needed (Transfer Goal 1, PT) supervision required  -CM     Time Frame (Transfer Goal 1, PT) 2 weeks  -CM     Strategies/Barriers (Transfers Goal 1, PT) follows sternal precautions consistently  -CM     Row Name 12/30/22  "1308          Gait Training Goal 1 (PT)    Activity/Assistive Device (Gait Training Goal 1, PT) gait (walking locomotion);walker, rolling  -CM     [place] Level (Gait Training Goal 1, PT) supervision required  -CM     Distance (Gait Training Goal 1, PT) 100ft w/o loss of balance or soa  -CM     Time Frame (Gait Training Goal 1, PT) 2 weeks  -CM     Row Name 12/30/22 130          Therapy Assessment/Plan (PT)    Planned Therapy Interventions (PT) balance training;bed mobility training;gait training;patient/family education;strengthening;ROM (range of motion);postural re-education;transfer training;motor coordination training;neuromuscular re-education  -CM           User Key  (r) = Recorded By, (t) = Taken By, (c) = Cosigned By    Initials Name Provider Type    Cris Carmona, PT Physical Therapist               Clinical Impression     Row Name 12/30/22 1255          Pain    Additional Documentation Pain Scale: FACES Pre/Post-Treatment (Group)  -CM     Row Name 12/30/22 3313          Pain Scale: FACES Pre/Post-Treatment    Pain: FACES Scale, Pretreatment 2-->hurts little bit  -CM     Posttreatment Pain Rating 2-->hurts little bit  -CM     Pain Location incisional  -CM     Pre/Posttreatment Pain Comment pt reports at times that he has no pain; appears heavily medicated; later states he is having \"some\" pain at chest and at chest tube insertion site but unable to rate numerically  -CM     Row Name 12/30/22 2959          Plan of Care Review    Plan of Care Reviewed With patient  -CM     Progress declining  -CM     Outcome Evaluation 78 yo male seen for re-eval s/p cabg x 4 on 12/29/22. Pt now POD1. He still has swan shannon and A line in place, as well as multiple other lines. RN reports pt not likely to be de-escalated to have swan removed today. Pt transferred to chair earlier w/ nsg. Today, pt is heavily medicated and slightly confused. He has difficulty hearing, but also has difficulty answering questions " when he can clearly hear question. He requires min to mod assist of 1 to come to stand at chair. He is moderately unsteady in standing. Pt denies pain and then moments later will state that he is painful. He is unable to rate his pain numerically. Attempted to have pt march in static standing, but pt was moderately unsteady and unsafe in this attempt. Pt also demonstrates moderate weakness in BLEs. Pt reports his wife uses a rw for short distances and power w/c for longer distances, so not likely that she can safely assist pt at home. Recommend SNF at d/c. Will continue to follow.  -     Row Name 12/30/22 1306          Therapy Assessment/Plan (PT)    Rehab Potential (PT) good, to achieve stated therapy goals  -CM     Criteria for Skilled Interventions Met (PT) yes;skilled treatment is necessary  -     Therapy Frequency (PT) 5 times/wk  -CM     Predicted Duration of Therapy Intervention (PT) until d/c  -     Row Name 12/30/22 1306          Vital Signs    Pre Systolic BP Rehab 123  -CM     Pre Treatment Diastolic BP 57  map 68  -CM     Intra Systolic BP Rehab 110  -CM     Intra Treatment Diastolic BP 35  map 71  -CM     Post Systolic BP Rehab 115  -CM     Post Treatment Diastolic BP 53  map 73  -CM     Pretreatment Heart Rate (beats/min) 90  -CM     Intratreatment Heart Rate (beats/min) 90  -CM     Posttreatment Heart Rate (beats/min) 89  -CM     Pre SpO2 (%) 96  -CM     O2 Delivery Pre Treatment supplemental O2  3L  -CM     Intra SpO2 (%) 92  -CM     O2 Delivery Intra Treatment supplemental O2  -CM     Post SpO2 (%) 93  -CM     O2 Delivery Post Treatment supplemental O2  -CM     Row Name 12/30/22 1306          Positioning and Restraints    Pre-Treatment Position sitting in chair/recliner  -CM     Post Treatment Position chair  -CM     In Chair notified nsg;sitting;call light within reach;encouraged to call for assist;exit alarm on  -CM           User Key  (r) = Recorded By, (t) = Taken By, (c) = Cosigned By     Initials Name Provider Type    Cris Carmona, PT Physical Therapist               Outcome Measures     Row Name 12/30/22 1309          How much help from another person do you currently need...    Turning from your back to your side while in flat bed without using bedrails? 2  -CM     Moving from lying on back to sitting on the side of a flat bed without bedrails? 2  -CM     Moving to and from a bed to a chair (including a wheelchair)? 2  -CM     Standing up from a chair using your arms (e.g., wheelchair, bedside chair)? 2  -CM     Climbing 3-5 steps with a railing? 1  -CM     To walk in hospital room? 2  -CM     AM-PAC 6 Clicks Score (PT) 11  -CM     Highest level of mobility 4 --> Transferred to chair/commode  -CM           User Key  (r) = Recorded By, (t) = Taken By, (c) = Cosigned By    Initials Name Provider Type    Cris Carmona, PT Physical Therapist                             Physical Therapy Education     Title: PT OT SLP Therapies (Done)     Topic: Physical Therapy (Done)     Point: Mobility training (Done)     Learning Progress Summary           Patient Acceptance, E,TB, VU,NR by CM at 12/30/2022 1310    Acceptance, E,TB, VU by TT at 12/23/2022 1521    Acceptance, E, VU by JULIA at 12/20/2022 1406                   Point: Home exercise program (Done)     Learning Progress Summary           Patient Acceptance, E,TB, VU,NR by CM at 12/30/2022 1310    Acceptance, E,TB, VU by TT at 12/23/2022 1521                   Point: Body mechanics (Done)     Learning Progress Summary           Patient Acceptance, E,TB, VU,NR by CM at 12/30/2022 1310    Acceptance, E,TB, VU by TT at 12/23/2022 1521    Acceptance, E, VU by JULIA at 12/20/2022 1406                   Point: Precautions (Done)     Learning Progress Summary           Patient Acceptance, E,TB, VU,NR by CM at 12/30/2022 1310    Acceptance, E,TB, VU by TT at 12/23/2022 1521    Acceptance, E, VU by JULIA at 12/20/2022 1406                               User  Key     Initials Effective Dates Name Provider Type Discipline    CM 06/16/21 -  Cris Riley, PT Physical Therapist PT    JULIA 08/23/21 -  Jessica Saals PT Physical Therapist PT    TT 10/18/22 -  Gudelia Blackwood LPN Licensed Nurse Nurse              PT Recommendation and Plan  Planned Therapy Interventions (PT): balance training, bed mobility training, gait training, patient/family education, strengthening, ROM (range of motion), postural re-education, transfer training, motor coordination training, neuromuscular re-education  Plan of Care Reviewed With: patient  Progress: declining  Outcome Evaluation: 78 yo male seen for re-eval s/p cabg x 4 on 12/29/22. Pt now POD1. He still has swan shannon and A line in place, as well as multiple other lines. RN reports pt not likely to be de-escalated to have swan removed today. Pt transferred to chair earlier w/ nsg. Today, pt is heavily medicated and slightly confused. He has difficulty hearing, but also has difficulty answering questions when he can clearly hear question. He requires min to mod assist of 1 to come to stand at chair. He is moderately unsteady in standing. Pt denies pain and then moments later will state that he is painful. He is unable to rate his pain numerically. Attempted to have pt march in static standing, but pt was moderately unsteady and unsafe in this attempt. Pt also demonstrates moderate weakness in BLEs. Pt reports his wife uses a rw for short distances and power w/c for longer distances, so not likely that she can safely assist pt at home. Recommend SNF at d/c. Will continue to follow.     Time Calculation:    PT Charges     Row Name 12/30/22 1310             Time Calculation    Start Time 1011  -CM      Stop Time 1030  -CM      Time Calculation (min) 19 min  -CM      PT Received On 12/30/22  -CM      PT - Next Appointment 12/31/22  -CM      PT Goal Re-Cert Due Date 01/13/23  -CM         Time Calculation- PT    Total Timed Code Minutes-  PT 8 minute(s)  -CM            User Key  (r) = Recorded By, (t) = Taken By, (c) = Cosigned By    Initials Name Provider Type    CM Cris Riley, PT Physical Therapist              Therapy Charges for Today     Code Description Service Date Service Provider Modifiers Qty    [id number]  PT RE-EVAL ESTABLISHED PLAN 2 12/30/2022 Cris Riley, PT GP 1    [id number]  PT THERAPEUTIC ACT EA 15 MIN 12/30/2022 Cris Riley, PT GP 1          PT G-Codes  Outcome Measure Options: AM-PAC 6 Clicks Basic Mobility (PT)  AM-PAC 6 Clicks Score (PT): 11  PT Discharge Summary  Anticipated Discharge Disposition (PT): skilled nursing facility    Cris Riley PT  12/30/2022

## 2022-12-30 NOTE — THERAPY EVALUATION
Patient Name: Sherman Baumann  : 1945    MRN: [id number]                              Today's Date: 2022       Admit Date: 2022    Visit Dx:     ICD-10-CM ICD-9-CM   1. Coronary artery disease involving native coronary artery of native heart with unstable angina pectoris (HCC)  I25.110 414.01     411.1     Patient Active Problem List   Diagnosis   • CAD (coronary artery disease), native coronary artery   • Type 2 diabetes mellitus with hyperglycemia, without long-term current use of insulin (HCC)   • Mixed hyperlipidemia   • Primary hypertension   • Systolic and diastolic CHF, acute on chronic (HCC)   • Influenza due to seasonal influenza virus   • NSTEMI (non-ST elevated myocardial infarction) ([facility])     Past Medical History:   Diagnosis Date   • CHF (congestive heart failure) ([facility])    • Diabetes mellitus ([facility])    • Elevated cholesterol    • Hyperlipidemia    • Hypertension      Past Surgical History:   Procedure Laterality Date   • HERNIA REPAIR     • TONSILLECTOMY        General Information     Row Name 22 1323          OT Time and Intention    Document Type re-evaluation  -ES     Mode of Treatment occupational therapy  -ES     Row Name 22 1323          General Information    Patient Profile Reviewed yes  -ES     Prior Level of Function independent:;ADL's;yard work  -ES     Existing Precautions/Restrictions oxygen therapy device and L/min;fall;cardiac;sternal  -ES     Row Name 22 1323          Occupational Profile    Reason for Services/Referral (Occupational Profile) Pt re-evaled following CABG 22.  See initial eval for PLOF. Pt with O2, Pacer, IJ, [place], chest tube, heart monitor, hi-flow O2, and [place]. Assessment limited due to [place] in place, not to be removed this date.  -ES     Row Name 22 1323          Cognition    Orientation Status (Cognition) oriented to;person;place  has difficulty answering orientation questions; despite being hard of  hearing, still has difficulty, even when he can repeat question back  -ES     Row Name 12/30/22 1323          Safety Issues, Functional Mobility    Impairments Affecting Function (Mobility) endurance/activity tolerance;shortness of breath;balance;cognition;postural/trunk control;pain  -ES           User Key  (r) = Recorded By, (t) = Taken By, (c) = Cosigned By    Initials Name Provider Type    ES Vale Juarez OT Occupational Therapist                 Mobility/ADL's     Row Name 12/30/22 1326          Bed Mobility    Bed Mobility bed mobility (all) activities  -ES     All Activities, [place] (Bed Mobility) not tested  -ES     Comment, (Bed Mobility) Pt in chair when OT arrived.  -ES     Row Name 12/30/22 1326          Transfers    Transfers sit-stand transfer  -ES     Row Name 12/30/22 1326          Bed-Chair Transfer    Bed-Chair [place] (Transfers) not tested  -ES     Row Name 12/30/22 1326          Sit-Stand Transfer    Sit-Stand [place] (Transfers) minimum assist (75% patient effort);1 person assist;1 person to manage equipment  -ES     Assistive Device (Sit-Stand Transfers) walker, front-wheeled  -ES     Row Name 12/30/22 1326          Activities of Daily Living    BADL Assessment/Intervention upper body dressing;lower body dressing;toileting  -ES     Row Name 12/30/22 1326          Mobility    Extremity Weight-bearing Status left upper extremity;right upper extremity  -ES     Left Upper Extremity (Weight-bearing Status) touch down weight-bearing (TDWB)  -ES     Right Upper Extremity (Weight-bearing Status) touch down weight-bearing (TDWB)  -ES     Row Name 12/30/22 1326          Lower Body Dressing Assessment/Training    [place] Level (Lower Body Dressing) maximum assist (25% patient effort)  -ES     Position (Lower Body Dressing) edge of bed sitting  -ES     Row Name 12/30/22 1326          Upper Body Dressing Assessment/Training    [place] Level (Upper Body Dressing) maximum  assist (25% patient effort)  -ES     Row Name 12/30/22 1326          Toileting Assessment/Training    [place] Level (Toileting) maximum assist (25% patient effort)  -ES     Comment, (Toileting) ng cath  -ES           User Key  (r) = Recorded By, (t) = Taken By, (c) = Cosigned By    Initials Name Provider Type    ES Vale Juarez OT Occupational Therapist               Obj/Interventions     Row Name 12/30/22 1327          Sensory Assessment (Somatosensory)    Sensory Assessment (Somatosensory) sensation intact  -ES     Row Name 12/30/22 1327          Vision Assessment/Intervention    Visual Impairment/Limitations WNL  -ES     Row Name 12/30/22 1327          Range of Motion Comprehensive    General Range of Motion bilateral upper extremity ROM WFL  -ES     Comment, General Range of Motion WFL within sternal precautions  -ES     Row Name 12/30/22 1327          Strength Comprehensive (MMT)    General Manual Muscle Testing (MMT) Assessment lower extremity strength deficits identified  -ES     Comment, General Manual Muscle Testing (MMT) Assessment BUE WFL within sternal precaution  -ES     Row Name 12/30/22 1327          Balance    Balance Assessment sitting static balance;sitting dynamic balance;standing static balance;standing dynamic balance  -ES     Static Sitting Balance supervision  -ES     Dynamic Sitting Balance contact guard  -ES     Static Standing Balance minimal assist  -ES     Dynamic Standing Balance moderate assist  -ES     Balance Interventions sitting;standing;static;dynamic  -ES           User Key  (r) = Recorded By, (t) = Taken By, (c) = Cosigned By    Initials Name Provider Type    ES Vale Juarez OT Occupational Therapist               Goals/Plan     Row Name 12/30/22 1336          Transfer Goal 1 (OT)    Activity/Assistive Device (Transfer Goal 1, OT) toilet  -ES     [place] Level/Cues Needed (Transfer Goal 1, OT) modified independence  -ES     Time Frame (Transfer Goal 1,  OT) long term goal (LTG);2 weeks  -ES     Row Name 12/30/22 1336          Dressing Goal 1 (OT)    Activity/Device (Dressing Goal 1, OT) dressing skills, all  -ES     [place]/Cues Needed (Dressing Goal 1, OT) modified independence  -ES     Time Frame (Dressing Goal 1, OT) long term goal (LTG);2 weeks  -ES     Row Name 12/30/22 1336          Toileting Goal 1 (OT)    Activity/Device (Toileting Goal 1, OT) toileting skills, all  -ES     [place] Level/Cues Needed (Toileting Goal 1, OT) modified independence  -ES     Time Frame (Toileting Goal 1, OT) long term goal (LTG);2 weeks  -ES     Row Name 12/30/22 2191          Therapy Assessment/Plan (OT)    Planned Therapy Interventions (OT) activity tolerance training;BADL retraining;functional balance retraining;cognitive/visual perception retraining;occupation/activity based interventions;neuromuscular control/coordination retraining;patient/caregiver education/training;ROM/therapeutic exercise;strengthening exercise;passive ROM/stretching  -ES           User Key  (r) = Recorded By, (t) = Taken By, (c) = Cosigned By    Initials Name Provider Type    ES Vale Juarez, OT Occupational Therapist               Clinical Impression     Row Name 12/30/22 2255          Pain Scale: FACES Pre/Post-Treatment    Pain: FACES Scale, Pretreatment 2-->hurts little bit  -ES     Posttreatment Pain Rating 2-->hurts little bit  -ES     Row Name 12/30/22 8960          Plan of Care Review    Plan of Care Reviewed With patient  -ES     Outcome Evaluation Pt re-evaled following CABG 12/29/22.  See initial eval for PLOF. Pt with O2, Pacer, IJ, [place], chest tube, heart monitor, hi-flow O2, and Leigh. Assessment limited due to [place] in place, not to be removed this date. Patient very [place] and has difficulty answering questions, though does acknowledge incorrect answers when questions are repeated. Appears below stated baseline cognitively at this time. Completes sit<>stand with  Min A. [place] poor recall for sternal precautions, and requires tactile and visual cues for compliance.  Sit<>stand with Min A and Min A for balance/safety. Provided and reviewed Cardiac Rehab HEP, requiring Max A for completion this date. Patient appears below stated baseline and will rrequire SNF at d/c for short-term rehab.  -ES     Row Name 12/30/22 1328          Therapy Assessment/Plan (OT)    Criteria for Skilled Therapeutic Interventions Met (OT) yes;meets criteria;skilled treatment is necessary  -ES     Therapy Frequency (OT) 5 times/wk  -ES     Predicted Duration of Therapy Intervention (OT) until discharge  -ES     Row Name 12/30/22 1328          Therapy Plan Review/Discharge Plan (OT)    Anticipated Discharge Disposition (OT) skilled nursing facility  will need re-eval post-op  -ES     Row Name 12/30/22 1024 12/30/22 1013       Vital Signs    Pre Systolic BP Rehab -- 123  -ES    Pre Treatment Diastolic BP -- 57  (Map 68)  -ES    Intra Systolic BP Rehab 110  -ES --    Intra Treatment Diastolic BP 35  MAP 71  -ES --    Post Systolic BP Rehab 115  -ES --    Post Treatment Diastolic BP 53  MAP 73  -ES --    Pretreatment Heart Rate (beats/min) -- 90  -ES    Intratreatment Heart Rate (beats/min) 90  -ES --    Posttreatment Heart Rate (beats/min) 89  -ES --    Pre SpO2 (%) -- 96  -ES    O2 Delivery Pre Treatment -- supplemental O2  3L  -ES    Intra SpO2 (%) 92  -ES --    O2 Delivery Intra Treatment hi-flow  3L HF  -ES --    Post SpO2 (%) 93  -ES --    O2 Delivery Post Treatment hi-flow  3L HF  -ES --    Pre Patient Position Sitting  -ES --    Intra Patient Position Standing  -ES --    Post Patient Position Sitting  -ES --    Row Name 12/30/22 1024          Positioning and Restraints    Pre-Treatment Position sitting in chair/recliner  -ES     Post Treatment Position chair  -ES     In Chair notified nsg;call light within reach;exit alarm on;encouraged to call for assist  -ES           User Key  (r) = Recorded By,  (t) = Taken By, (c) = Cosigned By    Initials Name Provider Type    Vale Gates OT Occupational Therapist               Outcome Measures     Row Name 12/30/22 2436          How much help from another is currently needed...    Putting on and taking off regular lower body clothing? 2  -ES     Bathing (including washing, rinsing, and drying) 2  -ES     Toileting (which includes using toilet bed pan or urinal) 1  -ES     Putting on and taking off regular upper body clothing 2  -ES     Taking care of personal grooming (such as brushing teeth) 3  -ES     Eating meals 4  -ES     AM-PAC 6 Clicks Score (OT) 14  -ES     Row Name 12/30/22 1309          How much help from another person do you currently need...    Turning from your back to your side while in flat bed without using bedrails? 2  -CM     Moving from lying on back to sitting on the side of a flat bed without bedrails? 2  -CM     Moving to and from a bed to a chair (including a wheelchair)? 2  -CM     Standing up from a chair using your arms (e.g., wheelchair, bedside chair)? 2  -CM     Climbing 3-5 steps with a railing? 1  -CM     To walk in hospital room? 2  -CM     AM-PAC 6 Clicks Score (PT) 11  -CM     Highest level of mobility 4 --> Transferred to chair/commode  -CM     Row Name 12/30/22 2264          Functional Assessment    Outcome Measure Options AM-PAC 6 Clicks Daily Activity (OT)  -ES           User Key  (r) = Recorded By, (t) = Taken By, (c) = Cosigned By    Initials Name Provider Type    Vale Gates OT Occupational Therapist    Cris Carmona, PT Physical Therapist                Occupational Therapy Education     Title: PT OT SLP Therapies (Done)     Topic: Occupational Therapy (Done)     Point: ADL training (Done)     Description:   Instruct learner(s) on proper safety adaptation and remediation techniques during self care or transfers.   Instruct in proper use of assistive devices.              Learning Progress Summary            Patient Acceptance, E,TB, VU by TT at 12/23/2022 1521    Acceptance, E, VU by AM at 12/20/2022 1415                   Point: Home exercise program (Done)     Description:   Instruct learner(s) on appropriate technique for monitoring, assisting and/or progressing therapeutic exercises/activities.              Learning Progress Summary           Patient Acceptance, E,TB, VU by TT at 12/23/2022 1521                   Point: Precautions (Done)     Description:   Instruct learner(s) on prescribed precautions during self-care and functional transfers.              Learning Progress Summary           Patient Acceptance, E,TB, VU by TT at 12/23/2022 1521                   Point: Body mechanics (Done)     Description:   Instruct learner(s) on proper positioning and spine alignment during self-care, functional mobility activities and/or exercises.              Learning Progress Summary           Patient Acceptance, E,TB, VU by TT at 12/23/2022 1521                               User Key     Initials Effective Dates Name Provider Type Discipline    AM 09/27/22 -  Giovanna Boo OT Occupational Therapist OT    TT 10/18/22 -  Gudelia Blackwood LPN Licensed Nurse Nurse              OT Recommendation and Plan  Planned Therapy Interventions (OT): activity tolerance training, BADL retraining, functional balance retraining, cognitive/visual perception retraining, occupation/activity based interventions, neuromuscular control/coordination retraining, patient/caregiver education/training, ROM/therapeutic exercise, strengthening exercise, passive ROM/stretching  Therapy Frequency (OT): 5 times/wk  Plan of Care Review  Plan of Care Reviewed With: patient  Outcome Evaluation: Pt re-evaled following CABG 12/29/22.  See initial eval for PLOF. Pt with O2, Pacer, IJ, [place], chest tube, heart monitor, hi-flow O2, and [place]. Assessment limited due to [place] in place, not to be removed this date. Patient very [place] and has difficulty  answering questions, though does acknowledge incorrect answers when questions are repeated. Appears below stated baseline cognitively at this time. Completes sit<>stand with Min A. [place] poor recall for sternal precautions, and requires tactile and visual cues for compliance.  Sit<>stand with Min A and Min A for balance/safety. Provided and reviewed Cardiac Rehab HEP, requiring Max A for completion this date. Patient appears below stated baseline and will rrequire SNF at d/c for short-term rehab.     Time Calculation:    Time Calculation- OT     Row Name 12/30/22 1337             Time Calculation- OT    OT Start Time 1011  -ES      OT Stop Time 1049  -ES      OT Time Calculation (min) 38 min  -ES      Total Timed Code Minutes- OT 25 minute(s)  -ES      OT Received On 12/30/22  -ES      OT - Next Appointment 01/02/23  -ES      OT Goal Re-Cert Due Date 01/13/23  -ES            User Key  (r) = Recorded By, (t) = Taken By, (c) = Cosigned By    Initials Name Provider Type     Vale Juarez OT Occupational Therapist              Therapy Charges for Today     Code Description Service Date Service Provider Modifiers Qty    57479894447 HC OT RE-EVAL 2 12/30/2022 Vale Juarez OT GO 1    35655090869 HC OT SELF CARE/MGMT/TRAIN EA 15 MIN 12/30/2022 Vale Juarez OT GO 1    [id number]  OT THER PROC EA 15 MIN 12/30/2022 Vale Juarez OT GO 1               Vale Juarez OT  12/30/2022

## 2022-12-30 NOTE — PLAN OF CARE
Goal Outcome Evaluation:  Plan of Care Reviewed With: patient        Progress: declining  Outcome Evaluation: 76 yo male seen for re-eval s/p cabg x 4 on 12/29/22. Pt now POD1. He still has swan shannon and A line in place, as well as multiple other lines. RN reports pt not likely to be de-escalated to have swan removed today. Pt transferred to chair earlier w/ nsg. Today, pt is heavily medicated and slightly confused. He has difficulty hearing, but also has difficulty answering questions when he can clearly hear question. He requires min to mod assist of 1 to come to stand at chair. He is moderately unsteady in standing. Pt denies pain and then moments later will state that he is painful. He is unable to rate his pain numerically. Attempted to have pt march in static standing, but pt was moderately unsteady and unsafe in this attempt. Pt also demonstrates moderate weakness in BLEs. Pt reports his wife uses a rw for short distances and power w/c for longer distances, so not likely that she can safely assist pt at home. Recommend SNF at d/c. Will continue to follow.

## 2022-12-31 ENCOUNTER — APPOINTMENT (OUTPATIENT)
Dept: GENERAL RADIOLOGY | Facility: HOSPITAL | Age: 77
DRG: 235 | End: 2022-12-31
Payer: MEDICARE

## 2022-12-31 LAB
ANION GAP SERPL CALCULATED.3IONS-SCNC: 12 MMOL/L (ref 5–15)
BUN SERPL-MCNC: 45 MG/DL (ref 8–23)
BUN/CREAT SERPL: 40.2 (ref 7–25)
CALCIUM SPEC-SCNC: 8 MG/DL (ref 8.6–10.5)
CHLORIDE SERPL-SCNC: 100 MMOL/L (ref 98–107)
CO2 SERPL-SCNC: 23 MMOL/L (ref 22–29)
CREAT SERPL-MCNC: 1.12 MG/DL (ref 0.76–1.27)
DEPRECATED RDW RBC AUTO: 47.7 FL (ref 37–54)
EGFRCR SERPLBLD CKD-EPI 2021: 68.1 ML/MIN/1.73
ERYTHROCYTE [DISTWIDTH] IN BLOOD BY AUTOMATED COUNT: 14.7 % (ref 12.3–15.4)
GLUCOSE BLDC GLUCOMTR-MCNC: 125 MG/DL (ref 70–105)
GLUCOSE BLDC GLUCOMTR-MCNC: 132 MG/DL (ref 70–105)
GLUCOSE BLDC GLUCOMTR-MCNC: 132 MG/DL (ref 70–105)
GLUCOSE BLDC GLUCOMTR-MCNC: 138 MG/DL (ref 70–105)
GLUCOSE BLDC GLUCOMTR-MCNC: 141 MG/DL (ref 70–105)
GLUCOSE BLDC GLUCOMTR-MCNC: 142 MG/DL (ref 70–105)
GLUCOSE BLDC GLUCOMTR-MCNC: 147 MG/DL (ref 70–105)
GLUCOSE BLDC GLUCOMTR-MCNC: 150 MG/DL (ref 70–105)
GLUCOSE BLDC GLUCOMTR-MCNC: 151 MG/DL (ref 70–105)
GLUCOSE BLDC GLUCOMTR-MCNC: 157 MG/DL (ref 70–105)
GLUCOSE BLDC GLUCOMTR-MCNC: 161 MG/DL (ref 70–105)
GLUCOSE BLDC GLUCOMTR-MCNC: 163 MG/DL (ref 70–105)
GLUCOSE BLDC GLUCOMTR-MCNC: 165 MG/DL (ref 70–105)
GLUCOSE BLDC GLUCOMTR-MCNC: 170 MG/DL (ref 70–105)
GLUCOSE BLDC GLUCOMTR-MCNC: 187 MG/DL (ref 70–105)
GLUCOSE SERPL-MCNC: 147 MG/DL (ref 65–99)
HCT VFR BLD AUTO: 30.9 % (ref 37.5–51)
HGB BLD-MCNC: 10.4 G/DL (ref 13–17.7)
MCH RBC QN AUTO: 29.7 PG (ref 26.6–33)
MCHC RBC AUTO-ENTMCNC: 33.6 G/DL (ref 31.5–35.7)
MCV RBC AUTO: 88.6 FL (ref 79–97)
PLATELET # BLD AUTO: 161 10*3/MM3 (ref 140–450)
PMV BLD AUTO: 8.5 FL (ref 6–12)
POTASSIUM SERPL-SCNC: 3.5 MMOL/L (ref 3.5–5.2)
POTASSIUM SERPL-SCNC: 4.1 MMOL/L (ref 3.5–5.2)
RBC # BLD AUTO: 3.49 10*6/MM3 (ref 4.14–5.8)
SODIUM SERPL-SCNC: 135 MMOL/L (ref 136–145)
WBC NRBC COR # BLD: 10.9 10*3/MM3 (ref 3.4–10.8)

## 2022-12-31 PROCEDURE — 99231 SBSQ HOSP IP/OBS SF/LOW 25: CPT | Performed by: INTERNAL MEDICINE

## 2022-12-31 PROCEDURE — 97112 NEUROMUSCULAR REEDUCATION: CPT

## 2022-12-31 PROCEDURE — 99232 SBSQ HOSP IP/OBS MODERATE 35: CPT | Performed by: INTERNAL MEDICINE

## 2022-12-31 PROCEDURE — 84132 ASSAY OF SERUM POTASSIUM: CPT | Performed by: THORACIC SURGERY (CARDIOTHORACIC VASCULAR SURGERY)

## 2022-12-31 PROCEDURE — 85027 COMPLETE CBC AUTOMATED: CPT | Performed by: NURSE PRACTITIONER

## 2022-12-31 PROCEDURE — 25010000002 FUROSEMIDE PER 20 MG: Performed by: NURSE PRACTITIONER

## 2022-12-31 PROCEDURE — 25010000002 CEFAZOLIN PER 500 MG: Performed by: NURSE PRACTITIONER

## 2022-12-31 PROCEDURE — 97530 THERAPEUTIC ACTIVITIES: CPT

## 2022-12-31 PROCEDURE — 71045 X-RAY EXAM CHEST 1 VIEW: CPT

## 2022-12-31 PROCEDURE — 99024 POSTOP FOLLOW-UP VISIT: CPT | Performed by: THORACIC SURGERY (CARDIOTHORACIC VASCULAR SURGERY)

## 2022-12-31 PROCEDURE — 25010000002 ENOXAPARIN PER 10 MG: Performed by: NURSE PRACTITIONER

## 2022-12-31 PROCEDURE — 82962 GLUCOSE BLOOD TEST: CPT

## 2022-12-31 PROCEDURE — 80048 BASIC METABOLIC PNL TOTAL CA: CPT | Performed by: NURSE PRACTITIONER

## 2022-12-31 RX ORDER — DEXTROSE MONOHYDRATE 25 G/50ML
25 INJECTION, SOLUTION INTRAVENOUS
Status: DISCONTINUED | OUTPATIENT
Start: 2023-01-01 | End: 2023-01-01

## 2022-12-31 RX ORDER — FUROSEMIDE 10 MG/ML
20 INJECTION INTRAMUSCULAR; INTRAVENOUS ONCE
Status: COMPLETED | OUTPATIENT
Start: 2022-12-31 | End: 2022-12-31

## 2022-12-31 RX ORDER — NICOTINE POLACRILEX 4 MG
15 LOZENGE BUCCAL
Status: DISCONTINUED | OUTPATIENT
Start: 2023-01-01 | End: 2023-01-01

## 2022-12-31 RX ORDER — OLANZAPINE 10 MG/2ML
1 INJECTION, POWDER, LYOPHILIZED, FOR SOLUTION INTRAMUSCULAR
Status: DISCONTINUED | OUTPATIENT
Start: 2023-01-01 | End: 2023-01-01

## 2022-12-31 RX ORDER — INSULIN LISPRO 100 [IU]/ML
3-14 INJECTION, SOLUTION INTRAVENOUS; SUBCUTANEOUS
Status: DISCONTINUED | OUTPATIENT
Start: 2023-01-01 | End: 2023-01-01

## 2022-12-31 RX ADMIN — POTASSIUM CHLORIDE 20 MEQ: 1500 TABLET, EXTENDED RELEASE ORAL at 06:25

## 2022-12-31 RX ADMIN — MUPIROCIN 1 APPLICATION: 20 OINTMENT TOPICAL at 08:19

## 2022-12-31 RX ADMIN — CHLORHEXIDINE GLUCONATE 15 ML: 1.2 SOLUTION ORAL at 08:19

## 2022-12-31 RX ADMIN — ENOXAPARIN SODIUM 40 MG: 100 INJECTION SUBCUTANEOUS at 18:30

## 2022-12-31 RX ADMIN — PANTOPRAZOLE SODIUM 40 MG: 40 TABLET, DELAYED RELEASE ORAL at 06:25

## 2022-12-31 RX ADMIN — POTASSIUM CHLORIDE 20 MEQ: 1500 TABLET, EXTENDED RELEASE ORAL at 04:30

## 2022-12-31 RX ADMIN — AMIODARONE HYDROCHLORIDE 400 MG: 200 TABLET ORAL at 08:19

## 2022-12-31 RX ADMIN — POLYETHYLENE GLYCOL 3350 17 G: 17 POWDER, FOR SOLUTION ORAL at 08:19

## 2022-12-31 RX ADMIN — MUPIROCIN 1 APPLICATION: 20 OINTMENT TOPICAL at 21:17

## 2022-12-31 RX ADMIN — CEFAZOLIN 2 G: 2 INJECTION, POWDER, FOR SOLUTION INTRAMUSCULAR; INTRAVENOUS at 03:44

## 2022-12-31 RX ADMIN — SENNOSIDES AND DOCUSATE SODIUM 2 TABLET: 50; 8.6 TABLET ORAL at 08:19

## 2022-12-31 RX ADMIN — FUROSEMIDE 20 MG: 10 INJECTION, SOLUTION INTRAMUSCULAR; INTRAVENOUS at 13:33

## 2022-12-31 RX ADMIN — ASPIRIN 81 MG: 81 TABLET, COATED ORAL at 08:19

## 2022-12-31 RX ADMIN — ATORVASTATIN CALCIUM 40 MG: 40 TABLET, FILM COATED ORAL at 21:17

## 2022-12-31 RX ADMIN — CHLORHEXIDINE GLUCONATE 15 ML: 1.2 SOLUTION ORAL at 21:17

## 2022-12-31 RX ADMIN — AMIODARONE HYDROCHLORIDE 400 MG: 200 TABLET ORAL at 18:23

## 2022-12-31 NOTE — PROGRESS NOTES
Daily Progress Note          Assessment    Hypoxemia  Flu B  COPD  LOGAN  Cor [place]   CHF: LV EF 20-25%  DM II  Hyperlipidemia  HTN  CAD with multivessel disease: Status post CABG x4 12/29/2022  MENDEZ        PLAN:    Oxygen supplement and titration: Currently requiring 3-1/2 L per nasal cannula  Encourage use of incentive spirometer  Chest tube management as per CTS  Aspirin  DVT prophylaxis  Glucose control: Currently on insulin drip           LOS: 14 days     Subjective     Patient reports mild shortness of breath and cough    Objective     Vital signs for last 24 hours:  Vitals:    12/31/22 0724 12/31/22 0800 12/31/22 0900 12/31/22 1147   BP:  126/65 125/57    BP Location:       Patient Position:       Pulse:  89 88    Resp:       Temp: 98.6 °F (37 °C)   98.3 °F (36.8 °C)   TempSrc: Oral   Oral   SpO2:  97% 94%    Weight:       Height:           Intake/Output last 3 shifts:  I/O last 3 completed shifts:  In: 5025.3 [P.O.:860; I.V.:4165.3]  Out: 3345 [Urine:3080; Chest Tube:265]  Intake/Output this shift:  I/O this shift:  In: 0   Out: 635 [Urine:575; Chest Tube:60]      Radiology  Imaging Results (Last 24 Hours)     Procedure Component Value Units Date/Time    XR Chest 1 View [[id number]] Collected: 12/31/22 0814     Updated: 12/31/22 0818    Narrative:      DATE OF EXAM:  12/31/2022 5:38 AM     PROCEDURE:  XR CHEST 1 VW-     INDICATIONS:  Post-Op Heart Surgery; I25.110-Atherosclerotic heart disease of native  coronary artery with unstable angina pectoris     COMPARISON:  12/30/2022     TECHNIQUE:   Single radiographic view of the chest was obtained.     FINDINGS:  [place] catheter has been removed. Right IJ sheath appears in stable  position.  There are bibasilar opacities, with suspected mild increase  in right basilar opacities. There are suspected small bilateral pleural  effusions. No definite pneumothorax is seen.  Heart size and mediastinal  contour appear unchanged. Status post median sternotomy.        Impression:      1.     Interval removal of [place]-S[place] catheter. Right IJ sheath remains.  No definite pneumothorax.  2.     Bibasilar opacities with suspected mild increase in right basilar  opacities which could represent worsening infiltrate or atelectasis.  3.     Probable small pleural effusions.     Electronically Signed By-Cameron Fermin MD On:12/31/2022 8:16 AM  This report was finalized on [id number] by  Cameron Fermin MD.          Labs:  Results from last 7 days   Lab Units 12/31/22  0343   WBC 10*3/mm3 10.90*   HEMOGLOBIN g/dL 10.4*   HEMATOCRIT % 30.9*   PLATELETS 10*3/mm3 161     Results from last 7 days   Lab Units 12/31/22  1200 12/31/22  0343 12/29/22  1210 12/29/22  0419   SODIUM mmol/L  --  135*   < > 126*   POTASSIUM mmol/L 4.1 3.5   < > 4.2   CHLORIDE mmol/L  --  100   < > 81*   CO2 mmol/L  --  23.0   < > 28.0   BUN mg/dL  --  45*   < > 79*   CREATININE mg/dL  --  1.12   < > 1.69*   CALCIUM mg/dL  --  8.0*   < > 10.5   BILIRUBIN mg/dL  --   --   --  0.8   ALK PHOS U/L  --   --   --  104   ALT (SGPT) U/L  --   --   --  12   AST (SGOT) U/L  --   --   --  18   GLUCOSE mg/dL  --  147*   < > 180*    < > = values in this interval not displayed.     Results from last 7 days   Lab Units 12/30/22  0151   PH, ARTERIAL pH units 7.340*   PO2 ART mm Hg 124.5*   PCO2, ARTERIAL mm Hg 40.3   HCO3 ART mmol/L 21.8     Results from last 7 days   Lab Units 12/30/22  0317 12/29/22  1210 12/29/22  0419   ALBUMIN g/dL 3.8 3.7 4.0             Results from last 7 days   Lab Units 12/30/22  0317   MAGNESIUM mg/dL 3.0*     Results from last 7 days   Lab Units 12/30/22  0317 12/29/22  1210 12/29/22  0420 12/28/22  0905 12/28/22  0154   INR  1.06 1.16*  --   --  1.06   APTT seconds  --  30.3 64.1 62.4 66.9               Meds:   SCHEDULE  amiodarone, 400 mg, Oral, BID With Meals  aspirin, 81 mg, Oral, Daily  atorvastatin, 40 mg, Oral, Nightly  chlorhexidine, 15 mL, Mouth/Throat, Q12H  enoxaparin, 40 mg, Subcutaneous,  Daily  [START ON 1/1/2023] insulin lispro, 3-14 Units, Subcutaneous, TID With Meals  mupirocin, 1 application, Each Nare, BID  pantoprazole, 40 mg, Oral, Q AM  polyethylene glycol, 17 g, Oral, BID  senna-docusate sodium, 2 tablet, Oral, BID      Infusions  insulin, 0-100 Units/hr, Last Rate: 2.3 Units/hr (12/31/22 1429)  milrinone, 0.125 mcg/kg/min, Last Rate: 0.125 mcg/kg/min (12/31/22 0946)  sodium chloride, 30 mL/hr, Last Rate: 30 mL/hr (12/29/22 1307)      PRNs  •  acetaminophen **OR** acetaminophen **OR** acetaminophen  •  dextrose  •  [START ON 1/1/2023] dextrose  •  dextrose  •  [START ON 1/1/2023] dextrose  •  glucagon (human recombinant)  •  [START ON 1/1/2023] glucagon (human recombinant)  •  HYDROcodone-acetaminophen  •  Morphine **AND** naloxone  •  ondansetron  •  potassium chloride **OR** potassium chloride  •  sodium chloride    Physical Exam:  General Appearance: Alert and following commands  HEENT:  Normocephalic, without obvious abnormality, Conjunctiva/corneas clear,.   Nares normal, no drainage     Neck:  Supple, symmetrical, trachea midline.   Lungs /Chest wall: Good air movement bilaterally bibasilar crackles, chest tubes in place, symmetrical wall movement.     Heart: Rate controlled, S1 S2 normal  Abdomen: Soft, non-tender, no masses, no organomegaly.    Extremities: No edema, no clubbing or cyanosis     ROS  Constitutional: Negative for chills, fever and malaise/fatigue.   HENT: Negative.    Eyes: Negative.    Cardiovascular: Expected postoperative incisional pain  Respiratory: Positive for cough and shortness of breath.    Skin: Negative.    Musculoskeletal: Negative.    Gastrointestinal: Negative.    Genitourinary: Negative.    Neurological: Negative.    Psychiatric/Behavioral: Negative.      I reviewed the recent clinical results    Much of this encounter note is an electronic transcription/translation of spoken language to printed text using Dragon Software which might include  inadvertent errors in transcription.

## 2022-12-31 NOTE — PROGRESS NOTES
Referring Provider: Jaleel Huber MD    Reason for follow-up: Multivessel coronary artery disease, HFrEF, acute kidney injury, non-ST elevation MI, diabetes.     Patient Care Team:  Melvina Hodge as PCP - General (Nurse Practitioner)  Johann Morales MD as Cardiologist (Cardiology)      SUBJECTIVE  He is slowly recuperating after CABG.  Denies any chest pain or shortness of breath other than typical postoperative chest discomfort     ROS  Review of all systems negative except as indicated.    Since I have last seen, the patient has been without any chest discomfort, shortness of breath, palpitations, dizziness or syncope.  Denies having any headache, abdominal pain, nausea, vomiting, diarrhea, constipation, loss of weight or loss of appetite.  Denies having any excessive bruising, hematuria or blood in the stool.  ROS      Personal History:    Past Medical History:   Diagnosis Date   • CHF (congestive heart failure) (HCC)    • Diabetes mellitus (HCC)    • Elevated cholesterol    • Hyperlipidemia    • Hypertension        Past Surgical History:   Procedure Laterality Date   • HERNIA REPAIR     • TONSILLECTOMY         History reviewed. No pertinent family history.    Social History     Tobacco Use   • Smoking status: Former     Types: Cigarettes   Vaping Use   • Vaping Use: Never used   Substance Use Topics   • Alcohol use: Not Currently   • Drug use: Never        Medications Prior to Admission   Medication Sig Dispense Refill Last Dose   • aspirin 81 MG chewable tablet Chew 81 mg Daily.      • aspirin-acetaminophen-caffeine (EXCEDRIN MIGRAINE) 250-250-65 MG per tablet Take 1 tablet by mouth Every 6 (Six) Hours As Needed for Headache.      • carvedilol (COREG) 3.125 MG tablet Take 3.125 mg by mouth 2 (Two) Times a Day With Meals.      • furosemide (LASIX) 40 MG tablet Take 40 mg by mouth 2 (Two) Times a Day.      • glipizide (GLUCOTROL) 10 MG tablet Take 10 mg by mouth 2 (Two) Times a Day Before Meals.      •  insulin NPH-insulin regular (humuLIN 70/30,novoLIN 70/30) (70-30) 100 UNIT/ML injection Inject 27 Units under the skin into the appropriate area as directed Every Morning.      • insulin NPH-insulin regular (humuLIN 70/30,novoLIN 70/30) (70-30) 100 UNIT/ML injection Inject 22 Units under the skin into the appropriate area as directed Every Night.      • metFORMIN (GLUCOPHAGE) 500 MG tablet Take 500 mg by mouth Daily With Breakfast.      • Omega-3 Fatty Acids (fish oil) 1000 MG capsule capsule Take 1,000 mg by mouth Daily With Breakfast.      • oseltamivir (TAMIFLU) 75 MG capsule Take 75 mg by mouth 2 (Two) Times a Day.      • rosuvastatin (CRESTOR) 5 MG tablet Take 5 mg by mouth Every Night.          Allergies:  Patient has no known allergies.    Scheduled Meds:amiodarone, 400 mg, Oral, BID With Meals  aspirin, 81 mg, Oral, Daily  atorvastatin, 40 mg, Oral, Nightly  chlorhexidine, 15 mL, Mouth/Throat, Q12H  enoxaparin, 40 mg, Subcutaneous, Daily  mupirocin, 1 application, Each Nare, BID  pantoprazole, 40 mg, Oral, Q AM  polyethylene glycol, 17 g, Oral, BID  senna-docusate sodium, 2 tablet, Oral, BID      Continuous Infusions:EPINEPHrine, 0.02-0.05 mcg/kg/min, Last Rate: Stopped (12/30/22 1415)  insulin, 0-100 Units/hr, Last Rate: 2.1 Units/hr (12/31/22 0612)  milrinone, 0.25 mcg/kg/min, Last Rate: 0.125 mcg/kg/min (12/30/22 1458)  norepinephrine, 0.02-0.06 mcg/kg/min, Last Rate: Stopped (12/30/22 1703)  sodium chloride, 30 mL/hr, Last Rate: 30 mL/hr (12/29/22 1307)      PRN Meds:.•  acetaminophen **OR** acetaminophen **OR** acetaminophen  •  albumin human  •  dextrose  •  dextrose  •  glucagon (human recombinant)  •  HYDROcodone-acetaminophen  •  Morphine **AND** naloxone  •  norepinephrine  •  ondansetron  •  potassium chloride **OR** potassium chloride  •  sodium chloride      OBJECTIVE    Vital Signs  Vitals:    12/31/22 0100 12/31/22 0200 12/31/22 0300 12/31/22 0400   BP: 123/64 125/60 122/60 119/62   BP  "Location:    Right arm   Patient Position:    Lying   Pulse: 89 89 89 90   Resp:    15   Temp:   98 °F (36.7 °C)    TempSrc:   Oral    SpO2: 97% 97% 98% 97%   Weight:       Height:           Flowsheet Rows    Flowsheet Row First Filed Value   Admission Height 177.8 cm (70\") Documented at 12/18/2022 1305   Admission Weight 103 kg (227 lb 1.2 oz) Documented at 12/17/2022 0500            Intake/Output Summary (Last 24 hours) at 12/31/2022 0645  Last data filed at 12/31/2022 0600  Gross per 24 hour   Intake 2620.3 ml   Output 2255 ml   Net 365.3 ml          Telemetry: Sinus rhythm    Physical Exam:  The patient is alert, oriented and in no distress.  Vital signs as noted above.  Head and neck revealed no carotid bruits or jugular venous distention.  No thyromegaly or lymphadenopathy is present  Lungs clear.  No wheezing.  Breath sounds are normal bilaterally.  Heart normal first and second heart sounds.  No murmur. No precordial rub is present.  No gallop is present.  Abdomen soft and nontender.  No organomegaly is present.  Extremities with good peripheral pulses without any pedal edema.  Skin warm and dry.  Musculoskeletal system is grossly normal.  CNS grossly normal.       Results Review:  I have personally reviewed the results from the time of this admission to 12/31/2022 06:45 EST and agree with these findings:  []  Laboratory  []  Microbiology  []  Radiology  []  EKG/Telemetry   []  Cardiology/Vascular   []  Pathology  []  Old records  []  Other:    Most notable findings include:    Lab Results (last 24 hours)     Procedure Component Value Units Date/Time    POC Glucose Once [[id number]]  (Abnormal) Collected: 12/31/22 0609    Specimen: Blood Updated: 12/31/22 0611     Glucose 163 mg/dL      Comment: Serial Number: [id number]:  [id number]       POC Glucose Once [[id number]]  (Abnormal) Collected: 12/31/22 0446    Specimen: Blood Updated: 12/31/22 0448     Glucose 151 mg/dL      Comment: Serial Number: " [id number]:  [id number]       Basic Metabolic Panel [[id number]]  (Abnormal) Collected: 12/31/22 0343    Specimen: Blood Updated: 12/31/22 0417     Glucose 147 mg/dL      BUN 45 mg/dL      Creatinine 1.12 mg/dL      Sodium 135 mmol/L      Potassium 3.5 mmol/L      Chloride 100 mmol/L      CO2 23.0 mmol/L      Calcium 8.0 mg/dL      BUN/Creatinine Ratio 40.2     Anion Gap 12.0 mmol/L      eGFR 68.1 mL/min/1.73      Comment: National Kidney Foundation and American Society of Nephrology (ASN) Task Force recommended calculation based on the Chronic Kidney Disease Epidemiology Collaboration (CKD-EPI) equation refit without adjustment for race.       Narrative:      GFR Normal >60  Chronic Kidney Disease <60  Kidney Failure <15    The GFR formula is only valid for adults with stable renal function between ages 18 and 70.    CBC (No Diff) [[id number]]  (Abnormal) Collected: 12/31/22 0343    Specimen: Blood Updated: 12/31/22 0401     WBC 10.90 10*3/mm3      RBC 3.49 10*6/mm3      Hemoglobin 10.4 g/dL      Hematocrit 30.9 %      MCV 88.6 fL      MCH 29.7 pg      MCHC 33.6 g/dL      RDW 14.7 %      RDW-SD 47.7 fl      MPV 8.5 fL      Platelets 161 10*3/mm3     POC Glucose Once [[id number]]  (Abnormal) Collected: 12/31/22 0341    Specimen: Blood Updated: 12/31/22 0342     Glucose 142 mg/dL      Comment: Serial Number: [id number]:  [id number]       POC Glucose Once [[id number]]  (Abnormal) Collected: 12/31/22 0228    Specimen: Blood Updated: 12/31/22 0229     Glucose 132 mg/dL      Comment: Serial Number: [id number]:  [id number]       POC Glucose Once [[id number]]  (Abnormal) Collected: 12/31/22 0122    Specimen: Blood Updated: 12/31/22 0124     Glucose 125 mg/dL      Comment: Serial Number: [id number]:  [id number]       POC Glucose Once [[id number]]  (Abnormal) Collected: 12/31/22 0014    Specimen: Blood Updated: 12/31/22 0016     Glucose 132 mg/dL      Comment: Serial Number: [id number]:  [id number]        POC Glucose Once [[id number]]  (Abnormal) Collected: 12/30/22 2258    Specimen: Blood Updated: 12/30/22 2301     Glucose 146 mg/dL      Comment: Serial Number: [id number]:  [id number]       POC Glucose Once [[id number]]  (Abnormal) Collected: 12/30/22 2157    Specimen: Blood Updated: 12/30/22 2159     Glucose 168 mg/dL      Comment: Serial Number: [id number]:  [id number]       POC Glucose Once [[id number]]  (Abnormal) Collected: 12/30/22 2048    Specimen: Blood Updated: 12/30/22 2051     Glucose 186 mg/dL      Comment: Serial Number: [id number]:  [id number]       POC Glucose Once [[id number]]  (Abnormal) Collected: 12/30/22 1918    Specimen: Blood Updated: 12/30/22 1920     Glucose 142 mg/dL      Comment: Serial Number: [id number]:  [id number]       POC Glucose Once [[id number]]  (Normal) Collected: 12/30/22 1750    Specimen: Blood Updated: 12/30/22 1756     Glucose 100 mg/dL      Comment: Serial Number: [id number]:  [id number]       Basic Metabolic Panel [[id number]]  (Abnormal) Collected: 12/30/22 1654    Specimen: Blood Updated: 12/30/22 1724     Glucose 95 mg/dL      BUN 53 mg/dL      Creatinine 1.38 mg/dL      Sodium 135 mmol/L      Potassium 4.0 mmol/L      Chloride 96 mmol/L      CO2 26.0 mmol/L      Calcium 8.8 mg/dL      BUN/Creatinine Ratio 38.4     Anion Gap 13.0 mmol/L      eGFR 53.0 mL/min/1.73      Comment: National Kidney Foundation and American Society of Nephrology (ASN) Task Force recommended calculation based on the Chronic Kidney Disease Epidemiology Collaboration (CKD-EPI) equation refit without adjustment for race.       Narrative:      GFR Normal >60  Chronic Kidney Disease <60  Kidney Failure <15    The GFR formula is only valid for adults with stable renal function between ages 18 and 70.    POC Glucose Once [[id number]]  (Normal) Collected: 12/30/22 1651    Specimen: Blood Updated: 12/30/22 1653     Glucose 88 mg/dL      Comment: Serial Number:  [id number]:  [id number]       POC Glucose Once [[id number]]  (Normal) Collected: 12/30/22 1627    Specimen: Blood Updated: 12/30/22 1634     Glucose 100 mg/dL      Comment: Serial Number: [id number]:  [id number]       POC Glucose Once [[id number]]  (Abnormal) Collected: 12/30/22 1447    Specimen: Blood Updated: 12/30/22 1448     Glucose 137 mg/dL      Comment: Serial Number: [id number]:  [id number]       POC Glucose Once [[id number]]  (Abnormal) Collected: 12/30/22 1325    Specimen: Blood Updated: 12/30/22 1326     Glucose 155 mg/dL      Comment: Serial Number: [id number]:  [id number]       POC Glucose Once [[id number]]  (Abnormal) Collected: 12/30/22 1211    Specimen: Blood Updated: 12/30/22 1213     Glucose 200 mg/dL      Comment: Serial Number: [id number]:  [id number]       POC Glucose Once [[id number]]  (Abnormal) Collected: 12/30/22 1052    Specimen: Blood Updated: 12/30/22 1054     Glucose 258 mg/dL      Comment: Serial Number: [id number]:  [id number]       POC Glucose Once [[id number]]  (Abnormal) Collected: 12/30/22 0900    Specimen: Blood Updated: 12/30/22 0926     Glucose 128 mg/dL      Comment: Serial Number: [id number]:  [id number]       POC Glucose Once [[id number]]  (Abnormal) Collected: 12/30/22 0646    Specimen: Blood Updated: 12/30/22 0706     Glucose 145 mg/dL      Comment: Serial Number: [id number]:  [id number]             Imaging Results (Last 24 Hours)     Procedure Component Value Units Date/Time    XR Chest 1 View [[id number]] Resulted: 12/31/22 0629     Updated: 12/31/22 0631    XR Chest 1 View [[id number]] Collected: 12/30/22 0712     Updated: 12/30/22 0716    Narrative:         DATE OF EXAM:   12/30/2022 6:11 AM     PROCEDURE:   XR CHEST 1 VW-     INDICATIONS:   Post-Op Heart Surgery; I25.110-Atherosclerotic heart disease of native  coronary artery with unstable angina pectoris     COMPARISON:  12/29/2022 and prior     TECHNIQUE:   Portable Chest      FINDINGS:      Study is limited by overlying support and monitoring apparatus.     Patient is status post median sternotomy and CABG. Heart size appears  stable. Vascularity appears unchanged.      Small left-sided pleural effusion and dependent opacities at the left  base appears similar to slightly improved in aeration the comparison.  Left-sided chest tube is unchanged. No definite pneumothorax is noted.     The patient has been extubated and the NG tube has been removed. Right  IJ sheath with [place] catheter remains in place. The tip of the  [place] catheter extends to the expected position of the distal main  pulmonary artery or proximal right main pulmonary artery. Catheter  differences in technique this is unchanged.     The right lung demonstrates no focal consolidation. Osseous structures  appear stable        Impression:         1. Lines and tubes as above  2. Slight improved aeration at the left base[     Electronically Signed By-Ha Rocha On:12/30/2022 7:14 AM  This report was finalized on [id number] by  Ha Rocha, .          LAB RESULTS (LAST 7 DAYS)    CBC  Results from last 7 days   Lab Units 12/31/22  0343 12/30/22  0317 12/29/22  1829 12/29/22  1739 12/29/22  1637 12/29/22  1518 12/29/22  1417 12/29/22  1247 12/29/22  1210 12/29/22  0758 12/29/22  0419 12/28/22  0154 12/27/22  0427 12/25/22  0243   WBC 10*3/mm3 10.90* 21.00*  --   --   --   --   --   --  29.20*  --  12.20* 12.20* 11.80* 8.40   RBC 10*6/mm3 3.49* 4.03*  --   --   --   --   --   --  4.47  --  5.27 5.38 5.22 4.73   HEMOGLOBIN g/dL 10.4* 11.8*  --   --   --   --   --   --  13.0  --  16.1 15.8 15.5 14.3   HEMOGLOBIN, POC g/dL  --   --  13.6 14.3 14.2 13.8 14.5   < >  --    < >  --   --   --   --    HEMATOCRIT % 30.9* 35.2*  --   --   --   --   --   --  39.9  --  46.3 47.3 46.2 42.3   HEMATOCRIT POC %  --   --  40 42 42 41 43   < >  --    < >  --   --   --   --    MCV fL 88.6 87.4  --   --   --   --   --   --   89.3  --  87.9 88.0 88.6 89.4   PLATELETS 10*3/mm3 161 268  --   --   --   --   --   --  333  --  326 327 361 305    < > = values in this interval not displayed.       BMP  Results from last 7 days   Lab Units 12/31/22 0343 12/30/22 1654 12/30/22 0317 12/29/22 2315 12/29/22 1753 12/29/22 1210 12/29/22 0419 12/28/22 0154 12/27/22  1145   SODIUM mmol/L 135* 135* 133*  --   --  128* 126* 125* 127*   POTASSIUM mmol/L 3.5 4.0 4.3 3.7 3.3* 3.8 4.2 4.3 4.7   CHLORIDE mmol/L 100 96* 97*  --   --  89* 81* 83* 82*   CO2 mmol/L 23.0 26.0 22.0  --   --  24.0 28.0 25.0 26.0   BUN mg/dL 45* 53* 67*  --   --  69* 79* 59* 51*   CREATININE mg/dL 1.12 1.38* 1.82*  --   --  1.75* 1.69* 1.85* 1.50*   GLUCOSE mg/dL 147* 95 224*  --   --  191* 180* 185* 284*   MAGNESIUM mg/dL  --   --  3.0*  --   --   --   --   --   --    PHOSPHORUS mg/dL  --   --  5.6*  --   --  5.4*  --   --   --        CMP   Results from last 7 days   Lab Units 12/31/22 0343 12/30/22 1654 12/30/22 0317 12/29/22 2315 12/29/22 1753 12/29/22 1210 12/29/22 0419 12/28/22 0154 12/27/22  1145   SODIUM mmol/L 135* 135* 133*  --   --  128* 126* 125* 127*   POTASSIUM mmol/L 3.5 4.0 4.3 3.7 3.3* 3.8 4.2 4.3 4.7   CHLORIDE mmol/L 100 96* 97*  --   --  89* 81* 83* 82*   CO2 mmol/L 23.0 26.0 22.0  --   --  24.0 28.0 25.0 26.0   BUN mg/dL 45* 53* 67*  --   --  69* 79* 59* 51*   CREATININE mg/dL 1.12 1.38* 1.82*  --   --  1.75* 1.69* 1.85* 1.50*   GLUCOSE mg/dL 147* 95 224*  --   --  191* 180* 185* 284*   ALBUMIN g/dL  --   --  3.8  --   --  3.7 4.0  --   --    BILIRUBIN mg/dL  --   --   --   --   --   --  0.8  --   --    ALK PHOS U/L  --   --   --   --   --   --  104  --   --    AST (SGOT) U/L  --   --   --   --   --   --  18  --   --    ALT (SGPT) U/L  --   --   --   --   --   --  12  --   --        BNP        TROPONIN        CoAg  Results from last 7 days   Lab Units 12/30/22  0317 12/29/22  1210 12/29/22  0420 12/28/22  0905 12/28/22  0154 12/27/22  1913  12/27/22  1145 12/27/22  0427   INR  1.06 1.16*  --   --  1.06  --   --   --    APTT seconds  --  30.3 64.1 62.4 66.9 77.0* 76.5 59.7*       Creatinine Clearance  Estimated Creatinine Clearance: 68.4 mL/min (by C-G formula based on SCr of 1.12 mg/dL).    ABG  Results from last 7 days   Lab Units 12/30/22  0151 12/30/22  0019 12/29/22  1829 12/29/22  1739 12/29/22  1637 12/29/22  1518 12/29/22  1417   PH, ARTERIAL pH units 7.340* 7.361 7.362 7.368 7.424 7.400 7.360   PCO2, ARTERIAL mm Hg 40.3 38.8 38.4 40.0 34.8* 37.4 40.2   PO2 ART mm Hg 124.5* 96.8 75.7* 82.2* 78.1* 94.6 102.3   O2 SATURATION ART % 98.6* 97.3 94.5 95.7 95.8 97.4 97.6   BASE EXCESS ART mmol/L -3.8* -3.1* -3.2* -2.1* -1.1* -1.4* -2.6*       Radiology  XR Chest 1 View    Result Date: 12/30/2022   1. Lines and tubes as above 2. Slight improved aeration at the left base[  Electronically Signed By-Ha Rocha On:12/30/2022 7:14 AM This report was finalized on [id number] by  Ha Rocha, .    XR Chest 1 View    Result Date: 12/29/2022  1. Support devices appear in expected position. 2. Left sided chest tube without visible pneumothorax. 3. Streaky left basilar airspace opacity, likely atelectasis.  Electronically Signed By-Bradley Moore MD On:12/29/2022 1:04 PM This report was finalized on [id number] by  Bradley Moore MD.        EKG  I personally viewed and interpreted the patient's EKG/Telemetry data:  ECG 12 Lead Drug Monitoring; Amiodarone   Final Result   HEART RATE= 60  bpm   RR Interval= 996  ms   WY Interval= 177  ms   P Horizontal Axis= -52  deg   P Front Axis= 38  deg   QRSD Interval= 89  ms   QT Interval= 453  ms   QRS Axis= 13  deg   T Wave Axis= 96  deg   - ABNORMAL ECG -   Sinus rhythm   Nonspecific T abnrm, anterolateral leads   Borderline ST elevation, inferior leads   When compared with ECG of 29-Dec-2022 16:41:22,   Significant change in rhythm   Electronically Signed By: Johann Morales ([facility]) 30-Dec-2022 23:59:10    Date and Time of Study: 2022-12-30 03:33:03      ECG 12 Lead Drug Monitoring; Amiodarone   Final Result   HEART RATE= 89  bpm   RR Interval= 671  ms   NV Interval= 219  ms   P Horizontal Axis= -87  deg   P Front Axis=   deg   QRSD Interval= 100  ms   QT Interval= 371  ms   QRS Axis= 20  deg   T Wave Axis= 177  deg   - ABNORMAL ECG -   Atrial-paced rhythm   Abnormal T, consider ischemia, lateral leads   When compared with ECG of 17-Dec-2022 7:21:47,   Significant change in rhythm   Significant axis, voltage or hypertrophy change   Electronically Signed By: Sampson Torres ([facility]) 29-Dec-2022 20:09:23   Date and Time of Study: 2022-12-29 16:41:22      ECG 12 Lead Chest Pain   Final Result   HEART RATE= 89  bpm   RR Interval= 672  ms   NV Interval= 176  ms   P Horizontal Axis= -5  deg   P Front Axis= 29  deg   QRSD Interval= 92  ms   QT Interval= 362  ms   QRS Axis= 16  deg   T Wave Axis= 163  deg   - ABNORMAL ECG -   Sinus rhythm   Probable left atrial enlargement   Probable anterior infarct, age indeterminate   Abnormal T, consider ischemia, lateral leads   No previous ECG available for comparison   Electronically Signed By: Alex Byrd ([facility]) 19-Dec-2022 08:52:18   Date and Time of Study: 2022-12-17 07:21:47      SCANNED - TELEMETRY     Final Result      SCANNED - TELEMETRY     Final Result      SCANNED - TELEMETRY     Final Result      SCANNED - TELEMETRY     Final Result      SCANNED - TELEMETRY     Final Result      SCANNED - TELEMETRY     Final Result      SCANNED - TELEMETRY     Final Result      SCANNED - TELEMETRY     Final Result      SCANNED - TELEMETRY     Final Result      SCANNED - TELEMETRY     Final Result      SCANNED - TELEMETRY     Final Result      SCANNED - TELEMETRY     Final Result      SCANNED - TELEMETRY     Final Result      SCANNED - TELEMETRY     Final Result      SCANNED - TELEMETRY     Final Result      SCANNED - TELEMETRY     Final Result      SCANNED - TELEMETRY     Final Result       SCANNED - TELEMETRY     Final Result      SCANNED - TELEMETRY     Final Result      SCANNED - TELEMETRY     Final Result      SCANNED - TELEMETRY     Final Result      SCANNED - TELEMETRY     Final Result      SCANNED - TELEMETRY     Final Result      SCANNED - TELEMETRY     Final Result      SCANNED - TELEMETRY     Final Result      SCANNED - TELEMETRY     Final Result      SCANNED - TELEMETRY     Final Result      SCANNED - TELEMETRY     Final Result      SCANNED - TELEMETRY     Final Result      SCANNED - TELEMETRY     Final Result      SCANNED - TELEMETRY     Final Result      SCANNED - TELEMETRY     Final Result      SCANNED - TELEMETRY     Final Result      SCANNED - TELEMETRY     Final Result      SCANNED - TELEMETRY     Final Result      SCANNED - TELEMETRY     Final Result      SCANNED - TELEMETRY     Final Result      SCANNED - TELEMETRY     Final Result      SCANNED - TELEMETRY     Final Result      SCANNED - TELEMETRY     Final Result      SCANNED - TELEMETRY     Final Result      ECG 12 Lead    (Results Pending)   ECG 12 Lead    (Results Pending)   ECG 12 Lead    (Results Pending)         Echocardiogram:    Results for orders placed during the hospital encounter of 12/17/22    Adult Transthoracic Echo Limited W/ Cont if Necessary Per Protocol    Interpretation Summary  •  Left ventricular systolic function is low normal. Calculated left ventricular EF = 48% Left ventricular ejection fraction appears to be 46 - 50%.  •  The left ventricular cavity is moderately dilated.  •  Left ventricular wall thickness is consistent with mild concentric hypertrophy.  •  Left ventricular diastolic dysfunction is noted.  •  There is calcification of the aortic valve without stenosis        Stress Test:         Cardiac Catheterization:  No results found for this or any previous visit.         Other:         ASSESSMENT & PLAN:    Principal Problem:    CAD (coronary artery disease), native coronary artery  Active  Problems:    Type 2 diabetes mellitus with hyperglycemia, without long-term current use of insulin (HCC)    Mixed hyperlipidemia    Primary hypertension    Systolic and diastolic CHF, acute on chronic (HCC)    Influenza due to seasonal influenza virus    NSTEMI (non-ST elevated myocardial infarction) ([facility])    77-year-old man presented with non-ST elevation MI and acute kidney injury in the setting of influenza.  Urgent cardiac catheterization showed multivessel coronary artery disease and echocardiogram showed EF of 20%.  Treatment for influenza was completed and medical management for non-ST elevation myocardial infarction was initiated.  EF improved to 40 to 45%.  He is now status post CABG with LIMA to LAD, vein graft to diagonal, vein graft to ramus and vein graft to PDA.  He is now off pressors and has been extubated.  Chest tubes have been removed.  He remains on milrinone and insulin drip.  Lasix as needed per nephrology.  Creatinine has improved.  Will start beta-blocker once pacer wires have been removed.  He is also on amiodarone for A. fib prevention.  We will start and uptitrate GDMT once he is off inotrope.  Will need Plavix at the time of discharge.  Start aspirin and high intensity statin.  He will need cardiac rehab at the time of discharge.      Johann Morales MD  12/31/22  06:45 EST

## 2022-12-31 NOTE — PROGRESS NOTES
" LOS: 14 days   Patient Care Team:  Melvina Hodge as PCP - General (Nurse Practitioner)  Johann Morales MD as Cardiologist (Cardiology)    Chief Complaint: post op fu    Subjective:  Symptoms:  He reports shortness of breath (mild).    Diet:  No nausea.    Activity level: Impaired due to weakness.    Pain:  He reports no pain.          Vital Signs  Temp:  [97 °F (36.1 °C)-98.6 °F (37 °C)] 98.6 °F (37 °C)  Heart Rate:  [88-90] 89  Resp:  [15-21] 15  BP: (104-131)/(45-64) 122/54  Arterial Line BP: ()/(53-87) 113/87  Body mass index is 33.52 kg/m².    Intake/Output Summary (Last 24 hours) at 12/31/2022 0808  Last data filed at 12/31/2022 0600  Gross per 24 hour   Intake 2620.3 ml   Output 2255 ml   Net 365.3 ml     No intake/output data recorded.    Chest tube drainage last 8 hours:  20 (air leak)        12/29/22  0600 12/30/22  0600 12/31/22  0015   Weight: 92.2 kg (203 lb 4.2 oz) 105 kg (231 lb 7.7 oz) 106 kg (233 lb 9.6 oz)         Objective:  General Appearance:  Comfortable.    Vital signs: (most recent): Blood pressure 125/57, pulse 88, temperature 98.6 °F (37 °C), temperature source Oral, resp. rate 15, height 177.8 cm (70\"), weight 106 kg (233 lb 9.6 oz), SpO2 94 %.    Output: Producing urine and no stool output.    Lungs:  Normal effort and normal respiratory rate.  There are decreased breath sounds (right base).  No rales, wheezes or rhonchi.  (O2 at 2 liters)  Heart: Normal rate.  Regular rhythm.  S1 normal and S2 normal.  No murmur.   Extremities: There is no dependent edema.    Neurological: Patient is alert and oriented to person, place and time.    Skin:  Warm and dry.  (Sternal dressing dry and intact.  Leg incision without erythema, edema, or drainage)            Results Review:        WBC WBC   Date Value Ref Range Status   12/31/2022 10.90 (H) 3.40 - 10.80 10*3/mm3 Final   12/30/2022 21.00 (H) 3.40 - 10.80 10*3/mm3 Final   12/29/2022 29.20 (H) 3.40 - 10.80 10*3/mm3 Final     Comment:     " Result checked   12/29/2022 12.20 (H) 3.40 - 10.80 10*3/mm3 Final      HGB Hemoglobin   Date Value Ref Range Status   12/31/2022 10.4 (L) 13.0 - 17.7 g/dL Final   12/30/2022 11.8 (L) 13.0 - 17.7 g/dL Final   12/29/2022 13.6 12.0 - 17.0 g/dL Final   12/29/2022 14.3 12.0 - 17.0 g/dL Final   12/29/2022 14.2 12.0 - 17.0 g/dL Final   12/29/2022 13.8 12.0 - 17.0 g/dL Final   12/29/2022 14.5 12.0 - 17.0 g/dL Final   12/29/2022 15.2 12.0 - 17.0 g/dL Final   12/29/2022 13.0 13.0 - 17.7 g/dL Final     Comment:     Result checked   12/29/2022 11.2 (L) 12.0 - 17.0 g/dL Final   12/29/2022 10.5 (L) 12.0 - 17.0 g/dL Final   12/29/2022 11.2 (L) 12.0 - 17.0 g/dL Final   12/29/2022 11.2 (L) 12.0 - 17.0 g/dL Final   12/29/2022 11.2 (L) 12.0 - 17.0 g/dL Final   12/29/2022 15.3 12.0 - 17.0 g/dL Final   12/29/2022 16.1 13.0 - 17.7 g/dL Final      HCT Hematocrit   Date Value Ref Range Status   12/31/2022 30.9 (L) 37.5 - 51.0 % Final   12/30/2022 35.2 (L) 37.5 - 51.0 % Final   12/29/2022 40 38 - 51 % Final   12/29/2022 42 38 - 51 % Final   12/29/2022 42 38 - 51 % Final   12/29/2022 41 38 - 51 % Final   12/29/2022 43 38 - 51 % Final   12/29/2022 45 38 - 51 % Final   12/29/2022 39.9 37.5 - 51.0 % Final   12/29/2022 33 (L) 38 - 51 % Final   12/29/2022 31 (L) 38 - 51 % Final   12/29/2022 33 (L) 38 - 51 % Final   12/29/2022 33 (L) 38 - 51 % Final   12/29/2022 33 (L) 38 - 51 % Final   12/29/2022 45 38 - 51 % Final   12/29/2022 46.3 37.5 - 51.0 % Final      Platelets Platelets   Date Value Ref Range Status   12/31/2022 161 140 - 450 10*3/mm3 Final   12/30/2022 268 140 - 450 10*3/mm3 Final   12/29/2022 333 140 - 450 10*3/mm3 Final   12/29/2022 326 140 - 450 10*3/mm3 Final        PT/INR:    Protime   Date Value Ref Range Status   12/30/2022 10.9 9.6 - 11.7 Seconds Final   12/29/2022 11.8 (H) 9.6 - 11.7 Seconds Final   /  INR   Date Value Ref Range Status   12/30/2022 1.06 0.93 - 1.10 Final   12/29/2022 1.16 (H) 0.93 - 1.10 Final       Sodium Sodium    Date Value Ref Range Status   12/31/2022 135 (L) 136 - 145 mmol/L Final   12/30/2022 135 (L) 136 - 145 mmol/L Final   12/30/2022 133 (L) 136 - 145 mmol/L Final   12/29/2022 128 (L) 136 - 145 mmol/L Final   12/29/2022 126 (L) 136 - 145 mmol/L Final      Potassium Potassium   Date Value Ref Range Status   12/31/2022 3.5 3.5 - 5.2 mmol/L Final   12/30/2022 4.0 3.5 - 5.2 mmol/L Final   12/30/2022 4.3 3.5 - 5.2 mmol/L Final   12/29/2022 3.7 3.5 - 5.2 mmol/L Final   12/29/2022 3.3 (L) 3.5 - 5.2 mmol/L Final   12/29/2022 3.8 3.5 - 5.2 mmol/L Final     Comment:     Slight hemolysis detected by analyzer. Results may be affected.   12/29/2022 4.2 3.5 - 5.2 mmol/L Final     Comment:     Slight hemolysis detected by analyzer. Results may be affected.      Chloride Chloride   Date Value Ref Range Status   12/31/2022 100 98 - 107 mmol/L Final   12/30/2022 96 (L) 98 - 107 mmol/L Final   12/30/2022 97 (L) 98 - 107 mmol/L Final   12/29/2022 89 (L) 98 - 107 mmol/L Final   12/29/2022 81 (L) 98 - 107 mmol/L Final      Bicarbonate CO2   Date Value Ref Range Status   12/31/2022 23.0 22.0 - 29.0 mmol/L Final   12/30/2022 26.0 22.0 - 29.0 mmol/L Final   12/30/2022 22.0 22.0 - 29.0 mmol/L Final   12/29/2022 24.0 22.0 - 29.0 mmol/L Final   12/29/2022 28.0 22.0 - 29.0 mmol/L Final      BUN BUN   Date Value Ref Range Status   12/31/2022 45 (H) 8 - 23 mg/dL Final   12/30/2022 53 (H) 8 - 23 mg/dL Final   12/30/2022 67 (H) 8 - 23 mg/dL Final   12/29/2022 69 (H) 8 - 23 mg/dL Final   12/29/2022 79 (H) 8 - 23 mg/dL Final      Creatinine Creatinine   Date Value Ref Range Status   12/31/2022 1.12 0.76 - 1.27 mg/dL Final   12/30/2022 1.38 (H) 0.76 - 1.27 mg/dL Final   12/30/2022 1.82 (H) 0.76 - 1.27 mg/dL Final   12/29/2022 1.75 (H) 0.76 - 1.27 mg/dL Final   12/29/2022 1.69 (H) 0.76 - 1.27 mg/dL Final      Calcium Calcium   Date Value Ref Range Status   12/31/2022 8.0 (L) 8.6 - 10.5 mg/dL Final   12/30/2022 8.8 8.6 - 10.5 mg/dL Final   12/30/2022 9.2  8.6 - 10.5 mg/dL Final   12/29/2022 8.8 8.6 - 10.5 mg/dL Final   12/29/2022 10.5 8.6 - 10.5 mg/dL Final      Magnesium Magnesium   Date Value Ref Range Status   12/30/2022 3.0 (H) 1.6 - 2.4 mg/dL Final      Troponin No results found for: TROPONIN   BNP No results found for: BNP         amiodarone, 400 mg, Oral, BID With Meals  aspirin, 81 mg, Oral, Daily  atorvastatin, 40 mg, Oral, Nightly  chlorhexidine, 15 mL, Mouth/Throat, Q12H  enoxaparin, 40 mg, Subcutaneous, Daily  mupirocin, 1 application, Each Nare, BID  pantoprazole, 40 mg, Oral, Q AM  polyethylene glycol, 17 g, Oral, BID  senna-docusate sodium, 2 tablet, Oral, BID      EPINEPHrine, 0.02-0.05 mcg/kg/min, Last Rate: Stopped (12/30/22 1415)  insulin, 0-100 Units/hr, Last Rate: 2 Units/hr (12/31/22 0722)  milrinone, 0.25 mcg/kg/min, Last Rate: 0.125 mcg/kg/min (12/30/22 1458)  norepinephrine, 0.02-0.06 mcg/kg/min, Last Rate: Stopped (12/30/22 1703)  sodium chloride, 30 mL/hr, Last Rate: 30 mL/hr (12/29/22 1307)        PRN Meds:.•  acetaminophen **OR** acetaminophen **OR** acetaminophen  •  albumin human  •  dextrose  •  dextrose  •  glucagon (human recombinant)  •  HYDROcodone-acetaminophen  •  Morphine **AND** naloxone  •  norepinephrine  •  ondansetron  •  potassium chloride **OR** potassium chloride  •  sodium chloride    Patient Active Problem List   Diagnosis Code   • CAD (coronary artery disease), native coronary artery I25.10   • Type 2 diabetes mellitus with hyperglycemia, without long-term current use of insulin ([facility]) E11.65   • Mixed hyperlipidemia E78.2   • Primary hypertension I10   • Systolic and diastolic CHF, acute on chronic ([facility]) I50.43   • Influenza due to seasonal influenza virus J10.1   • NSTEMI (non-ST elevated myocardial infarction) ([facility]) I21.4       Assessment & Plan    - MV CAD with NSTEMI presentation at [place], EF 20-25% (echo), 20% by intraop REESE s/p urgent CABG x4 with LIMA ([place])-----will start plavix prior to discharge  - ICM,  acute HFrEF, NYHA class II-III, BNP 5580 at OSH---GDMT prior to discharge  - Influenza B diagnosed 12/15--ID signed off, Tamiflu x 5 days completed  - DM type 2 with hyperglycemia, a1c 8.3---- endocrine managing  - HTN--hypotensive post op, pressors  - HL---statin  - Past smoker  - [place], hearing aids in place  - Obesity  - COPD---pulmonary following  - MENDEZ on probable CKD III---peak creatinine preop 2.1, renal following  - Postop leukocytosis, likely reactive    POD#2.  D/c chest tubes, leave ng for now with primacor gtt to monitor after d/c'd.  Leave cordis with primacor.  No lasix per renal today.     Chyna Christian, APRN  12/31/22  08:08 EST

## 2022-12-31 NOTE — THERAPY TREATMENT NOTE
Subjective: Pt agreeable to therapeutic plan of care.Nsg called to ask if we could assist with pt out of bed and to chair    Objective:     Bed mobility - Mod-A with max cues to hold harness  Transfers - Min-A, Assist x 2 and with rolling walker  Ambulation -  feet N/A or Not attempted.    Vitals: WNL on 2L 02    Pain: 0 VAS   Education: Provided education on the importance of mobility in the acute care setting, Verbal/Tactile Cues, Transfer Training, WB'ing status and Post-Op Precautions    Assessment: Sherman Baumann presents with functional mobility impairments which indicate the need for skilled intervention. Tolerating session today without incident. Pt [place] and delayed with responding. Req mod assist to come to sit EOB to R and min x 2 to SPS to chair using rwx to shuffle feet and complete transfer. Pt incontinent of loose stool again and had to stand long enough for nsg to clean up. Plans on rehab at [place]. Will continue to follow and progress as tolerated.     Post-Tx Position: Up in Chair, Staff Present and Call light and personal items within reach  PPE: gloves and surgical mask

## 2022-12-31 NOTE — PROGRESS NOTES
"      FOLLOW UP NOTE      Name: Sherman Baumann ADMIT: 2022   : 1945  PCP: Melvina Hodge    MRN: [id number] LOS: 14 days   AGE/SEX: 77 y.o. male  ROOM:      Date of Service: 2022                           CHIEF COMPLIANTS / REASON FOR FOLLOW UP          Acute kidney injury versus chronic kidney disease a stage III      Subjective:      Seen and examined  Resting in bed, no distress, no new complaints     Review of Systems:       Constitutional: No fever, no chills, no lethargy, no weakness.  HEENT:  No headache, otalgia, itchy eyes, nasal discharge or sore throat.  Cardiac:  No chest pain, dyspnea, orthopnea or PND.  Chest:   No cough, phlegm or wheezing.  Abdomen:  No abdominal pain, nausea or vomiting.  Neuro:  No focal weakness, abnormal movements or seizure-like activity.  :   No hematuria, no pyuria, no dysuria, no flank pain.  Extremities:  No  joint pains.  ROS was otherwise negative except as mentioned in the [place].        OBJECTIVE                                                                        Exam:  /54   Pulse 89   Temp 98.6 °F (37 °C) (Oral)   Resp 15   Ht 177.8 cm (70\")   Wt 106 kg (233 lb 9.6 oz)   SpO2 96%   BMI 33.52 kg/m²   Intake/Output last 3 shifts:  I/O last 3 completed shifts:  In: 5025.3 [P.O.:860; I.V.:4165.3]  Out: 3345 [Urine:3080; Chest Tube:265]  Intake/Output this shift:  No intake/output data recorded.    General Appearance:  Alert, chronically ill appearing, cooperative, no distress, appears stated age  Head:  Normocephalic, without obvious abnormality, atraumatic  Eyes:  Sclerae anicteric, EOM's intact     Neck:  Supple,  no adenopathy;      Lungs:   Clear to auscultation bilaterally, respirations unlabored  Heart:  Regular rate and rhythm, S1 and S2 normal, no  rub   or gallop  Abdomen:  Soft, nontender,    no masses, no hepatomegaly, no splenomegaly  Extremities:  Extremities normal, no edema  Neurologic:   Alert and oriented, no " focal deficits    Scheduled Meds:amiodarone, 400 mg, Oral, BID With Meals  aspirin, 81 mg, Oral, Daily  atorvastatin, 40 mg, Oral, Nightly  chlorhexidine, 15 mL, Mouth/Throat, Q12H  enoxaparin, 40 mg, Subcutaneous, Daily  mupirocin, 1 application, Each Nare, BID  pantoprazole, 40 mg, Oral, Q AM  polyethylene glycol, 17 g, Oral, BID  senna-docusate sodium, 2 tablet, Oral, BID      Continuous Infusions:EPINEPHrine, 0.02-0.05 mcg/kg/min, Last Rate: Stopped (12/30/22 1415)  insulin, 0-100 Units/hr, Last Rate: 2 Units/hr (12/31/22 6069)  milrinone, 0.25 mcg/kg/min, Last Rate: 0.125 mcg/kg/min (12/30/22 1458)  norepinephrine, 0.02-0.06 mcg/kg/min, Last Rate: Stopped (12/30/22 1703)  sodium chloride, 30 mL/hr, Last Rate: 30 mL/hr (12/29/22 1307)      PRN Meds:•  acetaminophen **OR** acetaminophen **OR** acetaminophen  •  albumin human  •  dextrose  •  dextrose  •  glucagon (human recombinant)  •  HYDROcodone-acetaminophen  •  Morphine **AND** naloxone  •  norepinephrine  •  ondansetron  •  potassium chloride **OR** potassium chloride  •  sodium chloride         Data Review:                                                                           Labs reviewed      Imaging:                                                                           Imaging reviewed           ASSESSMENT:                                                                                CAD (coronary artery disease), native coronary artery    Type 2 diabetes mellitus with hyperglycemia, without long-term current use of insulin ([facility])    Mixed hyperlipidemia    Primary hypertension    Systolic and diastolic CHF, acute on chronic ([facility])    Influenza due to seasonal influenza virus    NSTEMI (non-ST elevated myocardial infarction) ([facility])    • Acute kidney injury with chronic kidney disease a stage III, baseline creatinine December 17, 2022 on admission 1.16, now creatinine around 1.88-2.12. Suspect variable creatinine secondary to hemodynamic and volume  changes.  With history of diabetes, hypertension.  Urine analysis shows 100 mg of protein, no RBCs, no WBCs.  Urine output about 1 L.    • Volume overload with pulmonary edema.  • Diabetes type 2  • Hypertension  • Coronary artery disease multivalvular disease.  • Congestive heart failure ejection fraction 15 to 20%.  •  Acute coronary syndrome.  • S/p CABG x4     Plan:      • Patient has chronic kidney disease a stage III renal function is variable, creatinine peaked at 2.12, now creatinine 1.86 slightly better than yesterday  • Electrolytes acceptable, hyponatremia noted which is getting worse since yesterday  • CABG x4 12/29/2022   • Creatinine improved to 1.1 today.  Volume status acceptable no significant edema  • CVP still around 9 which is acceptable  • May need to start some regular low-dose loop diuretics in 1 to 2 days so far volume is acceptable  • Patient is currently not on any ACE inhibitor's or angiotensin receptor blockers. Noted cardiology plans to introduce when kidney function stabilizes.  Most likely ACE and ARB can be started in 2 to 3 days  • Urine analysis with glucosuria, ketones, 100 mg/dL protein, no hematuria, no pyuria  · Repeat echo showed ejection fraction 48% left ventricular cavity is dilated.  With grade 1 diastolic dysfunctions.       Wade Jiang MD  [facility]  12/31/2022  08:46 EST

## 2022-12-31 NOTE — PLAN OF CARE
Assessment: Sherman Baumann presents with functional mobility impairments which indicate the need for skilled intervention. Tolerating session today without incident. Pt [place] and delayed with responding. Req mod assist to come to sit EOB to R and min x 2 to SPS to chair using rwx to shuffle feet and complete transfer. Pt incontinent of loose stool again and had to stand long enough for nsg to clean up. Plans on rehab at [place]. Will continue to follow and progress as tolerated.

## 2022-12-31 NOTE — PROGRESS NOTES
"Subjective   Sherman Baumann is a 77 y.o. male.   Seen for diabetes f/u.  Post op day #1.  On the insulin drip per protocol.  Sitting up.      Objective     /55   Pulse 88   Temp 97.7 °F (36.5 °C) (Core)   Resp 21   Ht 177.8 cm (70\")   Wt 105 kg (231 lb 7.7 oz)   SpO2 97%   BMI 33.21 kg/m²   Blood sugar  145 @ 7 am,  200 @ noon, 100 @ 6p    ASSESSMENT    Patient is Improving    PLAN    Continue insulin drip.         Poncho Menard MD  12/30/2022  19:36 EST    " No

## 2022-12-31 NOTE — PROGRESS NOTES
"Subjective   Sherman Baumann is a 77 y.o. male.   Seen for diabetes f/u.  Post op day # 2. Sitting up, eating some dinner.  Will transition to sq glucommander tomorrow.      Objective     /62   Pulse 83   Temp 97.8 °F (36.6 °C) (Oral)   Resp 15   Ht 177.8 cm (70\")   Wt 106 kg (233 lb 9.6 oz)   SpO2 94%   BMI 33.52 kg/m²   Blood sugar  163 @ 6 am,  187 @ noon, 147 @ 6p    ASSESSMENT    Patient is Improving    PLAN    Continue insulin drip.         Poncho Menard MD  12/31/2022  18:57 EST    "

## 2022-12-31 NOTE — PLAN OF CARE
Assessment: Sherman Baumann presents with functional mobility impairments which indicate the need for skilled intervention. Tolerating session today without incident. Pt incontinent of loose stool once in stance, stood for 1 minute to get cleaned up and wanted to just take a few steps to get back to bed. Shuffle steps to complete transfer. Plans on rehab at [place].Will continue to follow and progress as tolerated.

## 2022-12-31 NOTE — THERAPY TREATMENT NOTE
"Subjective: Pt agreeable to therapeutic plan of care. Pt wanting to end up in bed after amb    Objective:     Bed mobility - Mod-A and Assist x 2 sit>supine  Transfers - Min-A, Assist x 2 and with rolling walker  Ambulation - 5 feet Min-A, Assist x 2 and with rolling walker    Vitals: WNL on 2L O2    Pain: 0 VAS     Education: Provided education on the importance of mobility in the acute care setting, Verbal/Tactile Cues, Transfer Training, Gait Training, WB'ing status and Post-Op Precautions    Assessment: Sherman Baumann presents with functional mobility impairments which indicate the need for skilled intervention. Tolerating session today without incident. Pt incontinent of loose stool once in stance, stood for 1 minute to get cleaned up and wanted to just take a few steps to get back to bed. Shuffle steps to complete transfer. Plans on rehab at [place].Will continue to follow and progress as tolerated.     Plan/Recommendations:   Moderate Intensity Therapy recommended post-acute care. This is recommended as therapy feels the patient would require 3-4 days per week and wouldn't tolerate \"3 hour daily\" rehab intensity. SNF would be the preferred choice. If the patient does not agree to SNF, arrange HH or OP depending on home bound status. If patient is medically complex, consider LTACH.. Pt requires no DME at discharge.     Pt desires Skilled Rehab placement at discharge. Pt cooperative; agreeable to therapeutic recommendations and plan of care.         Basic Mobility 6-click:  Rollin = Total, A lot = 2, A little = 3; 4 = None  Supine>Sit:   1 = Total, A lot = 2, A little = 3; 4 = None   Sit>Stand with arms:  1 = Total, A lot = 2, A little = 3; 4 = None  Bed>Chair:   1 = Total, A lot = 2, A little = 3; 4 = None  Ambulate in room:  1 = Total, A lot = 2, A little = 3; 4 = None  3-5 Steps with railin = Total, A lot = 2, A little = 3; 4 = None  Score: 12    Post-Tx Position: Supine with HOB Elevated, " Alarms activated and Call light and personal items within reach  PPE: gloves and surgical mask

## 2023-01-01 LAB
ANION GAP SERPL CALCULATED.3IONS-SCNC: 12 MMOL/L (ref 5–15)
BUN SERPL-MCNC: 37 MG/DL (ref 8–23)
BUN/CREAT SERPL: 33.9 (ref 7–25)
CALCIUM SPEC-SCNC: 8.8 MG/DL (ref 8.6–10.5)
CHLORIDE SERPL-SCNC: 100 MMOL/L (ref 98–107)
CO2 SERPL-SCNC: 26 MMOL/L (ref 22–29)
CREAT SERPL-MCNC: 1.09 MG/DL (ref 0.76–1.27)
DEPRECATED RDW RBC AUTO: 47.7 FL (ref 37–54)
EGFRCR SERPLBLD CKD-EPI 2021: 70.3 ML/MIN/1.73
ERYTHROCYTE [DISTWIDTH] IN BLOOD BY AUTOMATED COUNT: 14.7 % (ref 12.3–15.4)
GLUCOSE BLDC GLUCOMTR-MCNC: 123 MG/DL (ref 70–105)
GLUCOSE BLDC GLUCOMTR-MCNC: 135 MG/DL (ref 70–105)
GLUCOSE BLDC GLUCOMTR-MCNC: 137 MG/DL (ref 70–105)
GLUCOSE BLDC GLUCOMTR-MCNC: 149 MG/DL (ref 70–105)
GLUCOSE BLDC GLUCOMTR-MCNC: 154 MG/DL (ref 70–105)
GLUCOSE BLDC GLUCOMTR-MCNC: 212 MG/DL (ref 70–105)
GLUCOSE BLDC GLUCOMTR-MCNC: 255 MG/DL (ref 70–105)
GLUCOSE BLDC GLUCOMTR-MCNC: 271 MG/DL (ref 70–105)
GLUCOSE BLDC GLUCOMTR-MCNC: 300 MG/DL (ref 70–105)
GLUCOSE SERPL-MCNC: 130 MG/DL (ref 65–99)
HCT VFR BLD AUTO: 33.1 % (ref 37.5–51)
HGB BLD-MCNC: 10.9 G/DL (ref 13–17.7)
MCH RBC QN AUTO: 29.1 PG (ref 26.6–33)
MCHC RBC AUTO-ENTMCNC: 32.9 G/DL (ref 31.5–35.7)
MCV RBC AUTO: 88.7 FL (ref 79–97)
PLATELET # BLD AUTO: 200 10*3/MM3 (ref 140–450)
PMV BLD AUTO: 8.6 FL (ref 6–12)
POTASSIUM SERPL-SCNC: 3.4 MMOL/L (ref 3.5–5.2)
RBC # BLD AUTO: 3.74 10*6/MM3 (ref 4.14–5.8)
SODIUM SERPL-SCNC: 138 MMOL/L (ref 136–145)
WBC NRBC COR # BLD: 11.6 10*3/MM3 (ref 3.4–10.8)

## 2023-01-01 PROCEDURE — 93010 ELECTROCARDIOGRAM REPORT: CPT | Performed by: INTERNAL MEDICINE

## 2023-01-01 PROCEDURE — 82962 GLUCOSE BLOOD TEST: CPT

## 2023-01-01 PROCEDURE — 25010000002 ENOXAPARIN PER 10 MG: Performed by: NURSE PRACTITIONER

## 2023-01-01 PROCEDURE — 99231 SBSQ HOSP IP/OBS SF/LOW 25: CPT | Performed by: INTERNAL MEDICINE

## 2023-01-01 PROCEDURE — 63710000001 INSULIN GLARGINE PER 5 UNITS: Performed by: INTERNAL MEDICINE

## 2023-01-01 PROCEDURE — 93005 ELECTROCARDIOGRAM TRACING: CPT | Performed by: THORACIC SURGERY (CARDIOTHORACIC VASCULAR SURGERY)

## 2023-01-01 PROCEDURE — 99024 POSTOP FOLLOW-UP VISIT: CPT | Performed by: THORACIC SURGERY (CARDIOTHORACIC VASCULAR SURGERY)

## 2023-01-01 PROCEDURE — 99232 SBSQ HOSP IP/OBS MODERATE 35: CPT | Performed by: INTERNAL MEDICINE

## 2023-01-01 PROCEDURE — 0 MILRINONE LACTATE IN DEXTROSE 20-5 MG/100ML-% SOLUTION: Performed by: NURSE PRACTITIONER

## 2023-01-01 PROCEDURE — 25010000002 MAGNESIUM SULFATE 2 GM/50ML SOLUTION: Performed by: THORACIC SURGERY (CARDIOTHORACIC VASCULAR SURGERY)

## 2023-01-01 PROCEDURE — 80048 BASIC METABOLIC PNL TOTAL CA: CPT | Performed by: NURSE PRACTITIONER

## 2023-01-01 PROCEDURE — 25010000002 CALCIUM GLUCONATE-NACL 1-0.675 GM/50ML-% SOLUTION: Performed by: THORACIC SURGERY (CARDIOTHORACIC VASCULAR SURGERY)

## 2023-01-01 PROCEDURE — 85027 COMPLETE CBC AUTOMATED: CPT | Performed by: NURSE PRACTITIONER

## 2023-01-01 PROCEDURE — 63710000001 INSULIN LISPRO (HUMAN) PER 5 UNITS: Performed by: INTERNAL MEDICINE

## 2023-01-01 PROCEDURE — 25010000002 FUROSEMIDE PER 20 MG: Performed by: THORACIC SURGERY (CARDIOTHORACIC VASCULAR SURGERY)

## 2023-01-01 RX ORDER — BUMETANIDE 1 MG/1
0.5 TABLET ORAL 2 TIMES DAILY
Status: DISCONTINUED | OUTPATIENT
Start: 2023-01-01 | End: 2023-01-01

## 2023-01-01 RX ORDER — MAGNESIUM SULFATE HEPTAHYDRATE 40 MG/ML
2 INJECTION, SOLUTION INTRAVENOUS ONCE
Status: COMPLETED | OUTPATIENT
Start: 2023-01-01 | End: 2023-01-01

## 2023-01-01 RX ORDER — INSULIN LISPRO 100 [IU]/ML
1-200 INJECTION, SOLUTION INTRAVENOUS; SUBCUTANEOUS AS NEEDED
Status: DISCONTINUED | OUTPATIENT
Start: 2023-01-01 | End: 2023-01-03 | Stop reason: HOSPADM

## 2023-01-01 RX ORDER — INSULIN LISPRO 100 [IU]/ML
1-200 INJECTION, SOLUTION INTRAVENOUS; SUBCUTANEOUS
Status: DISCONTINUED | OUTPATIENT
Start: 2023-01-01 | End: 2023-01-03 | Stop reason: HOSPADM

## 2023-01-01 RX ORDER — ASPIRIN 325 MG
325 TABLET, DELAYED RELEASE (ENTERIC COATED) ORAL DAILY
Status: DISCONTINUED | OUTPATIENT
Start: 2023-01-02 | End: 2023-01-03

## 2023-01-01 RX ORDER — OLANZAPINE 10 MG/2ML
1 INJECTION, POWDER, LYOPHILIZED, FOR SOLUTION INTRAMUSCULAR
Status: DISCONTINUED | OUTPATIENT
Start: 2023-01-01 | End: 2023-01-03 | Stop reason: HOSPADM

## 2023-01-01 RX ORDER — FUROSEMIDE 10 MG/ML
20 INJECTION INTRAMUSCULAR; INTRAVENOUS EVERY 12 HOURS
Status: DISCONTINUED | OUTPATIENT
Start: 2023-01-01 | End: 2023-01-01

## 2023-01-01 RX ORDER — POTASSIUM CHLORIDE 20 MEQ/1
20 TABLET, EXTENDED RELEASE ORAL EVERY 12 HOURS
Status: COMPLETED | OUTPATIENT
Start: 2023-01-01 | End: 2023-01-02

## 2023-01-01 RX ORDER — NICOTINE POLACRILEX 4 MG
15 LOZENGE BUCCAL
Status: DISCONTINUED | OUTPATIENT
Start: 2023-01-01 | End: 2023-01-03 | Stop reason: HOSPADM

## 2023-01-01 RX ORDER — CALCIUM GLUCONATE 20 MG/ML
1 INJECTION, SOLUTION INTRAVENOUS ONCE
Status: COMPLETED | OUTPATIENT
Start: 2023-01-01 | End: 2023-01-01

## 2023-01-01 RX ORDER — BUMETANIDE 1 MG/1
1 TABLET ORAL DAILY
Status: DISCONTINUED | OUTPATIENT
Start: 2023-01-01 | End: 2023-01-03 | Stop reason: HOSPADM

## 2023-01-01 RX ORDER — DEXTROSE MONOHYDRATE 25 G/50ML
10-50 INJECTION, SOLUTION INTRAVENOUS
Status: DISCONTINUED | OUTPATIENT
Start: 2023-01-01 | End: 2023-01-03 | Stop reason: HOSPADM

## 2023-01-01 RX ADMIN — SENNOSIDES AND DOCUSATE SODIUM 2 TABLET: 50; 8.6 TABLET ORAL at 20:49

## 2023-01-01 RX ADMIN — MAGNESIUM SULFATE HEPTAHYDRATE 2 G: 2 INJECTION, SOLUTION INTRAVENOUS at 09:48

## 2023-01-01 RX ADMIN — EMPAGLIFLOZIN 10 MG: 10 TABLET, FILM COATED ORAL at 16:15

## 2023-01-01 RX ADMIN — CALCIUM GLUCONATE 1 G: 20 INJECTION, SOLUTION INTRAVENOUS at 09:48

## 2023-01-01 RX ADMIN — INSULIN LISPRO 4 UNITS: 100 INJECTION, SOLUTION INTRAVENOUS; SUBCUTANEOUS at 09:49

## 2023-01-01 RX ADMIN — POLYETHYLENE GLYCOL 3350 17 G: 17 POWDER, FOR SOLUTION ORAL at 20:52

## 2023-01-01 RX ADMIN — ASPIRIN 81 MG: 81 TABLET, COATED ORAL at 08:35

## 2023-01-01 RX ADMIN — POLYETHYLENE GLYCOL 3350 17 G: 17 POWDER, FOR SOLUTION ORAL at 08:35

## 2023-01-01 RX ADMIN — INSULIN LISPRO 7 UNITS: 100 INJECTION, SOLUTION INTRAVENOUS; SUBCUTANEOUS at 20:49

## 2023-01-01 RX ADMIN — POTASSIUM CHLORIDE 20 MEQ: 1500 TABLET, EXTENDED RELEASE ORAL at 22:08

## 2023-01-01 RX ADMIN — SENNOSIDES AND DOCUSATE SODIUM 2 TABLET: 50; 8.6 TABLET ORAL at 08:35

## 2023-01-01 RX ADMIN — MILRINONE LACTATE 0.12 MCG/KG/MIN: 0.2 INJECTION, SOLUTION INTRAVENOUS at 00:04

## 2023-01-01 RX ADMIN — AMIODARONE HYDROCHLORIDE 400 MG: 200 TABLET ORAL at 08:34

## 2023-01-01 RX ADMIN — CHLORHEXIDINE GLUCONATE 15 ML: 1.2 SOLUTION ORAL at 20:49

## 2023-01-01 RX ADMIN — INSULIN GLARGINE 35 UNITS: 100 INJECTION, SOLUTION SUBCUTANEOUS at 09:51

## 2023-01-01 RX ADMIN — INSULIN LISPRO 14 UNITS: 100 INJECTION, SOLUTION INTRAVENOUS; SUBCUTANEOUS at 17:29

## 2023-01-01 RX ADMIN — PANTOPRAZOLE SODIUM 40 MG: 40 TABLET, DELAYED RELEASE ORAL at 05:39

## 2023-01-01 RX ADMIN — POTASSIUM CHLORIDE 20 MEQ: 1500 TABLET, EXTENDED RELEASE ORAL at 08:34

## 2023-01-01 RX ADMIN — BUMETANIDE 1 MG: 1 TABLET ORAL at 16:15

## 2023-01-01 RX ADMIN — AMIODARONE HYDROCHLORIDE 400 MG: 200 TABLET ORAL at 17:29

## 2023-01-01 RX ADMIN — MUPIROCIN 1 APPLICATION: 20 OINTMENT TOPICAL at 20:49

## 2023-01-01 RX ADMIN — POTASSIUM CHLORIDE 20 MEQ: 1500 TABLET, EXTENDED RELEASE ORAL at 09:49

## 2023-01-01 RX ADMIN — ENOXAPARIN SODIUM 40 MG: 100 INJECTION SUBCUTANEOUS at 20:23

## 2023-01-01 RX ADMIN — INSULIN LISPRO 12 UNITS: 100 INJECTION, SOLUTION INTRAVENOUS; SUBCUTANEOUS at 11:43

## 2023-01-01 RX ADMIN — CHLORHEXIDINE GLUCONATE 15 ML: 1.2 SOLUTION ORAL at 08:35

## 2023-01-01 RX ADMIN — MUPIROCIN 1 APPLICATION: 20 OINTMENT TOPICAL at 08:35

## 2023-01-01 RX ADMIN — FUROSEMIDE 20 MG: 10 INJECTION, SOLUTION INTRAMUSCULAR; INTRAVENOUS at 09:49

## 2023-01-01 RX ADMIN — ATORVASTATIN CALCIUM 40 MG: 40 TABLET, FILM COATED ORAL at 20:49

## 2023-01-01 NOTE — PROGRESS NOTES
"      FOLLOW UP NOTE      Name: Sherman Baumann ADMIT: 2022   : 1945  PCP: Melvina Hodge    MRN: [id number] LOS: 15 days   AGE/SEX: 77 y.o. male  ROOM:      Date of Service: 2023                           CHIEF COMPLIANTS / REASON FOR FOLLOW UP          Acute kidney injury versus chronic kidney disease a stage III      Subjective:      Seen and examined  Resting in bed, no distress, no new complaints     Review of Systems:       Constitutional: No fever, no chills, no lethargy, no weakness.  HEENT:  No headache, otalgia, itchy eyes, nasal discharge or sore throat.  Cardiac:  No chest pain, dyspnea, orthopnea or PND.  Chest:   No cough, phlegm or wheezing.  Abdomen:  No abdominal pain, nausea or vomiting.  Neuro:  No focal weakness, abnormal movements or seizure-like activity.  :   No hematuria, no pyuria, no dysuria, no flank pain.  Extremities:  No  joint pains.  ROS was otherwise negative except as mentioned in the [place].        OBJECTIVE                                                                        Exam:  /66   Pulse 80   Temp 97.4 °F (36.3 °C) (Oral)   Resp 18   Ht 177.8 cm (70\")   Wt 100 kg (220 lb 14.4 oz)   SpO2 95%   BMI 31.70 kg/m²   Intake/Output last 3 shifts:  I/O last 3 completed shifts:  In: 2234.3 [P.O.:840; I.V.:1394.3]  Out: 3925 [Urine:3725; Chest Tube:200]  Intake/Output this shift:  I/O this shift:  In: 920 [P.O.:720; I.V.:200]  Out: 850 [Urine:850]    General Appearance:  Alert, chronically ill appearing, cooperative, no distress, appears stated age  Head:  Normocephalic, without obvious abnormality, atraumatic  Eyes:  Sclerae anicteric, EOM's intact     Neck:  Supple,  no adenopathy;      Lungs:   Clear to auscultation bilaterally, respirations unlabored  Heart:  Regular rate and rhythm, S1 and S2 normal, no  rub   or gallop  Abdomen:  Soft, nontender,    no masses, no hepatomegaly, no splenomegaly  Extremities:  Extremities normal, no " edema  Neurologic:   Alert and oriented, no focal deficits    Scheduled Meds:amiodarone, 400 mg, Oral, BID With Meals  [START ON 1/2/2023] aspirin, 325 mg, Oral, Daily  atorvastatin, 40 mg, Oral, Nightly  chlorhexidine, 15 mL, Mouth/Throat, Q12H  empagliflozin, 10 mg, Oral, Daily  enoxaparin, 40 mg, Subcutaneous, Daily  furosemide, 20 mg, Intravenous, Q12H  insulin glargine, 1-200 Units, Subcutaneous, Daily - Glucommander  insulin lispro, 1-200 Units, Subcutaneous, 4x Daily With Meals & Nightly  mupirocin, 1 application, Each Nare, BID  pantoprazole, 40 mg, Oral, Q AM  polyethylene glycol, 17 g, Oral, BID  potassium chloride, 20 mEq, Oral, Q12H  senna-docusate sodium, 2 tablet, Oral, BID      Continuous Infusions:   PRN Meds:•  acetaminophen **OR** [DISCONTINUED] acetaminophen **OR** acetaminophen  •  dextrose  •  dextrose  •  glucagon (human recombinant)  •  HYDROcodone-acetaminophen  •  insulin lispro  •  [DISCONTINUED] Morphine **AND** naloxone  •  ondansetron  •  potassium chloride **OR** potassium chloride  •  sodium chloride         Data Review:                                                                           Labs reviewed      Imaging:                                                                           Imaging reviewed           ASSESSMENT:                                                                                CAD (coronary artery disease), native coronary artery    Type 2 diabetes mellitus with hyperglycemia, without long-term current use of insulin ([facility])    Mixed hyperlipidemia    Primary hypertension    Systolic and diastolic CHF, acute on chronic (HCC)    Influenza due to seasonal influenza virus    NSTEMI (non-ST elevated myocardial infarction) ([facility])    • Acute kidney injury with chronic kidney disease a stage III, baseline creatinine December 17, 2022 on admission 1.16, now creatinine around 1.88-2.12. Suspect variable creatinine secondary to hemodynamic and volume changes.  With  history of diabetes, hypertension.  Urine analysis shows 100 mg of protein, no RBCs, no WBCs.  Urine output about 1 L.    • Volume overload with pulmonary edema.  • Diabetes type 2  • Hypertension  • Coronary artery disease multivalvular disease.  • Congestive heart failure ejection fraction 15 to 20%.  •  Acute coronary syndrome.  • S/p CABG x4     Plan:      • Patient has chronic kidney disease a stage III renal function is variable, creatinine peaked at 2.12, now creatinine 1.86 slightly better than yesterday  • Electrolytes acceptable, hyponatremia noted which is getting worse since yesterday  • CABG x4 12/29/2022   • Creatinine improved to 1.1 today.  Volume status acceptable no significant edema  • Slightly low Na will start lasix at low dose e  • Patient is currently not on any ACE inhibitor's or angiotensin receptor blockers. Noted cardiology plans to introduce when kidney function stabilizes.  Most likely ACE and ARB can be started in 2 to 3 days  • Urine analysis with glucosuria, ketones, 100 mg/dL protein, no hematuria, no pyuria  · Repeat echo showed ejection fraction 48% left ventricular cavity is dilated.  With grade 1 diastolic dysfunctions.       Wade Jiang MD  [facility]  1/1/2023  15:46 EST

## 2023-01-01 NOTE — PROGRESS NOTES
Daily Progress Note          Assessment    Hypoxemia  Flu B  COPD  LOGAN  Cor Pulmonale   CHF: LV EF 20-25%  DM II  Hyperlipidemia  HTN  CAD with multivessel disease: Status post CABG x4 12/29/2022  MENDEZ        PLAN:    Respiratory status has improved and patient can be discharged from pulmonary standpoint   oxygen supplement and titration: Currently requiring 2 L per nasal cannula  Encourage use of incentive spirometer  Encourage ambulation and physical therapy  Aspirin  DVT prophylaxis             LOS: 15 days     Subjective     Patient reports significant improvement in the mild shortness of breath and cough    Objective     Vital signs for last 24 hours:  Vitals:    01/01/23 1100 01/01/23 1200 01/01/23 1300 01/01/23 1400   BP: 125/63 125/52  126/66   Pulse: 84 87 89 80   Resp:       Temp:  97.4 °F (36.3 °C)     TempSrc:  Oral     SpO2: 95% 96%  95%   Weight:       Height:           Intake/Output last 3 shifts:  I/O last 3 completed shifts:  In: 2234.3 [P.O.:840; I.V.:1394.3]  Out: 3925 [Urine:3725; Chest Tube:200]  Intake/Output this shift:  I/O this shift:  In: 920 [P.O.:720; I.V.:200]  Out: 850 [Urine:850]      Radiology  Imaging Results (Last 24 Hours)     Procedure Component Value Units Date/Time    XR Chest 1 View [[id number]] Collected: 12/31/22 1659     Updated: 12/31/22 1703    Narrative:      EXAMINATION: XR CHEST 1 VW-     DATE OF EXAM: 12/31/2022 2:48 PM     INDICATION: chest tube removal; I25.110-Atherosclerotic heart disease of  native coronary artery with unstable angina pectoris.     COMPARISON: Chest radiograph dated 12/31/2022 at 5:43     TECHNIQUE: Portable AP view of the chest was obtained.     FINDINGS:  There is a right IJ introducer sheath with tip terminating at the  superior mediastinum. There is cardiomegaly. There are postsurgical  changes of CABG. The midline chest tube has been removed. There are low  lung volumes with streaky bibasilar airspace disease. There is no  pleural effusion or  pneumothorax. There are degenerative changes of the  thoracic spine.       Impression:      1. No evidence of pneumothorax status post chest tube removal.  2. Low lung volumes with streaky bibasilar atelectasis.  3. Postsurgical changes of CABG.     Electronically Signed By-Bradley Moore MD On:12/31/2022 5:01 PM  This report was finalized on [id number] by  Bradley Moore MD.          Labs:  Results from last 7 days   Lab Units 01/01/23 0428   WBC 10*3/mm3 11.60*   HEMOGLOBIN g/dL 10.9*   HEMATOCRIT % 33.1*   PLATELETS 10*3/mm3 200     Results from last 7 days   Lab Units 01/01/23 0428 12/29/22 1210 12/29/22 0419   SODIUM mmol/L 138   < > 126*   POTASSIUM mmol/L 3.4*   < > 4.2   CHLORIDE mmol/L 100   < > 81*   CO2 mmol/L 26.0   < > 28.0   BUN mg/dL 37*   < > 79*   CREATININE mg/dL 1.09   < > 1.69*   CALCIUM mg/dL 8.8   < > 10.5   BILIRUBIN mg/dL  --   --  0.8   ALK PHOS U/L  --   --  104   ALT (SGPT) U/L  --   --  12   AST (SGOT) U/L  --   --  18   GLUCOSE mg/dL 130*   < > 180*    < > = values in this interval not displayed.     Results from last 7 days   Lab Units 12/30/22  0151   PH, ARTERIAL pH units 7.340*   PO2 ART mm Hg 124.5*   PCO2, ARTERIAL mm Hg 40.3   HCO3 ART mmol/L 21.8     Results from last 7 days   Lab Units 12/30/22 0317 12/29/22  1210 12/29/22  0419   ALBUMIN g/dL 3.8 3.7 4.0             Results from last 7 days   Lab Units 12/30/22 0317   MAGNESIUM mg/dL 3.0*     Results from last 7 days   Lab Units 12/30/22 0317 12/29/22  1210 12/29/22  0420 12/28/22  0905 12/28/22  0154   INR  1.06 1.16*  --   --  1.06   APTT seconds  --  30.3 64.1 62.4 66.9               Meds:   SCHEDULE  amiodarone, 400 mg, Oral, BID With Meals  [START ON 1/2/2023] aspirin, 325 mg, Oral, Daily  atorvastatin, 40 mg, Oral, Nightly  chlorhexidine, 15 mL, Mouth/Throat, Q12H  empagliflozin, 10 mg, Oral, Daily  enoxaparin, 40 mg, Subcutaneous, Daily  furosemide, 20 mg, Intravenous, Q12H  insulin glargine, 1-200 Units,  Subcutaneous, Daily - Glucommander  insulin lispro, 1-200 Units, Subcutaneous, 4x Daily With Meals & Nightly  mupirocin, 1 application, Each Nare, BID  pantoprazole, 40 mg, Oral, Q AM  polyethylene glycol, 17 g, Oral, BID  potassium chloride, 20 mEq, Oral, Q12H  senna-docusate sodium, 2 tablet, Oral, BID      Infusions     PRNs  •  acetaminophen **OR** [DISCONTINUED] acetaminophen **OR** acetaminophen  •  dextrose  •  dextrose  •  glucagon (human recombinant)  •  HYDROcodone-acetaminophen  •  insulin lispro  •  [DISCONTINUED] Morphine **AND** naloxone  •  ondansetron  •  potassium chloride **OR** potassium chloride  •  sodium chloride    Physical Exam:  General Appearance: Alert and following commands  HEENT:  Normocephalic, without obvious abnormality, Conjunctiva/corneas clear,.   Nares normal, no drainage     Neck:  Supple, symmetrical, trachea midline.   Lungs /Chest wall: Good air movement bilaterally bibasilar crackles, chest tubes in place, symmetrical wall movement.     Heart: Rate controlled, S1 S2 normal  Abdomen: Soft, non-tender, no masses, no organomegaly.    Extremities: No edema, no clubbing or cyanosis     ROS  Constitutional: Negative for chills, fever and malaise/fatigue.   HENT: Negative.    Eyes: Negative.    Cardiovascular: Expected postoperative incisional pain  Respiratory: Positive for improvement in the cough and shortness of breath.    Skin: Negative.    Musculoskeletal: Negative.    Gastrointestinal: Negative.    Genitourinary: Negative.    Neurological: Negative.    Psychiatric/Behavioral: Negative.      I reviewed the recent clinical results    Much of this encounter note is an electronic transcription/translation of spoken language to printed text using Dragon Software which might include inadvertent errors in transcription.

## 2023-01-01 NOTE — PROGRESS NOTES
"Subjective   Sherman Baumann is a 77 y.o. male.   Seen for diabetes f/u.  Post op day#3.      Objective     /73   Pulse 81   Temp 97.4 °F (36.3 °C) (Oral)   Resp 18   Ht 177.8 cm (70\")   Wt 100 kg (220 lb 14.4 oz)   SpO2 96%   BMI 31.70 kg/m²   Blood sugar  137/154 this am,  300 @ lunch, 212 @ supper    ASSESSMENT    Patient is Improving    PLAN    Transitioned to  glucommander this mornig.  Restarted Jardiance today.         Poncho Menard MD  1/1/2023  18:10 EST    "

## 2023-01-01 NOTE — PROGRESS NOTES
Status post coronary bypass, postop day #3.    CV improving.  Discontinue milrinone.  Not ready for beta-blocker yet.  Continue amiodarone.  Remove AV wires later today.  Remove central line later today.  Pulmonary toileting.  Mobilize.  Oral diet as tolerated.  Low-dose diuretic regimen started with potassium supplementation.  Replace magnesium this morning.  Afebrile.    Slow but measurable progress.  Rehab will be an issue.

## 2023-01-01 NOTE — PROGRESS NOTES
Referring Provider: Jaleel Huber MD    Reason for follow-up: Multivessel coronary artery disease, HFrEF, diabetes, non-ST elevation MI, acute kidney injury     Patient Care Team:  Melvina Hodge as PCP - General (Nurse Practitioner)  Johann Morales MD as Cardiologist (Cardiology)      SUBJECTIVE  He is recuperating well.  He is off milrinone but remains on amiodarone  His family is at bedside and had multiple questions today     ROS  Review of all systems negative except as indicated.    Since I have last seen, the patient has been without any chest discomfort, shortness of breath, palpitations, dizziness or syncope.  Denies having any headache, abdominal pain, nausea, vomiting, diarrhea, constipation, loss of weight or loss of appetite.  Denies having any excessive bruising, hematuria or blood in the stool.  ROS      Personal History:    Past Medical History:   Diagnosis Date   • CHF (congestive heart failure) (HCC)    • Diabetes mellitus (HCC)    • Elevated cholesterol    • Hyperlipidemia    • Hypertension        Past Surgical History:   Procedure Laterality Date   • HERNIA REPAIR     • TONSILLECTOMY         History reviewed. No pertinent family history.    Social History     Tobacco Use   • Smoking status: Former     Types: Cigarettes   Vaping Use   • Vaping Use: Never used   Substance Use Topics   • Alcohol use: Not Currently   • Drug use: Never        Medications Prior to Admission   Medication Sig Dispense Refill Last Dose   • aspirin 81 MG chewable tablet Chew 81 mg Daily.      • aspirin-acetaminophen-caffeine (EXCEDRIN MIGRAINE) 250-250-65 MG per tablet Take 1 tablet by mouth Every 6 (Six) Hours As Needed for Headache.      • carvedilol (COREG) 3.125 MG tablet Take 3.125 mg by mouth 2 (Two) Times a Day With Meals.      • furosemide (LASIX) 40 MG tablet Take 40 mg by mouth 2 (Two) Times a Day.      • glipizide (GLUCOTROL) 10 MG tablet Take 10 mg by mouth 2 (Two) Times a Day Before Meals.      • insulin  NPH-insulin regular (humuLIN 70/30,novoLIN 70/30) (70-30) 100 UNIT/ML injection Inject 27 Units under the skin into the appropriate area as directed Every Morning.      • insulin NPH-insulin regular (humuLIN 70/30,novoLIN 70/30) (70-30) 100 UNIT/ML injection Inject 22 Units under the skin into the appropriate area as directed Every Night.      • metFORMIN (GLUCOPHAGE) 500 MG tablet Take 500 mg by mouth Daily With Breakfast.      • Omega-3 Fatty Acids (fish oil) 1000 MG capsule capsule Take 1,000 mg by mouth Daily With Breakfast.      • oseltamivir (TAMIFLU) 75 MG capsule Take 75 mg by mouth 2 (Two) Times a Day.      • rosuvastatin (CRESTOR) 5 MG tablet Take 5 mg by mouth Every Night.          Allergies:  Patient has no known allergies.    Scheduled Meds:amiodarone, 400 mg, Oral, BID With Meals  [START ON 1/2/2023] aspirin, 325 mg, Oral, Daily  atorvastatin, 40 mg, Oral, Nightly  chlorhexidine, 15 mL, Mouth/Throat, Q12H  enoxaparin, 40 mg, Subcutaneous, Daily  furosemide, 20 mg, Intravenous, Q12H  insulin glargine, 1-200 Units, Subcutaneous, Daily - Glucommander  insulin lispro, 1-200 Units, Subcutaneous, 4x Daily With Meals & Nightly  mupirocin, 1 application, Each Nare, BID  pantoprazole, 40 mg, Oral, Q AM  polyethylene glycol, 17 g, Oral, BID  potassium chloride, 20 mEq, Oral, Q12H  senna-docusate sodium, 2 tablet, Oral, BID      Continuous Infusions:sodium chloride, 30 mL/hr, Last Rate: 30 mL/hr (12/29/22 1307)      PRN Meds:.•  acetaminophen **OR** [DISCONTINUED] acetaminophen **OR** acetaminophen  •  dextrose  •  dextrose  •  dextrose  •  dextrose  •  glucagon (human recombinant)  •  glucagon (human recombinant)  •  HYDROcodone-acetaminophen  •  insulin lispro  •  [DISCONTINUED] Morphine **AND** naloxone  •  ondansetron  •  potassium chloride **OR** potassium chloride  •  sodium chloride      OBJECTIVE    Vital Signs  Vitals:    01/01/23 0700 01/01/23 0742 01/01/23 0800 01/01/23 0834   BP: 132/65  126/58 126/58  "  Pulse: 80  88 89   Resp:   18    Temp:  98.3 °F (36.8 °C)     TempSrc:       SpO2: 100%  95%    Weight:       Height:           Flowsheet Rows      Flowsheet Row First Filed Value   Admission Height 177.8 cm (70\") Documented at 12/18/2022 1305   Admission Weight 103 kg (227 lb 1.2 oz) Documented at 12/17/2022 0500              Intake/Output Summary (Last 24 hours) at 1/1/2023 1006  Last data filed at 1/1/2023 0700  Gross per 24 hour   Intake 1409 ml   Output 2435 ml   Net -1026 ml          Telemetry: Sinus rhythm    Physical Exam:  The patient is alert, oriented and in no distress.  Vital signs as noted above.  Head and neck revealed no carotid bruits or jugular venous distention.  No thyromegaly or lymphadenopathy is present  Lungs clear.  No wheezing.  Breath sounds are normal bilaterally.  Heart normal first and second heart sounds.  No murmur. No precordial rub is present.  No gallop is present.  Abdomen soft and nontender.  No organomegaly is present.  Extremities with good peripheral pulses without any pedal edema.  Skin warm and dry.  Musculoskeletal system is grossly normal.  CNS grossly normal.       Results Review:  I have personally reviewed the results from the time of this admission to 1/1/2023 10:06 EST and agree with these findings:  []  Laboratory  []  Microbiology  []  Radiology  []  EKG/Telemetry   []  Cardiology/Vascular   []  Pathology  []  Old records  []  Other:    Most notable findings include:    Lab Results (last 24 hours)       Procedure Component Value Units Date/Time    POC Glucose Once [[id number]]  (Abnormal) Collected: 01/01/23 0917    Specimen: Blood Updated: 01/01/23 0918     Glucose 255 mg/dL      Comment: Serial Number: [id number]:  [id number]       POC Glucose Once [[id number]]  (Abnormal) Collected: 01/01/23 0704    Specimen: Blood Updated: 01/01/23 0706     Glucose 154 mg/dL      Comment: Serial Number: [id number]:  [id number]       POC Glucose Once [[id number]]  " (Abnormal) Collected: 01/01/23 0537    Specimen: Blood Updated: 01/01/23 0539     Glucose 137 mg/dL      Comment: Serial Number: [id number]:  [id number]       Basic Metabolic Panel [[id number]]  (Abnormal) Collected: 01/01/23 0428    Specimen: Blood Updated: 01/01/23 0453     Glucose 130 mg/dL      BUN 37 mg/dL      Creatinine 1.09 mg/dL      Sodium 138 mmol/L      Potassium 3.4 mmol/L      Chloride 100 mmol/L      CO2 26.0 mmol/L      Calcium 8.8 mg/dL      BUN/Creatinine Ratio 33.9     Anion Gap 12.0 mmol/L      eGFR 70.3 mL/min/1.73      Comment: National Kidney Foundation and American Society of Nephrology (ASN) Task Force recommended calculation based on the Chronic Kidney Disease Epidemiology Collaboration (CKD-EPI) equation refit without adjustment for race.       Narrative:      GFR Normal >60  Chronic Kidney Disease <60  Kidney Failure <15    The GFR formula is only valid for adults with stable renal function between ages 18 and 70.    CBC (No Diff) [[id number]]  (Abnormal) Collected: 01/01/23 0428    Specimen: Blood Updated: 01/01/23 0435     WBC 11.60 10*3/mm3      RBC 3.74 10*6/mm3      Hemoglobin 10.9 g/dL      Hematocrit 33.1 %      MCV 88.7 fL      MCH 29.1 pg      MCHC 32.9 g/dL      RDW 14.7 %      RDW-SD 47.7 fl      MPV 8.6 fL      Platelets 200 10*3/mm3     POC Glucose Once [[id number]]  (Abnormal) Collected: 01/01/23 0428    Specimen: Blood Updated: 01/01/23 0431     Glucose 123 mg/dL      Comment: Serial Number: [id number]:  [id number]       POC Glucose Once [[id number]]  (Abnormal) Collected: 01/01/23 0319    Specimen: Blood Updated: 01/01/23 0322     Glucose 149 mg/dL      Comment: Serial Number: [id number]:  [id number]       POC Glucose Once [[id number]]  (Abnormal) Collected: 01/01/23 0209    Specimen: Blood Updated: 01/01/23 0212     Glucose 135 mg/dL      Comment: Serial Number: [id number]:  [id number]       POC Glucose Once [[id number]]  (Abnormal) Collected: 12/31/22  2347    Specimen: Blood Updated: 12/31/22 2349     Glucose 141 mg/dL      Comment: Serial Number: [id number]:  [id number]       POC Glucose Once [[id number]]  (Abnormal) Collected: 12/31/22 2107    Specimen: Blood Updated: 12/31/22 2111     Glucose 157 mg/dL      Comment: Serial Number: [id number]:  [id number]       POC Glucose Once [[id number]]  (Abnormal) Collected: 12/31/22 1745    Specimen: Blood Updated: 12/31/22 1810     Glucose 147 mg/dL      Comment: Serial Number: [id number]:  [id number]       POC Glucose Once [[id number]]  (Abnormal) Collected: 12/31/22 1551    Specimen: Blood Updated: 12/31/22 1553     Glucose 138 mg/dL      Comment: Serial Number: [id number]:  [id number]       POC Glucose Once [[id number]]  (Abnormal) Collected: 12/31/22 1424    Specimen: Blood Updated: 12/31/22 1426     Glucose 150 mg/dL      Comment: Serial Number: [id number]:  [id number]       POC Glucose Once [[id number]]  (Abnormal) Collected: 12/31/22 1310    Specimen: Blood Updated: 12/31/22 1311     Glucose 165 mg/dL      Comment: Serial Number: [id number]:  [id number]       Potassium [[id number]]  (Normal) Collected: 12/31/22 1200    Specimen: Blood Updated: 12/31/22 1248     Potassium 4.1 mmol/L     POC Glucose Once [[id number]]  (Abnormal) Collected: 12/31/22 1234    Specimen: Blood Updated: 12/31/22 1236     Glucose 187 mg/dL      Comment: Serial Number: [id number]:  [id number]       POC Glucose Once [[id number]]  (Abnormal) Collected: 12/31/22 1011    Specimen: Blood Updated: 12/31/22 1012     Glucose 170 mg/dL      Comment: Serial Number: [id number]:  [id number]               Imaging Results (Last 24 Hours)       Procedure Component Value Units Date/Time    XR Chest 1 View [[id number]] Collected: 12/31/22 1659     Updated: 12/31/22 1703    Narrative:      EXAMINATION: XR CHEST 1 VW-     DATE OF EXAM: 12/31/2022 2:48 PM     INDICATION: chest tube removal; I25.110-Atherosclerotic heart  disease of  native coronary artery with unstable angina pectoris.     COMPARISON: Chest radiograph dated 12/31/2022 at 5:43     TECHNIQUE: Portable AP view of the chest was obtained.     FINDINGS:  There is a right IJ introducer sheath with tip terminating at the  superior mediastinum. There is cardiomegaly. There are postsurgical  changes of CABG. The midline chest tube has been removed. There are low  lung volumes with streaky bibasilar airspace disease. There is no  pleural effusion or pneumothorax. There are degenerative changes of the  thoracic spine.       Impression:      1. No evidence of pneumothorax status post chest tube removal.  2. Low lung volumes with streaky bibasilar atelectasis.  3. Postsurgical changes of CABG.     Electronically Signed By-Bradley Moore MD On:12/31/2022 5:01 PM  This report was finalized on [id number] by  Bradley Moore MD.            LAB RESULTS (LAST 7 DAYS)    CBC  Results from last 7 days   Lab Units 01/01/23  0428 12/31/22  0343 12/30/22  0317 12/29/22  1829 12/29/22  1739 12/29/22  1637 12/29/22  1518 12/29/22  1247 12/29/22  1210 12/29/22  0758 12/29/22  0419 12/28/22  0154 12/27/22  0427   WBC 10*3/mm3 11.60* 10.90* 21.00*  --   --   --   --   --  29.20*  --  12.20* 12.20* 11.80*   RBC 10*6/mm3 3.74* 3.49* 4.03*  --   --   --   --   --  4.47  --  5.27 5.38 5.22   HEMOGLOBIN g/dL 10.9* 10.4* 11.8*  --   --   --   --   --  13.0  --  16.1 15.8 15.5   HEMOGLOBIN, POC g/dL  --   --   --  13.6 14.3 14.2 13.8   < >  --    < >  --   --   --    HEMATOCRIT % 33.1* 30.9* 35.2*  --   --   --   --   --  39.9  --  46.3 47.3 46.2   HEMATOCRIT POC %  --   --   --  40 42 42 41   < >  --    < >  --   --   --    MCV fL 88.7 88.6 87.4  --   --   --   --   --  89.3  --  87.9 88.0 88.6   PLATELETS 10*3/mm3 200 161 268  --   --   --   --   --  333  --  326 327 361    < > = values in this interval not displayed.       BMP  Results from last 7 days   Lab Units 01/01/23  0426  12/31/22  1200 12/31/22  0343 12/30/22  1654 12/30/22 0317 12/29/22 2315 12/29/22 1753 12/29/22 1210 12/29/22 0419 12/28/22  0154   SODIUM mmol/L 138  --  135* 135* 133*  --   --  128* 126* 125*   POTASSIUM mmol/L 3.4* 4.1 3.5 4.0 4.3 3.7 3.3* 3.8 4.2 4.3   CHLORIDE mmol/L 100  --  100 96* 97*  --   --  89* 81* 83*   CO2 mmol/L 26.0  --  23.0 26.0 22.0  --   --  24.0 28.0 25.0   BUN mg/dL 37*  --  45* 53* 67*  --   --  69* 79* 59*   CREATININE mg/dL 1.09  --  1.12 1.38* 1.82*  --   --  1.75* 1.69* 1.85*   GLUCOSE mg/dL 130*  --  147* 95 224*  --   --  191* 180* 185*   MAGNESIUM mg/dL  --   --   --   --  3.0*  --   --   --   --   --    PHOSPHORUS mg/dL  --   --   --   --  5.6*  --   --  5.4*  --   --        CMP   Results from last 7 days   Lab Units 01/01/23  0428 12/31/22  1200 12/31/22 0343 12/30/22 1654 12/30/22 0317 12/29/22 2315 12/29/22 1753 12/29/22 1210 12/29/22 0419 12/28/22  0154   SODIUM mmol/L 138  --  135* 135* 133*  --   --  128* 126* 125*   POTASSIUM mmol/L 3.4* 4.1 3.5 4.0 4.3 3.7 3.3* 3.8 4.2 4.3   CHLORIDE mmol/L 100  --  100 96* 97*  --   --  89* 81* 83*   CO2 mmol/L 26.0  --  23.0 26.0 22.0  --   --  24.0 28.0 25.0   BUN mg/dL 37*  --  45* 53* 67*  --   --  69* 79* 59*   CREATININE mg/dL 1.09  --  1.12 1.38* 1.82*  --   --  1.75* 1.69* 1.85*   GLUCOSE mg/dL 130*  --  147* 95 224*  --   --  191* 180* 185*   ALBUMIN g/dL  --   --   --   --  3.8  --   --  3.7 4.0  --    BILIRUBIN mg/dL  --   --   --   --   --   --   --   --  0.8  --    ALK PHOS U/L  --   --   --   --   --   --   --   --  104  --    AST (SGOT) U/L  --   --   --   --   --   --   --   --  18  --    ALT (SGPT) U/L  --   --   --   --   --   --   --   --  12  --        BNP        TROPONIN        CoAg  Results from last 7 days   Lab Units 12/30/22  0317 12/29/22  1210 12/29/22  0420 12/28/22  0905 12/28/22  0154 12/27/22  1913 12/27/22  1145 12/27/22  0427   INR  1.06 1.16*  --   --  1.06  --   --   --    APTT seconds  --  30.3  64.1 62.4 66.9 77.0* 76.5 59.7*       Creatinine Clearance  Estimated Creatinine Clearance: 68.3 mL/min (by C-G formula based on SCr of 1.09 mg/dL).    ABG  Results from last 7 days   Lab Units 12/30/22  0151 12/30/22  0019 12/29/22  1829 12/29/22  1739 12/29/22  1637 12/29/22  1518 12/29/22  1417   PH, ARTERIAL pH units 7.340* 7.361 7.362 7.368 7.424 7.400 7.360   PCO2, ARTERIAL mm Hg 40.3 38.8 38.4 40.0 34.8* 37.4 40.2   PO2 ART mm Hg 124.5* 96.8 75.7* 82.2* 78.1* 94.6 102.3   O2 SATURATION ART % 98.6* 97.3 94.5 95.7 95.8 97.4 97.6   BASE EXCESS ART mmol/L -3.8* -3.1* -3.2* -2.1* -1.1* -1.4* -2.6*       Radiology  XR Chest 1 View    Result Date: 12/31/2022  1. No evidence of pneumothorax status post chest tube removal. 2. Low lung volumes with streaky bibasilar atelectasis. 3. Postsurgical changes of CABG.  Electronically Signed By-Bradley Moore MD On:12/31/2022 5:01 PM This report was finalized on [id number] by  Bradley Moore MD.    XR Chest 1 View    Result Date: 12/31/2022  1.     Interval removal of Ralston-Sonia catheter. Right IJ sheath remains. No definite pneumothorax. 2.     Bibasilar opacities with suspected mild increase in right basilar opacities which could represent worsening infiltrate or atelectasis. 3.     Probable small pleural effusions.  Electronically Signed By-Cameron Fermin MD On:12/31/2022 8:16 AM This report was finalized on [id number] by  Cameron Fermin MD.        EKG  I personally viewed and interpreted the patient's EKG/Telemetry data:  ECG 12 Lead Drug Monitoring; Amiodarone   Final Result   HEART RATE= 60  bpm   RR Interval= 996  ms   IN Interval= 177  ms   P Horizontal Axis= -52  deg   P Front Axis= 38  deg   QRSD Interval= 89  ms   QT Interval= 453  ms   QRS Axis= 13  deg   T Wave Axis= 96  deg   - ABNORMAL ECG -   Sinus rhythm   Nonspecific T abnrm, anterolateral leads   Borderline ST elevation, inferior leads   When compared with ECG of 29-Dec-2022 16:41:22,   Significant  change in rhythm   Electronically Signed By: Johann Morales ([facility]) 30-Dec-2022 23:59:10   Date and Time of Study: 2022-12-30 03:33:03      ECG 12 Lead Drug Monitoring; Amiodarone   Final Result   HEART RATE= 89  bpm   RR Interval= 671  ms   AZ Interval= 219  ms   P Horizontal Axis= -87  deg   P Front Axis=   deg   QRSD Interval= 100  ms   QT Interval= 371  ms   QRS Axis= 20  deg   T Wave Axis= 177  deg   - ABNORMAL ECG -   Atrial-paced rhythm   Abnormal T, consider ischemia, lateral leads   When compared with ECG of 17-Dec-2022 7:21:47,   Significant change in rhythm   Significant axis, voltage or hypertrophy change   Electronically Signed By: Sampson Torres ([facility]) 29-Dec-2022 20:09:23   Date and Time of Study: 2022-12-29 16:41:22      ECG 12 Lead Chest Pain   Final Result   HEART RATE= 89  bpm   RR Interval= 672  ms   AZ Interval= 176  ms   P Horizontal Axis= -5  deg   P Front Axis= 29  deg   QRSD Interval= 92  ms   QT Interval= 362  ms   QRS Axis= 16  deg   T Wave Axis= 163  deg   - ABNORMAL ECG -   Sinus rhythm   Probable left atrial enlargement   Probable anterior infarct, age indeterminate   Abnormal T, consider ischemia, lateral leads   No previous ECG available for comparison   Electronically Signed By: Alex Byrd ([facility]) 19-Dec-2022 08:52:18   Date and Time of Study: 2022-12-17 07:21:47      SCANNED - TELEMETRY     Final Result      SCANNED - TELEMETRY     Final Result      SCANNED - TELEMETRY     Final Result      SCANNED - TELEMETRY     Final Result      SCANNED - TELEMETRY     Final Result      SCANNED - TELEMETRY     Final Result      SCANNED - TELEMETRY     Final Result      SCANNED - TELEMETRY     Final Result      SCANNED - TELEMETRY     Final Result      SCANNED - TELEMETRY     Final Result      SCANNED - TELEMETRY     Final Result      SCANNED - TELEMETRY     Final Result      SCANNED - TELEMETRY     Final Result      SCANNED - TELEMETRY     Final Result      SCANNED - TELEMETRY     Final Result       SCANNED - TELEMETRY     Final Result      SCANNED - TELEMETRY     Final Result      SCANNED - TELEMETRY     Final Result      SCANNED - TELEMETRY     Final Result      SCANNED - TELEMETRY     Final Result      SCANNED - TELEMETRY     Final Result      SCANNED - TELEMETRY     Final Result      SCANNED - TELEMETRY     Final Result      SCANNED - TELEMETRY     Final Result      SCANNED - TELEMETRY     Final Result      SCANNED - TELEMETRY     Final Result      SCANNED - TELEMETRY     Final Result      SCANNED - TELEMETRY     Final Result      SCANNED - TELEMETRY     Final Result      SCANNED - TELEMETRY     Final Result      SCANNED - TELEMETRY     Final Result      SCANNED - TELEMETRY     Final Result      SCANNED - TELEMETRY     Final Result      SCANNED - TELEMETRY     Final Result      SCANNED - TELEMETRY     Final Result      SCANNED - TELEMETRY     Final Result      SCANNED - TELEMETRY     Final Result      SCANNED - TELEMETRY     Final Result      SCANNED - TELEMETRY     Final Result      SCANNED - TELEMETRY     Final Result      SCANNED - TELEMETRY     Final Result      ECG 12 Lead    (Results Pending)   ECG 12 Lead    (Results Pending)   ECG 12 Lead    (Results Pending)         Echocardiogram:    Results for orders placed during the hospital encounter of 12/17/22    Adult Transthoracic Echo Limited W/ Cont if Necessary Per Protocol    Interpretation Summary  •  Left ventricular systolic function is low normal. Calculated left ventricular EF = 48% Left ventricular ejection fraction appears to be 46 - 50%.  •  The left ventricular cavity is moderately dilated.  •  Left ventricular wall thickness is consistent with mild concentric hypertrophy.  •  Left ventricular diastolic dysfunction is noted.  •  There is calcification of the aortic valve without stenosis        Stress Test:         Cardiac Catheterization:  No results found for this or any previous visit.         Other:         ASSESSMENT &  PLAN:    Principal Problem:    CAD (coronary artery disease), native coronary artery  Active Problems:    Type 2 diabetes mellitus with hyperglycemia, without long-term current use of insulin ([facility])    Mixed hyperlipidemia    Primary hypertension    Systolic and diastolic CHF, acute on chronic (HCC)    Influenza due to seasonal influenza virus    NSTEMI (non-ST elevated myocardial infarction) ([facility])    77-year-old man with multiple cardiovascular risk factors presented with non-ST elevation MI and acute kidney injury in the setting of influenza.  He completed treatment for influenza and is now status post CABG.  Of note preop EF was 20% which improved to 40 to 45% with medical treatment.  CABG with LIMA to LAD, vein graft to diagonal, vein graft to PDA and vein graft to ramus.  He is recuperating well, extubated, off milrinone and insulin drip.  He is on aspirin, high intensity statin and low-dose diuretics.  Plan will be to start low-dose beta-blocker tomorrow.  Chest tubes and pacer wires have been removed.  Of note he will also require Plavix at the time of discharge as initial presentation with non-ST elevation MI  He is on oral amiodarone which she will continue for 6 weeks.  He should have a repeat echocardiogram in 3 months to evaluate his LV function.  Will need cardiac rehab in [place] where he lives.        Johann Morales MD  01/01/23  10:06 EST

## 2023-01-02 ENCOUNTER — APPOINTMENT (OUTPATIENT)
Dept: GENERAL RADIOLOGY | Facility: HOSPITAL | Age: 78
DRG: 235 | End: 2023-01-02
Payer: MEDICARE

## 2023-01-02 LAB
ALBUMIN SERPL-MCNC: 3.6 G/DL (ref 3.5–5.2)
ALBUMIN/GLOB SERPL: 1.2 G/DL
ALP SERPL-CCNC: 71 U/L (ref 39–117)
ALT SERPL W P-5'-P-CCNC: 9 U/L (ref 1–41)
ANION GAP SERPL CALCULATED.3IONS-SCNC: 13 MMOL/L (ref 5–15)
AST SERPL-CCNC: 28 U/L (ref 1–40)
BILIRUB SERPL-MCNC: 0.8 MG/DL (ref 0–1.2)
BILIRUB UR QL STRIP: NEGATIVE
BUN SERPL-MCNC: 42 MG/DL (ref 8–23)
BUN/CREAT SERPL: 34.4 (ref 7–25)
CALCIUM SPEC-SCNC: 9.4 MG/DL (ref 8.6–10.5)
CHLORIDE SERPL-SCNC: 97 MMOL/L (ref 98–107)
CLARITY UR: CLEAR
CO2 SERPL-SCNC: 29 MMOL/L (ref 22–29)
COLOR UR: YELLOW
CREAT SERPL-MCNC: 1.22 MG/DL (ref 0.76–1.27)
DEPRECATED RDW RBC AUTO: 48.6 FL (ref 37–54)
EGFRCR SERPLBLD CKD-EPI 2021: 61.4 ML/MIN/1.73
ERYTHROCYTE [DISTWIDTH] IN BLOOD BY AUTOMATED COUNT: 14.7 % (ref 12.3–15.4)
GLOBULIN UR ELPH-MCNC: 3 GM/DL
GLUCOSE BLDC GLUCOMTR-MCNC: 167 MG/DL (ref 70–105)
GLUCOSE BLDC GLUCOMTR-MCNC: 193 MG/DL (ref 70–105)
GLUCOSE BLDC GLUCOMTR-MCNC: 233 MG/DL (ref 70–105)
GLUCOSE BLDC GLUCOMTR-MCNC: 353 MG/DL (ref 70–105)
GLUCOSE SERPL-MCNC: 151 MG/DL (ref 65–99)
GLUCOSE UR STRIP-MCNC: ABNORMAL MG/DL
HCT VFR BLD AUTO: 34.6 % (ref 37.5–51)
HGB BLD-MCNC: 11.4 G/DL (ref 13–17.7)
HGB UR QL STRIP.AUTO: NEGATIVE
KETONES UR QL STRIP: NEGATIVE
LEUKOCYTE ESTERASE UR QL STRIP.AUTO: NEGATIVE
MAGNESIUM SERPL-MCNC: 2.2 MG/DL (ref 1.6–2.4)
MCH RBC QN AUTO: 29.7 PG (ref 26.6–33)
MCHC RBC AUTO-ENTMCNC: 33 G/DL (ref 31.5–35.7)
MCV RBC AUTO: 89.9 FL (ref 79–97)
NITRITE UR QL STRIP: NEGATIVE
PH UR STRIP.AUTO: <=5 [PH] (ref 5–8)
PLATELET # BLD AUTO: 257 10*3/MM3 (ref 140–450)
PMV BLD AUTO: 8.9 FL (ref 6–12)
POTASSIUM SERPL-SCNC: 3.8 MMOL/L (ref 3.5–5.2)
PROT SERPL-MCNC: 6.6 G/DL (ref 6–8.5)
PROT UR QL STRIP: NEGATIVE
RBC # BLD AUTO: 3.85 10*6/MM3 (ref 4.14–5.8)
SODIUM SERPL-SCNC: 139 MMOL/L (ref 136–145)
SP GR UR STRIP: 1.01 (ref 1–1.03)
UROBILINOGEN UR QL STRIP: ABNORMAL
WBC NRBC COR # BLD: 13.6 10*3/MM3 (ref 3.4–10.8)

## 2023-01-02 PROCEDURE — 87070 CULTURE OTHR SPECIMN AEROBIC: CPT | Performed by: NURSE PRACTITIONER

## 2023-01-02 PROCEDURE — 71045 X-RAY EXAM CHEST 1 VIEW: CPT

## 2023-01-02 PROCEDURE — 97110 THERAPEUTIC EXERCISES: CPT

## 2023-01-02 PROCEDURE — 25010000002 ENOXAPARIN PER 10 MG: Performed by: NURSE PRACTITIONER

## 2023-01-02 PROCEDURE — 80053 COMPREHEN METABOLIC PANEL: CPT | Performed by: THORACIC SURGERY (CARDIOTHORACIC VASCULAR SURGERY)

## 2023-01-02 PROCEDURE — 83735 ASSAY OF MAGNESIUM: CPT | Performed by: THORACIC SURGERY (CARDIOTHORACIC VASCULAR SURGERY)

## 2023-01-02 PROCEDURE — 99232 SBSQ HOSP IP/OBS MODERATE 35: CPT | Performed by: INTERNAL MEDICINE

## 2023-01-02 PROCEDURE — 81003 URINALYSIS AUTO W/O SCOPE: CPT | Performed by: NURSE PRACTITIONER

## 2023-01-02 PROCEDURE — 97116 GAIT TRAINING THERAPY: CPT

## 2023-01-02 PROCEDURE — 87205 SMEAR GRAM STAIN: CPT | Performed by: NURSE PRACTITIONER

## 2023-01-02 PROCEDURE — 63710000001 INSULIN LISPRO (HUMAN) PER 5 UNITS: Performed by: INTERNAL MEDICINE

## 2023-01-02 PROCEDURE — 63710000001 INSULIN GLARGINE PER 5 UNITS: Performed by: INTERNAL MEDICINE

## 2023-01-02 PROCEDURE — 93005 ELECTROCARDIOGRAM TRACING: CPT | Performed by: THORACIC SURGERY (CARDIOTHORACIC VASCULAR SURGERY)

## 2023-01-02 PROCEDURE — 87040 BLOOD CULTURE FOR BACTERIA: CPT | Performed by: NURSE PRACTITIONER

## 2023-01-02 PROCEDURE — 82962 GLUCOSE BLOOD TEST: CPT

## 2023-01-02 PROCEDURE — 85027 COMPLETE CBC AUTOMATED: CPT | Performed by: NURSE PRACTITIONER

## 2023-01-02 RX ORDER — DOXYCYCLINE 100 MG/1
100 TABLET ORAL EVERY 12 HOURS SCHEDULED
Status: DISCONTINUED | OUTPATIENT
Start: 2023-01-02 | End: 2023-01-03 | Stop reason: HOSPADM

## 2023-01-02 RX ORDER — AMIODARONE HYDROCHLORIDE 200 MG/1
300 TABLET ORAL 2 TIMES DAILY WITH MEALS
Status: DISCONTINUED | OUTPATIENT
Start: 2023-01-02 | End: 2023-01-03

## 2023-01-02 RX ORDER — ACETAMINOPHEN 500 MG
500 TABLET ORAL EVERY 4 HOURS PRN
Status: DISCONTINUED | OUTPATIENT
Start: 2023-01-02 | End: 2023-01-03 | Stop reason: HOSPADM

## 2023-01-02 RX ORDER — ACETAMINOPHEN 650 MG/1
650 SUPPOSITORY RECTAL EVERY 4 HOURS PRN
Status: DISCONTINUED | OUTPATIENT
Start: 2023-01-02 | End: 2023-01-03 | Stop reason: HOSPADM

## 2023-01-02 RX ADMIN — PANTOPRAZOLE SODIUM 40 MG: 40 TABLET, DELAYED RELEASE ORAL at 05:16

## 2023-01-02 RX ADMIN — AMIODARONE HYDROCHLORIDE 300 MG: 200 TABLET ORAL at 17:16

## 2023-01-02 RX ADMIN — INSULIN LISPRO 15 UNITS: 100 INJECTION, SOLUTION INTRAVENOUS; SUBCUTANEOUS at 12:18

## 2023-01-02 RX ADMIN — DOXYCYCLINE 100 MG: 100 TABLET ORAL at 13:34

## 2023-01-02 RX ADMIN — ATORVASTATIN CALCIUM 40 MG: 40 TABLET, FILM COATED ORAL at 21:30

## 2023-01-02 RX ADMIN — ENOXAPARIN SODIUM 40 MG: 100 INJECTION SUBCUTANEOUS at 15:54

## 2023-01-02 RX ADMIN — MUPIROCIN 1 APPLICATION: 20 OINTMENT TOPICAL at 09:01

## 2023-01-02 RX ADMIN — BUMETANIDE 1 MG: 1 TABLET ORAL at 09:01

## 2023-01-02 RX ADMIN — EMPAGLIFLOZIN 10 MG: 10 TABLET, FILM COATED ORAL at 09:00

## 2023-01-02 RX ADMIN — INSULIN GLARGINE 25 UNITS: 100 INJECTION, SOLUTION SUBCUTANEOUS at 10:18

## 2023-01-02 RX ADMIN — POTASSIUM CHLORIDE 20 MEQ: 1500 TABLET, EXTENDED RELEASE ORAL at 09:00

## 2023-01-02 RX ADMIN — AMIODARONE HYDROCHLORIDE 400 MG: 200 TABLET ORAL at 09:01

## 2023-01-02 RX ADMIN — MUPIROCIN 1 APPLICATION: 20 OINTMENT TOPICAL at 21:30

## 2023-01-02 RX ADMIN — ASPIRIN 325 MG: 325 TABLET, COATED ORAL at 09:00

## 2023-01-02 RX ADMIN — DOXYCYCLINE 100 MG: 100 TABLET ORAL at 21:30

## 2023-01-02 RX ADMIN — INSULIN LISPRO 7 UNITS: 100 INJECTION, SOLUTION INTRAVENOUS; SUBCUTANEOUS at 10:18

## 2023-01-02 RX ADMIN — INSULIN LISPRO 22 UNITS: 100 INJECTION, SOLUTION INTRAVENOUS; SUBCUTANEOUS at 17:16

## 2023-01-02 RX ADMIN — POTASSIUM CHLORIDE 20 MEQ: 1500 TABLET, EXTENDED RELEASE ORAL at 09:04

## 2023-01-02 NOTE — DISCHARGE PLACEMENT REQUEST
"Sam Andrade (77 y.o. Male)     Date of Birth   1945    Social Security Number       Address   [address] [place] [address] [place] [facility]    Home Phone   [phone]    MRN   [id number]       Latter day   None    Marital Status                               Admission Date   12/17/22    Admission Type   Urgent    Admitting Provider   Jaleel Huber MD    Attending Provider   Jaleel Huber MD    Department, Room/Bed   [facility] CARDIOVASCULAR CARE UNIT, 2202/1       Discharge Date       Discharge Disposition       Discharge Destination                               Attending Provider: Jaleel Huber MD    Allergies: No Known Allergies    Isolation: None   Infection: None   Code Status: CPR    Ht: 177.8 cm (70\")   Wt: 99.9 kg (220 lb 3.8 oz)    Admission Cmt: None   Principal Problem: CAD (coronary artery disease), native coronary artery [I25.10]                 Active Insurance as of 12/17/2022     Primary Coverage     Payor Plan Insurance Group Employer/Plan Group    MEDICARE MEDICARE A & B      Payor Plan Address Payor Plan Phone Number Payor Plan Fax Number Effective Dates    [address] [phone]  12/1/2011 - None Entered    [facility] [id number]       Subscriber Name Subscriber Birth Date Member ID       SAM ANDRADE 1945 [id number]           Secondary Coverage     Payor Plan Insurance Group Employer/Plan Group    [facility]      Payor Plan Address Payor Plan Phone Number Payor Plan Fax Number Effective Dates    [address] [phone]  1/1/2022 - None Entered    [facility] [id number]       Subscriber Name Subscriber Birth Date Member ID       SAM ANDRADE 1945 [id number]                 Emergency Contacts      (Rel.) Home Phone Work Phone Mobile Phone    RAYMONDCUBA BAUTISTA (Spouse) [phone] -- --    TIFFANIWEN (Son) [phone] -- --               History & Physical      Satterly-Denise, " ROWAN Spence at 12/17/22 1246     Attestation signed by Jr Sunil Medellin MD at 12/18/22 5832    I have reviewed this documentation and agree.                    Patient Care Team:  Melvina Hodge as PCP - General (Nurse Practitioner)  Referring Provider:  Dr. Harris ([place], [place])  Reason for transfer: Multivessel CAD, severe LV dysfunction    Chief complaint: Shortness of breath    Subjective      History of Present Illness:  76-year-old gentleman presented to [facility] in [place] with complaints of shortness of breath for approximately 1 week.  He reports it is constant.  Associated symptoms included cough and generalized weakness.  His O2 sats in the ED were in the 80s on 5 L O2 (per ED notes).  He was found to have influenza B.  BNP 5580 and troponin 2.77. Cardiology was consulted he was found to have three-vessel heavily calcified CAD with EF 20 to 25%.  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  Echo at OSH showed EF 15 to 25%, right 1 diastolic dysfunction, mild MR, trace AI, mild TR and mildly dilated ascending aorta.  PMHx includes DM type II, HTN, past tobacco abuse.  Pt is poor historian.  Dr. Huber was asked to evaluate pt for surgical revascularization/PCI.    Review of Systems   Constitutional: Positive for fatigue. Negative for chills and fever.   Respiratory: Positive for cough and shortness of breath.    Cardiovascular: Negative for chest pain, palpitations and leg swelling.   Neurological: Negative for dizziness, weakness and light-headedness.        Past Medical History:   Diagnosis Date   • CHF (congestive heart failure) (HCC)    • Diabetes mellitus (HCC)    • Elevated cholesterol    • Hyperlipidemia    • Hypertension      Past Surgical History:   Procedure Laterality Date   • HERNIA REPAIR     • TONSILLECTOMY       No family history on file.  Social History     Tobacco Use   • Smoking status: Former     Types: Cigarettes    Vaping Use   • Vaping Use: Never used   Substance Use Topics   • Alcohol use: Not Currently   • Drug use: Never     Medications Prior to Admission   Medication Sig Dispense Refill Last Dose   • aspirin 81 MG chewable tablet Chew 81 mg Daily.      • aspirin-acetaminophen-caffeine (EXCEDRIN MIGRAINE) 250-250-65 MG per tablet Take 1 tablet by mouth Every 6 (Six) Hours As Needed for Headache.      • carvedilol (COREG) 3.125 MG tablet Take 3.125 mg by mouth 2 (Two) Times a Day With Meals.      • furosemide (LASIX) 40 MG tablet Take 40 mg by mouth 2 (Two) Times a Day.      • glipizide (GLUCOTROL) 10 MG tablet Take 10 mg by mouth 2 (Two) Times a Day Before Meals.      • insulin NPH-insulin regular (humuLIN 70/30,novoLIN 70/30) (70-30) 100 UNIT/ML injection Inject 27 Units under the skin into the appropriate area as directed Every Morning.      • insulin NPH-insulin regular (humuLIN 70/30,novoLIN 70/30) (70-30) 100 UNIT/ML injection Inject 22 Units under the skin into the appropriate area as directed Every Night.      • metFORMIN (GLUCOPHAGE) 500 MG tablet Take 500 mg by mouth Daily With Breakfast.      • Omega-3 Fatty Acids (fish oil) 1000 MG capsule capsule Take 1,000 mg by mouth Daily With Breakfast.      • oseltamivir (TAMIFLU) 75 MG capsule Take 75 mg by mouth 2 (Two) Times a Day.      • rosuvastatin (CRESTOR) 5 MG tablet Take 5 mg by mouth Every Night.        aspirin, 81 mg, Oral, Daily  carvedilol, 3.125 mg, Oral, BID With Meals  docusate sodium, 100 mg, Oral, BID  furosemide, 40 mg, Oral, BID  glipizide, 10 mg, Oral, BID AC  insulin lispro, 4-24 Units, Subcutaneous, TID With Meals  insulin NPH-insulin regular, 22 Units, Subcutaneous, Daily With Dinner  insulin NPH-insulin regular, 27 Units, Subcutaneous, QAM  polyethylene glycol, 17 g, Oral, Daily  potassium chloride, 20 mEq, Oral, BID With Meals  rosuvastatin, 5 mg, Oral, Nightly  sodium chloride, 10 mL, Intravenous, Q12H      Allergies:  Patient has no known  allergies.    Objective       Vital Signs  Temp:  [97.8 °F (36.6 °C)-98.1 °F (36.7 °C)] 98.1 °F (36.7 °C)  Heart Rate:  [90-93] 90  Resp:  [20] 20  BP: (126-128)/(69-73) 126/69    Flowsheet Rows    Flowsheet Row First Filed Value   Admission Height --   Admission Weight 103 kg (227 lb 1.2 oz) Documented at 12/17/2022 0500             Physical Exam  Vitals and nursing note reviewed.   Constitutional:       General: He is awake.      Appearance: Normal appearance. He is well-developed, well-groomed and overweight.   HENT:      Head: Normocephalic and atraumatic.      Ears:      Comments: + Hearing aides     Nose: Nose normal.      Mouth/Throat:      Lips: Pink.      Mouth: Mucous membranes are moist.      Pharynx: Uvula midline.   Eyes:      General: Lids are normal. No scleral icterus.     Extraocular Movements: Extraocular movements intact.      Conjunctiva/sclera: Conjunctivae normal.      Pupils: Pupils are equal, round, and reactive to light.   Neck:      Thyroid: No thyroid mass or thyromegaly.      Vascular: Normal carotid pulses. No carotid bruit, hepatojugular reflux or JVD.      Trachea: Trachea normal.   Cardiovascular:      Rate and Rhythm: Normal rate and regular rhythm.      Pulses:           Carotid pulses are 2+ on the right side and 2+ on the left side.       Radial pulses are 2+ on the right side and 2+ on the left side.        Femoral pulses are 2+ on the right side and 2+ on the left side.       Popliteal pulses are 2+ on the right side and 2+ on the left side.        Dorsalis pedis pulses are 2+ on the right side and 2+ on the left side.        Posterior tibial pulses are 2+ on the right side and 2+ on the left side.      Heart sounds: Normal heart sounds. No murmur heard.     Comments: Tele:  SR 80s  Drips:  heparin  Pulmonary:      Effort: Pulmonary effort is normal.      Breath sounds: Decreased breath sounds present.      Comments: 95% 3L  Abdominal:      General: Abdomen is protuberant. Bowel  sounds are normal. There is no distension.      Palpations: Abdomen is soft.      Tenderness: There is no abdominal tenderness.   Musculoskeletal:      Cervical back: Neck supple.      Comments: Gait steady and strong without use of assistive devices   Lymphadenopathy:      Cervical: No cervical adenopathy.      Upper Body:      Right upper body: No supraclavicular adenopathy.      Left upper body: No supraclavicular adenopathy.   Skin:     General: Skin is warm and dry.      Capillary Refill: Capillary refill takes less than 2 seconds.      Findings: No erythema or rash.      Nails: There is no clubbing.   Neurological:      Mental Status: He is alert and oriented to person, place, and time.      GCS: GCS eye subscore is 4. GCS verbal subscore is 5. GCS motor subscore is 6.   Psychiatric:         Attention and Perception: Attention and perception normal.         Mood and Affect: Mood and affect normal.         Speech: Speech normal.         Behavior: Behavior normal. Behavior is cooperative.         Thought Content: Thought content normal.         Cognition and Memory: Cognition and memory normal.         Judgment: Judgment normal.         Results Review:   Lab Results (last 24 hours)     Procedure Component Value Units Date/Time    COVID PRE-OP / PRE-PROCEDURE SCREENING ORDER (NO ISOLATION) - Swab, Nasopharynx [[id number]] Collected: 12/17/22 1237    Specimen: Swab from Nasopharynx Updated: 12/17/22 1241    Narrative:      The following orders were created for panel order COVID PRE-OP / PRE-PROCEDURE SCREENING ORDER (NO ISOLATION) - Swab, Nasopharynx.  Procedure                               Abnormality         Status                     ---------                               -----------         ------                     COVID-19,CEPHEID/CAMERON,CO...[[id number]]                      In process                   Please view results for these tests on the individual orders.    COVID-19,CEPHEID/CAMERON,COR/BERNY/PAD/ASAF/MAD  IN-HOUSE(OR EMERGENT/ADD-ON),NP SWAB IN TRANSPORT MEDIA 3-4 HR TAT, RT-PCR - Swab, Nasopharynx [[id number]] Collected: 12/17/22 1237    Specimen: Swab from Nasopharynx Updated: 12/17/22 1241    POC Glucose Once [[id number]]  (Abnormal) Collected: 12/17/22 1159    Specimen: Blood Updated: 12/17/22 1200     Glucose 416 mg/dL      Comment: Serial Number: [id number]:  [id number]       Extra Tubes [[id number]] Collected: 12/17/22 1136    Specimen: Blood, Venous Line Updated: 12/17/22 1136    Narrative:      The following orders were created for panel order Extra Tubes.  Procedure                               Abnormality         Status                     ---------                               -----------         ------                     Gold Top - SST[[id number]]                                   In process                   Please view results for these tests on the individual orders.    Gold Top - SST [[id number]] Collected: 12/17/22 1136    Specimen: Blood Updated: 12/17/22 1136    Urinalysis, Microscopic Only - Urine, Clean Catch [[id number]]  (Abnormal) Collected: 12/17/22 1048    Specimen: Urine, Clean Catch Updated: 12/17/22 1102     RBC, UA 0-2 /HPF      WBC, UA 0-2 /HPF      Bacteria, UA None Seen /HPF      Squamous Epithelial Cells, UA 0-2 /HPF      Hyaline Casts, UA 0-2 /LPF      Methodology Automated Microscopy    Urinalysis With Microscopic If Indicated (No Culture) - Urine, Clean Catch [[id number]]  (Abnormal) Collected: 12/17/22 1048    Specimen: Urine, Clean Catch Updated: 12/17/22 1102     Color, UA Yellow     Appearance, UA Clear     pH, UA 5.5     Specific Gravity, UA 1.024     Glucose,  mg/dL (Trace)     Ketones, UA 15 mg/dL (1+)     Bilirubin, UA Negative     Blood, UA Negative     Protein,  mg/dL (2+)     Leuk Esterase, UA Negative     Nitrite, UA Negative     Urobilinogen, UA 1.0 E.U./dL    CBC & Differential [[id number]]  (Abnormal) Collected: 12/17/22 0945    Specimen: Blood  Updated: 12/17/22 1042    Narrative:      The following orders were created for panel order CBC & Differential.  Procedure                               Abnormality         Status                     ---------                               -----------         ------                     CBC Auto Differential[[id number]]        Abnormal            Final result               Scan Slide[[id number]]                                                                    Please view results for these tests on the individual orders.    CBC Auto Differential [[id number]]  (Abnormal) Collected: 12/17/22 0945    Specimen: Blood Updated: 12/17/22 1042     WBC 10.70 10*3/mm3      RBC 4.90 10*6/mm3      Hemoglobin 14.5 g/dL      Hematocrit 45.0 %      MCV 91.8 fL      MCH 29.5 pg      MCHC 32.2 g/dL      RDW 14.4 %      RDW-SD 46.4 fl      MPV 9.1 fL      Platelets 337 10*3/mm3      Neutrophil % 71.8 %      Lymphocyte % 18.1 %      Monocyte % 6.8 %      Eosinophil % 2.4 %      Basophil % 0.9 %      Neutrophils, Absolute 7.70 10*3/mm3      Lymphocytes, Absolute 1.90 10*3/mm3      Monocytes, Absolute 0.70 10*3/mm3      Eosinophils, Absolute 0.30 10*3/mm3      Basophils, Absolute 0.10 10*3/mm3      nRBC 0.1 /100 WBC     Comprehensive Metabolic Panel [[id number]]  (Abnormal) Collected: 12/17/22 0945    Specimen: Blood Updated: 12/17/22 1038     Glucose 368 mg/dL      BUN 32 mg/dL      Creatinine 1.16 mg/dL      Sodium 134 mmol/L      Potassium 4.2 mmol/L      Comment: Slight hemolysis detected by analyzer. Results may be affected.        Chloride 94 mmol/L      CO2 26.0 mmol/L      Calcium 9.0 mg/dL      Total Protein 7.2 g/dL      Albumin 3.80 g/dL      ALT (SGPT) 9 U/L      AST (SGOT) 16 U/L      Alkaline Phosphatase 90 U/L      Total Bilirubin 0.9 mg/dL      Globulin 3.4 gm/dL      A/G Ratio 1.1 g/dL      BUN/Creatinine Ratio 27.6     Anion Gap 14.0 mmol/L      eGFR 65.7 mL/min/1.73      Comment: National Kidney Foundation and American  Society of Nephrology (ASN) Task Force recommended calculation based on the Chronic Kidney Disease Epidemiology Collaboration (CKD-EPI) equation refit without adjustment for race.       Narrative:      GFR Normal >60  Chronic Kidney Disease <60  Kidney Failure <15    The GFR formula is only valid for adults with stable renal function between ages 18 and 70.    aPTT [[id number]]  (Abnormal) Collected: 12/17/22 0708    Specimen: Blood Updated: 12/17/22 0742     PTT 22.8 seconds     Protime-INR [[id number]]  (Normal) Collected: 12/17/22 0708    Specimen: Blood Updated: 12/17/22 0742     Protime 10.9 Seconds      INR 1.06    POC Glucose Once [[id number]]  (Abnormal) Collected: 12/17/22 0737    Specimen: Blood Updated: 12/17/22 0739     Glucose 214 mg/dL      Comment: Serial Number: [id number]:  [id number]       Platelet Function ADP [[id number]]  (Normal) Collected: 12/17/22 0708    Specimen: Blood Updated: 12/17/22 0733     ADP Aggregation, % Platelet 93 %     Hemoglobin A1c [[id number]] Collected: 12/17/22 0708    Specimen: Blood Updated: 12/17/22 0716              Assessment & Plan       CAD (coronary artery disease), native coronary artery      Assessment & Plan     - MV CAD with NSTEMI presentation, EF 20-25% (echo)--transferred for further care and evaluation  - Acute HFrEF, NYHA class II-III--BNP 5580 at OSH  - Influenzae B--ID consulted  - ICM  - DM type 2--a1c 8.4 at OSH  - HTN--stable  - Past smoker  - [place], hearing aids in place  - Obesity    Plans to have Dr. Morales evaluate patient for high risk PCI possible Impella assisted given his severe LV dysfunction.  Continue heparin drip.  ID consulted.  Patient reports he lives at home with his wife who also has failing health.  He drives short distances and goes to the store as needed.  Full recommendations to follow.    Thank you for allowing us to participate in the care of this patient.      Narda Duffy, ROWAN  12/17/22  12:46 EST    **all  problems new to this examiner  **EKG and CXR independently reviewed and interpreted          Electronically signed by Jr Sunil Medellin MD at 12/18/22 9209

## 2023-01-02 NOTE — PROGRESS NOTES
"      FOLLOW UP NOTE      Name: Sherman Baumann ADMIT: 2022   : 1945  PCP: Melvina Hodge    MRN: [id number] LOS: 16 days   AGE/SEX: 77 y.o. male  ROOM:      Date of Service: 2023                           CHIEF COMPLIANTS / REASON FOR FOLLOW UP          Acute kidney injury versus chronic kidney disease a stage III      Subjective:      Seen and examined  Resting in bed, no distress, no new complaints     Review of Systems:       Constitutional: No fever, no chills, no lethargy, no weakness.  HEENT:  No headache, otalgia, itchy eyes, nasal discharge or sore throat.  Cardiac:  No chest pain, dyspnea, orthopnea or PND.  Chest:   No cough, phlegm or wheezing.  Abdomen:  No abdominal pain, nausea or vomiting.  Neuro:  No focal weakness, abnormal movements or seizure-like activity.  :   No hematuria, no pyuria, no dysuria, no flank pain.  Extremities:  No  joint pains.  ROS was otherwise negative except as mentioned in the [place].        OBJECTIVE                                                                        Exam:  /60 (BP Location: Left arm, Patient Position: Sitting)   Pulse 83   Temp 97.5 °F (36.4 °C) (Oral)   Resp 16   Ht 177.8 cm (70\")   Wt 99.9 kg (220 lb 3.8 oz)   SpO2 95%   BMI 31.60 kg/m²   Intake/Output last 3 shifts:  I/O last 3 completed shifts:  In: 2764 [P.O.:2140; I.V.:624]  Out: 4100 [Urine:4100]  Intake/Output this shift:  No intake/output data recorded.    General Appearance:  Alert, chronically ill appearing, cooperative, no distress, appears stated age  Head:  Normocephalic, without obvious abnormality, atraumatic  Eyes:  Sclerae anicteric, EOM's intact     Neck:  Supple,  no adenopathy;      Lungs:   Clear to auscultation bilaterally, respirations unlabored  Heart:  Regular rate and rhythm, S1 and S2 normal, no  rub   or gallop  Abdomen:  Soft, nontender,    no masses, no hepatomegaly, no splenomegaly  Extremities:  Extremities normal, no " edema  Neurologic:   Alert and oriented, no focal deficits    Scheduled Meds:amiodarone, 300 mg, Oral, BID With Meals  aspirin, 325 mg, Oral, Daily  atorvastatin, 40 mg, Oral, Nightly  bumetanide, 1 mg, Oral, Daily  doxycycline, 100 mg, Oral, Q12H  empagliflozin, 10 mg, Oral, Daily  enoxaparin, 40 mg, Subcutaneous, Daily  insulin glargine, 1-200 Units, Subcutaneous, Daily - Glucommander  insulin lispro, 1-200 Units, Subcutaneous, 4x Daily With Meals & Nightly  mupirocin, 1 application, Each Nare, BID  pantoprazole, 40 mg, Oral, Q AM  polyethylene glycol, 17 g, Oral, BID  senna-docusate sodium, 2 tablet, Oral, BID      Continuous Infusions:   PRN Meds:•  acetaminophen **OR** [DISCONTINUED] acetaminophen **OR** acetaminophen  •  dextrose  •  dextrose  •  glucagon (human recombinant)  •  HYDROcodone-acetaminophen  •  insulin lispro  •  [DISCONTINUED] Morphine **AND** naloxone  •  ondansetron  •  potassium chloride **OR** potassium chloride  •  sodium chloride         Data Review:                                                                           Labs reviewed      Imaging:                                                                           Imaging reviewed           ASSESSMENT:                                                                                CAD (coronary artery disease), native coronary artery    Type 2 diabetes mellitus with hyperglycemia, without long-term current use of insulin (HCC)    Mixed hyperlipidemia    Primary hypertension    Systolic and diastolic CHF, acute on chronic (HCC)    Influenza due to seasonal influenza virus    NSTEMI (non-ST elevated myocardial infarction) ([facility])    • Acute kidney injury with chronic kidney disease a stage III, baseline creatinine December 17, 2022 on admission 1.16, now creatinine around 1.88-2.12. Suspect variable creatinine secondary to hemodynamic and volume changes.  With history of diabetes, hypertension.  Urine analysis shows 100 mg of protein,  no RBCs, no WBCs.  Urine output about 1 L.    • Volume overload with pulmonary edema.  • Diabetes type 2  • Hypertension  • Coronary artery disease multivalvular disease.  • Congestive heart failure ejection fraction 15 to 20%.  •  Acute coronary syndrome.  • S/p CABG x4     Plan:      • Patient has chronic kidney disease a stage III renal function is variable, creatinine peaked at 2.12, now creatinine 1.86 slightly better than yesterday  • Electrolytes acceptable, hyponatremia noted which is getting worse since yesterday  • CABG x4 12/29/2022   • Creatinine improved to 1.1 today.  Volume status acceptable no significant edema  • Sodium level is slightly better slight increase in the creatinine from yesterday continue Bumex at 1 mg a day as hemodynamics stable blood pressure is acceptable  • Patient is currently not on any ACE inhibitor's or angiotensin receptor blockers. Noted cardiology plans to introduce when kidney function stabilizes.  Most likely ACE and ARB can be started in 2 to 3 days  • Urine analysis with glucosuria, ketones, 100 mg/dL protein, no hematuria, no pyuria  · Repeat echo showed ejection fraction 48% left ventricular cavity is dilated.  With grade 1 diastolic dysfunctions.       Wade Jiang MD  [facility]  1/2/2023  13:54 EST

## 2023-01-02 NOTE — PROGRESS NOTES
Daily Progress Note    Patient Care Team:  Melvina Hodge as PCP - General (Nurse Practitioner)  Johann Morales MD as Cardiologist (Cardiology)    Chief Complaint: Follow-up type 2 diabetes    HPI: Patient seen and examined today.  Blood sugar log reviewed and running high.  Currently on Glucomander subcu along with Jardiance.  Eating well.    ROS:   Constitutional:  Denies fatigue, tiredness.    Respiratory: denies cough, shortness of breath.   Cardiovascular:  denies chest pain, edema   GI:  Denies abdominal pain, nausea, vomiting.         Vitals:    01/02/23 1618   BP: 140/68   Pulse: 83   Resp: 16   Temp: 98.2 °F (36.8 °C)   SpO2: 98%     Body mass index is 31.6 kg/m².    Physical Exam:  GEN: NAD, conversant  CV: RRR  LUNG: CTA  PSYCH: Awake and coherent.      Results Review:     I reviewed the patient's new clinical results.    Glucose   Date Value Ref Range Status   01/02/2023 151 (H) 65 - 99 mg/dL Final     Sodium   Date Value Ref Range Status   01/02/2023 139 136 - 145 mmol/L Final     Potassium   Date Value Ref Range Status   01/02/2023 3.8 3.5 - 5.2 mmol/L Final     CO2   Date Value Ref Range Status   01/02/2023 29.0 22.0 - 29.0 mmol/L Final     Chloride   Date Value Ref Range Status   01/02/2023 97 (L) 98 - 107 mmol/L Final     Anion Gap   Date Value Ref Range Status   01/02/2023 13.0 5.0 - 15.0 mmol/L Final     Creatinine   Date Value Ref Range Status   01/02/2023 1.22 0.76 - 1.27 mg/dL Final     BUN   Date Value Ref Range Status   01/02/2023 42 (H) 8 - 23 mg/dL Final     BUN/Creatinine Ratio   Date Value Ref Range Status   01/02/2023 34.4 (H) 7.0 - 25.0 Final     Calcium   Date Value Ref Range Status   01/02/2023 9.4 8.6 - 10.5 mg/dL Final     Alkaline Phosphatase   Date Value Ref Range Status   01/02/2023 71 39 - 117 U/L Final     Total Protein   Date Value Ref Range Status   01/02/2023 6.6 6.0 - 8.5 g/dL Final     ALT (SGPT)   Date Value Ref Range Status   01/02/2023 9 1 - 41 U/L Final     AST  (SGOT)   Date Value Ref Range Status   01/02/2023 28 1 - 40 U/L Final     Total Bilirubin   Date Value Ref Range Status   01/02/2023 0.8 0.0 - 1.2 mg/dL Final     Albumin   Date Value Ref Range Status   01/02/2023 3.6 3.5 - 5.2 g/dL Final     Globulin   Date Value Ref Range Status   01/02/2023 3.0 gm/dL Final     Magnesium   Date Value Ref Range Status   01/02/2023 2.2 1.6 - 2.4 mg/dL Final     Lab Results   Component Value Date    HGBA1C 8.3 (H) 12/17/2022     No results found for: GLUF, MICROALBUR  Results from last 7 days   Lab Units 01/02/23  1634 01/02/23  1200 01/02/23  0746 01/01/23  1944 01/01/23  1719 01/01/23  1139   GLUCOSE mg/dL 233* 353* 193* 271* 212* 300*             Medication Review: Reviewed.     amiodarone, 300 mg, Oral, BID With Meals  aspirin, 325 mg, Oral, Daily  atorvastatin, 40 mg, Oral, Nightly  bumetanide, 1 mg, Oral, Daily  doxycycline, 100 mg, Oral, Q12H  empagliflozin, 10 mg, Oral, Daily  enoxaparin, 40 mg, Subcutaneous, Daily  insulin glargine, 1-200 Units, Subcutaneous, Daily - Glucommander  insulin lispro, 1-200 Units, Subcutaneous, 4x Daily With Meals & Nightly  mupirocin, 1 application, Each Nare, BID  pantoprazole, 40 mg, Oral, Q AM  polyethylene glycol, 17 g, Oral, BID  senna-docusate sodium, 2 tablet, Oral, BID              Assessment and plan:  Diabetes mellitus type 2 with hyperglycemia: Uncontrolled with hyperglycemia, currently on Glucomander subcu, will continue that for now.  Also continue Jardiance.  Follow blood sugars and make adjustments as needed.    Hyperlipidemia: Currently on atorvastatin.    CAD: Status post CABG.    Serg Quesada MD. FACE

## 2023-01-02 NOTE — PROGRESS NOTES
Daily Progress Note        CAD (coronary artery disease), native coronary artery    Type 2 diabetes mellitus with hyperglycemia, without long-term current use of insulin ([facility])    Mixed hyperlipidemia    Primary hypertension    Systolic and diastolic CHF, acute on chronic (HCC)    Influenza due to seasonal influenza virus    NSTEMI (non-ST elevated myocardial infarction) ([facility])      Assessment    Hypoxemia, mild pulmonary edema  COPD, no PFTs on record  LOGAN  Cor Pulmonale     CHF: LV EF 20-25%  CAD with multivessel disease: Status post CABG x4 12/29/2022    DM II  Hyperlipidemia  HTN  MENDEZ     PLAN:     oxygen supplement and titration: Currently requiring 2 L per nasal cannula  Will benefit from work-up for sleep apnea as an outpatient  Encourage use of incentive spirometer  Encourage ambulation and physical therapy  Aspirin  Bumex 1 mg daily  DVT prophylaxis                LOS: 16 days     Subjective         Objective     Vital signs for last 24 hours:  Vitals:    01/02/23 0300 01/02/23 0400 01/02/23 0500 01/02/23 0600   BP: 120/63 117/62 120/56 127/66   Pulse: 76 75 75 76   Resp:  16  20   Temp:  98 °F (36.7 °C)     TempSrc:  Oral     SpO2: 96% 94% 95% 93%   Weight:  99.9 kg (220 lb 3.8 oz)     Height:           Intake/Output last 3 shifts:  I/O last 3 completed shifts:  In: 2764 [P.O.:2140; I.V.:624]  Out: 4100 [Urine:4100]  Intake/Output this shift:  No intake/output data recorded.      Radiology  Imaging Results (Last 24 Hours)     Procedure Component Value Units Date/Time    XR Chest 1 View [[id number]] Resulted: 01/02/23 0524     Updated: 01/02/23 0524          Labs:  Results from last 7 days   Lab Units 01/02/23 0449   WBC 10*3/mm3 13.60*   HEMOGLOBIN g/dL 11.4*   HEMATOCRIT % 34.6*   PLATELETS 10*3/mm3 257     Results from last 7 days   Lab Units 01/02/23  0449   SODIUM mmol/L 139   POTASSIUM mmol/L 3.8   CHLORIDE mmol/L 97*   CO2 mmol/L 29.0   BUN mg/dL 42*   CREATININE mg/dL 1.22   CALCIUM mg/dL 9.4    BILIRUBIN mg/dL 0.8   ALK PHOS U/L 71   ALT (SGPT) U/L 9   AST (SGOT) U/L 28   GLUCOSE mg/dL 151*     Results from last 7 days   Lab Units 12/30/22  0151   PH, ARTERIAL pH units 7.340*   PO2 ART mm Hg 124.5*   PCO2, ARTERIAL mm Hg 40.3   HCO3 ART mmol/L 21.8     Results from last 7 days   Lab Units 01/02/23  0449 12/30/22  0317 12/29/22  1210   ALBUMIN g/dL 3.6 3.8 3.7             Results from last 7 days   Lab Units 01/02/23  0449   MAGNESIUM mg/dL 2.2     Results from last 7 days   Lab Units 12/30/22  0317 12/29/22  1210 12/29/22  0420 12/28/22  0905 12/28/22  0154   INR  1.06 1.16*  --   --  1.06   APTT seconds  --  30.3 64.1 62.4 66.9               Meds:   SCHEDULE  amiodarone, 400 mg, Oral, BID With Meals  aspirin, 325 mg, Oral, Daily  atorvastatin, 40 mg, Oral, Nightly  bumetanide, 1 mg, Oral, Daily  empagliflozin, 10 mg, Oral, Daily  enoxaparin, 40 mg, Subcutaneous, Daily  insulin glargine, 1-200 Units, Subcutaneous, Daily - Glucommander  insulin lispro, 1-200 Units, Subcutaneous, 4x Daily With Meals & Nightly  mupirocin, 1 application, Each Nare, BID  pantoprazole, 40 mg, Oral, Q AM  polyethylene glycol, 17 g, Oral, BID  potassium chloride, 20 mEq, Oral, Q12H  senna-docusate sodium, 2 tablet, Oral, BID      Infusions     PRNs  •  acetaminophen **OR** [DISCONTINUED] acetaminophen **OR** acetaminophen  •  dextrose  •  dextrose  •  glucagon (human recombinant)  •  HYDROcodone-acetaminophen  •  insulin lispro  •  [DISCONTINUED] Morphine **AND** naloxone  •  ondansetron  •  potassium chloride **OR** potassium chloride  •  sodium chloride    Physical Exam:  Physical Exam    ROS  Review of Systems    I have reviewed the patient's new clinical results.    Electronically signed by Ryley Judd MD

## 2023-01-02 NOTE — PLAN OF CARE
"Assessment: Sherman Baumann presents with functional mobility impairments which indicate the need for skilled intervention. Pt ambulates with decreased heel strike BLE and forward flexed posture. Tolerating session today without incident. Will continue to follow and progress as tolerated.     Plan/Recommendations:   Moderate Intensity Therapy recommended post-acute care. This is recommended as therapy feels the patient would require 3-4 days per week and wouldn't tolerate \"3 hour daily\" rehab intensity. SNF would be the preferred choice. If the patient does not agree to SNF, arrange HH or OP depending on home bound status. If patient is medically complex, consider LTACH.. Pt requires no DME at discharge.     Pt desires Skilled Rehab placement at discharge. Pt cooperative; agreeable to therapeutic recommendations and plan of care.                    "

## 2023-01-02 NOTE — PROGRESS NOTES
Referring Provider: Jaleel Huber MD    Reason for follow-up: Multivessel coronary artery disease, HFrEF, status post CABG     Patient Care Team:  Melvina Hodge as PCP - General (Nurse Practitioner)  Johann Morales MD as Cardiologist (Cardiology)      SUBJECTIVE  Denies chest pain, shortness of breath.  He is recuperating well and is slowly nearing discharge     ROS  Review of all systems negative except as indicated.    Since I have last seen, the patient has been without any chest discomfort, shortness of breath, palpitations, dizziness or syncope.  Denies having any headache, abdominal pain, nausea, vomiting, diarrhea, constipation, loss of weight or loss of appetite.  Denies having any excessive bruising, hematuria or blood in the stool.  ROS      Personal History:    Past Medical History:   Diagnosis Date   • CHF (congestive heart failure) (HCC)    • Diabetes mellitus (HCC)    • Elevated cholesterol    • Hyperlipidemia    • Hypertension        Past Surgical History:   Procedure Laterality Date   • HERNIA REPAIR     • TONSILLECTOMY         History reviewed. No pertinent family history.    Social History     Tobacco Use   • Smoking status: Former     Types: Cigarettes   Vaping Use   • Vaping Use: Never used   Substance Use Topics   • Alcohol use: Not Currently   • Drug use: Never        Medications Prior to Admission   Medication Sig Dispense Refill Last Dose   • aspirin 81 MG chewable tablet Chew 81 mg Daily.      • aspirin-acetaminophen-caffeine (EXCEDRIN MIGRAINE) 250-250-65 MG per tablet Take 1 tablet by mouth Every 6 (Six) Hours As Needed for Headache.      • carvedilol (COREG) 3.125 MG tablet Take 3.125 mg by mouth 2 (Two) Times a Day With Meals.      • furosemide (LASIX) 40 MG tablet Take 40 mg by mouth 2 (Two) Times a Day.      • glipizide (GLUCOTROL) 10 MG tablet Take 10 mg by mouth 2 (Two) Times a Day Before Meals.      • insulin NPH-insulin regular (humuLIN 70/30,novoLIN 70/30) (70-30) 100  UNIT/ML injection Inject 27 Units under the skin into the appropriate area as directed Every Morning.      • insulin NPH-insulin regular (humuLIN 70/30,novoLIN 70/30) (70-30) 100 UNIT/ML injection Inject 22 Units under the skin into the appropriate area as directed Every Night.      • metFORMIN (GLUCOPHAGE) 500 MG tablet Take 500 mg by mouth Daily With Breakfast.      • Omega-3 Fatty Acids (fish oil) 1000 MG capsule capsule Take 1,000 mg by mouth Daily With Breakfast.      • oseltamivir (TAMIFLU) 75 MG capsule Take 75 mg by mouth 2 (Two) Times a Day.      • rosuvastatin (CRESTOR) 5 MG tablet Take 5 mg by mouth Every Night.          Allergies:  Patient has no known allergies.    Scheduled Meds:amiodarone, 400 mg, Oral, BID With Meals  aspirin, 325 mg, Oral, Daily  atorvastatin, 40 mg, Oral, Nightly  bumetanide, 1 mg, Oral, Daily  empagliflozin, 10 mg, Oral, Daily  enoxaparin, 40 mg, Subcutaneous, Daily  insulin glargine, 1-200 Units, Subcutaneous, Daily - Glucommander  insulin lispro, 1-200 Units, Subcutaneous, 4x Daily With Meals & Nightly  mupirocin, 1 application, Each Nare, BID  pantoprazole, 40 mg, Oral, Q AM  polyethylene glycol, 17 g, Oral, BID  senna-docusate sodium, 2 tablet, Oral, BID      Continuous Infusions:   PRN Meds:.•  acetaminophen **OR** [DISCONTINUED] acetaminophen **OR** acetaminophen  •  dextrose  •  dextrose  •  glucagon (human recombinant)  •  HYDROcodone-acetaminophen  •  insulin lispro  •  [DISCONTINUED] Morphine **AND** naloxone  •  ondansetron  •  potassium chloride **OR** potassium chloride  •  sodium chloride      OBJECTIVE    Vital Signs  Vitals:    01/02/23 0400 01/02/23 0500 01/02/23 0600 01/02/23 0729   BP: 117/62 120/56 127/66 134/63   BP Location:    Left arm   Patient Position:    Sitting   Pulse: 75 75 76 76   Resp: 16  20 19   Temp: 98 °F (36.7 °C)   97.7 °F (36.5 °C)   TempSrc: Oral   Oral   SpO2: 94% 95% 93% 90%   Weight: 99.9 kg (220 lb 3.8 oz)      Height:      "      Flowsheet Rows    Flowsheet Row First Filed Value   Admission Height 177.8 cm (70\") Documented at 12/18/2022 1305   Admission Weight 103 kg (227 lb 1.2 oz) Documented at 12/17/2022 0500            Intake/Output Summary (Last 24 hours) at 1/2/2023 0938  Last data filed at 1/2/2023 0600  Gross per 24 hour   Intake 1880 ml   Output 3100 ml   Net -1220 ml          Telemetry: Sinus rhythm    Physical Exam:  The patient is alert, oriented and in no distress.  Vital signs as noted above.  Head and neck revealed no carotid bruits or jugular venous distention.  No thyromegaly or lymphadenopathy is present  Lungs clear.  No wheezing.  Breath sounds are normal bilaterally.  Heart normal first and second heart sounds.  No murmur. No precordial rub is present.  No gallop is present.  Abdomen soft and nontender.  No organomegaly is present.  Extremities with good peripheral pulses without any pedal edema.  Skin warm and dry.  Musculoskeletal system is grossly normal.  CNS grossly normal.       Results Review:  I have personally reviewed the results from the time of this admission to 1/2/2023 09:38 EST and agree with these findings:  []  Laboratory  []  Microbiology  []  Radiology  []  EKG/Telemetry   []  Cardiology/Vascular   []  Pathology  []  Old records  []  Other:    Most notable findings include:    Lab Results (last 24 hours)     Procedure Component Value Units Date/Time    POC Glucose Once [[id number]]  (Abnormal) Collected: 01/02/23 0746    Specimen: Blood Updated: 01/02/23 0747     Glucose 193 mg/dL      Comment: Serial Number: [id number]:  [id number]       Magnesium [[id number]]  (Normal) Collected: 01/02/23 0449    Specimen: Blood Updated: 01/02/23 0529     Magnesium 2.2 mg/dL     Comprehensive Metabolic Panel [[id number]]  (Abnormal) Collected: 01/02/23 0449    Specimen: Blood Updated: 01/02/23 0522     Glucose 151 mg/dL      BUN 42 mg/dL      Creatinine 1.22 mg/dL      Sodium 139 mmol/L      Potassium 3.8 " mmol/L      Chloride 97 mmol/L      CO2 29.0 mmol/L      Calcium 9.4 mg/dL      Total Protein 6.6 g/dL      Albumin 3.6 g/dL      ALT (SGPT) 9 U/L      AST (SGOT) 28 U/L      Alkaline Phosphatase 71 U/L      Total Bilirubin 0.8 mg/dL      Globulin 3.0 gm/dL      A/G Ratio 1.2 g/dL      BUN/Creatinine Ratio 34.4     Anion Gap 13.0 mmol/L      eGFR 61.4 mL/min/1.73      Comment: National Kidney Foundation and American Society of Nephrology (ASN) Task Force recommended calculation based on the Chronic Kidney Disease Epidemiology Collaboration (CKD-EPI) equation refit without adjustment for race.       Narrative:      GFR Normal >60  Chronic Kidney Disease <60  Kidney Failure <15    The GFR formula is only valid for adults with stable renal function between ages 18 and 70.    CBC (No Diff) [[id number]]  (Abnormal) Collected: 01/02/23 0449    Specimen: Blood Updated: 01/02/23 0507     WBC 13.60 10*3/mm3      RBC 3.85 10*6/mm3      Hemoglobin 11.4 g/dL      Hematocrit 34.6 %      MCV 89.9 fL      MCH 29.7 pg      MCHC 33.0 g/dL      RDW 14.7 %      RDW-SD 48.6 fl      MPV 8.9 fL      Platelets 257 10*3/mm3     POC Glucose Once [[id number]]  (Abnormal) Collected: 01/01/23 1944    Specimen: Blood Updated: 01/01/23 1945     Glucose 271 mg/dL      Comment: Serial Number: [id number]:  [id number]       POC Glucose Once [[id number]]  (Abnormal) Collected: 01/01/23 1719    Specimen: Blood Updated: 01/01/23 1721     Glucose 212 mg/dL      Comment: Serial Number: [id number]:  [id number]       POC Glucose Once [[id number]]  (Abnormal) Collected: 01/01/23 1139    Specimen: Blood Updated: 01/01/23 1140     Glucose 300 mg/dL      Comment: Serial Number: [id number]:  [id number]             Imaging Results (Last 24 Hours)     Procedure Component Value Units Date/Time    XR Chest 1 View [[id number]] Collected: 01/02/23 0721     Updated: 01/02/23 0725    Narrative:      DATE OF EXAM:  1/2/2023 5:24 AM     PROCEDURE:  XR CHEST  1 VW-     INDICATIONS:  Postop; I25.110-Atherosclerotic heart disease of native coronary artery  with unstable angina pectoris     COMPARISON:  12/31/2022, and prior.     TECHNIQUE:   Single radiographic view of the chest was obtained.     FINDINGS:  There appear to be increased opacities in the left base. Mild right  basilar opacities appear unchanged.  No significant new effusion or  pneumothorax is seen.  Stable cardiomegaly. Status post median  sternotomy and CABG. Mediastinal contour appears unchanged. Right IJ  catheter is no longer seen.       Impression:      1.     Increased left basilar opacities which could represent  atelectasis or infiltrate.  2.     Stable mild right basilar opacities and cardiomegaly.     Electronically Signed By-Cameron Fermin MD On:1/2/2023 7:23 AM  This report was finalized on [id number] by  Cameron Fermin MD.          LAB RESULTS (LAST 7 DAYS)    CBC  Results from last 7 days   Lab Units 01/02/23  0449 01/01/23  0428 12/31/22  0343 12/30/22  0317 12/29/22  1829 12/29/22  1739 12/29/22  1637 12/29/22  1247 12/29/22  1210 12/29/22  0758 12/29/22  0419 12/28/22  0154   WBC 10*3/mm3 13.60* 11.60* 10.90* 21.00*  --   --   --   --  29.20*  --  12.20* 12.20*   RBC 10*6/mm3 3.85* 3.74* 3.49* 4.03*  --   --   --   --  4.47  --  5.27 5.38   HEMOGLOBIN g/dL 11.4* 10.9* 10.4* 11.8*  --   --   --   --  13.0  --  16.1 15.8   HEMOGLOBIN, POC g/dL  --   --   --   --  13.6 14.3 14.2   < >  --    < >  --   --    HEMATOCRIT % 34.6* 33.1* 30.9* 35.2*  --   --   --   --  39.9  --  46.3 47.3   HEMATOCRIT POC %  --   --   --   --  40 42 42   < >  --    < >  --   --    MCV fL 89.9 88.7 88.6 87.4  --   --   --   --  89.3  --  87.9 88.0   PLATELETS 10*3/mm3 257 200 161 268  --   --   --   --  333  --  326 327    < > = values in this interval not displayed.       BMP  Results from last 7 days   Lab Units 01/02/23  0449 01/01/23  0428 12/31/22  1200 12/31/22  0343 12/30/22  1654 12/30/22  0317  12/29/22  2315 12/29/22  1753 12/29/22  1210 12/29/22  0419   SODIUM mmol/L 139 138  --  135* 135* 133*  --   --  128* 126*   POTASSIUM mmol/L 3.8 3.4* 4.1 3.5 4.0 4.3 3.7   < > 3.8 4.2   CHLORIDE mmol/L 97* 100  --  100 96* 97*  --   --  89* 81*   CO2 mmol/L 29.0 26.0  --  23.0 26.0 22.0  --   --  24.0 28.0   BUN mg/dL 42* 37*  --  45* 53* 67*  --   --  69* 79*   CREATININE mg/dL 1.22 1.09  --  1.12 1.38* 1.82*  --   --  1.75* 1.69*   GLUCOSE mg/dL 151* 130*  --  147* 95 224*  --   --  191* 180*   MAGNESIUM mg/dL 2.2  --   --   --   --  3.0*  --   --   --   --    PHOSPHORUS mg/dL  --   --   --   --   --  5.6*  --   --  5.4*  --     < > = values in this interval not displayed.       CMP   Results from last 7 days   Lab Units 01/02/23  0449 01/01/23  0428 12/31/22  1200 12/31/22  0343 12/30/22  1654 12/30/22  0317 12/29/22 2315 12/29/22  1753 12/29/22  1210 12/29/22 0419   SODIUM mmol/L 139 138  --  135* 135* 133*  --   --  128* 126*   POTASSIUM mmol/L 3.8 3.4* 4.1 3.5 4.0 4.3 3.7   < > 3.8 4.2   CHLORIDE mmol/L 97* 100  --  100 96* 97*  --   --  89* 81*   CO2 mmol/L 29.0 26.0  --  23.0 26.0 22.0  --   --  24.0 28.0   BUN mg/dL 42* 37*  --  45* 53* 67*  --   --  69* 79*   CREATININE mg/dL 1.22 1.09  --  1.12 1.38* 1.82*  --   --  1.75* 1.69*   GLUCOSE mg/dL 151* 130*  --  147* 95 224*  --   --  191* 180*   ALBUMIN g/dL 3.6  --   --   --   --  3.8  --   --  3.7 4.0   BILIRUBIN mg/dL 0.8  --   --   --   --   --   --   --   --  0.8   ALK PHOS U/L 71  --   --   --   --   --   --   --   --  104   AST (SGOT) U/L 28  --   --   --   --   --   --   --   --  18   ALT (SGPT) U/L 9  --   --   --   --   --   --   --   --  12    < > = values in this interval not displayed.       BNP        TROPONIN        CoAg  Results from last 7 days   Lab Units 12/30/22  0317 12/29/22  1210 12/29/22  0420 12/28/22  0905 12/28/22  0154 12/27/22  1913 12/27/22  1145 12/27/22  0427   INR  1.06 1.16*  --   --  1.06  --   --   --    APTT seconds   --  30.3 64.1 62.4 66.9 77.0* 76.5 59.7*       Creatinine Clearance  Estimated Creatinine Clearance: 61.1 mL/min (by C-G formula based on SCr of 1.22 mg/dL).    ABG  Results from last 7 days   Lab Units 12/30/22  0151 12/30/22  0019 12/29/22  1829 12/29/22  1739 12/29/22  1637 12/29/22  1518 12/29/22  1417   PH, ARTERIAL pH units 7.340* 7.361 7.362 7.368 7.424 7.400 7.360   PCO2, ARTERIAL mm Hg 40.3 38.8 38.4 40.0 34.8* 37.4 40.2   PO2 ART mm Hg 124.5* 96.8 75.7* 82.2* 78.1* 94.6 102.3   O2 SATURATION ART % 98.6* 97.3 94.5 95.7 95.8 97.4 97.6   BASE EXCESS ART mmol/L -3.8* -3.1* -3.2* -2.1* -1.1* -1.4* -2.6*       Radiology  XR Chest 1 View    Result Date: 1/2/2023  1.     Increased left basilar opacities which could represent atelectasis or infiltrate. 2.     Stable mild right basilar opacities and cardiomegaly.  Electronically Signed By-Cameron Fermin MD On:1/2/2023 7:23 AM This report was finalized on [id number] by  Cameron Fermin MD.    XR Chest 1 View    Result Date: 12/31/2022  1. No evidence of pneumothorax status post chest tube removal. 2. Low lung volumes with streaky bibasilar atelectasis. 3. Postsurgical changes of CABG.  Electronically Signed By-Bradley Moore MD On:12/31/2022 5:01 PM This report was finalized on [id number] by  Bradley Moore MD.        EKG  I personally viewed and interpreted the patient's EKG/Telemetry data:  ECG 12 Lead Other; Possible Atrial flutter   Preliminary Result   HEART RATE= 86  bpm   RR Interval= 700  ms   DC Interval= 122  ms   P Horizontal Axis= 166  deg   P Front Axis= 251  deg   QRSD Interval= 92  ms   QT Interval= 332  ms   QRS Axis= 16  deg   T Wave Axis= 60  deg   - ABNORMAL ECG -   Sinus or ectopic atrial rhythm   ST elevation, consider inferior injury   When compared with ECG of 30-Dec-2022 3:33:03,   Significant change in rhythm: previously sinus   Significant repolarization change   Electronically Signed By:    Date and Time of Study: 2023-01-01  18:58:36      ECG 12 Lead Drug Monitoring; Amiodarone   Final Result   HEART RATE= 60  bpm   RR Interval= 996  ms   MA Interval= 177  ms   P Horizontal Axis= -52  deg   P Front Axis= 38  deg   QRSD Interval= 89  ms   QT Interval= 453  ms   QRS Axis= 13  deg   T Wave Axis= 96  deg   - ABNORMAL ECG -   Sinus rhythm   Nonspecific T abnrm, anterolateral leads   Borderline ST elevation, inferior leads   When compared with ECG of 29-Dec-2022 16:41:22,   Significant change in rhythm   Electronically Signed By: Johann Morales ([facility]) 30-Dec-2022 23:59:10   Date and Time of Study: 2022-12-30 03:33:03      ECG 12 Lead Drug Monitoring; Amiodarone   Final Result   HEART RATE= 89  bpm   RR Interval= 671  ms   MA Interval= 219  ms   P Horizontal Axis= -87  deg   P Front Axis=   deg   QRSD Interval= 100  ms   QT Interval= 371  ms   QRS Axis= 20  deg   T Wave Axis= 177  deg   - ABNORMAL ECG -   Atrial-paced rhythm   Abnormal T, consider ischemia, lateral leads   When compared with ECG of 17-Dec-2022 7:21:47,   Significant change in rhythm   Significant axis, voltage or hypertrophy change   Electronically Signed By: Sampson Torres ([facility]) 29-Dec-2022 20:09:23   Date and Time of Study: 2022-12-29 16:41:22      ECG 12 Lead Chest Pain   Final Result   HEART RATE= 89  bpm   RR Interval= 672  ms   MA Interval= 176  ms   P Horizontal Axis= -5  deg   P Front Axis= 29  deg   QRSD Interval= 92  ms   QT Interval= 362  ms   QRS Axis= 16  deg   T Wave Axis= 163  deg   - ABNORMAL ECG -   Sinus rhythm   Probable left atrial enlargement   Probable anterior infarct, age indeterminate   Abnormal T, consider ischemia, lateral leads   No previous ECG available for comparison   Electronically Signed By: Alex Byrd ([facility]) 19-Dec-2022 08:52:18   Date and Time of Study: 2022-12-17 07:21:47      SCANNED - TELEMETRY     Final Result      SCANNED - TELEMETRY     Final Result      SCANNED - TELEMETRY     Final Result      SCANNED - TELEMETRY     Final  Result      SCANNED - TELEMETRY     Final Result      SCANNED - TELEMETRY     Final Result      SCANNED - TELEMETRY     Final Result      SCANNED - TELEMETRY     Final Result      SCANNED - TELEMETRY     Final Result      SCANNED - TELEMETRY     Final Result      SCANNED - TELEMETRY     Final Result      SCANNED - TELEMETRY     Final Result      SCANNED - TELEMETRY     Final Result      SCANNED - TELEMETRY     Final Result      SCANNED - TELEMETRY     Final Result      SCANNED - TELEMETRY     Final Result      SCANNED - TELEMETRY     Final Result      SCANNED - TELEMETRY     Final Result      SCANNED - TELEMETRY     Final Result      SCANNED - TELEMETRY     Final Result      SCANNED - TELEMETRY     Final Result      SCANNED - TELEMETRY     Final Result      SCANNED - TELEMETRY     Final Result      SCANNED - TELEMETRY     Final Result      SCANNED - TELEMETRY     Final Result      SCANNED - TELEMETRY     Final Result      SCANNED - TELEMETRY     Final Result      SCANNED - TELEMETRY     Final Result      SCANNED - TELEMETRY     Final Result      SCANNED - TELEMETRY     Final Result      SCANNED - TELEMETRY     Final Result      SCANNED - TELEMETRY     Final Result      SCANNED - TELEMETRY     Final Result      SCANNED - TELEMETRY     Final Result      SCANNED - TELEMETRY     Final Result      SCANNED - TELEMETRY     Final Result      SCANNED - TELEMETRY     Final Result      SCANNED - TELEMETRY     Final Result      SCANNED - TELEMETRY     Final Result      SCANNED - TELEMETRY     Final Result      SCANNED - TELEMETRY     Final Result      ECG 12 Lead    (Results Pending)   ECG 12 Lead    (Results Pending)   ECG 12 Lead    (Results Pending)         Echocardiogram:    Results for orders placed during the hospital encounter of 12/17/22    Adult Transthoracic Echo Limited W/ Cont if Necessary Per Protocol    Interpretation Summary  •  Left ventricular systolic function is low normal. Calculated left ventricular EF =  48% Left ventricular ejection fraction appears to be 46 - 50%.  •  The left ventricular cavity is moderately dilated.  •  Left ventricular wall thickness is consistent with mild concentric hypertrophy.  •  Left ventricular diastolic dysfunction is noted.  •  There is calcification of the aortic valve without stenosis        Stress Test:         Cardiac Catheterization:  No results found for this or any previous visit.         Other:         ASSESSMENT & PLAN:    Principal Problem:    CAD (coronary artery disease), native coronary artery  Active Problems:    Type 2 diabetes mellitus with hyperglycemia, without long-term current use of insulin ([facility])    Mixed hyperlipidemia    Primary hypertension    Systolic and diastolic CHF, acute on chronic ([facility])    Influenza due to seasonal influenza virus    NSTEMI (non-ST elevated myocardial infarction) ([facility])    77-year-old man with multiple cardiovascular risk factors has been diagnosed with multivessel coronary artery disease, acute on chronic kidney injury in the setting of influenza.  He is now status post CABG with LIMA to LAD and vein graft to diagonal, vein graft to PDA and ramus.  Overall he is doing well postop.  He has been started on aspirin, high intensity statin.  Recommend starting low-dose beta-blocker metoprolol 12.5 mg p.o. twice daily.  He remains on Bumex daily.  Close eye on renal function  He is also on amiodarone 400 mg p.o. twice daily   Continue amiodarone for 6 weeks  Recommend addition of Plavix as well as the presentation was non-ST elevation MI.  Jardiance has been added.  Preop EF was 40%, he will need another echocardiogram in 3 months.  He is nearing discharge and will need cardiac rehab.    Johann Morales MD  01/02/23  09:38 EST

## 2023-01-02 NOTE — DISCHARGE PLACEMENT REQUEST
"Sam Andrade (77 y.o. Male)     Date of Birth   1945    Social Security Number       Address   [address] [place] [address] [place] [facility]    Home Phone   [phone]    MRN   [id number]       Judaism   None    Marital Status                               Admission Date   12/17/22    Admission Type   Urgent    Admitting Provider   Jaleel Huber MD    Attending Provider   Jaleel Huber MD    Department, Room/Bed   [facility] CARDIOVASCULAR CARE UNIT, 2202/1       Discharge Date       Discharge Disposition       Discharge Destination                               Attending Provider: Jaleel Huber MD    Allergies: No Known Allergies    Isolation: None   Infection: None   Code Status: CPR    Ht: 177.8 cm (70\")   Wt: 99.9 kg (220 lb 3.8 oz)    Admission Cmt: None   Principal Problem: CAD (coronary artery disease), native coronary artery [I25.10]                 Active Insurance as of 12/17/2022     Primary Coverage     Payor Plan Insurance Group Employer/Plan Group    MEDICARE MEDICARE A & B      Payor Plan Address Payor Plan Phone Number Payor Plan Fax Number Effective Dates    [address] [phone]  12/1/2011 - None Entered    [facility] [id number]       Subscriber Name Subscriber Birth Date Member ID       SAM ANDRADE 1945 [id number]           Secondary Coverage     Payor Plan Insurance Group Employer/Plan Group    [facility]      Payor Plan Address Payor Plan Phone Number Payor Plan Fax Number Effective Dates    [address] [phone]  1/1/2022 - None Entered    [facility] [id number]       Subscriber Name Subscriber Birth Date Member ID       SAM ANDRADE 1945 [id number]                 Emergency Contacts      (Rel.) Home Phone Work Phone Mobile Phone    RAYMONDCUBA BAUTISTA (Spouse) [phone] -- --    TIFFANIWEN (Son) [phone] -- --               History & Physical      Satterly-Denise, " ROWAN Spence at 12/17/22 1246     Attestation signed by Jr Sunil Medellin MD at 12/18/22 8070    I have reviewed this documentation and agree.                    Patient Care Team:  Melvina Hodge as PCP - General (Nurse Practitioner)  Referring Provider:  Dr. Harris ([place], [place])  Reason for transfer: Multivessel CAD, severe LV dysfunction    Chief complaint: Shortness of breath    Subjective      History of Present Illness:  76-year-old gentleman presented to [facility] in [place] with complaints of shortness of breath for approximately 1 week.  He reports it is constant.  Associated symptoms included cough and generalized weakness.  His O2 sats in the ED were in the 80s on 5 L O2 (per ED notes).  He was found to have influenza B.  BNP 5580 and troponin 2.77. Cardiology was consulted he was found to have three-vessel heavily calcified CAD with EF 20 to 25%.  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  Echo at OSH showed EF 15 to 25%, right 1 diastolic dysfunction, mild MR, trace AI, mild TR and mildly dilated ascending aorta.  PMHx includes DM type II, HTN, past tobacco abuse.  Pt is poor historian.  Dr. Huber was asked to evaluate pt for surgical revascularization/PCI.    Review of Systems   Constitutional: Positive for fatigue. Negative for chills and fever.   Respiratory: Positive for cough and shortness of breath.    Cardiovascular: Negative for chest pain, palpitations and leg swelling.   Neurological: Negative for dizziness, weakness and light-headedness.        Past Medical History:   Diagnosis Date   • CHF (congestive heart failure) (HCC)    • Diabetes mellitus (HCC)    • Elevated cholesterol    • Hyperlipidemia    • Hypertension      Past Surgical History:   Procedure Laterality Date   • HERNIA REPAIR     • TONSILLECTOMY       No family history on file.  Social History     Tobacco Use   • Smoking status: Former     Types: Cigarettes    Vaping Use   • Vaping Use: Never used   Substance Use Topics   • Alcohol use: Not Currently   • Drug use: Never     Medications Prior to Admission   Medication Sig Dispense Refill Last Dose   • aspirin 81 MG chewable tablet Chew 81 mg Daily.      • aspirin-acetaminophen-caffeine (EXCEDRIN MIGRAINE) 250-250-65 MG per tablet Take 1 tablet by mouth Every 6 (Six) Hours As Needed for Headache.      • carvedilol (COREG) 3.125 MG tablet Take 3.125 mg by mouth 2 (Two) Times a Day With Meals.      • furosemide (LASIX) 40 MG tablet Take 40 mg by mouth 2 (Two) Times a Day.      • glipizide (GLUCOTROL) 10 MG tablet Take 10 mg by mouth 2 (Two) Times a Day Before Meals.      • insulin NPH-insulin regular (humuLIN 70/30,novoLIN 70/30) (70-30) 100 UNIT/ML injection Inject 27 Units under the skin into the appropriate area as directed Every Morning.      • insulin NPH-insulin regular (humuLIN 70/30,novoLIN 70/30) (70-30) 100 UNIT/ML injection Inject 22 Units under the skin into the appropriate area as directed Every Night.      • metFORMIN (GLUCOPHAGE) 500 MG tablet Take 500 mg by mouth Daily With Breakfast.      • Omega-3 Fatty Acids (fish oil) 1000 MG capsule capsule Take 1,000 mg by mouth Daily With Breakfast.      • oseltamivir (TAMIFLU) 75 MG capsule Take 75 mg by mouth 2 (Two) Times a Day.      • rosuvastatin (CRESTOR) 5 MG tablet Take 5 mg by mouth Every Night.        aspirin, 81 mg, Oral, Daily  carvedilol, 3.125 mg, Oral, BID With Meals  docusate sodium, 100 mg, Oral, BID  furosemide, 40 mg, Oral, BID  glipizide, 10 mg, Oral, BID AC  insulin lispro, 4-24 Units, Subcutaneous, TID With Meals  insulin NPH-insulin regular, 22 Units, Subcutaneous, Daily With Dinner  insulin NPH-insulin regular, 27 Units, Subcutaneous, QAM  polyethylene glycol, 17 g, Oral, Daily  potassium chloride, 20 mEq, Oral, BID With Meals  rosuvastatin, 5 mg, Oral, Nightly  sodium chloride, 10 mL, Intravenous, Q12H      Allergies:  Patient has no known  allergies.    Objective       Vital Signs  Temp:  [97.8 °F (36.6 °C)-98.1 °F (36.7 °C)] 98.1 °F (36.7 °C)  Heart Rate:  [90-93] 90  Resp:  [20] 20  BP: (126-128)/(69-73) 126/69    Flowsheet Rows    Flowsheet Row First Filed Value   Admission Height --   Admission Weight 103 kg (227 lb 1.2 oz) Documented at 12/17/2022 0500             Physical Exam  Vitals and nursing note reviewed.   Constitutional:       General: He is awake.      Appearance: Normal appearance. He is well-developed, well-groomed and overweight.   HENT:      Head: Normocephalic and atraumatic.      Ears:      Comments: + Hearing aides     Nose: Nose normal.      Mouth/Throat:      Lips: Pink.      Mouth: Mucous membranes are moist.      Pharynx: Uvula midline.   Eyes:      General: Lids are normal. No scleral icterus.     Extraocular Movements: Extraocular movements intact.      Conjunctiva/sclera: Conjunctivae normal.      Pupils: Pupils are equal, round, and reactive to light.   Neck:      Thyroid: No thyroid mass or thyromegaly.      Vascular: Normal carotid pulses. No carotid bruit, hepatojugular reflux or JVD.      Trachea: Trachea normal.   Cardiovascular:      Rate and Rhythm: Normal rate and regular rhythm.      Pulses:           Carotid pulses are 2+ on the right side and 2+ on the left side.       Radial pulses are 2+ on the right side and 2+ on the left side.        Femoral pulses are 2+ on the right side and 2+ on the left side.       Popliteal pulses are 2+ on the right side and 2+ on the left side.        Dorsalis pedis pulses are 2+ on the right side and 2+ on the left side.        Posterior tibial pulses are 2+ on the right side and 2+ on the left side.      Heart sounds: Normal heart sounds. No murmur heard.     Comments: Tele:  SR 80s  Drips:  heparin  Pulmonary:      Effort: Pulmonary effort is normal.      Breath sounds: Decreased breath sounds present.      Comments: 95% 3L  Abdominal:      General: Abdomen is protuberant. Bowel  sounds are normal. There is no distension.      Palpations: Abdomen is soft.      Tenderness: There is no abdominal tenderness.   Musculoskeletal:      Cervical back: Neck supple.      Comments: Gait steady and strong without use of assistive devices   Lymphadenopathy:      Cervical: No cervical adenopathy.      Upper Body:      Right upper body: No supraclavicular adenopathy.      Left upper body: No supraclavicular adenopathy.   Skin:     General: Skin is warm and dry.      Capillary Refill: Capillary refill takes less than 2 seconds.      Findings: No erythema or rash.      Nails: There is no clubbing.   Neurological:      Mental Status: He is alert and oriented to person, place, and time.      GCS: GCS eye subscore is 4. GCS verbal subscore is 5. GCS motor subscore is 6.   Psychiatric:         Attention and Perception: Attention and perception normal.         Mood and Affect: Mood and affect normal.         Speech: Speech normal.         Behavior: Behavior normal. Behavior is cooperative.         Thought Content: Thought content normal.         Cognition and Memory: Cognition and memory normal.         Judgment: Judgment normal.         Results Review:   Lab Results (last 24 hours)     Procedure Component Value Units Date/Time    COVID PRE-OP / PRE-PROCEDURE SCREENING ORDER (NO ISOLATION) - Swab, Nasopharynx [[id number]] Collected: 12/17/22 1237    Specimen: Swab from Nasopharynx Updated: 12/17/22 1241    Narrative:      The following orders were created for panel order COVID PRE-OP / PRE-PROCEDURE SCREENING ORDER (NO ISOLATION) - Swab, Nasopharynx.  Procedure                               Abnormality         Status                     ---------                               -----------         ------                     COVID-19,CEPHEID/CAMERON,CO...[[id number]]                      In process                   Please view results for these tests on the individual orders.    COVID-19,CEPHEID/CAMERON,COR/BERNY/PAD/ASAF/MAD  IN-HOUSE(OR EMERGENT/ADD-ON),NP SWAB IN TRANSPORT MEDIA 3-4 HR TAT, RT-PCR - Swab, Nasopharynx [[id number]] Collected: 12/17/22 1237    Specimen: Swab from Nasopharynx Updated: 12/17/22 1241    POC Glucose Once [[id number]]  (Abnormal) Collected: 12/17/22 1159    Specimen: Blood Updated: 12/17/22 1200     Glucose 416 mg/dL      Comment: Serial Number: [id number]:  [id number]       Extra Tubes [[id number]] Collected: 12/17/22 1136    Specimen: Blood, Venous Line Updated: 12/17/22 1136    Narrative:      The following orders were created for panel order Extra Tubes.  Procedure                               Abnormality         Status                     ---------                               -----------         ------                     Gold Top - SST[[id number]]                                   In process                   Please view results for these tests on the individual orders.    Gold Top - SST [[id number]] Collected: 12/17/22 1136    Specimen: Blood Updated: 12/17/22 1136    Urinalysis, Microscopic Only - Urine, Clean Catch [[id number]]  (Abnormal) Collected: 12/17/22 1048    Specimen: Urine, Clean Catch Updated: 12/17/22 1102     RBC, UA 0-2 /HPF      WBC, UA 0-2 /HPF      Bacteria, UA None Seen /HPF      Squamous Epithelial Cells, UA 0-2 /HPF      Hyaline Casts, UA 0-2 /LPF      Methodology Automated Microscopy    Urinalysis With Microscopic If Indicated (No Culture) - Urine, Clean Catch [[id number]]  (Abnormal) Collected: 12/17/22 1048    Specimen: Urine, Clean Catch Updated: 12/17/22 1102     Color, UA Yellow     Appearance, UA Clear     pH, UA 5.5     Specific Gravity, UA 1.024     Glucose,  mg/dL (Trace)     Ketones, UA 15 mg/dL (1+)     Bilirubin, UA Negative     Blood, UA Negative     Protein,  mg/dL (2+)     Leuk Esterase, UA Negative     Nitrite, UA Negative     Urobilinogen, UA 1.0 E.U./dL    CBC & Differential [[id number]]  (Abnormal) Collected: 12/17/22 0945    Specimen: Blood  Updated: 12/17/22 1042    Narrative:      The following orders were created for panel order CBC & Differential.  Procedure                               Abnormality         Status                     ---------                               -----------         ------                     CBC Auto Differential[[id number]]        Abnormal            Final result               Scan Slide[[id number]]                                                                    Please view results for these tests on the individual orders.    CBC Auto Differential [[id number]]  (Abnormal) Collected: 12/17/22 0945    Specimen: Blood Updated: 12/17/22 1042     WBC 10.70 10*3/mm3      RBC 4.90 10*6/mm3      Hemoglobin 14.5 g/dL      Hematocrit 45.0 %      MCV 91.8 fL      MCH 29.5 pg      MCHC 32.2 g/dL      RDW 14.4 %      RDW-SD 46.4 fl      MPV 9.1 fL      Platelets 337 10*3/mm3      Neutrophil % 71.8 %      Lymphocyte % 18.1 %      Monocyte % 6.8 %      Eosinophil % 2.4 %      Basophil % 0.9 %      Neutrophils, Absolute 7.70 10*3/mm3      Lymphocytes, Absolute 1.90 10*3/mm3      Monocytes, Absolute 0.70 10*3/mm3      Eosinophils, Absolute 0.30 10*3/mm3      Basophils, Absolute 0.10 10*3/mm3      nRBC 0.1 /100 WBC     Comprehensive Metabolic Panel [[id number]]  (Abnormal) Collected: 12/17/22 0945    Specimen: Blood Updated: 12/17/22 1038     Glucose 368 mg/dL      BUN 32 mg/dL      Creatinine 1.16 mg/dL      Sodium 134 mmol/L      Potassium 4.2 mmol/L      Comment: Slight hemolysis detected by analyzer. Results may be affected.        Chloride 94 mmol/L      CO2 26.0 mmol/L      Calcium 9.0 mg/dL      Total Protein 7.2 g/dL      Albumin 3.80 g/dL      ALT (SGPT) 9 U/L      AST (SGOT) 16 U/L      Alkaline Phosphatase 90 U/L      Total Bilirubin 0.9 mg/dL      Globulin 3.4 gm/dL      A/G Ratio 1.1 g/dL      BUN/Creatinine Ratio 27.6     Anion Gap 14.0 mmol/L      eGFR 65.7 mL/min/1.73      Comment: National Kidney Foundation and American  Society of Nephrology (ASN) Task Force recommended calculation based on the Chronic Kidney Disease Epidemiology Collaboration (CKD-EPI) equation refit without adjustment for race.       Narrative:      GFR Normal >60  Chronic Kidney Disease <60  Kidney Failure <15    The GFR formula is only valid for adults with stable renal function between ages 18 and 70.    aPTT [[id number]]  (Abnormal) Collected: 12/17/22 0708    Specimen: Blood Updated: 12/17/22 0742     PTT 22.8 seconds     Protime-INR [[id number]]  (Normal) Collected: 12/17/22 0708    Specimen: Blood Updated: 12/17/22 0742     Protime 10.9 Seconds      INR 1.06    POC Glucose Once [[id number]]  (Abnormal) Collected: 12/17/22 0737    Specimen: Blood Updated: 12/17/22 0739     Glucose 214 mg/dL      Comment: Serial Number: [id number]:  [id number]       Platelet Function ADP [[id number]]  (Normal) Collected: 12/17/22 0708    Specimen: Blood Updated: 12/17/22 0733     ADP Aggregation, % Platelet 93 %     Hemoglobin A1c [[id number]] Collected: 12/17/22 0708    Specimen: Blood Updated: 12/17/22 0716              Assessment & Plan       CAD (coronary artery disease), native coronary artery      Assessment & Plan     - MV CAD with NSTEMI presentation, EF 20-25% (echo)--transferred for further care and evaluation  - Acute HFrEF, NYHA class II-III--BNP 5580 at OSH  - Influenzae B--ID consulted  - ICM  - DM type 2--a1c 8.4 at OSH  - HTN--stable  - Past smoker  - [place], hearing aids in place  - Obesity    Plans to have Dr. Morales evaluate patient for high risk PCI possible Impella assisted given his severe LV dysfunction.  Continue heparin drip.  ID consulted.  Patient reports he lives at home with his wife who also has failing health.  He drives short distances and goes to the store as needed.  Full recommendations to follow.    Thank you for allowing us to participate in the care of this patient.      Narda Duffy, ROWAN  12/17/22  12:46 EST    **all  problems new to this examiner  **EKG and CXR independently reviewed and interpreted          Electronically signed by Jr Sunil Medellin MD at 12/18/22 1344         Current Facility-Administered Medications   Medication Dose Route Frequency Provider Last Rate Last Admin   • acetaminophen (TYLENOL) tablet 650 mg  650 mg Oral Q4H PRN Satterly-Denise, Narda L, APRN        Or   • acetaminophen (TYLENOL) suppository 650 mg  650 mg Rectal Q4H PRN Satterradha-Denise, Narda L, APRN       • amiodarone (PACERONE) tablet 400 mg  400 mg Oral BID With Meals Satterly-Denise, Narda L, APRN   400 mg at 01/02/23 0901   • aspirin EC tablet 325 mg  325 mg Oral Daily Jaleel Huber MD   325 mg at 01/02/23 0900   • atorvastatin (LIPITOR) tablet 40 mg  40 mg Oral Nightly Satterradha-Denise, Narda L, APRN   40 mg at 01/01/23 2049   • bumetanide (BUMEX) tablet 1 mg  1 mg Oral Daily Wade Jiang MD   1 mg at 01/02/23 0901   • dextrose (D50W) (25 g/50 mL) IV injection 10-50 mL  10-50 mL Intravenous Q15 Min PRN Poncho Menard MD       • dextrose (GLUTOSE) oral gel 15 g  15 g Oral Q15 Min PRN Poncho Menard MD       • empagliflozin (JARDIANCE) tablet 10 mg  10 mg Oral Daily Poncho Menard MD   10 mg at 01/02/23 0900   • Enoxaparin Sodium (LOVENOX) syringe 40 mg  40 mg Subcutaneous Daily Chyna Christian APRN   40 mg at 01/01/23 2023   • glucagon (human recombinant) (GLUCAGEN DIAGNOSTIC) 1 mg in sterile water (preservative free) 1 mL injection  1 mg Intramuscular Q15 Min PRN Poncho Menard MD       • HYDROcodone-acetaminophen (NORCO) 5-325 MG per tablet 1 tablet  1 tablet Oral Q4H PRN Satterradha-Denise, Narda L, APRN   1 tablet at 12/30/22 2053   • insulin glargine (LANTUS, SEMGLEE) injection 1-200 Units  1-200 Units Subcutaneous Daily - Poncho Noriega MD   25 Units at 01/02/23 1018   • insulin lispro (ADMELOG) injection 1-200 Units  1-200 Units Subcutaneous 4x Daily With Meals & Nightly  Poncho Menard MD   7 Units at 01/02/23 1018   • insulin lispro (ADMELOG) injection 1-200 Units  1-200 Units Subcutaneous PRN Poncho Menard MD       • mupirocin (BACTROBAN) 2 % nasal ointment 1 application  1 application Each Nare BID Jaleel Huber MD   1 application at 01/02/23 0901   • naloxone (NARCAN) injection 0.4 mg  0.4 mg Intravenous Q5 Min PRN Satterly-Denise, Narda L, APRN       • ondansetron (ZOFRAN) injection 4 mg  4 mg Intravenous Q6H PRN Satterly-Denise, Narda L, APRN       • pantoprazole (PROTONIX) EC tablet 40 mg  40 mg Oral Q AM Satterly-Denise, Narda L, APRN   40 mg at 01/02/23 0516   • polyethylene glycol (MIRALAX) packet 17 g  17 g Oral BID Satterly-Denise, Narda L, APRN   17 g at 01/01/23 2052   • potassium chloride (K-DUR,KLOR-CON) CR tablet 20 mEq  20 mEq Oral PRN Satterly-Denise, Narda L, APRN   20 mEq at 01/02/23 0904    Or   • potassium chloride (KLOR-CON) packet 20 mEq  20 mEq Oral PRN Satterly-Denise, Narda L, APRN       • sennosides-docusate (PERICOLACE) 8.6-50 MG per tablet 2 tablet  2 tablet Oral BID Satterly-Denise, Narda L, APRN   2 tablet at 01/01/23 2049   • sodium chloride 0.9 % bolus 200 mL  200 mL Intravenous Q1H PRN Jaleel Huber  mL/hr at 12/29/22 1432 200 mL at 12/29/22 1432        Operative/Procedure Notes (last 7 days)      Jaleel Huber MD at 12/29/22 0808        BCS OPERATIVE NOTE    Date of procedure:12/29/2022    Pre-op Diagnosis: NSTEMI, ischemic cardiomyopathy, severe three vessel coronary artery disease, systolic congestive heart failure (NYHA grade III-IV), CKD stage 3b with MENDEZ, HTN, HLD, DM, LOGAN, COPD.    Post-op Diagnosis: Same.    Procedure: Urgent CABG x4 with LIMA to distal LAD, saphenous vein to Diag, saphenous vein to Ramus, saphenous vein to PDA. LLE EVH, PRP application to LIMA bed and sternum. Intraoperative transesophageal echocardiogram.    Surgeon: Jaleel Huber MD    Assistant: BUTCH Harris  (Assistant performed saphenous vein harvest and provided specialized assistance including suctioning, cutting of sutures, and aiding in exposure; this was necessary for the success of this surgical procedure).    Cardiologist: ANDREY Harris MD/ JEN Morales MD    Anesthesia: GET    Findings: Atherosclerotic plaque at base of innominate trunk, LIMA and saphenous vein adequate. PDA 1.75 mm, OM 1.5 mm (not bypassed), Ramus 1.75 mm, Diag 1.75 mm, distal LAD 2 mm.     Aortic cross-clamp time: 70 min    Cardiopulmonary bypass time: 88 min    Cellsaver/EBL: 700 ml    Antegrade and retrograde cardioplegia.    Arterial cannula: 20 Bel[place]ian    Venous cannula: 29/37 Bel[place]ian    Specimen: None.    History of present illness: The STS morbidity and mortality risks were calculated and discussed prior to surgery.  The dangers of tobacco abuse and illicit drug use were discussed in detail with the patient; this included the benefits of tobacco and illicit drug use cessation.    Details:    Jaleel Huber MD  12/29/2022  11:31 EST    Electronically signed by Jaleel Huber MD at 12/29/22 1140          Physician Progress Notes (last 48 hours)      Chyna Christian APRN at 01/02/23 0755           LOS: 16 days   Patient Care Team:  Melvina Hodge as PCP - General (Nurse Practitioner)  Johann Morales MD as Cardiologist (Cardiology)    Chief Complaint: post op fu    Subjective:  Symptoms:  No shortness of breath or chest pain.    Diet:  No nausea.    Activity level: Impaired due to weakness.    Pain:  He reports no pain.          Vital Signs  Temp:  [97.4 °F (36.3 °C)-98.8 °F (37.1 °C)] 97.7 °F (36.5 °C)  Heart Rate:  [75-90] 76  Resp:  [16-20] 19  BP: (117-145)/(50-74) 134/63  Body mass index is 31.6 kg/m².    Intake/Output Summary (Last 24 hours) at 1/2/2023 0756  Last data filed at 1/2/2023 0600  Gross per 24 hour   Intake 1880 ml   Output 3100 ml   Net -1220 ml     No intake/output data recorded.            01/01/23  0538 01/01/23  0548  "01/02/23  0400   Weight: 101 kg (223 lb 12.3 oz) 100 kg (220 lb 14.4 oz) 99.9 kg (220 lb 3.8 oz)         Objective:  General Appearance:  Comfortable.    Vital signs: (most recent): Blood pressure 134/63, pulse 76, temperature 97.7 °F (36.5 °C), temperature source Oral, resp. rate 19, height 177.8 cm (70\"), weight 99.9 kg (220 lb 3.8 oz), SpO2 90 %.    Lungs:  Normal effort and normal respiratory rate.    Heart: Normal rate.  Regular rhythm.  (NSR, rate 80's)  Abdomen: Abdomen is soft and non-distended.    Skin:  Warm and dry.  (Stable sternum)            Results Review:        WBC WBC   Date Value Ref Range Status   01/02/2023 13.60 (H) 3.40 - 10.80 10*3/mm3 Final   01/01/2023 11.60 (H) 3.40 - 10.80 10*3/mm3 Final   12/31/2022 10.90 (H) 3.40 - 10.80 10*3/mm3 Final      HGB Hemoglobin   Date Value Ref Range Status   01/02/2023 11.4 (L) 13.0 - 17.7 g/dL Final   01/01/2023 10.9 (L) 13.0 - 17.7 g/dL Final   12/31/2022 10.4 (L) 13.0 - 17.7 g/dL Final      HCT Hematocrit   Date Value Ref Range Status   01/02/2023 34.6 (L) 37.5 - 51.0 % Final   01/01/2023 33.1 (L) 37.5 - 51.0 % Final   12/31/2022 30.9 (L) 37.5 - 51.0 % Final      Platelets Platelets   Date Value Ref Range Status   01/02/2023 257 140 - 450 10*3/mm3 Final   01/01/2023 200 140 - 450 10*3/mm3 Final   12/31/2022 161 140 - 450 10*3/mm3 Final        PT/INR:  No results found for: PROTIME/No results found for: INR    Sodium Sodium   Date Value Ref Range Status   01/02/2023 139 136 - 145 mmol/L Final   01/01/2023 138 136 - 145 mmol/L Final   12/31/2022 135 (L) 136 - 145 mmol/L Final   12/30/2022 135 (L) 136 - 145 mmol/L Final      Potassium Potassium   Date Value Ref Range Status   01/02/2023 3.8 3.5 - 5.2 mmol/L Final   01/01/2023 3.4 (L) 3.5 - 5.2 mmol/L Final   12/31/2022 4.1 3.5 - 5.2 mmol/L Final   12/31/2022 3.5 3.5 - 5.2 mmol/L Final   12/30/2022 4.0 3.5 - 5.2 mmol/L Final      Chloride Chloride   Date Value Ref Range Status   01/02/2023 97 (L) 98 - 107 " mmol/L Final   01/01/2023 100 98 - 107 mmol/L Final   12/31/2022 100 98 - 107 mmol/L Final   12/30/2022 96 (L) 98 - 107 mmol/L Final      Bicarbonate CO2   Date Value Ref Range Status   01/02/2023 29.0 22.0 - 29.0 mmol/L Final   01/01/2023 26.0 22.0 - 29.0 mmol/L Final   12/31/2022 23.0 22.0 - 29.0 mmol/L Final   12/30/2022 26.0 22.0 - 29.0 mmol/L Final      BUN BUN   Date Value Ref Range Status   01/02/2023 42 (H) 8 - 23 mg/dL Final   01/01/2023 37 (H) 8 - 23 mg/dL Final   12/31/2022 45 (H) 8 - 23 mg/dL Final   12/30/2022 53 (H) 8 - 23 mg/dL Final      Creatinine Creatinine   Date Value Ref Range Status   01/02/2023 1.22 0.76 - 1.27 mg/dL Final   01/01/2023 1.09 0.76 - 1.27 mg/dL Final   12/31/2022 1.12 0.76 - 1.27 mg/dL Final   12/30/2022 1.38 (H) 0.76 - 1.27 mg/dL Final      Calcium Calcium   Date Value Ref Range Status   01/02/2023 9.4 8.6 - 10.5 mg/dL Final   01/01/2023 8.8 8.6 - 10.5 mg/dL Final   12/31/2022 8.0 (L) 8.6 - 10.5 mg/dL Final   12/30/2022 8.8 8.6 - 10.5 mg/dL Final      Magnesium Magnesium   Date Value Ref Range Status   01/02/2023 2.2 1.6 - 2.4 mg/dL Final      Troponin No results found for: TROPONIN   BNP No results found for: BNP         amiodarone, 400 mg, Oral, BID With Meals  aspirin, 325 mg, Oral, Daily  atorvastatin, 40 mg, Oral, Nightly  bumetanide, 1 mg, Oral, Daily  empagliflozin, 10 mg, Oral, Daily  enoxaparin, 40 mg, Subcutaneous, Daily  insulin glargine, 1-200 Units, Subcutaneous, Daily - Glucommander  insulin lispro, 1-200 Units, Subcutaneous, 4x Daily With Meals & Nightly  mupirocin, 1 application, Each Nare, BID  pantoprazole, 40 mg, Oral, Q AM  polyethylene glycol, 17 g, Oral, BID  potassium chloride, 20 mEq, Oral, Q12H  senna-docusate sodium, 2 tablet, Oral, BID           PRN Meds:.•  acetaminophen **OR** [DISCONTINUED] acetaminophen **OR** acetaminophen  •  dextrose  •  dextrose  •  glucagon (human recombinant)  •  HYDROcodone-acetaminophen  •  insulin lispro  •  [DISCONTINUED]  Morphine **AND** naloxone  •  ondansetron  •  potassium chloride **OR** potassium chloride  •  sodium chloride    Patient Active Problem List   Diagnosis Code   • CAD (coronary artery disease), native coronary artery I25.10   • Type 2 diabetes mellitus with hyperglycemia, without long-term current use of insulin ([facility]) E11.65   • Mixed hyperlipidemia E78.2   • Primary hypertension I10   • Systolic and diastolic CHF, acute on chronic (HCC) I50.43   • Influenza due to seasonal influenza virus J10.1   • NSTEMI (non-ST elevated myocardial infarction) ([facility]) I21.4       Assessment & Plan    - MV CAD with NSTEMI presentation at [place], EF 20-25% (echo), 20% by intraop REESE s/p urgent CABG x4 with LIMA ([place])-----will start plavix prior to discharge  - ICM, acute HFrEF, NYHA class II-III, BNP 5580 at OSH---GDMT prior to discharge  - Influenza B diagnosed 12/15--ID signed off, Tamiflu x 5 days completed  - DM type 2 with hyperglycemia, a1c 8.3---- endocrine managing  - HTN--hypotensive post op, pressors  - HL---statin  - Past smoker  - [place], hearing aids in place  - Obesity  - COPD---pulmonary following  - MENDEZ on probable CKD III---peak creatinine preop 2.1, renal following  - Postop leukocytosis, likely reactive    POD#4.  White count elevated, increased opacities on cxr, check u/a and sputum, O2 wean as tolerated.  Continue Bumex daily, Jardiance added.  Will need rehab at discharge, case mgt assisting with choices, anticipate d/c early this week.       ROWAN Jefferson  01/02/23  07:56 EST    Electronically signed by Chyna Christian APRN at 01/02/23 1129     Ryley Judd MD at 01/02/23 0781          Daily Progress Note        CAD (coronary artery disease), native coronary artery    Type 2 diabetes mellitus with hyperglycemia, without long-term current use of insulin ([facility])    Mixed hyperlipidemia    Primary hypertension    Systolic and diastolic CHF, acute on chronic (HCC)    Influenza due to seasonal influenza virus     NSTEMI (non-ST elevated myocardial infarction) ([facility])      Assessment    Hypoxemia, mild pulmonary edema  COPD, no PFTs on record  LOGAN  Cor Pulmonale     CHF: LV EF 20-25%  CAD with multivessel disease: Status post CABG x4 12/29/2022    DM II  Hyperlipidemia  HTN  MENDEZ     PLAN:     oxygen supplement and titration: Currently requiring 2 L per nasal cannula  Will benefit from work-up for sleep apnea as an outpatient  Encourage use of incentive spirometer  Encourage ambulation and physical therapy  Aspirin  Bumex 1 mg daily  DVT prophylaxis                LOS: 16 days     Subjective         Objective     Vital signs for last 24 hours:  Vitals:    01/02/23 0300 01/02/23 0400 01/02/23 0500 01/02/23 0600   BP: 120/63 117/62 120/56 127/66   Pulse: 76 75 75 76   Resp:  16  20   Temp:  98 °F (36.7 °C)     TempSrc:  Oral     SpO2: 96% 94% 95% 93%   Weight:  99.9 kg (220 lb 3.8 oz)     Height:           Intake/Output last 3 shifts:  I/O last 3 completed shifts:  In: 2764 [P.O.:2140; I.V.:624]  Out: 4100 [Urine:4100]  Intake/Output this shift:  No intake/output data recorded.      Radiology  Imaging Results (Last 24 Hours)     Procedure Component Value Units Date/Time    XR Chest 1 View [[id number]] Resulted: 01/02/23 0524     Updated: 01/02/23 0524          Labs:  Results from last 7 days   Lab Units 01/02/23 0449   WBC 10*3/mm3 13.60*   HEMOGLOBIN g/dL 11.4*   HEMATOCRIT % 34.6*   PLATELETS 10*3/mm3 257     Results from last 7 days   Lab Units 01/02/23  0449   SODIUM mmol/L 139   POTASSIUM mmol/L 3.8   CHLORIDE mmol/L 97*   CO2 mmol/L 29.0   BUN mg/dL 42*   CREATININE mg/dL 1.22   CALCIUM mg/dL 9.4   BILIRUBIN mg/dL 0.8   ALK PHOS U/L 71   ALT (SGPT) U/L 9   AST (SGOT) U/L 28   GLUCOSE mg/dL 151*     Results from last 7 days   Lab Units 12/30/22  0151   PH, ARTERIAL pH units 7.340*   PO2 ART mm Hg 124.5*   PCO2, ARTERIAL mm Hg 40.3   HCO3 ART mmol/L 21.8     Results from last 7 days   Lab Units 01/02/23  0449 12/30/22  1009  "12/29/22  1210   ALBUMIN g/dL 3.6 3.8 3.7             Results from last 7 days   Lab Units 01/02/23  0449   MAGNESIUM mg/dL 2.2     Results from last 7 days   Lab Units 12/30/22  0317 12/29/22  1210 12/29/22  0420 12/28/22  0905 12/28/22  0154   INR  1.06 1.16*  --   --  1.06   APTT seconds  --  30.3 64.1 62.4 66.9               Meds:   SCHEDULE  amiodarone, 400 mg, Oral, BID With Meals  aspirin, 325 mg, Oral, Daily  atorvastatin, 40 mg, Oral, Nightly  bumetanide, 1 mg, Oral, Daily  empagliflozin, 10 mg, Oral, Daily  enoxaparin, 40 mg, Subcutaneous, Daily  insulin glargine, 1-200 Units, Subcutaneous, Daily - Glucommander  insulin lispro, 1-200 Units, Subcutaneous, 4x Daily With Meals & Nightly  mupirocin, 1 application, Each Nare, BID  pantoprazole, 40 mg, Oral, Q AM  polyethylene glycol, 17 g, Oral, BID  potassium chloride, 20 mEq, Oral, Q12H  senna-docusate sodium, 2 tablet, Oral, BID      Infusions     PRNs  •  acetaminophen **OR** [DISCONTINUED] acetaminophen **OR** acetaminophen  •  dextrose  •  dextrose  •  glucagon (human recombinant)  •  HYDROcodone-acetaminophen  •  insulin lispro  •  [DISCONTINUED] Morphine **AND** naloxone  •  ondansetron  •  potassium chloride **OR** potassium chloride  •  sodium chloride    Physical Exam:  Physical Exam    ROS  Review of Systems    I have reviewed the patient's new clinical results.    Electronically signed by Ryley Judd MD    Electronically signed by Ryley Judd MD at 01/02/23 1150     Poncho Menard MD at 01/01/23 1810        Subjective   Sherman Baumann is a 77 y.o. male.   Seen for diabetes f/u.  Post op day#3.      Objective     /73   Pulse 81   Temp 97.4 °F (36.3 °C) (Oral)   Resp 18   Ht 177.8 cm (70\")   Wt 100 kg (220 lb 14.4 oz)   SpO2 96%   BMI 31.70 kg/m²   Blood sugar  137/154 this am,  300 @ lunch, 212 @ supper    ASSESSMENT    Patient is Improving    PLAN    Transitioned to sq glucommander this loyd.  Restarted Jardiance " "today.        Poncho Menard MD  2023  18:10 EST      Electronically signed by Poncho Menard MD at 23 1826     Wade Jiang MD at 23 1546                FOLLOW UP NOTE      Name: Sherman Baumann ADMIT: 2022   : 1945  PCP: Melvina Hodge    MRN: [id number] LOS: 15 days   AGE/SEX: 77 y.o. male  ROOM:      Date of Service: 2023                           CHIEF COMPLIANTS / REASON FOR FOLLOW UP          Acute kidney injury versus chronic kidney disease a stage III      Subjective:      Seen and examined  Resting in bed, no distress, no new complaints     Review of Systems:       Constitutional: No fever, no chills, no lethargy, no weakness.  HEENT:  No headache, otalgia, itchy eyes, nasal discharge or sore throat.  Cardiac:  No chest pain, dyspnea, orthopnea or PND.  Chest:   No cough, phlegm or wheezing.  Abdomen:  No abdominal pain, nausea or vomiting.  Neuro:  No focal weakness, abnormal movements or seizure-like activity.  :   No hematuria, no pyuria, no dysuria, no flank pain.  Extremities:  No  joint pains.  ROS was otherwise negative except as mentioned in the [place].        OBJECTIVE                                                                        Exam:  /66   Pulse 80   Temp 97.4 °F (36.3 °C) (Oral)   Resp 18   Ht 177.8 cm (70\")   Wt 100 kg (220 lb 14.4 oz)   SpO2 95%   BMI 31.70 kg/m²   Intake/Output last 3 shifts:  I/O last 3 completed shifts:  In: 2234.3 [P.O.:840; I.V.:1394.3]  Out: 3925 [Urine:3725; Chest Tube:200]  Intake/Output this shift:  I/O this shift:  In: 920 [P.O.:720; I.V.:200]  Out: 850 [Urine:850]    General Appearance:  Alert, chronically ill appearing, cooperative, no distress, appears stated age  Head:  Normocephalic, without obvious abnormality, atraumatic  Eyes:  Sclerae anicteric, EOM's intact     Neck:  Supple,  no adenopathy;      Lungs:   Clear to auscultation bilaterally, respirations unlabored  Heart:  Regular " rate and rhythm, S1 and S2 normal, no  rub   or gallop  Abdomen:  Soft, nontender,    no masses, no hepatomegaly, no splenomegaly  Extremities:  Extremities normal, no edema  Neurologic:   Alert and oriented, no focal deficits    Scheduled Meds:amiodarone, 400 mg, Oral, BID With Meals  [START ON 1/2/2023] aspirin, 325 mg, Oral, Daily  atorvastatin, 40 mg, Oral, Nightly  chlorhexidine, 15 mL, Mouth/Throat, Q12H  empagliflozin, 10 mg, Oral, Daily  enoxaparin, 40 mg, Subcutaneous, Daily  furosemide, 20 mg, Intravenous, Q12H  insulin glargine, 1-200 Units, Subcutaneous, Daily - Glucommander  insulin lispro, 1-200 Units, Subcutaneous, 4x Daily With Meals & Nightly  mupirocin, 1 application, Each Nare, BID  pantoprazole, 40 mg, Oral, Q AM  polyethylene glycol, 17 g, Oral, BID  potassium chloride, 20 mEq, Oral, Q12H  senna-docusate sodium, 2 tablet, Oral, BID      Continuous Infusions:   PRN Meds:•  acetaminophen **OR** [DISCONTINUED] acetaminophen **OR** acetaminophen  •  dextrose  •  dextrose  •  glucagon (human recombinant)  •  HYDROcodone-acetaminophen  •  insulin lispro  •  [DISCONTINUED] Morphine **AND** naloxone  •  ondansetron  •  potassium chloride **OR** potassium chloride  •  sodium chloride         Data Review:                                                                           Labs reviewed      Imaging:                                                                           Imaging reviewed           ASSESSMENT:                                                                                CAD (coronary artery disease), native coronary artery    Type 2 diabetes mellitus with hyperglycemia, without long-term current use of insulin (HCC)    Mixed hyperlipidemia    Primary hypertension    Systolic and diastolic CHF, acute on chronic (HCC)    Influenza due to seasonal influenza virus    NSTEMI (non-ST elevated myocardial infarction) ([facility])    • Acute kidney injury with chronic kidney disease a stage III,  baseline creatinine December 17, 2022 on admission 1.16, now creatinine around 1.88-2.12. Suspect variable creatinine secondary to hemodynamic and volume changes.  With history of diabetes, hypertension.  Urine analysis shows 100 mg of protein, no RBCs, no WBCs.  Urine output about 1 L.    • Volume overload with pulmonary edema.  • Diabetes type 2  • Hypertension  • Coronary artery disease multivalvular disease.  • Congestive heart failure ejection fraction 15 to 20%.  •  Acute coronary syndrome.  • S/p CABG x4     Plan:      • Patient has chronic kidney disease a stage III renal function is variable, creatinine peaked at 2.12, now creatinine 1.86 slightly better than yesterday  • Electrolytes acceptable, hyponatremia noted which is getting worse since yesterday  • CABG x4 12/29/2022   • Creatinine improved to 1.1 today.  Volume status acceptable no significant edema  • Slightly low Na will start lasix at low dose e  • Patient is currently not on any ACE inhibitor's or angiotensin receptor blockers. Noted cardiology plans to introduce when kidney function stabilizes.  Most likely ACE and ARB can be started in 2 to 3 days  • Urine analysis with glucosuria, ketones, 100 mg/dL protein, no hematuria, no pyuria  · Repeat echo showed ejection fraction 48% left ventricular cavity is dilated.  With grade 1 diastolic dysfunctions.       Wade Jiang MD  [facility]  1/1/2023  15:46 EST      Electronically signed by Wade Jiang MD at 01/01/23 0039     Tracy Page MD at 01/01/23 1043          Daily Progress Note          Assessment    Hypoxemia  Flu B  COPD  LOGAN  Cor Pulmonale   CHF: LV EF 20-25%  DM II  Hyperlipidemia  HTN  CAD with multivessel disease: Status post CABG x4 12/29/2022  MENDEZ        PLAN:    Respiratory status has improved and patient can be discharged from pulmonary standpoint   oxygen supplement and titration: Currently requiring 2 L per nasal cannula  Encourage use of incentive  spirometer  Encourage ambulation and physical therapy  Aspirin  DVT prophylaxis             LOS: 15 days     Subjective     Patient reports significant improvement in the mild shortness of breath and cough    Objective     Vital signs for last 24 hours:  Vitals:    01/01/23 1100 01/01/23 1200 01/01/23 1300 01/01/23 1400   BP: 125/63 125/52  126/66   Pulse: 84 87 89 80   Resp:       Temp:  97.4 °F (36.3 °C)     TempSrc:  Oral     SpO2: 95% 96%  95%   Weight:       Height:           Intake/Output last 3 shifts:  I/O last 3 completed shifts:  In: 2234.3 [P.O.:840; I.V.:1394.3]  Out: 3925 [Urine:3725; Chest Tube:200]  Intake/Output this shift:  I/O this shift:  In: 920 [P.O.:720; I.V.:200]  Out: 850 [Urine:850]      Radiology  Imaging Results (Last 24 Hours)     Procedure Component Value Units Date/Time    XR Chest 1 View [[id number]] Collected: 12/31/22 1659     Updated: 12/31/22 1703    Narrative:      EXAMINATION: XR CHEST 1 VW-     DATE OF EXAM: 12/31/2022 2:48 PM     INDICATION: chest tube removal; I25.110-Atherosclerotic heart disease of  native coronary artery with unstable angina pectoris.     COMPARISON: Chest radiograph dated 12/31/2022 at 5:43     TECHNIQUE: Portable AP view of the chest was obtained.     FINDINGS:  There is a right IJ introducer sheath with tip terminating at the  superior mediastinum. There is cardiomegaly. There are postsurgical  changes of CABG. The midline chest tube has been removed. There are low  lung volumes with streaky bibasilar airspace disease. There is no  pleural effusion or pneumothorax. There are degenerative changes of the  thoracic spine.       Impression:      1. No evidence of pneumothorax status post chest tube removal.  2. Low lung volumes with streaky bibasilar atelectasis.  3. Postsurgical changes of CABG.     Electronically Signed By-Bradley Moore MD On:12/31/2022 5:01 PM  This report was finalized on [id number] by  Bradley Moore MD.          Labs:  Results  from last 7 days   Lab Units 01/01/23 0428   WBC 10*3/mm3 11.60*   HEMOGLOBIN g/dL 10.9*   HEMATOCRIT % 33.1*   PLATELETS 10*3/mm3 200     Results from last 7 days   Lab Units 01/01/23  0428 12/29/22  1210 12/29/22  0419   SODIUM mmol/L 138   < > 126*   POTASSIUM mmol/L 3.4*   < > 4.2   CHLORIDE mmol/L 100   < > 81*   CO2 mmol/L 26.0   < > 28.0   BUN mg/dL 37*   < > 79*   CREATININE mg/dL 1.09   < > 1.69*   CALCIUM mg/dL 8.8   < > 10.5   BILIRUBIN mg/dL  --   --  0.8   ALK PHOS U/L  --   --  104   ALT (SGPT) U/L  --   --  12   AST (SGOT) U/L  --   --  18   GLUCOSE mg/dL 130*   < > 180*    < > = values in this interval not displayed.     Results from last 7 days   Lab Units 12/30/22  0151   PH, ARTERIAL pH units 7.340*   PO2 ART mm Hg 124.5*   PCO2, ARTERIAL mm Hg 40.3   HCO3 ART mmol/L 21.8     Results from last 7 days   Lab Units 12/30/22  0317 12/29/22  1210 12/29/22  0419   ALBUMIN g/dL 3.8 3.7 4.0             Results from last 7 days   Lab Units 12/30/22  0317   MAGNESIUM mg/dL 3.0*     Results from last 7 days   Lab Units 12/30/22  0317 12/29/22  1210 12/29/22  0420 12/28/22  0905 12/28/22  0154   INR  1.06 1.16*  --   --  1.06   APTT seconds  --  30.3 64.1 62.4 66.9               Meds:   SCHEDULE  amiodarone, 400 mg, Oral, BID With Meals  [START ON 1/2/2023] aspirin, 325 mg, Oral, Daily  atorvastatin, 40 mg, Oral, Nightly  chlorhexidine, 15 mL, Mouth/Throat, Q12H  empagliflozin, 10 mg, Oral, Daily  enoxaparin, 40 mg, Subcutaneous, Daily  furosemide, 20 mg, Intravenous, Q12H  insulin glargine, 1-200 Units, Subcutaneous, Daily - Glucommander  insulin lispro, 1-200 Units, Subcutaneous, 4x Daily With Meals & Nightly  mupirocin, 1 application, Each Nare, BID  pantoprazole, 40 mg, Oral, Q AM  polyethylene glycol, 17 g, Oral, BID  potassium chloride, 20 mEq, Oral, Q12H  senna-docusate sodium, 2 tablet, Oral, BID      Infusions     PRNs  •  acetaminophen **OR** [DISCONTINUED] acetaminophen **OR** acetaminophen  •   dextrose  •  dextrose  •  glucagon (human recombinant)  •  HYDROcodone-acetaminophen  •  insulin lispro  •  [DISCONTINUED] Morphine **AND** naloxone  •  ondansetron  •  potassium chloride **OR** potassium chloride  •  sodium chloride    Physical Exam:  General Appearance: Alert and following commands  HEENT:  Normocephalic, without obvious abnormality, Conjunctiva/corneas clear,.   Nares normal, no drainage     Neck:  Supple, symmetrical, trachea midline.   Lungs /Chest wall: Good air movement bilaterally bibasilar crackles, chest tubes in place, symmetrical wall movement.     Heart: Rate controlled, S1 S2 normal  Abdomen: Soft, non-tender, no masses, no organomegaly.    Extremities: No edema, no clubbing or cyanosis     ROS  Constitutional: Negative for chills, fever and malaise/fatigue.   HENT: Negative.    Eyes: Negative.    Cardiovascular: Expected postoperative incisional pain  Respiratory: Positive for improvement in the cough and shortness of breath.    Skin: Negative.    Musculoskeletal: Negative.    Gastrointestinal: Negative.    Genitourinary: Negative.    Neurological: Negative.    Psychiatric/Behavioral: Negative.      I reviewed the recent clinical results    Much of this encounter note is an electronic transcription/translation of spoken language to printed text using Dragon Software which might include inadvertent errors in transcription.      Electronically signed by Tracy Page MD at 01/01/23 1544     Johann Morales MD at 01/01/23 1006          Referring Provider: Jaleel Huber MD    Reason for follow-up: Multivessel coronary artery disease, HFrEF, diabetes, non-ST elevation MI, acute kidney injury     Patient Care Team:  Melvina Hodge as PCP - General (Nurse Practitioner)  Johann Morales MD as Cardiologist (Cardiology)      SUBJECTIVE  He is recuperating well.  He is off milrinone but remains on amiodarone  His family is at bedside and had multiple questions today     ROS  Review of all  systems negative except as indicated.    Since I have last seen, the patient has been without any chest discomfort, shortness of breath, palpitations, dizziness or syncope.  Denies having any headache, abdominal pain, nausea, vomiting, diarrhea, constipation, loss of weight or loss of appetite.  Denies having any excessive bruising, hematuria or blood in the stool.  ROS      Personal History:    Past Medical History:   Diagnosis Date   • CHF (congestive heart failure) (HCC)    • Diabetes mellitus (HCC)    • Elevated cholesterol    • Hyperlipidemia    • Hypertension        Past Surgical History:   Procedure Laterality Date   • HERNIA REPAIR     • TONSILLECTOMY         History reviewed. No pertinent family history.    Social History     Tobacco Use   • Smoking status: Former     Types: Cigarettes   Vaping Use   • Vaping Use: Never used   Substance Use Topics   • Alcohol use: Not Currently   • Drug use: Never        Medications Prior to Admission   Medication Sig Dispense Refill Last Dose   • aspirin 81 MG chewable tablet Chew 81 mg Daily.      • aspirin-acetaminophen-caffeine (EXCEDRIN MIGRAINE) 250-250-65 MG per tablet Take 1 tablet by mouth Every 6 (Six) Hours As Needed for Headache.      • carvedilol (COREG) 3.125 MG tablet Take 3.125 mg by mouth 2 (Two) Times a Day With Meals.      • furosemide (LASIX) 40 MG tablet Take 40 mg by mouth 2 (Two) Times a Day.      • glipizide (GLUCOTROL) 10 MG tablet Take 10 mg by mouth 2 (Two) Times a Day Before Meals.      • insulin NPH-insulin regular (humuLIN 70/30,novoLIN 70/30) (70-30) 100 UNIT/ML injection Inject 27 Units under the skin into the appropriate area as directed Every Morning.      • insulin NPH-insulin regular (humuLIN 70/30,novoLIN 70/30) (70-30) 100 UNIT/ML injection Inject 22 Units under the skin into the appropriate area as directed Every Night.      • metFORMIN (GLUCOPHAGE) 500 MG tablet Take 500 mg by mouth Daily With Breakfast.      • Omega-3 Fatty Acids  "(fish oil) 1000 MG capsule capsule Take 1,000 mg by mouth Daily With Breakfast.      • oseltamivir (TAMIFLU) 75 MG capsule Take 75 mg by mouth 2 (Two) Times a Day.      • rosuvastatin (CRESTOR) 5 MG tablet Take 5 mg by mouth Every Night.          Allergies:  Patient has no known allergies.    Scheduled Meds:amiodarone, 400 mg, Oral, BID With Meals  [START ON 1/2/2023] aspirin, 325 mg, Oral, Daily  atorvastatin, 40 mg, Oral, Nightly  chlorhexidine, 15 mL, Mouth/Throat, Q12H  enoxaparin, 40 mg, Subcutaneous, Daily  furosemide, 20 mg, Intravenous, Q12H  insulin glargine, 1-200 Units, Subcutaneous, Daily - Glucommander  insulin lispro, 1-200 Units, Subcutaneous, 4x Daily With Meals & Nightly  mupirocin, 1 application, Each Nare, BID  pantoprazole, 40 mg, Oral, Q AM  polyethylene glycol, 17 g, Oral, BID  potassium chloride, 20 mEq, Oral, Q12H  senna-docusate sodium, 2 tablet, Oral, BID      Continuous Infusions:sodium chloride, 30 mL/hr, Last Rate: 30 mL/hr (12/29/22 1307)      PRN Meds:.•  acetaminophen **OR** [DISCONTINUED] acetaminophen **OR** acetaminophen  •  dextrose  •  dextrose  •  dextrose  •  dextrose  •  glucagon (human recombinant)  •  glucagon (human recombinant)  •  HYDROcodone-acetaminophen  •  insulin lispro  •  [DISCONTINUED] Morphine **AND** naloxone  •  ondansetron  •  potassium chloride **OR** potassium chloride  •  sodium chloride      OBJECTIVE    Vital Signs  Vitals:    01/01/23 0700 01/01/23 0742 01/01/23 0800 01/01/23 0834   BP: 132/65  126/58 126/58   Pulse: 80  88 89   Resp:   18    Temp:  98.3 °F (36.8 °C)     TempSrc:       SpO2: 100%  95%    Weight:       Height:           Flowsheet Rows      Flowsheet Row First Filed Value   Admission Height 177.8 cm (70\") Documented at 12/18/2022 1305   Admission Weight 103 kg (227 lb 1.2 oz) Documented at 12/17/2022 0500              Intake/Output Summary (Last 24 hours) at 1/1/2023 1006  Last data filed at 1/1/2023 0700  Gross per 24 hour   Intake 1409 " ml   Output 2435 ml   Net -1026 ml          Telemetry: Sinus rhythm    Physical Exam:  The patient is alert, oriented and in no distress.  Vital signs as noted above.  Head and neck revealed no carotid bruits or jugular venous distention.  No thyromegaly or lymphadenopathy is present  Lungs clear.  No wheezing.  Breath sounds are normal bilaterally.  Heart normal first and second heart sounds.  No murmur. No precordial rub is present.  No gallop is present.  Abdomen soft and nontender.  No organomegaly is present.  Extremities with good peripheral pulses without any pedal edema.  Skin warm and dry.  Musculoskeletal system is grossly normal.  CNS grossly normal.       Results Review:  I have personally reviewed the results from the time of this admission to 1/1/2023 10:06 EST and agree with these findings:  []  Laboratory  []  Microbiology  []  Radiology  []  EKG/Telemetry   []  Cardiology/Vascular   []  Pathology  []  Old records  []  Other:    Most notable findings include:    Lab Results (last 24 hours)       Procedure Component Value Units Date/Time    POC Glucose Once [[id number]]  (Abnormal) Collected: 01/01/23 0917    Specimen: Blood Updated: 01/01/23 0918     Glucose 255 mg/dL      Comment: Serial Number: [id number]:  [id number]       POC Glucose Once [[id number]]  (Abnormal) Collected: 01/01/23 0704    Specimen: Blood Updated: 01/01/23 0706     Glucose 154 mg/dL      Comment: Serial Number: [id number]:  [id number]       POC Glucose Once [[id number]]  (Abnormal) Collected: 01/01/23 0537    Specimen: Blood Updated: 01/01/23 0539     Glucose 137 mg/dL      Comment: Serial Number: [id number]:  [id number]       Basic Metabolic Panel [[id number]]  (Abnormal) Collected: 01/01/23 0428    Specimen: Blood Updated: 01/01/23 0453     Glucose 130 mg/dL      BUN 37 mg/dL      Creatinine 1.09 mg/dL      Sodium 138 mmol/L      Potassium 3.4 mmol/L      Chloride 100 mmol/L      CO2 26.0 mmol/L      Calcium 8.8  mg/dL      BUN/Creatinine Ratio 33.9     Anion Gap 12.0 mmol/L      eGFR 70.3 mL/min/1.73      Comment: National Kidney Foundation and American Society of Nephrology (ASN) Task Force recommended calculation based on the Chronic Kidney Disease Epidemiology Collaboration (CKD-EPI) equation refit without adjustment for race.       Narrative:      GFR Normal >60  Chronic Kidney Disease <60  Kidney Failure <15    The GFR formula is only valid for adults with stable renal function between ages 18 and 70.    CBC (No Diff) [[id number]]  (Abnormal) Collected: 01/01/23 0428    Specimen: Blood Updated: 01/01/23 0435     WBC 11.60 10*3/mm3      RBC 3.74 10*6/mm3      Hemoglobin 10.9 g/dL      Hematocrit 33.1 %      MCV 88.7 fL      MCH 29.1 pg      MCHC 32.9 g/dL      RDW 14.7 %      RDW-SD 47.7 fl      MPV 8.6 fL      Platelets 200 10*3/mm3     POC Glucose Once [[id number]]  (Abnormal) Collected: 01/01/23 0428    Specimen: Blood Updated: 01/01/23 0431     Glucose 123 mg/dL      Comment: Serial Number: [id number]:  [id number]       POC Glucose Once [[id number]]  (Abnormal) Collected: 01/01/23 0319    Specimen: Blood Updated: 01/01/23 0322     Glucose 149 mg/dL      Comment: Serial Number: [id number]:  [id number]       POC Glucose Once [[id number]]  (Abnormal) Collected: 01/01/23 0209    Specimen: Blood Updated: 01/01/23 0212     Glucose 135 mg/dL      Comment: Serial Number: [id number]:  [id number]       POC Glucose Once [[id number]]  (Abnormal) Collected: 12/31/22 2347    Specimen: Blood Updated: 12/31/22 2349     Glucose 141 mg/dL      Comment: Serial Number: [id number]:  [id number]       POC Glucose Once [[id number]]  (Abnormal) Collected: 12/31/22 2107    Specimen: Blood Updated: 12/31/22 2111     Glucose 157 mg/dL      Comment: Serial Number: [id number]:  [id number]       POC Glucose Once [[id number]]  (Abnormal) Collected: 12/31/22 1745    Specimen: Blood Updated: 12/31/22 1810     Glucose 147 mg/dL       Comment: Serial Number: [id number]:  [id number]       POC Glucose Once [[id number]]  (Abnormal) Collected: 12/31/22 1551    Specimen: Blood Updated: 12/31/22 1553     Glucose 138 mg/dL      Comment: Serial Number: [id number]:  [id number]       POC Glucose Once [[id number]]  (Abnormal) Collected: 12/31/22 1424    Specimen: Blood Updated: 12/31/22 1426     Glucose 150 mg/dL      Comment: Serial Number: [id number]:  [id number]       POC Glucose Once [[id number]]  (Abnormal) Collected: 12/31/22 1310    Specimen: Blood Updated: 12/31/22 1311     Glucose 165 mg/dL      Comment: Serial Number: [id number]:  [id number]       Potassium [[id number]]  (Normal) Collected: 12/31/22 1200    Specimen: Blood Updated: 12/31/22 1248     Potassium 4.1 mmol/L     POC Glucose Once [[id number]]  (Abnormal) Collected: 12/31/22 1234    Specimen: Blood Updated: 12/31/22 1236     Glucose 187 mg/dL      Comment: Serial Number: [id number]:  [id number]       POC Glucose Once [[id number]]  (Abnormal) Collected: 12/31/22 1011    Specimen: Blood Updated: 12/31/22 1012     Glucose 170 mg/dL      Comment: Serial Number: [id number]:  [id number]               Imaging Results (Last 24 Hours)       Procedure Component Value Units Date/Time    XR Chest 1 View [[id number]] Collected: 12/31/22 1659     Updated: 12/31/22 1703    Narrative:      EXAMINATION: XR CHEST 1 VW-     DATE OF EXAM: 12/31/2022 2:48 PM     INDICATION: chest tube removal; I25.110-Atherosclerotic heart disease of  native coronary artery with unstable angina pectoris.     COMPARISON: Chest radiograph dated 12/31/2022 at 5:43     TECHNIQUE: Portable AP view of the chest was obtained.     FINDINGS:  There is a right IJ introducer sheath with tip terminating at the  superior mediastinum. There is cardiomegaly. There are postsurgical  changes of CABG. The midline chest tube has been removed. There are low  lung volumes with streaky bibasilar airspace disease.  There is no  pleural effusion or pneumothorax. There are degenerative changes of the  thoracic spine.       Impression:      1. No evidence of pneumothorax status post chest tube removal.  2. Low lung volumes with streaky bibasilar atelectasis.  3. Postsurgical changes of CABG.     Electronically Signed By-Bradley Moore MD On:12/31/2022 5:01 PM  This report was finalized on [id number] by  Bradley Moore MD.            LAB RESULTS (LAST 7 DAYS)    CBC  Results from last 7 days   Lab Units 01/01/23  0428 12/31/22  0343 12/30/22  0317 12/29/22  1829 12/29/22  1739 12/29/22  1637 12/29/22  1518 12/29/22  1247 12/29/22  1210 12/29/22  0758 12/29/22  0419 12/28/22  0154 12/27/22  0427   WBC 10*3/mm3 11.60* 10.90* 21.00*  --   --   --   --   --  29.20*  --  12.20* 12.20* 11.80*   RBC 10*6/mm3 3.74* 3.49* 4.03*  --   --   --   --   --  4.47  --  5.27 5.38 5.22   HEMOGLOBIN g/dL 10.9* 10.4* 11.8*  --   --   --   --   --  13.0  --  16.1 15.8 15.5   HEMOGLOBIN, POC g/dL  --   --   --  13.6 14.3 14.2 13.8   < >  --    < >  --   --   --    HEMATOCRIT % 33.1* 30.9* 35.2*  --   --   --   --   --  39.9  --  46.3 47.3 46.2   HEMATOCRIT POC %  --   --   --  40 42 42 41   < >  --    < >  --   --   --    MCV fL 88.7 88.6 87.4  --   --   --   --   --  89.3  --  87.9 88.0 88.6   PLATELETS 10*3/mm3 200 161 268  --   --   --   --   --  333  --  326 327 361    < > = values in this interval not displayed.       BMP  Results from last 7 days   Lab Units 01/01/23  0428 12/31/22  1200 12/31/22  0343 12/30/22  1654 12/30/22  0317 12/29/22  2315 12/29/22  1753 12/29/22  1210 12/29/22  0419 12/28/22  0154   SODIUM mmol/L 138  --  135* 135* 133*  --   --  128* 126* 125*   POTASSIUM mmol/L 3.4* 4.1 3.5 4.0 4.3 3.7 3.3* 3.8 4.2 4.3   CHLORIDE mmol/L 100  --  100 96* 97*  --   --  89* 81* 83*   CO2 mmol/L 26.0  --  23.0 26.0 22.0  --   --  24.0 28.0 25.0   BUN mg/dL 37*  --  45* 53* 67*  --   --  69* 79* 59*   CREATININE mg/dL 1.09  --  1.12  1.38* 1.82*  --   --  1.75* 1.69* 1.85*   GLUCOSE mg/dL 130*  --  147* 95 224*  --   --  191* 180* 185*   MAGNESIUM mg/dL  --   --   --   --  3.0*  --   --   --   --   --    PHOSPHORUS mg/dL  --   --   --   --  5.6*  --   --  5.4*  --   --        CMP   Results from last 7 days   Lab Units 01/01/23  0428 12/31/22  1200 12/31/22  0343 12/30/22  1654 12/30/22  0317 12/29/22  2315 12/29/22  1753 12/29/22  1210 12/29/22  0419 12/28/22  0154   SODIUM mmol/L 138  --  135* 135* 133*  --   --  128* 126* 125*   POTASSIUM mmol/L 3.4* 4.1 3.5 4.0 4.3 3.7 3.3* 3.8 4.2 4.3   CHLORIDE mmol/L 100  --  100 96* 97*  --   --  89* 81* 83*   CO2 mmol/L 26.0  --  23.0 26.0 22.0  --   --  24.0 28.0 25.0   BUN mg/dL 37*  --  45* 53* 67*  --   --  69* 79* 59*   CREATININE mg/dL 1.09  --  1.12 1.38* 1.82*  --   --  1.75* 1.69* 1.85*   GLUCOSE mg/dL 130*  --  147* 95 224*  --   --  191* 180* 185*   ALBUMIN g/dL  --   --   --   --  3.8  --   --  3.7 4.0  --    BILIRUBIN mg/dL  --   --   --   --   --   --   --   --  0.8  --    ALK PHOS U/L  --   --   --   --   --   --   --   --  104  --    AST (SGOT) U/L  --   --   --   --   --   --   --   --  18  --    ALT (SGPT) U/L  --   --   --   --   --   --   --   --  12  --        BNP        TROPONIN        CoAg  Results from last 7 days   Lab Units 12/30/22  0317 12/29/22  1210 12/29/22  0420 12/28/22  0905 12/28/22  0154 12/27/22  1913 12/27/22  1145 12/27/22  0427   INR  1.06 1.16*  --   --  1.06  --   --   --    APTT seconds  --  30.3 64.1 62.4 66.9 77.0* 76.5 59.7*       Creatinine Clearance  Estimated Creatinine Clearance: 68.3 mL/min (by C-G formula based on SCr of 1.09 mg/dL).    ABG  Results from last 7 days   Lab Units 12/30/22  0151 12/30/22  0019 12/29/22  1829 12/29/22  1739 12/29/22  1637 12/29/22  1518 12/29/22  1417   PH, ARTERIAL pH units 7.340* 7.361 7.362 7.368 7.424 7.400 7.360   PCO2, ARTERIAL mm Hg 40.3 38.8 38.4 40.0 34.8* 37.4 40.2   PO2 ART mm Hg 124.5* 96.8 75.7* 82.2* 78.1*  94.6 102.3   O2 SATURATION ART % 98.6* 97.3 94.5 95.7 95.8 97.4 97.6   BASE EXCESS ART mmol/L -3.8* -3.1* -3.2* -2.1* -1.1* -1.4* -2.6*       Radiology  XR Chest 1 View    Result Date: 12/31/2022  1. No evidence of pneumothorax status post chest tube removal. 2. Low lung volumes with streaky bibasilar atelectasis. 3. Postsurgical changes of CABG.  Electronically Signed By-Bradley Moore MD On:12/31/2022 5:01 PM This report was finalized on [id number] by  Bradley Moore MD.    XR Chest 1 View    Result Date: 12/31/2022  1.     Interval removal of [place]-S[place] catheter. Right IJ sheath remains. No definite pneumothorax. 2.     Bibasilar opacities with suspected mild increase in right basilar opacities which could represent worsening infiltrate or atelectasis. 3.     Probable small pleural effusions.  Electronically Signed By-Cameron Fermin MD On:12/31/2022 8:16 AM This report was finalized on [id number] by  Cameron Fermin MD.        EKG  I personally viewed and interpreted the patient's EKG/Telemetry data:  ECG 12 Lead Drug Monitoring; Amiodarone   Final Result   HEART RATE= 60  bpm   RR Interval= 996  ms   WV Interval= 177  ms   P Horizontal Axis= -52  deg   P Front Axis= 38  deg   QRSD Interval= 89  ms   QT Interval= 453  ms   QRS Axis= 13  deg   T Wave Axis= 96  deg   - ABNORMAL ECG -   Sinus rhythm   Nonspecific T abnrm, anterolateral leads   Borderline ST elevation, inferior leads   When compared with ECG of 29-Dec-2022 16:41:22,   Significant change in rhythm   Electronically Signed By: Johann Morales ([facility]) 30-Dec-2022 23:59:10   Date and Time of Study: 2022-12-30 03:33:03      ECG 12 Lead Drug Monitoring; Amiodarone   Final Result   HEART RATE= 89  bpm   RR Interval= 671  ms   WV Interval= 219  ms   P Horizontal Axis= -87  deg   P Front Axis=   deg   QRSD Interval= 100  ms   QT Interval= 371  ms   QRS Axis= 20  deg   T Wave Axis= 177  deg   - ABNORMAL ECG -   Atrial-paced rhythm   Abnormal T, consider  ischemia, lateral leads   When compared with ECG of 17-Dec-2022 7:21:47,   Significant change in rhythm   Significant axis, voltage or hypertrophy change   Electronically Signed By: Sampson Torres ([facility]) 29-Dec-2022 20:09:23   Date and Time of Study: 2022-12-29 16:41:22      ECG 12 Lead Chest Pain   Final Result   HEART RATE= 89  bpm   RR Interval= 672  ms   MO Interval= 176  ms   P Horizontal Axis= -5  deg   P Front Axis= 29  deg   QRSD Interval= 92  ms   QT Interval= 362  ms   QRS Axis= 16  deg   T Wave Axis= 163  deg   - ABNORMAL ECG -   Sinus rhythm   Probable left atrial enlargement   Probable anterior infarct, age indeterminate   Abnormal T, consider ischemia, lateral leads   No previous ECG available for comparison   Electronically Signed By: Alex Byrd ([facility]) 19-Dec-2022 08:52:18   Date and Time of Study: 2022-12-17 07:21:47      SCANNED - TELEMETRY     Final Result      SCANNED - TELEMETRY     Final Result      SCANNED - TELEMETRY     Final Result      SCANNED - TELEMETRY     Final Result      SCANNED - TELEMETRY     Final Result      SCANNED - TELEMETRY     Final Result      SCANNED - TELEMETRY     Final Result      SCANNED - TELEMETRY     Final Result      SCANNED - TELEMETRY     Final Result      SCANNED - TELEMETRY     Final Result      SCANNED - TELEMETRY     Final Result      SCANNED - TELEMETRY     Final Result      SCANNED - TELEMETRY     Final Result      SCANNED - TELEMETRY     Final Result      SCANNED - TELEMETRY     Final Result      SCANNED - TELEMETRY     Final Result      SCANNED - TELEMETRY     Final Result      SCANNED - TELEMETRY     Final Result      SCANNED - TELEMETRY     Final Result      SCANNED - TELEMETRY     Final Result      SCANNED - TELEMETRY     Final Result      SCANNED - TELEMETRY     Final Result      SCANNED - TELEMETRY     Final Result      SCANNED - TELEMETRY     Final Result      SCANNED - TELEMETRY     Final Result      SCANNED - TELEMETRY     Final Result       SCANNED - TELEMETRY     Final Result      SCANNED - TELEMETRY     Final Result      SCANNED - TELEMETRY     Final Result      SCANNED - TELEMETRY     Final Result      SCANNED - TELEMETRY     Final Result      SCANNED - TELEMETRY     Final Result      SCANNED - TELEMETRY     Final Result      SCANNED - TELEMETRY     Final Result      SCANNED - TELEMETRY     Final Result      SCANNED - TELEMETRY     Final Result      SCANNED - TELEMETRY     Final Result      SCANNED - TELEMETRY     Final Result      SCANNED - TELEMETRY     Final Result      SCANNED - TELEMETRY     Final Result      SCANNED - TELEMETRY     Final Result      ECG 12 Lead    (Results Pending)   ECG 12 Lead    (Results Pending)   ECG 12 Lead    (Results Pending)         Echocardiogram:    Results for orders placed during the hospital encounter of 12/17/22    Adult Transthoracic Echo Limited W/ Cont if Necessary Per Protocol    Interpretation Summary  •  Left ventricular systolic function is low normal. Calculated left ventricular EF = 48% Left ventricular ejection fraction appears to be 46 - 50%.  •  The left ventricular cavity is moderately dilated.  •  Left ventricular wall thickness is consistent with mild concentric hypertrophy.  •  Left ventricular diastolic dysfunction is noted.  •  There is calcification of the aortic valve without stenosis        Stress Test:         Cardiac Catheterization:  No results found for this or any previous visit.         Other:         ASSESSMENT & PLAN:    Principal Problem:    CAD (coronary artery disease), native coronary artery  Active Problems:    Type 2 diabetes mellitus with hyperglycemia, without long-term current use of insulin ([facility])    Mixed hyperlipidemia    Primary hypertension    Systolic and diastolic CHF, acute on chronic ([facility])    Influenza due to seasonal influenza virus    NSTEMI (non-ST elevated myocardial infarction) ([facility])    77-year-old man with multiple cardiovascular risk factors presented with  "non-ST elevation MI and acute kidney injury in the setting of influenza.  He completed treatment for influenza and is now status post CABG.  Of note preop EF was 20% which improved to 40 to 45% with medical treatment.  CABG with LIMA to LAD, vein graft to diagonal, vein graft to PDA and vein graft to ramus.  He is recuperating well, extubated, off milrinone and insulin drip.  He is on aspirin, high intensity statin and low-dose diuretics.  Plan will be to start low-dose beta-blocker tomorrow.  Chest tubes and pacer wires have been removed.  Of note he will also require Plavix at the time of discharge as initial presentation with non-ST elevation MI  He is on oral amiodarone which she will continue for 6 weeks.  He should have a repeat echocardiogram in 3 months to evaluate his LV function.  Will need cardiac rehab in [place] where he lives.        Johann Morales MD  01/01/23  10:06 EST                Electronically signed by Johann Morales MD at 01/01/23 6426     Jaleel Huber MD at 01/01/23 0916        Status post coronary bypass, postop day #3.    CV improving.  Discontinue milrinone.  Not ready for beta-blocker yet.  Continue amiodarone.  Remove AV wires later today.  Remove central line later today.  Pulmonary toileting.  Mobilize.  Oral diet as tolerated.  Low-dose diuretic regimen started with potassium supplementation.  Replace magnesium this morning.  Afebrile.    Slow but measurable progress.  Rehab will be an issue.    Electronically signed by Jaleel Huber MD at 01/01/23 0991     Poncho Menard MD at 12/31/22 6917        Subjective   Sherman Baumann is a 77 y.o. male.   Seen for diabetes f/u.  Post op day # 2. Sitting up, eating some dinner.  Will transition to sq glucommander tomorrow.      Objective     /62   Pulse 83   Temp 97.8 °F (36.6 °C) (Oral)   Resp 15   Ht 177.8 cm (70\")   Wt 106 kg (233 lb 9.6 oz)   SpO2 94%   BMI 33.52 kg/m²   Blood sugar  163 @ 6 am,  187 @ noon, 147 " @ 6p    ASSESSMENT    Patient is Improving    PLAN    Continue insulin drip.        Poncho Menard MD  12/31/2022  18:57 EST      Electronically signed by Poncho Menard MD at 12/31/22 1902     Tracy Page MD at 12/31/22 1233          Daily Progress Note          Assessment    Hypoxemia  Flu B  COPD  LOGAN  Cor Pulmonale   CHF: LV EF 20-25%  DM II  Hyperlipidemia  HTN  CAD with multivessel disease: Status post CABG x4 12/29/2022  MENDEZ        PLAN:    Oxygen supplement and titration: Currently requiring 3-1/2 L per nasal cannula  Encourage use of incentive spirometer  Chest tube management as per CTS  Aspirin  DVT prophylaxis  Glucose control: Currently on insulin drip           LOS: 14 days     Subjective     Patient reports mild shortness of breath and cough    Objective     Vital signs for last 24 hours:  Vitals:    12/31/22 0724 12/31/22 0800 12/31/22 0900 12/31/22 1147   BP:  126/65 125/57    BP Location:       Patient Position:       Pulse:  89 88    Resp:       Temp: 98.6 °F (37 °C)   98.3 °F (36.8 °C)   TempSrc: Oral   Oral   SpO2:  97% 94%    Weight:       Height:           Intake/Output last 3 shifts:  I/O last 3 completed shifts:  In: 5025.3 [P.O.:860; I.V.:4165.3]  Out: 3345 [Urine:3080; Chest Tube:265]  Intake/Output this shift:  I/O this shift:  In: 0   Out: 635 [Urine:575; Chest Tube:60]      Radiology  Imaging Results (Last 24 Hours)     Procedure Component Value Units Date/Time    XR Chest 1 View [[id number]] Collected: 12/31/22 0814     Updated: 12/31/22 0818    Narrative:      DATE OF EXAM:  12/31/2022 5:38 AM     PROCEDURE:  XR CHEST 1 VW-     INDICATIONS:  Post-Op Heart Surgery; I25.110-Atherosclerotic heart disease of native  coronary artery with unstable angina pectoris     COMPARISON:  12/30/2022     TECHNIQUE:   Single radiographic view of the chest was obtained.     FINDINGS:  [place]-Sonia catheter has been removed. Right IJ sheath appears in stable  position.  There are bibasilar  opacities, with suspected mild increase  in right basilar opacities. There are suspected small bilateral pleural  effusions. No definite pneumothorax is seen.  Heart size and mediastinal  contour appear unchanged. Status post median sternotomy.       Impression:      1.     Interval removal of [place]-Sonia catheter. Right IJ sheath remains.  No definite pneumothorax.  2.     Bibasilar opacities with suspected mild increase in right basilar  opacities which could represent worsening infiltrate or atelectasis.  3.     Probable small pleural effusions.     Electronically Signed By-Cameron Fermin MD On:12/31/2022 8:16 AM  This report was finalized on [id number] by  Cameron Fermin MD.          Labs:  Results from last 7 days   Lab Units 12/31/22  0343   WBC 10*3/mm3 10.90*   HEMOGLOBIN g/dL 10.4*   HEMATOCRIT % 30.9*   PLATELETS 10*3/mm3 161     Results from last 7 days   Lab Units 12/31/22  1200 12/31/22  0343 12/29/22  1210 12/29/22  0419   SODIUM mmol/L  --  135*   < > 126*   POTASSIUM mmol/L 4.1 3.5   < > 4.2   CHLORIDE mmol/L  --  100   < > 81*   CO2 mmol/L  --  23.0   < > 28.0   BUN mg/dL  --  45*   < > 79*   CREATININE mg/dL  --  1.12   < > 1.69*   CALCIUM mg/dL  --  8.0*   < > 10.5   BILIRUBIN mg/dL  --   --   --  0.8   ALK PHOS U/L  --   --   --  104   ALT (SGPT) U/L  --   --   --  12   AST (SGOT) U/L  --   --   --  18   GLUCOSE mg/dL  --  147*   < > 180*    < > = values in this interval not displayed.     Results from last 7 days   Lab Units 12/30/22  0151   PH, ARTERIAL pH units 7.340*   PO2 ART mm Hg 124.5*   PCO2, ARTERIAL mm Hg 40.3   HCO3 ART mmol/L 21.8     Results from last 7 days   Lab Units 12/30/22  0317 12/29/22  1210 12/29/22  0419   ALBUMIN g/dL 3.8 3.7 4.0             Results from last 7 days   Lab Units 12/30/22  0317   MAGNESIUM mg/dL 3.0*     Results from last 7 days   Lab Units 12/30/22  0317 12/29/22  1210 12/29/22  0420 12/28/22  0905 12/28/22  0154   INR  1.06 1.16*  --   --  1.06   APTT  seconds  --  30.3 64.1 62.4 66.9               Meds:   SCHEDULE  amiodarone, 400 mg, Oral, BID With Meals  aspirin, 81 mg, Oral, Daily  atorvastatin, 40 mg, Oral, Nightly  chlorhexidine, 15 mL, Mouth/Throat, Q12H  enoxaparin, 40 mg, Subcutaneous, Daily  [START ON 1/1/2023] insulin lispro, 3-14 Units, Subcutaneous, TID With Meals  mupirocin, 1 application, Each Nare, BID  pantoprazole, 40 mg, Oral, Q AM  polyethylene glycol, 17 g, Oral, BID  senna-docusate sodium, 2 tablet, Oral, BID      Infusions  insulin, 0-100 Units/hr, Last Rate: 2.3 Units/hr (12/31/22 1429)  milrinone, 0.125 mcg/kg/min, Last Rate: 0.125 mcg/kg/min (12/31/22 0973)  sodium chloride, 30 mL/hr, Last Rate: 30 mL/hr (12/29/22 1307)      PRNs  •  acetaminophen **OR** acetaminophen **OR** acetaminophen  •  dextrose  •  [START ON 1/1/2023] dextrose  •  dextrose  •  [START ON 1/1/2023] dextrose  •  glucagon (human recombinant)  •  [START ON 1/1/2023] glucagon (human recombinant)  •  HYDROcodone-acetaminophen  •  Morphine **AND** naloxone  •  ondansetron  •  potassium chloride **OR** potassium chloride  •  sodium chloride    Physical Exam:  General Appearance: Alert and following commands  HEENT:  Normocephalic, without obvious abnormality, Conjunctiva/corneas clear,.   Nares normal, no drainage     Neck:  Supple, symmetrical, trachea midline.   Lungs /Chest wall: Good air movement bilaterally bibasilar crackles, chest tubes in place, symmetrical wall movement.     Heart: Rate controlled, S1 S2 normal  Abdomen: Soft, non-tender, no masses, no organomegaly.    Extremities: No edema, no clubbing or cyanosis     ROS  Constitutional: Negative for chills, fever and malaise/fatigue.   HENT: Negative.    Eyes: Negative.    Cardiovascular: Expected postoperative incisional pain  Respiratory: Positive for cough and shortness of breath.    Skin: Negative.    Musculoskeletal: Negative.    Gastrointestinal: Negative.    Genitourinary: Negative.    Neurological:  Negative.    Psychiatric/Behavioral: Negative.      I reviewed the recent clinical results    Much of this encounter note is an electronic transcription/translation of spoken language to printed text using Dragon Software which might include inadvertent errors in transcription.      Electronically signed by Tracy Page MD at 12/31/22 1435          Physical Therapy Notes (last 48 hours)      Erica Salas PTA at 12/31/22 1735  Version 1 of 1       Subjective: Pt agreeable to therapeutic plan of care.Nsg called to ask if we could assist with pt out of bed and to chair    Objective:     Bed mobility - Mod-A with max cues to hold harness  Transfers - Min-A, Assist x 2 and with rolling walker  Ambulation -  feet N/A or Not attempted.    Vitals: WNL on 2L 02    Pain: 0 VAS   Education: Provided education on the importance of mobility in the acute care setting, Verbal/Tactile Cues, Transfer Training, WB'ing status and Post-Op Precautions    Assessment: Sherman Baumann presents with functional mobility impairments which indicate the need for skilled intervention. Tolerating session today without incident. Pt [place] and delayed with responding. Req mod assist to come to sit EOB to R and min x 2 to SPS to chair using rwx to shuffle feet and complete transfer. Pt incontinent of loose stool again and had to stand long enough for nsg to clean up. Plans on rehab at [place]. Will continue to follow and progress as tolerated.     Post-Tx Position: Up in Chair, Staff Present and Call light and personal items within reach  PPE: gloves and surgical mask    Electronically signed by Erica Salas PTA at 12/31/22 1740     Erica Salas PTA at 12/31/22 1735  Version 1 of 1       Assessment: Sherman Baumann presents with functional mobility impairments which indicate the need for skilled intervention. Tolerating session today without incident. Pt [place] and delayed with responding. Req mod assist to come to sit EOB to R and min  x 2 to SPS to chair using rwx to shuffle feet and complete transfer. Pt incontinent of loose stool again and had to stand long enough for nsg to clean up. Plans on rehab at [place]. Will continue to follow and progress as tolerated.       Electronically signed by Erica Salas PTA at 12/31/22 7030       Occupational Therapy Notes (last 48 hours)  Notes from 12/31/22 1207 through 01/02/23 1207   No notes exist for this encounter.

## 2023-01-02 NOTE — THERAPY TREATMENT NOTE
"Subjective: Pt agreeable to therapeutic plan of care.  Speech is unclear at times.     Objective:     Bed mobility - N/A or Not attempted.  Transfers - Mod-A  Ambulation - 85 feet Min-A and with rolling walker with cues to not push walker too far forward.    Phase 1 Cardiac Rehab Initiated    Sitting tolerance: >10min and supported  Standing tolerance: 1-5min and supported    Precautions:  Mid-sternal incision; avoid scapular retraction and depression.  Cardiovascular impairment post-sx; encourage energy conservation strategies.    Therapeutic Exercise: 10 reps UE and LE AROM in supported sitting    MET level equivalent: 2.0-3.0 (Unlimited sitting, ambulation on level ground <2mph, light housework)    Vitals: WNL    Pain: 0 VAS      Education: Verbal/Tactile Cues, Transfer Training and Post-Op Precautions    Assessment: Sherman Baumann presents with functional mobility impairments which indicate the need for skilled intervention. Pt ambulates with decreased heel strike BLE and forward flexed posture. Tolerating session today without incident. Will continue to follow and progress as tolerated.     Plan/Recommendations:   Moderate Intensity Therapy recommended post-acute care. This is recommended as therapy feels the patient would require 3-4 days per week and wouldn't tolerate \"3 hour daily\" rehab intensity. SNF would be the preferred choice. If the patient does not agree to SNF, arrange HH or OP depending on home bound status. If patient is medically complex, consider LTACH.. Pt requires no DME at discharge.     Pt desires Skilled Rehab placement at discharge. Pt cooperative; agreeable to therapeutic recommendations and plan of care.         Basic Mobility 6-click:  Rollin = Total, A lot = 2, A little = 3; 4 = None  Supine>Sit:   1 = Total, A lot = 2, A little = 3; 4 = None   Sit>Stand with arms:  1 = Total, A lot = 2, A little = 3; 4 = None  Bed>Chair:   1 = Total, A lot = 2, A little = 3; 4 = " None  Ambulate in room:  1 = Total, A lot = 2, A little = 3; 4 = None  3-5 Steps with railin = Total, A lot = 2, A little = 3; 4 = None  Score: 15    [place]: N/A = No pre-op stroke/TIA    Post-Tx Position: Up in Chair, Alarms activated and Call light and personal items within reach  PPE: gloves and surgical mask

## 2023-01-02 NOTE — CASE MANAGEMENT/SOCIAL WORK
Continued Stay Note  [facility]     Patient Name: Sherman Baumann  MRN: [id number]  Today's Date: 1/2/2023    Admit Date: 12/17/2022    Plan: DC Plan: [facility] in [place], [place] pending acceptance and bed availability. No precert required. PASRR per facility. CABG x4 12/29/22.   Discharge Plan     Row Name 01/02/23 1630       Plan    Plan DC Plan: [facility] in [place], [place] pending acceptance and bed availability. No precert required. PASRR per facility. CABG x4 12/29/22.    Patient/Family in Agreement with Plan yes    Provided Post Acute Provider List? N/A    Provided Post Acute Provider Quality & Resource List? N/A    Plan Comments CM spoke with patient spouse via telephone to discuss discharge planning. She is agreeable to the following facilities. 1) [facility] in [place], 2) [facility], and 3)[facility]. CM placed referral to [facility] and notified facility. CM was contacted by admissions who states they are working from home and will follow up on acceptance first thing in the morning 1/3/23.CM will continue to follow for any further needs and adjust discharge plan accordingly. DC Barriers: POD 4 OHS, pending blood cultures, and cardiac monitoring.           Expected Discharge Date and Time     Expected Discharge Date Expected Discharge Time    Dany 3, 2023         Phone communication or documentation only- no physical contact with patient or family.        Rebecca Dempsey RN     Office Phone: [phone]  Office Cell:     [phone]

## 2023-01-02 NOTE — PROGRESS NOTES
" LOS: 16 days   Patient Care Team:  Melvina Hodge as PCP - General (Nurse Practitioner)  Johann Morales MD as Cardiologist (Cardiology)    Chief Complaint: post op fu    Subjective:  Symptoms:  No shortness of breath or chest pain.    Diet:  No nausea.    Activity level: Impaired due to weakness.    Pain:  He reports no pain.          Vital Signs  Temp:  [97.4 °F (36.3 °C)-98.8 °F (37.1 °C)] 97.7 °F (36.5 °C)  Heart Rate:  [75-90] 76  Resp:  [16-20] 19  BP: (117-145)/(50-74) 134/63  Body mass index is 31.6 kg/m².    Intake/Output Summary (Last 24 hours) at 1/2/2023 0756  Last data filed at 1/2/2023 0600  Gross per 24 hour   Intake 1880 ml   Output 3100 ml   Net -1220 ml     No intake/output data recorded.            01/01/23  0538 01/01/23  0548 01/02/23  0400   Weight: 101 kg (223 lb 12.3 oz) 100 kg (220 lb 14.4 oz) 99.9 kg (220 lb 3.8 oz)         Objective:  General Appearance:  Comfortable.    Vital signs: (most recent): Blood pressure 134/63, pulse 76, temperature 97.7 °F (36.5 °C), temperature source Oral, resp. rate 19, height 177.8 cm (70\"), weight 99.9 kg (220 lb 3.8 oz), SpO2 90 %.    Lungs:  Normal effort and normal respiratory rate.    Heart: Normal rate.  Regular rhythm.  (NSR, rate 80's)  Abdomen: Abdomen is soft and non-distended.    Skin:  Warm and dry.  (Stable sternum)            Results Review:        WBC WBC   Date Value Ref Range Status   01/02/2023 13.60 (H) 3.40 - 10.80 10*3/mm3 Final   01/01/2023 11.60 (H) 3.40 - 10.80 10*3/mm3 Final   12/31/2022 10.90 (H) 3.40 - 10.80 10*3/mm3 Final      HGB Hemoglobin   Date Value Ref Range Status   01/02/2023 11.4 (L) 13.0 - 17.7 g/dL Final   01/01/2023 10.9 (L) 13.0 - 17.7 g/dL Final   12/31/2022 10.4 (L) 13.0 - 17.7 g/dL Final      HCT Hematocrit   Date Value Ref Range Status   01/02/2023 34.6 (L) 37.5 - 51.0 % Final   01/01/2023 33.1 (L) 37.5 - 51.0 % Final   12/31/2022 30.9 (L) 37.5 - 51.0 % Final      Platelets Platelets   Date Value Ref Range Status "   01/02/2023 257 140 - 450 10*3/mm3 Final   01/01/2023 200 140 - 450 10*3/mm3 Final   12/31/2022 161 140 - 450 10*3/mm3 Final        PT/INR:  No results found for: PROTIME/No results found for: INR    Sodium Sodium   Date Value Ref Range Status   01/02/2023 139 136 - 145 mmol/L Final   01/01/2023 138 136 - 145 mmol/L Final   12/31/2022 135 (L) 136 - 145 mmol/L Final   12/30/2022 135 (L) 136 - 145 mmol/L Final      Potassium Potassium   Date Value Ref Range Status   01/02/2023 3.8 3.5 - 5.2 mmol/L Final   01/01/2023 3.4 (L) 3.5 - 5.2 mmol/L Final   12/31/2022 4.1 3.5 - 5.2 mmol/L Final   12/31/2022 3.5 3.5 - 5.2 mmol/L Final   12/30/2022 4.0 3.5 - 5.2 mmol/L Final      Chloride Chloride   Date Value Ref Range Status   01/02/2023 97 (L) 98 - 107 mmol/L Final   01/01/2023 100 98 - 107 mmol/L Final   12/31/2022 100 98 - 107 mmol/L Final   12/30/2022 96 (L) 98 - 107 mmol/L Final      Bicarbonate CO2   Date Value Ref Range Status   01/02/2023 29.0 22.0 - 29.0 mmol/L Final   01/01/2023 26.0 22.0 - 29.0 mmol/L Final   12/31/2022 23.0 22.0 - 29.0 mmol/L Final   12/30/2022 26.0 22.0 - 29.0 mmol/L Final      BUN BUN   Date Value Ref Range Status   01/02/2023 42 (H) 8 - 23 mg/dL Final   01/01/2023 37 (H) 8 - 23 mg/dL Final   12/31/2022 45 (H) 8 - 23 mg/dL Final   12/30/2022 53 (H) 8 - 23 mg/dL Final      Creatinine Creatinine   Date Value Ref Range Status   01/02/2023 1.22 0.76 - 1.27 mg/dL Final   01/01/2023 1.09 0.76 - 1.27 mg/dL Final   12/31/2022 1.12 0.76 - 1.27 mg/dL Final   12/30/2022 1.38 (H) 0.76 - 1.27 mg/dL Final      Calcium Calcium   Date Value Ref Range Status   01/02/2023 9.4 8.6 - 10.5 mg/dL Final   01/01/2023 8.8 8.6 - 10.5 mg/dL Final   12/31/2022 8.0 (L) 8.6 - 10.5 mg/dL Final   12/30/2022 8.8 8.6 - 10.5 mg/dL Final      Magnesium Magnesium   Date Value Ref Range Status   01/02/2023 2.2 1.6 - 2.4 mg/dL Final      Troponin No results found for: TROPONIN   BNP No results found for: BNP         amiodarone, 400  mg, Oral, BID With Meals  aspirin, 325 mg, Oral, Daily  atorvastatin, 40 mg, Oral, Nightly  bumetanide, 1 mg, Oral, Daily  empagliflozin, 10 mg, Oral, Daily  enoxaparin, 40 mg, Subcutaneous, Daily  insulin glargine, 1-200 Units, Subcutaneous, Daily - Glucommander  insulin lispro, 1-200 Units, Subcutaneous, 4x Daily With Meals & Nightly  mupirocin, 1 application, Each Nare, BID  pantoprazole, 40 mg, Oral, Q AM  polyethylene glycol, 17 g, Oral, BID  potassium chloride, 20 mEq, Oral, Q12H  senna-docusate sodium, 2 tablet, Oral, BID           PRN Meds:.•  acetaminophen **OR** [DISCONTINUED] acetaminophen **OR** acetaminophen  •  dextrose  •  dextrose  •  glucagon (human recombinant)  •  HYDROcodone-acetaminophen  •  insulin lispro  •  [DISCONTINUED] Morphine **AND** naloxone  •  ondansetron  •  potassium chloride **OR** potassium chloride  •  sodium chloride    Patient Active Problem List   Diagnosis Code   • CAD (coronary artery disease), native coronary artery I25.10   • Type 2 diabetes mellitus with hyperglycemia, without long-term current use of insulin ([facility]) E11.65   • Mixed hyperlipidemia E78.2   • Primary hypertension I10   • Systolic and diastolic CHF, acute on chronic ([facility]) I50.43   • Influenza due to seasonal influenza virus J10.1   • NSTEMI (non-ST elevated myocardial infarction) ([facility]) I21.4       Assessment & Plan    - MV CAD with NSTEMI presentation at [place], EF 20-25% (echo), 20% by intraop REESE s/p urgent CABG x4 with LIMA ([place])-----will start plavix prior to discharge  - ICM, acute HFrEF, NYHA class II-III, BNP 5580 at OSH---GDMT prior to discharge  - Influenza B diagnosed 12/15--ID signed off, Tamiflu x 5 days completed  - DM type 2 with hyperglycemia, a1c 8.3---- endocrine managing  - HTN--hypotensive post op, pressors  - HL---statin  - Past smoker  - [place], hearing aids in place  - Obesity  - COPD---pulmonary following  - MENDEZ on probable CKD III---peak creatinine preop 2.1, renal following  - Postop  leukocytosis, likely reactive    POD#4.  White count elevated, increased opacities on cxr, check u/a and sputum, O2 wean as tolerated.  Continue Bumex daily, Jardiance added.  Will need rehab at discharge, case mgt assisting with choices, anticipate d/c early this week.       Chyna Christian, ROWAN  01/02/23  07:56 EST

## 2023-01-03 ENCOUNTER — APPOINTMENT (OUTPATIENT)
Dept: GENERAL RADIOLOGY | Facility: HOSPITAL | Age: 78
DRG: 235 | End: 2023-01-03
Payer: MEDICARE

## 2023-01-03 VITALS
HEART RATE: 77 BPM | DIASTOLIC BLOOD PRESSURE: 65 MMHG | SYSTOLIC BLOOD PRESSURE: 126 MMHG | HEIGHT: 70 IN | WEIGHT: 220.9 LBS | OXYGEN SATURATION: 98 % | TEMPERATURE: 98.8 F | RESPIRATION RATE: 17 BRPM | BODY MASS INDEX: 31.62 KG/M2

## 2023-01-03 LAB
ANION GAP SERPL CALCULATED.3IONS-SCNC: 12 MMOL/L (ref 5–15)
BUN SERPL-MCNC: 45 MG/DL (ref 8–23)
BUN/CREAT SERPL: 39.5 (ref 7–25)
CALCIUM SPEC-SCNC: 9.2 MG/DL (ref 8.6–10.5)
CHLORIDE SERPL-SCNC: 95 MMOL/L (ref 98–107)
CO2 SERPL-SCNC: 30 MMOL/L (ref 22–29)
CREAT SERPL-MCNC: 1.14 MG/DL (ref 0.76–1.27)
DEPRECATED RDW RBC AUTO: 46.8 FL (ref 37–54)
EGFRCR SERPLBLD CKD-EPI 2021: 66.7 ML/MIN/1.73
ERYTHROCYTE [DISTWIDTH] IN BLOOD BY AUTOMATED COUNT: 14.3 % (ref 12.3–15.4)
GLUCOSE BLDC GLUCOMTR-MCNC: 183 MG/DL (ref 70–105)
GLUCOSE BLDC GLUCOMTR-MCNC: 285 MG/DL (ref 70–105)
GLUCOSE SERPL-MCNC: 171 MG/DL (ref 65–99)
HCT VFR BLD AUTO: 35.9 % (ref 37.5–51)
HGB BLD-MCNC: 11.9 G/DL (ref 13–17.7)
MCH RBC QN AUTO: 29.4 PG (ref 26.6–33)
MCHC RBC AUTO-ENTMCNC: 33.1 G/DL (ref 31.5–35.7)
MCV RBC AUTO: 88.9 FL (ref 79–97)
PLATELET # BLD AUTO: 290 10*3/MM3 (ref 140–450)
PMV BLD AUTO: 8.6 FL (ref 6–12)
POTASSIUM SERPL-SCNC: 3.6 MMOL/L (ref 3.5–5.2)
QT INTERVAL: 330 MS
QT INTERVAL: 332 MS
RBC # BLD AUTO: 4.04 10*6/MM3 (ref 4.14–5.8)
SODIUM SERPL-SCNC: 137 MMOL/L (ref 136–145)
WBC NRBC COR # BLD: 12.5 10*3/MM3 (ref 3.4–10.8)

## 2023-01-03 PROCEDURE — 99024 POSTOP FOLLOW-UP VISIT: CPT | Performed by: THORACIC SURGERY (CARDIOTHORACIC VASCULAR SURGERY)

## 2023-01-03 PROCEDURE — 71045 X-RAY EXAM CHEST 1 VIEW: CPT

## 2023-01-03 PROCEDURE — 80048 BASIC METABOLIC PNL TOTAL CA: CPT | Performed by: NURSE PRACTITIONER

## 2023-01-03 PROCEDURE — 63710000001 INSULIN LISPRO (HUMAN) PER 5 UNITS: Performed by: INTERNAL MEDICINE

## 2023-01-03 PROCEDURE — 85027 COMPLETE CBC AUTOMATED: CPT | Performed by: NURSE PRACTITIONER

## 2023-01-03 PROCEDURE — 63710000001 INSULIN GLARGINE PER 5 UNITS: Performed by: INTERNAL MEDICINE

## 2023-01-03 PROCEDURE — 97116 GAIT TRAINING THERAPY: CPT

## 2023-01-03 PROCEDURE — 99232 SBSQ HOSP IP/OBS MODERATE 35: CPT | Performed by: INTERNAL MEDICINE

## 2023-01-03 PROCEDURE — 82962 GLUCOSE BLOOD TEST: CPT

## 2023-01-03 PROCEDURE — 97530 THERAPEUTIC ACTIVITIES: CPT

## 2023-01-03 PROCEDURE — 97535 SELF CARE MNGMENT TRAINING: CPT

## 2023-01-03 RX ORDER — CLOPIDOGREL BISULFATE 75 MG/1
75 TABLET ORAL DAILY
Qty: 30 TABLET | Refills: 6 | Status: SHIPPED | OUTPATIENT
Start: 2023-01-04

## 2023-01-03 RX ORDER — DOXYCYCLINE 100 MG/1
100 TABLET ORAL EVERY 12 HOURS SCHEDULED
Qty: 17 TABLET | Refills: 0 | Status: SHIPPED | OUTPATIENT
Start: 2023-01-03 | End: 2023-01-12

## 2023-01-03 RX ORDER — POTASSIUM CHLORIDE 20 MEQ/1
20 TABLET, EXTENDED RELEASE ORAL DAILY
Qty: 30 TABLET | Refills: 3 | Status: SHIPPED | OUTPATIENT
Start: 2023-01-04

## 2023-01-03 RX ORDER — BUMETANIDE 1 MG/1
1 TABLET ORAL DAILY
Qty: 30 TABLET | Refills: 3 | Status: SHIPPED | OUTPATIENT
Start: 2023-01-04

## 2023-01-03 RX ORDER — METOPROLOL SUCCINATE 25 MG/1
12.5 TABLET, EXTENDED RELEASE ORAL EVERY 12 HOURS SCHEDULED
Status: DISCONTINUED | OUTPATIENT
Start: 2023-01-03 | End: 2023-01-03 | Stop reason: HOSPADM

## 2023-01-03 RX ORDER — HYDROCODONE BITARTRATE AND ACETAMINOPHEN 5; 325 MG/1; MG/1
1 TABLET ORAL EVERY 4 HOURS PRN
Qty: 28 TABLET | Refills: 0 | Status: SHIPPED | OUTPATIENT
Start: 2023-01-03 | End: 2023-01-08

## 2023-01-03 RX ORDER — METOPROLOL SUCCINATE 25 MG/1
12.5 TABLET, EXTENDED RELEASE ORAL EVERY 12 HOURS SCHEDULED
Qty: 30 TABLET | Refills: 3 | Status: SHIPPED | OUTPATIENT
Start: 2023-01-03

## 2023-01-03 RX ORDER — ASPIRIN 81 MG/1
81 TABLET ORAL DAILY
Status: DISCONTINUED | OUTPATIENT
Start: 2023-01-04 | End: 2023-01-03 | Stop reason: HOSPADM

## 2023-01-03 RX ORDER — AMIODARONE HYDROCHLORIDE 200 MG/1
200 TABLET ORAL 2 TIMES DAILY WITH MEALS
Status: DISCONTINUED | OUTPATIENT
Start: 2023-01-03 | End: 2023-01-03

## 2023-01-03 RX ORDER — CLOPIDOGREL BISULFATE 75 MG/1
75 TABLET ORAL DAILY
Status: DISCONTINUED | OUTPATIENT
Start: 2023-01-04 | End: 2023-01-03 | Stop reason: HOSPADM

## 2023-01-03 RX ORDER — POTASSIUM CHLORIDE 20 MEQ/1
20 TABLET, EXTENDED RELEASE ORAL DAILY
Status: DISCONTINUED | OUTPATIENT
Start: 2023-01-03 | End: 2023-01-03 | Stop reason: HOSPADM

## 2023-01-03 RX ADMIN — INSULIN LISPRO 17 UNITS: 100 INJECTION, SOLUTION INTRAVENOUS; SUBCUTANEOUS at 13:22

## 2023-01-03 RX ADMIN — BUMETANIDE 1 MG: 1 TABLET ORAL at 09:21

## 2023-01-03 RX ADMIN — INSULIN GLARGINE 25 UNITS: 100 INJECTION, SOLUTION SUBCUTANEOUS at 09:21

## 2023-01-03 RX ADMIN — POTASSIUM CHLORIDE 20 MEQ: 1500 TABLET, EXTENDED RELEASE ORAL at 09:22

## 2023-01-03 RX ADMIN — ASPIRIN 325 MG: 325 TABLET, COATED ORAL at 09:21

## 2023-01-03 RX ADMIN — MUPIROCIN 1 APPLICATION: 20 OINTMENT TOPICAL at 09:22

## 2023-01-03 RX ADMIN — POTASSIUM CHLORIDE 20 MEQ: 1500 TABLET, EXTENDED RELEASE ORAL at 07:01

## 2023-01-03 RX ADMIN — PANTOPRAZOLE SODIUM 40 MG: 40 TABLET, DELAYED RELEASE ORAL at 05:50

## 2023-01-03 RX ADMIN — INSULIN LISPRO 7 UNITS: 100 INJECTION, SOLUTION INTRAVENOUS; SUBCUTANEOUS at 09:21

## 2023-01-03 RX ADMIN — EMPAGLIFLOZIN 10 MG: 10 TABLET, FILM COATED ORAL at 09:20

## 2023-01-03 RX ADMIN — METOPROLOL SUCCINATE 12.5 MG: 25 TABLET, EXTENDED RELEASE ORAL at 09:20

## 2023-01-03 RX ADMIN — DOXYCYCLINE 100 MG: 100 TABLET ORAL at 09:21

## 2023-01-03 NOTE — PROGRESS NOTES
Daily Progress Note    Patient Care Team:  Melvina Hodge as PCP - General (Nurse Practitioner)  Johann Morales MD as Cardiologist (Cardiology)    Chief Complaint: Follow-up type 2 diabetes    HPI: Patient seen and examined today.  Currently on subcu Glucomander and blood sugars have been fluctuating significantly.    ROS:   Constitutional:  Denies fatigue, tiredness.    Respiratory: denies cough, shortness of breath.   Cardiovascular:  denies chest pain, edema   GI:  Denies abdominal pain, nausea, vomiting.         Vitals:    01/03/23 1146   BP: 126/65   Pulse: 77   Resp: 17   Temp: 98.8 °F (37.1 °C)   SpO2: 98%     Body mass index is 31.7 kg/m².    Physical Exam:  GEN: NAD, conversant   CV: RRR  LUNG: CTA  PSYCH: Awake and coherent    Results Review:     I reviewed the patient's new clinical results.    Glucose   Date Value Ref Range Status   01/03/2023 171 (H) 65 - 99 mg/dL Final     Sodium   Date Value Ref Range Status   01/03/2023 137 136 - 145 mmol/L Final     Potassium   Date Value Ref Range Status   01/03/2023 3.6 3.5 - 5.2 mmol/L Final     CO2   Date Value Ref Range Status   01/03/2023 30.0 (H) 22.0 - 29.0 mmol/L Final     Chloride   Date Value Ref Range Status   01/03/2023 95 (L) 98 - 107 mmol/L Final     Anion Gap   Date Value Ref Range Status   01/03/2023 12.0 5.0 - 15.0 mmol/L Final     Creatinine   Date Value Ref Range Status   01/03/2023 1.14 0.76 - 1.27 mg/dL Final     BUN   Date Value Ref Range Status   01/03/2023 45 (H) 8 - 23 mg/dL Final     BUN/Creatinine Ratio   Date Value Ref Range Status   01/03/2023 39.5 (H) 7.0 - 25.0 Final     Calcium   Date Value Ref Range Status   01/03/2023 9.2 8.6 - 10.5 mg/dL Final     Alkaline Phosphatase   Date Value Ref Range Status   01/02/2023 71 39 - 117 U/L Final     Total Protein   Date Value Ref Range Status   01/02/2023 6.6 6.0 - 8.5 g/dL Final     ALT (SGPT)   Date Value Ref Range Status   01/02/2023 9 1 - 41 U/L Final     AST (SGOT)   Date Value Ref  Range Status   01/02/2023 28 1 - 40 U/L Final     Total Bilirubin   Date Value Ref Range Status   01/02/2023 0.8 0.0 - 1.2 mg/dL Final     Albumin   Date Value Ref Range Status   01/02/2023 3.6 3.5 - 5.2 g/dL Final     Globulin   Date Value Ref Range Status   01/02/2023 3.0 gm/dL Final     Magnesium   Date Value Ref Range Status   01/02/2023 2.2 1.6 - 2.4 mg/dL Final     Lab Results   Component Value Date    HGBA1C 8.3 (H) 12/17/2022     No results found for: GLUF, MICROALBUR  Results from last 7 days   Lab Units 01/03/23  1142 01/03/23  0749 01/02/23  1931 01/02/23  1634 01/02/23  1200 01/02/23  0746   GLUCOSE mg/dL 285* 183* 167* 233* 353* 193*             Medication Review: Reviewed.     [START ON 1/4/2023] aspirin, 81 mg, Oral, Daily  atorvastatin, 40 mg, Oral, Nightly  bumetanide, 1 mg, Oral, Daily  [START ON 1/4/2023] clopidogrel, 75 mg, Oral, Daily  doxycycline, 100 mg, Oral, Q12H  empagliflozin, 10 mg, Oral, Daily  enoxaparin, 40 mg, Subcutaneous, Daily  insulin glargine, 1-200 Units, Subcutaneous, Daily - Glucommander  insulin lispro, 1-200 Units, Subcutaneous, 4x Daily With Meals & Nightly  metoprolol succinate XL, 12.5 mg, Oral, Q12H  mupirocin, 1 application, Each Nare, BID  pantoprazole, 40 mg, Oral, Q AM  polyethylene glycol, 17 g, Oral, BID  potassium chloride, 20 mEq, Oral, Daily  senna-docusate sodium, 2 tablet, Oral, BID              Assessment and plan:  Diabetes mellitus type 2 with hyperglycemia: Uncontrolled with significant glucose fluctuation.  We will change Lantus to 30 units daily in the morning and Humalog 10 with each meal along with Humalog sliding scale.  We will continue Jardiance and follow blood sugars and make adjustments as needed.    CAD: Status post CABG    Hyperlipidemia: On atorvastatin.    Serg Quesada MD. FACE

## 2023-01-03 NOTE — PROGRESS NOTES
"      FOLLOW UP NOTE      Name: Sherman Baumann ADMIT: 2022   : 1945  PCP: Melvina Hodge    MRN: [id number] LOS: 17 days   AGE/SEX: 77 y.o. male  ROOM:      Date of Service: 1/3/2023                           CHIEF COMPLIANTS / REASON FOR FOLLOW UP          Acute kidney injury versus chronic kidney disease a stage III      Subjective:      Seen and examined  Resting in bed, no distress, no new complaints     Review of Systems:       Constitutional: No fever, no chills, no lethargy, no weakness.  HEENT:  No headache, otalgia, itchy eyes, nasal discharge or sore throat.  Cardiac:  No chest pain, dyspnea, orthopnea or PND.  Chest:   No cough, phlegm or wheezing.  Abdomen:  No abdominal pain, nausea or vomiting.  Neuro:  No focal weakness, abnormal movements or seizure-like activity.  :   No hematuria, no pyuria, no dysuria, no flank pain.  Extremities:  No  joint pains.  ROS was otherwise negative except as mentioned in the [place].        OBJECTIVE                                                                        Exam:  /65 (BP Location: Right arm, Patient Position: Sitting)   Pulse 77   Temp 98.8 °F (37.1 °C) (Oral)   Resp 17   Ht 177.8 cm (70\")   Wt 100 kg (220 lb 14.4 oz)   SpO2 98%   BMI 31.70 kg/m²   Intake/Output last 3 shifts:  I/O last 3 completed shifts:  In:  [P.O.:]  Out: 3470 [Urine:3470]  Intake/Output this shift:  I/O this shift:  In: 240 [P.O.:240]  Out: 500 [Urine:500]    General Appearance:  Alert, chronically ill appearing, cooperative, no distress, appears stated age  Head:  Normocephalic, without obvious abnormality, atraumatic  Eyes:  Sclerae anicteric, EOM's intact     Neck:  Supple,  no adenopathy;      Lungs:   Clear to auscultation bilaterally, respirations unlabored  Heart:  Regular rate and rhythm, S1 and S2 normal, no  rub   or gallop  Abdomen:  Soft, nontender,    no masses, no hepatomegaly, no splenomegaly  Extremities:  Extremities " normal, no edema  Neurologic:   Alert and oriented, no focal deficits    Scheduled Meds:  Continuous Infusions:No current facility-administered medications for this encounter.    PRN Meds:         Data Review:                                                                           Labs reviewed      Imaging:                                                                           Imaging reviewed           ASSESSMENT:                                                                                CAD (coronary artery disease), native coronary artery    Type 2 diabetes mellitus with hyperglycemia, without long-term current use of insulin ([facility])    Mixed hyperlipidemia    Primary hypertension    Systolic and diastolic CHF, acute on chronic ([facility])    Influenza due to seasonal influenza virus    NSTEMI (non-ST elevated myocardial infarction) ([facility])    • Acute kidney injury with chronic kidney disease a stage III, baseline creatinine December 17, 2022 on admission 1.16, now creatinine around 1.88-2.12. Suspect variable creatinine secondary to hemodynamic and volume changes.  With history of diabetes, hypertension.  Urine analysis shows 100 mg of protein, no RBCs, no WBCs.  Urine output about 1 L.    • Volume overload with pulmonary edema.  • Diabetes type 2  • Hypertension  • Coronary artery disease multivalvular disease.  • Congestive heart failure ejection fraction 15 to 20%.  •  Acute coronary syndrome.  • S/p CABG x4     Plan:      • Patient has chronic kidney disease a stage III renal function is variable, creatinine peaked at 2.12, now creatinine 1.86 slightly better than yesterday  • Electrolytes acceptable, hyponatremia noted which is getting worse since yesterday  • CABG x4 12/29/2022   • Creatinine improved   • Patient feel better  • Sodium level is slightly better slight increase in the creatinine from yesterday continue Bumex at 1 mg a day as hemodynamics stable blood pressure is acceptable  • Patient is  currently not on any ACE inhibitor's or angiotensin receptor blockers. Noted cardiology plans to introduce when kidney function stabilizes.  Most likely ACE and ARB can be started in 2 to 3 days  • Urine analysis with glucosuria, ketones, 100 mg/dL protein, no hematuria, no pyuria  · Repeat echo showed ejection fraction 48% left ventricular cavity is dilated.  With grade 1 diastolic dysfunctions.       Wade Jiang MD  [facility]  1/3/2023  18:25 EST

## 2023-01-03 NOTE — PROGRESS NOTES
Daily Progress Note        CAD (coronary artery disease), native coronary artery    Type 2 diabetes mellitus with hyperglycemia, without long-term current use of insulin (Formerly [facility])    Mixed hyperlipidemia    Primary hypertension    Systolic and diastolic CHF, acute on chronic (HCC)    Influenza due to seasonal influenza virus    NSTEMI (non-ST elevated myocardial infarction) (Formerly [facility])      Assessment    Hypoxemia, mild pulmonary edema  COPD, no PFTs on record  LOGAN  Cor Pulmonale     CHF: LV EF 20-25%  CAD with multivessel disease: Status post CABG x4 12/29/2022    DM II  Hyperlipidemia  HTN  MENDEZ     PLAN:     oxygen supplement and titration: Currently requiring 2 L per nasal cannula    Will benefit from work-up for sleep apnea as an outpatient    Encourage use of incentive spirometer  Encourage ambulation and physical therapy  Antibiotic doxycycline  Aspirin  Amiodarone p.o.  Bumex 1 mg daily  DVT prophylaxis                LOS: 17 days     Subjective         Objective     Vital signs for last 24 hours:  Vitals:    01/03/23 0400 01/03/23 0415 01/03/23 0500 01/03/23 0600   BP: 127/64 127/64 117/64    BP Location:  Right arm     Patient Position:  Lying     Pulse: 72  72    Resp: 16 16     Temp:  97.1 °F (36.2 °C)     TempSrc:  Axillary     SpO2: 93%  95%    Weight:    100 kg (220 lb 14.4 oz)   Height:           Intake/Output last 3 shifts:  I/O last 3 completed shifts:  In: 2840 [P.O.:2640; I.V.:200]  Out: 3900 [Urine:3900]  Intake/Output this shift:  I/O this shift:  In: 240 [P.O.:240]  Out: 420 [Urine:420]      Radiology  Imaging Results (Last 24 Hours)       Procedure Component Value Units Date/Time    XR Chest 1 View [[id number]] Collected: 01/02/23 0721     Updated: 01/02/23 0725    Narrative:      DATE OF EXAM:  1/2/2023 5:24 AM     PROCEDURE:  XR CHEST 1 VW-     INDICATIONS:  Postop; I25.110-Atherosclerotic heart disease of native coronary artery  with unstable angina pectoris     COMPARISON:  12/31/2022, and prior.      TECHNIQUE:   Single radiographic view of the chest was obtained.     FINDINGS:  There appear to be increased opacities in the left base. Mild right  basilar opacities appear unchanged.  No significant new effusion or  pneumothorax is seen.  Stable cardiomegaly. Status post median  sternotomy and CABG. Mediastinal contour appears unchanged. Right IJ  catheter is no longer seen.       Impression:      1.     Increased left basilar opacities which could represent  atelectasis or infiltrate.  2.     Stable mild right basilar opacities and cardiomegaly.     Electronically Signed By-Cameron Fermin MD On:1/2/2023 7:23 AM  This report was finalized on [id number] by  Cameron Fermin MD.            Labs:  Results from last 7 days   Lab Units 01/03/23  0457   WBC 10*3/mm3 12.50*   HEMOGLOBIN g/dL 11.9*   HEMATOCRIT % 35.9*   PLATELETS 10*3/mm3 290     Results from last 7 days   Lab Units 01/03/23 0457 01/02/23  0449   SODIUM mmol/L 137 139   POTASSIUM mmol/L 3.6 3.8   CHLORIDE mmol/L 95* 97*   CO2 mmol/L 30.0* 29.0   BUN mg/dL 45* 42*   CREATININE mg/dL 1.14 1.22   CALCIUM mg/dL 9.2 9.4   BILIRUBIN mg/dL  --  0.8   ALK PHOS U/L  --  71   ALT (SGPT) U/L  --  9   AST (SGOT) U/L  --  28   GLUCOSE mg/dL 171* 151*     Results from last 7 days   Lab Units 12/30/22  0151   PH, ARTERIAL pH units 7.340*   PO2 ART mm Hg 124.5*   PCO2, ARTERIAL mm Hg 40.3   HCO3 ART mmol/L 21.8     Results from last 7 days   Lab Units 01/02/23  0449 12/30/22  0317 12/29/22  1210   ALBUMIN g/dL 3.6 3.8 3.7             Results from last 7 days   Lab Units 01/02/23  0449   MAGNESIUM mg/dL 2.2     Results from last 7 days   Lab Units 12/30/22  0317 12/29/22  1210 12/29/22  0420 12/28/22  0905 12/28/22  0154   INR  1.06 1.16*  --   --  1.06   APTT seconds  --  30.3 64.1 62.4 66.9               Meds:   SCHEDULE  amiodarone, 300 mg, Oral, BID With Meals  aspirin, 325 mg, Oral, Daily  atorvastatin, 40 mg, Oral, Nightly  bumetanide, 1 mg, Oral,  Daily  doxycycline, 100 mg, Oral, Q12H  empagliflozin, 10 mg, Oral, Daily  enoxaparin, 40 mg, Subcutaneous, Daily  insulin glargine, 1-200 Units, Subcutaneous, Daily - Glucommander  insulin lispro, 1-200 Units, Subcutaneous, 4x Daily With Meals & Nightly  mupirocin, 1 application, Each Nare, BID  pantoprazole, 40 mg, Oral, Q AM  polyethylene glycol, 17 g, Oral, BID  senna-docusate sodium, 2 tablet, Oral, BID      Infusions     PRNs    acetaminophen **OR** [DISCONTINUED] acetaminophen **OR** acetaminophen    dextrose    dextrose    glucagon (human recombinant)    HYDROcodone-acetaminophen    insulin lispro    [DISCONTINUED] Morphine **AND** naloxone    ondansetron    potassium chloride **OR** potassium chloride    sodium chloride    Physical Exam:  Physical Exam  Vitals reviewed.   Pulmonary:      Effort: Pulmonary effort is normal.      Breath sounds: Rhonchi present.   Skin:     General: Skin is dry.   Neurological:      Mental Status: He is alert and oriented to person, place, and time.         ROS  Review of Systems    I have reviewed the patient's new clinical results.    Electronically signed by Ryley Judd MD

## 2023-01-03 NOTE — PROGRESS NOTES
Status post CABG, postop day #5.    CV stable.  Discontinue amiodarone and start low-dose beta-blocker.  Oral diet as tolerated.  Pulmonary toileting.  Mobilize.  He may have an upper respiratory infection and I placed him on doxycycline for 10 days.  Recheck chest x-ray tomorrow.  Nonoliguric.  Low-dose Bumex daily with potassium supplementation.  Afebrile.  Borderline leukocytosis.    He will need rehab (originally from [place]).  We need to initiate arrangements beginning today.

## 2023-01-03 NOTE — PLAN OF CARE
Goal Outcome Evaluation:   Patient has met goals set by the hospital and will be discharging to an outside facility.

## 2023-01-03 NOTE — DISCHARGE INSTR - APPOINTMENTS
See endocrinology in one month  See nephrology in one month  See pulmonology in one month  See Dr. Harris in 2 weeks, cardiology  Dr. Huber's office February 3rd at 08:30 for incision care

## 2023-01-03 NOTE — DISCHARGE PLACEMENT REQUEST
"Sam Andrade (77 y.o. Male)     Date of Birth   1945    Social Security Number       Address   [address] [place] [address] [place] [facility]    Home Phone   [phone]    MRN   [id number]       Mandaeism   None    Marital Status                               Admission Date   12/17/22    Admission Type   Urgent    Admitting Provider   Jaleel Huber MD    Attending Provider       Department, Room/Bed   [facility] CARDIOVASCULAR CARE UNIT, 2202/1       Discharge Date   1/3/2023    Discharge Disposition   Rehab Facility or Unit (DC - External)    Discharge Destination                               Attending Provider: (none)   Allergies: No Known Allergies    Isolation: None   Infection: None   Code Status: CPR    Ht: 177.8 cm (70\")   Wt: 100 kg (220 lb 14.4 oz)    Admission Cmt: None   Principal Problem: CAD (coronary artery disease), native coronary artery [I25.10]                 Active Insurance as of 12/17/2022     Primary Coverage     Payor Plan Insurance Group Employer/Plan Group    MEDICARE MEDICARE A & B      Payor Plan Address Payor Plan Phone Number Payor Plan Fax Number Effective Dates    [address] [phone]  12/1/2011 - None Entered    [facility] [id number]       Subscriber Name Subscriber Birth Date Member ID       SAM ANDRADE 1945 [id number]           Secondary Coverage     Payor Plan Insurance Group Employer/Plan Group    [id number]      Payor Plan Address Payor Plan Phone Number Payor Plan Fax Number Effective Dates    [address] [phone]  1/1/2022 - None Entered    [facility] [id number]       Subscriber Name Subscriber Birth Date Member ID       SAM ANDRADE 1945 [id number]                 Emergency Contacts      (Rel.) Home Phone Work Phone Mobile Phone    CUBA ANDRADE (Spouse) [phone] -- --    DANISHWEN APONTE (Son) [phone] -- --               Discharge Summary      Gino, " ROWAN Clemente at 01/03/23 1020          Date of Admission: 12/17/2022  Date of Discharge:  1/3/2023    Discharge Diagnosis:     - MV CAD with NSTEMI presentation at [place], EF 20-25% (echo), 20% by intraop REESE s/p urgent CABG x4 with LIMA (Huber)  - ICM, acute HFrEF, NYHA class II-III, BNP 5580 at OSH---GDMT, no ACE or ARB at this time with MENDEZ  - Influenza B diagnosed 12/15--ID signed off, Tamiflu x 5 days completed  - DM type 2 with hyperglycemia, a1c 8.3---- endocrine managing  - HTN--hypotensive post op, tolerating low-dose beta-blockade  - HL---statin  - Past smoker  - [place], hearing aids in place  - Obesity  - COPD---pulmonary following  - Possible sleep apnea  - MENDEZ on probable CKD III---peak creatinine preop 2.1, renal following  - Postop leukocytosis, likely reactive--- cultures negative to date    Presenting Problem/History of Present Illness  CAD (coronary artery disease), native coronary artery [I25.10]     Hospital Course  Patient is a 77 y.o. male of Dr. Colón transferred from [facility] for consideration for CAD intervention.  He was admitted with non-STEMI, positive flu B, and acute kidney injury.  We consulted infectious disease for opinion, he completed course of Tamiflu.  We consulted Dr. Morales for assistance with his acute decompensated systolic CHF.  And we consulted internal medicine and endocrinology for assistance with other medical issues.  With his history of COPD, cor pulmonale, LOGAN, and recent respiratory illness, we consulted pulmonology for preop evaluation for risk and perioperative respiratory intervention assistance.  His creatinine was elevated preoperatively to 2.1, we consulted nephrology for assistance with diuresis and stabilization preoperatively as well.  Serial echoes were obtained, his ejection fraction improved to 40%, and he was eventually deemed ready for surgery.  He underwent CABG x4 with LIMA to LAD, SVG to D1, SVG to RI, SVG to PDA and left lower extremity EVH on  12/29/2022 with Dr. Huber (see op report for full details).  Intraoperative REESE demonstrated an EF of 20%.  His postop course was relatively uneventful, tubes and lines removed in a timely manner, gentle diuresis ongoing, we have transitioned to Toprol for cardiomyopathy, no ACE/ARB at this time with recent acute kidney injury.  He did have elevated white count yesterday of 13.6, he has been initiated on doxycycline 100 mg twice daily for 10 days for possible upper respiratory tract infection, cultures to date have been negative, and repeat cxr today is improved.  Plavix has been initiated with his non-STEMI presentation.  He still has some weakness from his prolonged hospitalization, he will be sent to rehab for strengthening prior to returning home.  He is to follow-up as per appointments below.     Discharge care included:     post procedure instructions given     discharge care discussed with the nurse and the patient     post procedure appointments made    Procedures Performed  Procedure(s):  CORONARY ARTERY BYPASS WITH INTERNAL MAMMARY ARTERY GRAFT with IABP  TRANSESOPHAGEAL ECHOCARDIOGRAM WITH ANESTHESIA       Consults:   Consults     Date and Time Order Name Status Description    12/29/2022 12:09 PM Inpatient Cardiology Consult      12/19/2022  4:07 PM Inpatient Pulmonology Consult Completed     12/17/2022  9:23 AM Inpatient Cardiology Consult Completed     12/17/2022  6:15 AM Inpatient Infectious Diseases Consult Completed     12/17/2022  6:15 AM Inpatient Internal Medicine Consult Completed           Pertinent Test Results:    Lab Results   Component Value Date    WBC 12.50 (H) 01/03/2023    HGB 11.9 (L) 01/03/2023    HCT 35.9 (L) 01/03/2023    MCV 88.9 01/03/2023     01/03/2023      Lab Results   Component Value Date    GLUCOSE 171 (H) 01/03/2023    CALCIUM 9.2 01/03/2023     01/03/2023    K 3.6 01/03/2023    CO2 30.0 (H) 01/03/2023    CL 95 (L) 01/03/2023    BUN 45 (H) 01/03/2023     CREATININE 1.14 01/03/2023    BCR 39.5 (H) 01/03/2023    ANIONGAP 12.0 01/03/2023     Lab Results   Component Value Date    INR 1.06 12/30/2022    PROTIME 10.9 12/30/2022         Condition on Discharge: Stable    Vital Signs  Temp:  [97 °F (36.1 °C)-98.8 °F (37.1 °C)] 98.8 °F (37.1 °C)  Heart Rate:  [72-84] 77  Resp:  [15-20] 17  BP: (104-152)/(57-73) 126/65    General:      well developed, well nourished, in no acute distress.    Head:      normocephalic and atraumatic.    Eyes:      PERRL/EOM intact, conjunctiva and sclera clear with out nystagmus.    Neck:      no masses, thyromegaly,  trachea central with normal respiratory effort and PMI displaced laterally  Lungs:      clear bilaterally to auscultation.    Heart:      Normal sinus rhythm  Msk:      no deformity or scoliosis noted of thoracic or lumbar spine.    Pulses:      pulses normal in all 4 extremities.    Extremities:       no cyanosis or clubbing,   Neurologic:      no focal deficits.   alert oriented x3  Skin:      intact without lesions or rashes.  Midsternal surgical site incision well approximated without erythema, edema, or drainage     Psych:      alert and cooperative; normal mood and affect; normal attention span and concentration.          Discharge Disposition  Rehab Facility or Unit (DC - External)    Discharge Medications     Discharge Medications      New Medications      Instructions Start Date   bumetanide 1 MG tablet  Commonly known as: BUMEX   1 mg, Oral, Daily   Start Date: January 4, 2023     clopidogrel 75 MG tablet  Commonly known as: PLAVIX   75 mg, Oral, Daily   Start Date: January 4, 2023     doxycycline 100 MG tablet  Commonly known as: ADOXA   100 mg, Oral, Every 12 Hours Scheduled      empagliflozin 10 MG tablet tablet  Commonly known as: JARDIANCE   10 mg, Oral, Daily   Start Date: January 4, 2023     HYDROcodone-acetaminophen 5-325 MG per tablet  Commonly known as: NORCO   1 tablet, Oral, Every 4 Hours PRN      Injection  Device for Insulin device   1 each, Does not apply, Daily      Insulin Glargine 100 UNIT/ML injection pen  Commonly known as: LANTUS SOLOSTAR   30 Units, Subcutaneous, Daily      insulin lispro 100 UNIT/ML injection  Commonly known as: HumaLOG   10 Units, Subcutaneous, 3 Times Daily Before Meals      metoprolol succinate XL 25 MG 24 hr tablet  Commonly known as: TOPROL-XL   12.5 mg, Oral, Every 12 Hours Scheduled      potassium chloride 20 MEQ CR tablet  Commonly known as: K-DUR,KLOR-CON   20 mEq, Oral, Daily   Start Date: January 4, 2023        Continue These Medications      Instructions Start Date   aspirin 81 MG chewable tablet   81 mg, Oral, Daily      rosuvastatin 5 MG tablet  Commonly known as: CRESTOR   5 mg, Oral, Nightly         Stop These Medications    aspirin-acetaminophen-caffeine 250-250-65 MG per tablet  Commonly known as: EXCEDRIN MIGRAINE     carvedilol 3.125 MG tablet  Commonly known as: COREG     fish oil 1000 MG capsule capsule     furosemide 40 MG tablet  Commonly known as: LASIX     glipizide 10 MG tablet  Commonly known as: GLUCOTROL     insulin NPH-insulin regular (70-30) 100 UNIT/ML injection  Commonly known as: humuLIN 70/30,novoLIN 70/30     metFORMIN 500 MG tablet  Commonly known as: GLUCOPHAGE     oseltamivir 75 MG capsule  Commonly known as: TAMIFLU            Discharge Diet:     Activity at Discharge:     Follow-up Appointments  Future Appointments   Date Time Provider Department Center   2/2/2023  8:30 AM Chyna Christian APRN [facility]     Additional Instructions for the Follow-ups that You Need to Schedule     Ambulatory Referral to Cardiac Rehab   As directed      Call MD With Problems / Concerns   As directed      Instructions:  Call office at [phone] for any drainage, increased redness, or fever over 100.5    Order Comments: Instructions:  Call office at [phone] for any drainage, increased redness, or fever over 100.5          Discharge Follow-up with PCP   As  directed       Currently Documented PCP:    Melvina Hodge    PCP Phone Number:    [phone]     Follow Up Details: in 1 week         Discharge Follow-up with Specialty: Nephrology, call for appointment; 1 Month   As directed      Specialty: Nephrology, call for appointment    Follow Up: 1 Month         Discharge Follow-up with Specialty: Pulmonology; 1 Month   As directed      Specialty: Pulmonology    Follow Up: 1 Month         Discharge Follow-up with Specified Provider: CardiologistDr. Harris; 2 Weeks   As directed      To: CardiologDr. Kimberly helton    Follow Up: 2 Weeks    Follow Up Details: call for appointment, bring all medication bottles to appointment         Discharge Follow-up with Specified Provider: Dr. Tere DONAHUE, February 2nd at 8:30 am for incision check; 1 Month   As directed      To: Dr. Tere DONAHUE, February 2nd at 8:30 am for incision check    Follow Up: 1 Month         Discharge Follow-up with Specified Provider: Endocrinology; 1 Month   As directed      To: Endocrinology    Follow Up: 1 Month               Test Results Pending at Discharge  Pending Labs     Order Current Status    Blood Culture - Blood, Arm, Left Preliminary result    Blood Culture - Blood, Arm, Right Preliminary result    Respiratory Culture - Sputum, Cough Preliminary result             ROWAN Jefferson  01/03/23  14:45 EST      Electronically signed by Chyna Christian APRN at 01/03/23 7931

## 2023-01-03 NOTE — DISCHARGE SUMMARY
Date of Admission: 12/17/2022  Date of Discharge:  1/3/2023    Discharge Diagnosis:     - MV CAD with NSTEMI presentation at [facility], EF 20-25% (echo), 20% by intraop REESE s/p urgent CABG x4 with LIMA ([facility])  - ICM, acute HFrEF, NYHA class II-III, BNP 5580 at OSH---GDMT, no ACE or ARB at this time with MENDEZ  - Influenza B diagnosed 12/15--ID signed off, Tamiflu x 5 days completed  - DM type 2 with hyperglycemia, a1c 8.3---- endocrine managing  - HTN--hypotensive post op, tolerating low-dose beta-blockade  - HL---statin  - Past smoker  - [place], hearing aids in place  - Obesity  - COPD---pulmonary following  - Possible sleep apnea  - MENDEZ on probable CKD III---peak creatinine preop 2.1, renal following  - Postop leukocytosis, likely reactive--- cultures negative to date    Presenting Problem/History of Present Illness  CAD (coronary artery disease), native coronary artery [I25.10]     Hospital Course  Patient is a 77 y.o. male of Dr. Colón transferred from [facility] for consideration for CAD intervention.  He was admitted with non-STEMI, positive flu B, and acute kidney injury.  We consulted infectious disease for opinion, he completed course of Tamiflu.  We consulted Dr. Morales for assistance with his acute decompensated systolic CHF.  And we consulted internal medicine and endocrinology for assistance with other medical issues.  With his history of COPD, cor pulmonale, LOGAN, and recent respiratory illness, we consulted pulmonology for preop evaluation for risk and perioperative respiratory intervention assistance.  His creatinine was elevated preoperatively to 2.1, we consulted nephrology for assistance with diuresis and stabilization preoperatively as well.  Serial echoes were obtained, his ejection fraction improved to 40%, and he was eventually deemed ready for surgery.  He underwent CABG x4 with LIMA to LAD, SVG to D1, SVG to RI, SVG to PDA and left lower extremity EVH on 12/29/2022 with Dr. Huber (see op report for  full details).  Intraoperative REESE demonstrated an EF of 20%.  His postop course was relatively uneventful, tubes and lines removed in a timely manner, gentle diuresis ongoing, we have transitioned to Toprol for cardiomyopathy, no ACE/ARB at this time with recent acute kidney injury.  He did have elevated white count yesterday of 13.6, he has been initiated on doxycycline 100 mg twice daily for 10 days for possible upper respiratory tract infection, cultures to date have been negative, and repeat cxr today is improved.  Plavix has been initiated with his non-STEMI presentation.  He still has some weakness from his prolonged hospitalization, he will be sent to rehab for strengthening prior to returning home.  He is to follow-up as per appointments below.     Discharge care included:     post procedure instructions given     discharge care discussed with the nurse and the patient     post procedure appointments made    Procedures Performed  Procedure(s):  CORONARY ARTERY BYPASS WITH INTERNAL MAMMARY ARTERY GRAFT with IABP  TRANSESOPHAGEAL ECHOCARDIOGRAM WITH ANESTHESIA       Consults:   Consults       Date and Time Order Name Status Description    12/29/2022 12:09 PM Inpatient Cardiology Consult      12/19/2022  4:07 PM Inpatient Pulmonology Consult Completed     12/17/2022  9:23 AM Inpatient Cardiology Consult Completed     12/17/2022  6:15 AM Inpatient Infectious Diseases Consult Completed     12/17/2022  6:15 AM Inpatient Internal Medicine Consult Completed             Pertinent Test Results:    Lab Results   Component Value Date    WBC 12.50 (H) 01/03/2023    HGB 11.9 (L) 01/03/2023    HCT 35.9 (L) 01/03/2023    MCV 88.9 01/03/2023     01/03/2023      Lab Results   Component Value Date    GLUCOSE 171 (H) 01/03/2023    CALCIUM 9.2 01/03/2023     01/03/2023    K 3.6 01/03/2023    CO2 30.0 (H) 01/03/2023    CL 95 (L) 01/03/2023    BUN 45 (H) 01/03/2023    CREATININE 1.14 01/03/2023    BCR 39.5 (H)  01/03/2023    ANIONGAP 12.0 01/03/2023     Lab Results   Component Value Date    INR 1.06 12/30/2022    PROTIME 10.9 12/30/2022         Condition on Discharge: Stable    Vital Signs  Temp:  [97 °F (36.1 °C)-98.8 °F (37.1 °C)] 98.8 °F (37.1 °C)  Heart Rate:  [72-84] 77  Resp:  [15-20] 17  BP: (104-152)/(57-73) 126/65    General:      well developed, well nourished, in no acute distress.    Head:      normocephalic and atraumatic.    Eyes:      PERRL/EOM intact, conjunctiva and sclera clear with out nystagmus.    Neck:      no masses, thyromegaly,  trachea central with normal respiratory effort and PMI displaced laterally  Lungs:      clear bilaterally to auscultation.    Heart:      Normal sinus rhythm  Msk:      no deformity or scoliosis noted of thoracic or lumbar spine.    Pulses:      pulses normal in all 4 extremities.    Extremities:       no cyanosis or clubbing,   Neurologic:      no focal deficits.   alert oriented x3  Skin:      intact without lesions or rashes.  Midsternal surgical site incision well approximated without erythema, edema, or drainage     Psych:      alert and cooperative; normal mood and affect; normal attention span and concentration.          Discharge Disposition  Rehab Facility or Unit (DC - External)    Discharge Medications     Discharge Medications        New Medications        Instructions Start Date   bumetanide 1 MG tablet  Commonly known as: BUMEX   1 mg, Oral, Daily   Start Date: January 4, 2023     clopidogrel 75 MG tablet  Commonly known as: PLAVIX   75 mg, Oral, Daily   Start Date: January 4, 2023     doxycycline 100 MG tablet  Commonly known as: ADOXA   100 mg, Oral, Every 12 Hours Scheduled      empagliflozin 10 MG tablet tablet  Commonly known as: JARDIANCE   10 mg, Oral, Daily   Start Date: January 4, 2023     HYDROcodone-acetaminophen 5-325 MG per tablet  Commonly known as: NORCO   1 tablet, Oral, Every 4 Hours PRN      Injection Device for Insulin device   1 each, Does  not apply, Daily      Insulin Glargine 100 UNIT/ML injection pen  Commonly known as: LANTUS SOLOSTAR   30 Units, Subcutaneous, Daily      insulin lispro 100 UNIT/ML injection  Commonly known as: HumaLOG   10 Units, Subcutaneous, 3 Times Daily Before Meals      metoprolol succinate XL 25 MG 24 hr tablet  Commonly known as: TOPROL-XL   12.5 mg, Oral, Every 12 Hours Scheduled      potassium chloride 20 MEQ CR tablet  Commonly known as: K-DUR,KLOR-CON   20 mEq, Oral, Daily   Start Date: January 4, 2023            Continue These Medications        Instructions Start Date   aspirin 81 MG chewable tablet   81 mg, Oral, Daily      rosuvastatin 5 MG tablet  Commonly known as: CRESTOR   5 mg, Oral, Nightly             Stop These Medications      aspirin-acetaminophen-caffeine 250-250-65 MG per tablet  Commonly known as: EXCEDRIN MIGRAINE     carvedilol 3.125 MG tablet  Commonly known as: COREG     fish oil 1000 MG capsule capsule     furosemide 40 MG tablet  Commonly known as: LASIX     glipizide 10 MG tablet  Commonly known as: GLUCOTROL     insulin NPH-insulin regular (70-30) 100 UNIT/ML injection  Commonly known as: humuLIN 70/30,novoLIN 70/30     metFORMIN 500 MG tablet  Commonly known as: GLUCOPHAGE     oseltamivir 75 MG capsule  Commonly known as: TAMIFLU              Discharge Diet:     Activity at Discharge:     Follow-up Appointments  Future Appointments   Date Time Provider Department Center   2/2/2023  8:30 AM Chyna Christian APRN [facility]     Additional Instructions for the Follow-ups that You Need to Schedule       Ambulatory Referral to Cardiac Rehab   As directed      Call MD With Problems / Concerns   As directed      Instructions:  Call office at [phone] for any drainage, increased redness, or fever over 100.5    Order Comments: Instructions:  Call office at [phone] for any drainage, increased redness, or fever over 100.5          Discharge Follow-up with PCP   As directed       Currently  Documented PCP:    Melvina Hodge    PCP Phone Number:    [phone]     Follow Up Details: in 1 week         Discharge Follow-up with Specialty: Nephrology, call for appointment; 1 Month   As directed      Specialty: Nephrology, call for appointment    Follow Up: 1 Month         Discharge Follow-up with Specialty: Pulmonology; 1 Month   As directed      Specialty: Pulmonology    Follow Up: 1 Month         Discharge Follow-up with Specified Provider: CardiologistDr. Harris; 2 Weeks   As directed      To: CardiologDr. Kimberly helton    Follow Up: 2 Weeks    Follow Up Details: call for appointment, bring all medication bottles to appointment         Discharge Follow-up with Specified Provider: Dr. Tere DONAHUE, February 2nd at 8:30 am for incision check; 1 Month   As directed      To: Dr. Tere DONAHUE, February 2nd at 8:30 am for incision check    Follow Up: 1 Month         Discharge Follow-up with Specified Provider: Endocrinology; 1 Month   As directed      To: Endocrinology    Follow Up: 1 Month                 Test Results Pending at Discharge  Pending Labs       Order Current Status    Blood Culture - Blood, Arm, Left Preliminary result    Blood Culture - Blood, Arm, Right Preliminary result    Respiratory Culture - Sputum, Cough Preliminary result               Chyna Christian, ROWAN  01/03/23  14:45 EST  .

## 2023-01-03 NOTE — THERAPY TREATMENT NOTE
"Subjective: Pt agreeable to therapeutic plan of care.  Cognition: oriented to Person, Place and Time    Objective:     Bed Mobility: N/A or Not attempted.   Functional Transfers: Min-A and Mod-A  Functional Ambulation: Min-A    Toileting: Max-A  ADL Position: supported sitting and supported standing      Grooming: Supervision  ADL Position: supported standing    Vitals: WNL    Pain: 3 VAS  Location: sternal  Interventions for pain: RN notified  Education: Provided education on the importance of mobility in the acute care setting    Assessment: Sherman Baumann presents with ADL impairments below baseline abilities which indicate the need for continued skilled intervention while inpatient. Tolerating session today without incident. Will continue to follow and progress as tolerated.     Plan/Recommendations:   Moderate Intensity Therapy recommended post-acute care. This is recommended as therapy feels the patient would require 3-4 days per week and wouldn't tolerate \"3 hour daily\" rehab intensity. SNF would be the preferred choice. If the patient does not agree to SNF, arrange HH or OP depending on home bound status. If patient is medically complex, consider LTACH.. Pt requires no DME at discharge.     Pt desires Skilled Rehab placement at discharge. Pt cooperative; agreeable to therapeutic recommendations and plan of care.     Modified Priscilla: N/A = No pre-op stroke/TIA    Post-Tx Position: Up in Chair  PPE: gloves and surgical mask    "

## 2023-01-03 NOTE — PROGRESS NOTES
Referring Provider: Jaleel Huber MD    Reason for follow-up:  multivessel coronary artery disease, HFrEF, status post CABG, uncontrolled diabetes, MENDEZ     Patient Care Team:  Melvina Hodge as PCP - General (Nurse Practitioner)  Johann Morales MD as Cardiologist (Cardiology)      SUBJECTIVE  Slow to recuperate however denies chest pain or shortness of breath.  Anticipating discharge today     ROS  Review of all systems negative except as indicated.    Since I have last seen, the patient has been without any chest discomfort, shortness of breath, palpitations, dizziness or syncope.  Denies having any headache, abdominal pain, nausea, vomiting, diarrhea, constipation, loss of weight or loss of appetite.  Denies having any excessive bruising, hematuria or blood in the stool.  ROS      Personal History:    Past Medical History:   Diagnosis Date   • CHF (congestive heart failure) (HCC)    • Diabetes mellitus (HCC)    • Elevated cholesterol    • Hyperlipidemia    • Hypertension        Past Surgical History:   Procedure Laterality Date   • HERNIA REPAIR     • TONSILLECTOMY         History reviewed. No pertinent family history.    Social History     Tobacco Use   • Smoking status: Former     Types: Cigarettes   Vaping Use   • Vaping Use: Never used   Substance Use Topics   • Alcohol use: Not Currently   • Drug use: Never        Medications Prior to Admission   Medication Sig Dispense Refill Last Dose   • aspirin 81 MG chewable tablet Chew 81 mg Daily.      • aspirin-acetaminophen-caffeine (EXCEDRIN MIGRAINE) 250-250-65 MG per tablet Take 1 tablet by mouth Every 6 (Six) Hours As Needed for Headache.      • carvedilol (COREG) 3.125 MG tablet Take 3.125 mg by mouth 2 (Two) Times a Day With Meals.      • furosemide (LASIX) 40 MG tablet Take 40 mg by mouth 2 (Two) Times a Day.      • glipizide (GLUCOTROL) 10 MG tablet Take 10 mg by mouth 2 (Two) Times a Day Before Meals.      • insulin NPH-insulin regular (humuLIN  70/30,novoLIN 70/30) (70-30) 100 UNIT/ML injection Inject 27 Units under the skin into the appropriate area as directed Every Morning.      • insulin NPH-insulin regular (humuLIN 70/30,novoLIN 70/30) (70-30) 100 UNIT/ML injection Inject 22 Units under the skin into the appropriate area as directed Every Night.      • metFORMIN (GLUCOPHAGE) 500 MG tablet Take 500 mg by mouth Daily With Breakfast.      • Omega-3 Fatty Acids (fish oil) 1000 MG capsule capsule Take 1,000 mg by mouth Daily With Breakfast.      • oseltamivir (TAMIFLU) 75 MG capsule Take 75 mg by mouth 2 (Two) Times a Day.      • rosuvastatin (CRESTOR) 5 MG tablet Take 5 mg by mouth Every Night.          Allergies:  Patient has no known allergies.    Scheduled Meds:aspirin, 325 mg, Oral, Daily  atorvastatin, 40 mg, Oral, Nightly  bumetanide, 1 mg, Oral, Daily  doxycycline, 100 mg, Oral, Q12H  empagliflozin, 10 mg, Oral, Daily  enoxaparin, 40 mg, Subcutaneous, Daily  insulin glargine, 1-200 Units, Subcutaneous, Daily - Glucommander  insulin lispro, 1-200 Units, Subcutaneous, 4x Daily With Meals & Nightly  metoprolol succinate XL, 12.5 mg, Oral, Q12H  mupirocin, 1 application, Each Nare, BID  pantoprazole, 40 mg, Oral, Q AM  polyethylene glycol, 17 g, Oral, BID  potassium chloride, 20 mEq, Oral, Daily  senna-docusate sodium, 2 tablet, Oral, BID      Continuous Infusions:   PRN Meds:.•  acetaminophen **OR** [DISCONTINUED] acetaminophen **OR** acetaminophen  •  dextrose  •  dextrose  •  glucagon (human recombinant)  •  HYDROcodone-acetaminophen  •  insulin lispro  •  [DISCONTINUED] Morphine **AND** naloxone  •  ondansetron  •  potassium chloride **OR** potassium chloride  •  sodium chloride      OBJECTIVE    Vital Signs  Vitals:    01/03/23 0500 01/03/23 0600 01/03/23 0650 01/03/23 0738   BP: 117/64  123/60 133/62   BP Location:    Right arm   Patient Position:    Sitting   Pulse: 72  77 77   Resp:    17   Temp:    97.7 °F (36.5 °C)   TempSrc:    Oral   SpO2:  "95%  95% 97%   Weight:  100 kg (220 lb 14.4 oz)     Height:           Flowsheet Rows    Flowsheet Row First Filed Value   Admission Height 177.8 cm (70\") Documented at 12/18/2022 1305   Admission Weight 103 kg (227 lb 1.2 oz) Documented at 12/17/2022 0500            Intake/Output Summary (Last 24 hours) at 1/3/2023 0849  Last data filed at 1/3/2023 0600  Gross per 24 hour   Intake 800 ml   Output 820 ml   Net -20 ml          Telemetry: Sinus rhythm    Physical Exam:  The patient is alert, oriented and in no distress.  Vital signs as noted above.  Head and neck revealed no carotid bruits or jugular venous distention.  No thyromegaly or lymphadenopathy is present  Lungs clear.  No wheezing.  Breath sounds are normal bilaterally.  Heart normal first and second heart sounds.  No murmur. No precordial rub is present.  No gallop is present.  Abdomen soft and nontender.  No organomegaly is present.  Extremities with good peripheral pulses without any pedal edema.  Skin warm and dry.  Musculoskeletal system is grossly normal.  CNS grossly normal.       Results Review:  I have personally reviewed the results from the time of this admission to 1/3/2023 08:49 EST and agree with these findings:  []  Laboratory  []  Microbiology  []  Radiology  []  EKG/Telemetry   []  Cardiology/Vascular   []  Pathology  []  Old records  []  Other:    Most notable findings include:    Lab Results (last 24 hours)     Procedure Component Value Units Date/Time    POC Glucose Once [[id number]]  (Abnormal) Collected: 01/03/23 0749    Specimen: Blood Updated: 01/03/23 0751     Glucose 183 mg/dL      Comment: Serial Number: [id number]:  [id number]       Basic Metabolic Panel [[id number]]  (Abnormal) Collected: 01/03/23 0457    Specimen: Blood Updated: 01/03/23 0526     Glucose 171 mg/dL      BUN 45 mg/dL      Creatinine 1.14 mg/dL      Sodium 137 mmol/L      Potassium 3.6 mmol/L      Chloride 95 mmol/L      CO2 30.0 mmol/L      Calcium 9.2 mg/dL  "     BUN/Creatinine Ratio 39.5     Anion Gap 12.0 mmol/L      eGFR 66.7 mL/min/1.73      Comment: National Kidney Foundation and American Society of Nephrology (ASN) Task Force recommended calculation based on the Chronic Kidney Disease Epidemiology Collaboration (CKD-EPI) equation refit without adjustment for race.       Narrative:      GFR Normal >60  Chronic Kidney Disease <60  Kidney Failure <15    The GFR formula is only valid for adults with stable renal function between ages 18 and 70.    CBC (No Diff) [[id number]]  (Abnormal) Collected: 01/03/23 0457    Specimen: Blood Updated: 01/03/23 0517     WBC 12.50 10*3/mm3      RBC 4.04 10*6/mm3      Hemoglobin 11.9 g/dL      Hematocrit 35.9 %      MCV 88.9 fL      MCH 29.4 pg      MCHC 33.1 g/dL      RDW 14.3 %      RDW-SD 46.8 fl      MPV 8.6 fL      Platelets 290 10*3/mm3     POC Glucose Once [[id number]]  (Abnormal) Collected: 01/02/23 1931    Specimen: Blood Updated: 01/02/23 1932     Glucose 167 mg/dL      Comment: Serial Number: [id number]:  [id number]       POC Glucose Once [[id number]]  (Abnormal) Collected: 01/02/23 1634    Specimen: Blood Updated: 01/02/23 1636     Glucose 233 mg/dL      Comment: Serial Number: [id number]:  [id number]       Urinalysis With Culture If Indicated - Urine, Random Void [[id number]]  (Abnormal) Collected: 01/02/23 1300    Specimen: Urine, Random Void Updated: 01/02/23 1405     Color, UA Yellow     Appearance, UA Clear     pH, UA <=5.0     Specific Gravity, UA 1.014     Glucose, UA >=1000 mg/dL (3+)     Ketones, UA Negative     Bilirubin, UA Negative     Blood, UA Negative     Protein, UA Negative     Leuk Esterase, UA Negative     Nitrite, UA Negative     Urobilinogen, UA 0.2 E.U./dL    Narrative:      In absence of clinical symptoms, the presence of pyuria, bacteria, and/or nitrites on the urinalysis result does not correlate with infection.  Urine microscopic not indicated.    Respiratory Culture - Sputum, Cough  [[id number]] Collected: 01/02/23 1300    Specimen: Sputum from Cough Updated: 01/02/23 1402     Gram Stain Many (4+) WBCs seen      Rare (1+) Gram positive cocci in clusters    POC Glucose Once [[id number]]  (Abnormal) Collected: 01/02/23 1200    Specimen: Blood Updated: 01/02/23 1202     Glucose 353 mg/dL      Comment: Serial Number: [id number]:  [id number]       Blood Culture - Blood, Arm, Left [[id number]] Collected: 01/02/23 0916    Specimen: Blood from Arm, Left Updated: 01/02/23 0943    Blood Culture - Blood, Arm, Right [[id number]] Collected: 01/02/23 0913    Specimen: Blood from Arm, Right Updated: 01/02/23 0943          Imaging Results (Last 24 Hours)     ** No results found for the last 24 hours. **          LAB RESULTS (LAST 7 DAYS)    CBC  Results from last 7 days   Lab Units 01/03/23  0457 01/02/23  0449 01/01/23  0428 12/31/22  0343 12/30/22  0317 12/29/22  1829 12/29/22  1739 12/29/22  1247 12/29/22  1210 12/29/22  0758 12/29/22  0419   WBC 10*3/mm3 12.50* 13.60* 11.60* 10.90* 21.00*  --   --   --  29.20*  --  12.20*   RBC 10*6/mm3 4.04* 3.85* 3.74* 3.49* 4.03*  --   --   --  4.47  --  5.27   HEMOGLOBIN g/dL 11.9* 11.4* 10.9* 10.4* 11.8*  --   --   --  13.0  --  16.1   HEMOGLOBIN, POC g/dL  --   --   --   --   --  13.6 14.3   < >  --    < >  --    HEMATOCRIT % 35.9* 34.6* 33.1* 30.9* 35.2*  --   --   --  39.9  --  46.3   HEMATOCRIT POC %  --   --   --   --   --  40 42   < >  --    < >  --    MCV fL 88.9 89.9 88.7 88.6 87.4  --   --   --  89.3  --  87.9   PLATELETS 10*3/mm3 290 257 200 161 268  --   --   --  333  --  326    < > = values in this interval not displayed.       BMP  Results from last 7 days   Lab Units 01/03/23  0457 01/02/23  0449 01/01/23  0428 12/31/22  1200 12/31/22  0343 12/30/22  1654 12/30/22  0317 12/29/22  1753 12/29/22  1210   SODIUM mmol/L 137 139 138  --  135* 135* 133*  --  128*   POTASSIUM mmol/L 3.6 3.8 3.4* 4.1 3.5 4.0 4.3   < > 3.8   CHLORIDE mmol/L 95* 97* 100  --  100  96* 97*  --  89*   CO2 mmol/L 30.0* 29.0 26.0  --  23.0 26.0 22.0  --  24.0   BUN mg/dL 45* 42* 37*  --  45* 53* 67*  --  69*   CREATININE mg/dL 1.14 1.22 1.09  --  1.12 1.38* 1.82*  --  1.75*   GLUCOSE mg/dL 171* 151* 130*  --  147* 95 224*  --  191*   MAGNESIUM mg/dL  --  2.2  --   --   --   --  3.0*  --   --    PHOSPHORUS mg/dL  --   --   --   --   --   --  5.6*  --  5.4*    < > = values in this interval not displayed.       CMP   Results from last 7 days   Lab Units 01/03/23  0457 01/02/23  0449 01/01/23  0428 12/31/22  1200 12/31/22  0343 12/30/22  1654 12/30/22  0317 12/29/22  1753 12/29/22  1210 12/29/22  0419   SODIUM mmol/L 137 139 138  --  135* 135* 133*  --  128* 126*   POTASSIUM mmol/L 3.6 3.8 3.4* 4.1 3.5 4.0 4.3   < > 3.8 4.2   CHLORIDE mmol/L 95* 97* 100  --  100 96* 97*  --  89* 81*   CO2 mmol/L 30.0* 29.0 26.0  --  23.0 26.0 22.0  --  24.0 28.0   BUN mg/dL 45* 42* 37*  --  45* 53* 67*  --  69* 79*   CREATININE mg/dL 1.14 1.22 1.09  --  1.12 1.38* 1.82*  --  1.75* 1.69*   GLUCOSE mg/dL 171* 151* 130*  --  147* 95 224*  --  191* 180*   ALBUMIN g/dL  --  3.6  --   --   --   --  3.8  --  3.7 4.0   BILIRUBIN mg/dL  --  0.8  --   --   --   --   --   --   --  0.8   ALK PHOS U/L  --  71  --   --   --   --   --   --   --  104   AST (SGOT) U/L  --  28  --   --   --   --   --   --   --  18   ALT (SGPT) U/L  --  9  --   --   --   --   --   --   --  12    < > = values in this interval not displayed.       BNP        TROPONIN        CoAg  Results from last 7 days   Lab Units 12/30/22  0317 12/29/22  1210 12/29/22  0420 12/28/22  0905 12/28/22  0154 12/27/22  1913 12/27/22  1145   INR  1.06 1.16*  --   --  1.06  --   --    APTT seconds  --  30.3 64.1 62.4 66.9 77.0* 76.5       Creatinine Clearance  Estimated Creatinine Clearance: 65.3 mL/min (by C-G formula based on SCr of 1.14 mg/dL).    ABG  Results from last 7 days   Lab Units 12/30/22  0151 12/30/22  0019 12/29/22  1829 12/29/22  1739 12/29/22  1637  12/29/22  1518 12/29/22  1417   PH, ARTERIAL pH units 7.340* 7.361 7.362 7.368 7.424 7.400 7.360   PCO2, ARTERIAL mm Hg 40.3 38.8 38.4 40.0 34.8* 37.4 40.2   PO2 ART mm Hg 124.5* 96.8 75.7* 82.2* 78.1* 94.6 102.3   O2 SATURATION ART % 98.6* 97.3 94.5 95.7 95.8 97.4 97.6   BASE EXCESS ART mmol/L -3.8* -3.1* -3.2* -2.1* -1.1* -1.4* -2.6*       Radiology  XR Chest 1 View    Result Date: 1/2/2023  1.     Increased left basilar opacities which could represent atelectasis or infiltrate. 2.     Stable mild right basilar opacities and cardiomegaly.  Electronically Signed By-Cameron Fermin MD On:1/2/2023 7:23 AM This report was finalized on [id number] by  Cameron Fermin MD.        EKG  I personally viewed and interpreted the patient's EKG/Telemetry data:  ECG 12 Lead Other; Acute Chest Pain or Dysrhythmia   Final Result   HEART RATE= 84  bpm   RR Interval= 716  ms   IL Interval= 173  ms   P Horizontal Axis=   deg   P Front Axis= 52  deg   QRSD Interval= 95  ms   QT Interval= 330  ms   QRS Axis= 18  deg   T Wave Axis= 79  deg   - ABNORMAL ECG -   Sinus rhythm   Anteroseptal infarct, age indeterminate   ST elevation, consider inferior injury   When compared with ECG of 01-Jan-2023 18:58:36,   Significant change in rhythm   New or worsened ischemia or infarction   Electronically Signed By: Johann Morales (BER[facility]) 03-Jan-2023 00:25:23   Date and Time of Study: 2023-01-02 16:01:08      ECG 12 Lead Other; Possible Atrial flutter   Final Result   HEART RATE= 86  bpm   RR Interval= 700  ms   IL Interval= 122  ms   P Horizontal Axis= 166  deg   P Front Axis= 251  deg   QRSD Interval= 92  ms   QT Interval= 332  ms   QRS Axis= 16  deg   T Wave Axis= 60  deg   - ABNORMAL ECG -   Sinus rhythm    When compared with ECG of 30-Dec-2022 3:33:03,   No Significant change in rhythm:   Significant repolarization change   Electronically Signed By: Johann Morales ([facility]) 03-Jan-2023 00:26:09   Date and Time of Study: 2023-01-01 18:58:36      ECG 12 Lead  Drug Monitoring; Amiodarone   Final Result   HEART RATE= 60  bpm   RR Interval= 996  ms   VT Interval= 177  ms   P Horizontal Axis= -52  deg   P Front Axis= 38  deg   QRSD Interval= 89  ms   QT Interval= 453  ms   QRS Axis= 13  deg   T Wave Axis= 96  deg   - ABNORMAL ECG -   Sinus rhythm   Nonspecific T abnrm, anterolateral leads   Borderline ST elevation, inferior leads   When compared with ECG of 29-Dec-2022 16:41:22,   Significant change in rhythm   Electronically Signed By: Johann Morales ([facility]) 30-Dec-2022 23:59:10   Date and Time of Study: 2022-12-30 03:33:03      ECG 12 Lead Drug Monitoring; Amiodarone   Final Result   HEART RATE= 89  bpm   RR Interval= 671  ms   VT Interval= 219  ms   P Horizontal Axis= -87  deg   P Front Axis=   deg   QRSD Interval= 100  ms   QT Interval= 371  ms   QRS Axis= 20  deg   T Wave Axis= 177  deg   - ABNORMAL ECG -   Atrial-paced rhythm   Abnormal T, consider ischemia, lateral leads   When compared with ECG of 17-Dec-2022 7:21:47,   Significant change in rhythm   Significant axis, voltage or hypertrophy change   Electronically Signed By: Sampson Torres ([facility]) 29-Dec-2022 20:09:23   Date and Time of Study: 2022-12-29 16:41:22      ECG 12 Lead Chest Pain   Final Result   HEART RATE= 89  bpm   RR Interval= 672  ms   VT Interval= 176  ms   P Horizontal Axis= -5  deg   P Front Axis= 29  deg   QRSD Interval= 92  ms   QT Interval= 362  ms   QRS Axis= 16  deg   T Wave Axis= 163  deg   - ABNORMAL ECG -   Sinus rhythm   Probable left atrial enlargement   Probable anterior infarct, age indeterminate   Abnormal T, consider ischemia, lateral leads   No previous ECG available for comparison   Electronically Signed By: Alex Byrd ([facility]) 19-Dec-2022 08:52:18   Date and Time of Study: 2022-12-17 07:21:47      SCANNED - TELEMETRY     Final Result      SCANNED - TELEMETRY     Final Result      SCANNED - TELEMETRY     Final Result      SCANNED - TELEMETRY     Final Result      SCANNED -  TELEMETRY     Final Result      SCANNED - TELEMETRY     Final Result      SCANNED - TELEMETRY     Final Result      SCANNED - TELEMETRY     Final Result      SCANNED - TELEMETRY     Final Result      SCANNED - TELEMETRY     Final Result      SCANNED - TELEMETRY     Final Result      SCANNED - TELEMETRY     Final Result      SCANNED - TELEMETRY     Final Result      SCANNED - TELEMETRY     Final Result      SCANNED - TELEMETRY     Final Result      SCANNED - TELEMETRY     Final Result      SCANNED - TELEMETRY     Final Result      SCANNED - TELEMETRY     Final Result      SCANNED - TELEMETRY     Final Result      SCANNED - TELEMETRY     Final Result      SCANNED - TELEMETRY     Final Result      SCANNED - TELEMETRY     Final Result      SCANNED - TELEMETRY     Final Result      SCANNED - TELEMETRY     Final Result      SCANNED - TELEMETRY     Final Result      SCANNED - TELEMETRY     Final Result      SCANNED - TELEMETRY     Final Result      SCANNED - TELEMETRY     Final Result      SCANNED - TELEMETRY     Final Result      SCANNED - TELEMETRY     Final Result      SCANNED - TELEMETRY     Final Result      SCANNED - TELEMETRY     Final Result      SCANNED - TELEMETRY     Final Result      SCANNED - TELEMETRY     Final Result      SCANNED - TELEMETRY     Final Result      SCANNED - TELEMETRY     Final Result      SCANNED - TELEMETRY     Final Result      SCANNED - TELEMETRY     Final Result      SCANNED - TELEMETRY     Final Result      SCANNED - TELEMETRY     Final Result      SCANNED - TELEMETRY     Final Result            Echocardiogram:    Results for orders placed during the hospital encounter of 12/17/22    Adult Transthoracic Echo Limited W/ Cont if Necessary Per Protocol    Interpretation Summary  •  Left ventricular systolic function is low normal. Calculated left ventricular EF = 48% Left ventricular ejection fraction appears to be 46 - 50%.  •  The left ventricular cavity is moderately dilated.  •  Left  ventricular wall thickness is consistent with mild concentric hypertrophy.  •  Left ventricular diastolic dysfunction is noted.  •  There is calcification of the aortic valve without stenosis        Stress Test:         Cardiac Catheterization:  No results found for this or any previous visit.         Other:         ASSESSMENT & PLAN:    Principal Problem:    CAD (coronary artery disease), native coronary artery  Active Problems:    Type 2 diabetes mellitus with hyperglycemia, without long-term current use of insulin ([facility])    Mixed hyperlipidemia    Primary hypertension    Systolic and diastolic CHF, acute on chronic ([facility])    Influenza due to seasonal influenza virus    NSTEMI (non-ST elevated myocardial infarction) ([facility])    77-year-old man with multiple cardiovascular risk factors presented with non-ST elevation MI, acute kidney injury and influenza.  He was medically treated and renal function improved.  Troponin trended down.  His EF was initially 20% however it later improved to 40 to 45%.  He underwent successful CABG with LIMA to LAD, vein graft to diagonal, PDA and ramus.  We have now started dual antiplatelet therapy.  Continue amiodarone for total of 6 weeks.  We have started metoprolol.  He will need Plavix for at least 1 year as the initial presentation was RS elevation MI.  He will also need a repeat echocardiogram in 3 months to evaluate LV function.  ACE inhibitor was not added due to hypotension and renal dysfunction.  He will also need to be started on ACE inhibitor during follow-up.  He will need cardiac rehab in [place].        Johann Morales MD  01/03/23  08:49 EST

## 2023-01-03 NOTE — PLAN OF CARE
Goal Outcome Evaluation:      Pt. Demonstrates improved functional mobility this date w/ ambulatory transfer to and from bathroom w/ min A for safety + rolling walker support, increased min-mod A to stand from low seated commode. Pt. Provided max A for toilet hygiene from standing, setup assist from hand hygiene. Recommend SNF at d/c.

## 2023-01-03 NOTE — CONSULTS
Nutrition Services    Patient Name: Sherman Baumann  YOB: 1945  MRN: [id number]  Admission date: 12/17/2022    PPE Documentation        PPE Worn By Provider Did not enter room this encounter    PPE Worn By Patient  None     NUTRITION SCREENING      Encounter Information: 1/3: Review for next LOS.  Patient continues with good intakes.  S/P CABG 12/29 and continues to recover.      12/27: LOS x 10 d nutrition assessment. Visited pt in room to check on intakes. Pt reported improved intakes, eating 3 meals/d recently. Pt stated some meats are harder to eat, but finding it easier to eat options such as the pot roast. Pt reported a UBW of 240#, stating he feels like he has lost wt since admission. Pt reports no N/V and had no concerns or questions for writer. Will continue to monitor wt trends and intakes during admission. If wt continues to drop, pt may benefit from ONS.        PO Diet: Diet: Cardiac Diets, Diabetic Diets; Healthy Heart (2-3 Na+); Consistent Carbohydrate; Texture: Regular Texture (IDDSI 7); Fluid Consistency: Thin (IDDSI 0)   PO Supplements: None   PO Intake:  66% of recent meals       Labs (reviewed below): Hyperglycemia       GI Function:  Last BM 1/3 (today)       Skin: Chest incisions        Weight Hx Review: 1/3: 220#, no change since last RD review     12/27: Current wt: 220# (wt down 3% since admission x 2 wks) - will continue to monitor    Wt hx available x past 2 weeks; 220-230# range. Current wt lowest wt x 2 weeks.         Nutrition Intervention: Continue current diet and encourage good PO intakes.       Results from last 7 days   Lab Units 01/03/23  0457 01/02/23  0449 01/01/23  0428 12/29/22  1210 12/29/22  0419   SODIUM mmol/L 137 139 138   < > 126*   POTASSIUM mmol/L 3.6 3.8 3.4*   < > 4.2   CHLORIDE mmol/L 95* 97* 100   < > 81*   CO2 mmol/L 30.0* 29.0 26.0   < > 28.0   BUN mg/dL 45* 42* 37*   < > 79*   CREATININE mg/dL 1.14 1.22 1.09   < > 1.69*   CALCIUM mg/dL 9.2 9.4  8.8   < > 10.5   BILIRUBIN mg/dL  --  0.8  --   --  0.8   ALK PHOS U/L  --  71  --   --  104   ALT (SGPT) U/L  --  9  --   --  12   AST (SGOT) U/L  --  28  --   --  18   GLUCOSE mg/dL 171* 151* 130*   < > 180*    < > = values in this interval not displayed.     Results from last 7 days   Lab Units 01/03/23  0457 01/02/23  0449 12/31/22  0343 12/30/22  0317   MAGNESIUM mg/dL  --  2.2  --  3.0*   PHOSPHORUS mg/dL  --   --   --  5.6*   HEMOGLOBIN g/dL 11.9* 11.4*   < > 11.8*   HEMATOCRIT % 35.9* 34.6*   < > 35.2*    < > = values in this interval not displayed.     COVID19   Date Value Ref Range Status   12/27/2022 Not Detected Not Detected - Ref. Range Final     Lab Results   Component Value Date    HGBA1C 8.3 (H) 12/17/2022       RD to follow up per protocol.    Electronically signed by:  Staci Delarosa RD  01/03/23 07:55 EST

## 2023-01-03 NOTE — CASE MANAGEMENT/SOCIAL WORK
Continued Stay Note  [facility]     Patient Name: Sherman Baumann  MRN: [id number]  Today's Date: 1/3/2023    Admit Date: 12/17/2022    Plan: DC Plan: [facility] in [place], [place] accepted and bed available. No precert required. PASRR per facility. CABG x4 12/29/22.   Discharge Plan     Row Name 01/03/23 1053       Plan    Plan DC Plan: [facility] in [place], [place] accepted and bed available. No precert required. PASRR per facility. CABG x4 12/29/22.    Provided Post Acute Provider List? N/A    Provided Post Acute Provider Quality & Resource List? N/A    Plan Comments DIA spoke with Troy at [facility] and she confirms bed available today 1/3/22. CM informed CV surgery NP Chyna MARCELINO of bed availability. Plan for patient to discharge to [facility] today. DIA spoke with patient at bedside and his spouse Marta via telephone to update on plan. Marta states there is no way they can drive in town to transport patien to facility. Troy with [facility] confirms they can provide transport with a  time of 12:30 pm. today. DIA informed Marta that transportation will be provided. DC IMM given. CM informed NP of planned  time and need for oxygen with transport. No barriers to discharge.           Expected Discharge Date and Time     Expected Discharge Date Expected Discharge Time    Dany 3, 2023         Met with patient in room wearing PPE: mask,     Maintain distance greater than six feet and spent less than fifteen minutes in the room.      Rebecca Dempsey RN     Office Phone: [phone]  Office Cell:     [phone]

## 2023-01-03 NOTE — THERAPY TREATMENT NOTE
"Subjective: Pt agreeable to therapeutic plan of care. Patients accent made it difficult to ascertain his speech. He reports he has had some incontinence in chair     Objective:     Bed mobility - N/A or Not attempted.  Transfers - Min-A and with rolling walker   Performed toileting in restroom, dependent for christina care, had incontinence in chair and brief prior to using toilet  Called second person to assist due to extensive soiling.   Ambulation - 10 feet, 75 feet CGA, Min-A and with rolling walker    Vitals: WNL     Cardiac level 3    Sitting tolerance: >10min  Standing tolerance: 5-10min    Precautions:  Mid-sternal incision; avoid scapular retraction and depression.  Cardiovascular impairment post-sx; encourage energy conservation strategies.    Therapeutic Exercise: 10 reps UE and LE AROM in supported sitting    MET level equivalent: 2.0-3.0 (Unlimited sitting, ambulation on level ground <2mph, light housework)      Pain: 0 VAS   Location: n/a  Intervention for pain: N/A    Education: Verbal/Tactile Cues, Gait Training and Post-Op Precautions    Assessment: Sherman Baumann presents with functional mobility impairments which indicate the need for skilled intervention. Tolerating session today without incident. Will continue to follow and progress as tolerated.     Plan/Recommendations:   Moderate Intensity Therapy recommended post-acute care. This is recommended as therapy feels the patient would require 3-4 days per week and wouldn't tolerate \"3 hour daily\" rehab intensity. SNF would be the preferred choice. If the patient does not agree to SNF, arrange HH or OP depending on home bound status. If patient is medically complex, consider LTACH.. Pt requires no DME at discharge.     Pt desires Skilled Rehab placement at discharge. Pt cooperative; agreeable to therapeutic recommendations and plan of care.           Post-Tx Position: Up in Chair, Alarms activated and Call light and personal items within reach  PPE: " gloves and surgical mask

## 2023-01-04 LAB
BACTERIA SPEC RESP CULT: NORMAL
GRAM STN SPEC: NORMAL
GRAM STN SPEC: NORMAL

## 2023-01-04 NOTE — CASE MANAGEMENT/SOCIAL WORK
Case Management Discharge Note        Selected Continued Care - Discharged on 1/3/2023 Admission date: 12/17/2022 - Discharge disposition: Rehab Facility or Unit (DC - External)    Destination Coordination complete.    Service Provider Selected Services Address Phone Fax Patient Preferred    [facility] [address] [place] [address] [facility] [place] [address] [phone] -- --           Transportation Services  W/C Van: Other (Facility WC van)    Final Discharge Disposition Code: 03 - skilled nursing facility (SNF)

## 2023-01-07 LAB
BACTERIA SPEC AEROBE CULT: NORMAL
BACTERIA SPEC AEROBE CULT: NORMAL

## 2023-02-02 ENCOUNTER — OFFICE VISIT (OUTPATIENT)
Dept: CARDIAC SURGERY | Facility: CLINIC | Age: 78
End: 2023-02-02
Payer: MEDICARE

## 2023-02-02 VITALS
OXYGEN SATURATION: 98 % | TEMPERATURE: 97.3 F | HEIGHT: 70 IN | HEART RATE: 86 BPM | DIASTOLIC BLOOD PRESSURE: 69 MMHG | SYSTOLIC BLOOD PRESSURE: 121 MMHG | WEIGHT: 214 LBS | RESPIRATION RATE: 20 BRPM | BODY MASS INDEX: 30.64 KG/M2

## 2023-02-02 DIAGNOSIS — Z95.1 S/P CABG X 4: Primary | ICD-10-CM

## 2023-02-02 PROCEDURE — 99024 POSTOP FOLLOW-UP VISIT: CPT | Performed by: NURSE PRACTITIONER

## 2023-02-02 RX ORDER — AMMONIUM LACTATE 120 MG/G
1 CREAM TOPICAL AS NEEDED
COMMUNITY

## 2023-02-02 RX ORDER — HYDROCODONE BITARTRATE AND ACETAMINOPHEN 5; 325 MG/1; MG/1
1 TABLET ORAL EVERY 6 HOURS PRN
COMMUNITY

## 2023-02-02 RX ORDER — ASCORBIC ACID 500 MG
500 TABLET ORAL 2 TIMES DAILY
COMMUNITY

## 2023-02-02 NOTE — PROGRESS NOTES
"CARDIOVASCULAR SURGERY FOLLOW-UP PROGRESS NOTE  Chief Complaint: Postop follow-up        HPI:   Dear Melvina Ruiz and colleagues:    It was nice to see Sherman Baumann in follow up 5 weeks after surgery.  As you know, he is a 77 y.o. male with non-STEMI/CAD, ischemic cardiomyopathy EF 20% that increased to 40% after revascularization, DM2, hypertension, hyperlipidemia, tobacco abuse, COPD, CKD, Belk[place]ski who underwent CABG x4 with LIMA on 12/29/2022 at [facility] with Dr. Huber.  He did well postoperatively and continues to do well. He comes in today complaining of nothing.  His activity level has been fair, he and his family state that he is becoming more mobile at rehab.  He was sent home on oxygen, they are weaning as tolerated at the rehab center.  From a surgical standpoint, the sternal incision is well approximated without erythema, edema, or drainage.  The sternum is stable to palpation, and the patient denies any popping or clicking with deep inspiration or coughing.      Physical Exam:         /69 (BP Location: Left arm, Patient Position: Sitting, Cuff Size: Adult)   Pulse 86   Temp 97.3 °F (36.3 °C)   Resp 20   Ht 177.8 cm (70\")   Wt 97.1 kg (214 lb)   SpO2 98%   BMI 30.71 kg/m²   Heart:  regular rate and rhythm, S1, S2 normal, no murmur, click, rub or gallop  Lungs:  clear to auscultation bilaterally  Extremities:  1+ lower extremity edema bilaterally  Incision(s):  mid chest healing well, left leg healing well, sternum stable    Assessment/Plan:     S/P CABG. Overall, he is doing well.    Slow post-op rehabilitation progress    No heavy lifting > 10 pounds for 1 more weeks  Follow-up as scheduled with cardiology  Follow-up with CT surgery prn    Continue lifting restriction of 10 lbs until 6 weeks and 50 lbs until 12 weeks from the date of surgery, no excessive jarring motions or twisting motions until 12 weeks from the date of surgery    Return to clinic if any signs or symptoms " of infection or sternal instability develop       Thank you for allowing me to participate in the care of your patient.    Regards,  ROWAN Jefferson    Current outpatient and discharge medications have been reconciled for the patient.  Reviewed by: ROWAN Jefferson

## 2023-02-06 ENCOUNTER — TELEPHONE (OUTPATIENT)
Dept: CARDIAC SURGERY | Facility: CLINIC | Age: 78
End: 2023-02-06

## 2023-02-06 NOTE — TELEPHONE ENCOUNTER
Caller: JOSE ALBERTO    Relationship: Provider    Best call back number: [phone]    What form or medical record are you requesting: LAST PROGRESS NOTE AND ANY ORDERS.    Who is requesting this form or medical record from you: JOSE ALBERTO FROM [facility]     How would you like to receive the form or medical records (pick-up, mail, fax): FAX  If fax, what is the fax number: [phone]    Timeframe paperwork needed: ASAP    Additional notes: OFFICE IS OPEN 8AM-4:30PM

## 2024-07-12 NOTE — PROGRESS NOTES
"PULMONARY  PROGRESS  NOTE      PATIENT IDENTIFICATION:  Name: Sherman Baumann  MRN: [id number]  :  1945     Age: 76 y.o.  Sex: male    DATE OF Note:  2022   Referring Physician: Jaleel Huber MD                  Subjective:   Feeling a little better, on 1 L O2, no SOB, no chest or abd pain, no bowel or bladder issues reported    Objective:  tMax 24 hrs: Temp (24hrs), Av °F (36.7 °C), Min:97.5 °F (36.4 °C), Max:99.1 °F (37.3 °C)      Vitals Ranges:   Temp:  [97.5 °F (36.4 °C)-99.1 °F (37.3 °C)] 97.8 °F (36.6 °C)  Heart Rate:  [73-89] 88  Resp:  [11-21] 13  BP: (119-145)/(55-76) 131/69    Intake and Output Last 3 Shifts:   I/O last 3 completed shifts:  In: 720 [P.O.:720]  Out: 3500 [Urine:3500]    Exam:  /69 (BP Location: Right arm, Patient Position: Lying)   Pulse 88   Temp 97.8 °F (36.6 °C) (Oral)   Resp 13   Ht 177.8 cm (70\")   Wt 101 kg (222 lb 3.6 oz)   SpO2 95%   BMI 31.89 kg/m²     General Appearance: Alert    HEENT:  Normocephalic, without obvious abnormality. Conjunctivae/corneas clear.  Normal external ear canals. Nares normal, no drainage     Neck:  Supple, symmetrical, trachea midline. No JVD.  Lungs /Chest wall:   Bilateral basal rhonchi, respirations unlabored, symmetrical wall movement.     Heart:  Regular rate and rhythm, systolic murmur PMI left sternal border  Abdomen: Soft, nontender, no masses, no organomegaly.    Extremities: Trace edema, no clubbing or cyanosis        Medications:  aspirin, 81 mg, Oral, Daily  budesonide, 0.5 mg, Nebulization, BID - RT  carvedilol, 3.125 mg, Oral, BID With Meals  docusate sodium, 100 mg, Oral, BID  empagliflozin, 10 mg, Oral, Daily  furosemide, 40 mg, Oral, BID  insulin lispro, 2-12 Units, Subcutaneous, TID With Meals  insulin NPH-insulin regular, 30 Units, Subcutaneous, TID AC  oseltamivir, 75 mg, Oral, Q12H  polyethylene glycol, 17 g, Oral, Daily  potassium chloride, 20 mEq, Oral, BID With Meals  rosuvastatin, 5 mg, Oral, " Nightly  sodium chloride, 10 mL, Intravenous, Q12H        Infusion:  heparin, 9.71 Units/kg/hr, Last Rate: 20.89 Units/kg/hr (12/21/22 2762)         PRN:  •  acetaminophen  •  ALPRAZolam  •  dextrose  •  dextrose  •  glucagon (human recombinant)  •  ipratropium-albuterol  •  melatonin  •  nitroglycerin  •  ondansetron  •  potassium chloride **OR** potassium chloride **OR** potassium chloride  •  sodium chloride  •  sodium chloride  Data Review:  All labs (24hrs):   Recent Results (from the past 24 hour(s))   POC Glucose Once    Collection Time: 12/20/22 11:50 AM    Specimen: Blood   Result Value Ref Range    Glucose 365 (H) 70 - 105 mg/dL   aPTT    Collection Time: 12/20/22  4:00 PM    Specimen: Blood   Result Value Ref Range    PTT 61.8 61.0 - 76.5 seconds   POC Glucose Once    Collection Time: 12/20/22  5:03 PM    Specimen: Blood   Result Value Ref Range    Glucose 331 (H) 70 - 105 mg/dL   aPTT    Collection Time: 12/20/22 11:13 PM    Specimen: Blood   Result Value Ref Range    PTT 49.1 (L) 61.0 - 76.5 seconds   CBC Auto Differential    Collection Time: 12/20/22 11:13 PM    Specimen: Blood   Result Value Ref Range    WBC 7.70 3.40 - 10.80 10*3/mm3    RBC 4.90 4.14 - 5.80 10*6/mm3    Hemoglobin 14.3 13.0 - 17.7 g/dL    Hematocrit 44.3 37.5 - 51.0 %    MCV 90.5 79.0 - 97.0 fL    MCH 29.1 26.6 - 33.0 pg    MCHC 32.2 31.5 - 35.7 g/dL    RDW 14.5 12.3 - 15.4 %    RDW-SD 45.9 37.0 - 54.0 fl    MPV 8.8 6.0 - 12.0 fL    Platelets 336 140 - 450 10*3/mm3    Neutrophil % 61.9 42.7 - 76.0 %    Lymphocyte % 24.3 19.6 - 45.3 %    Monocyte % 10.0 5.0 - 12.0 %    Eosinophil % 3.2 0.3 - 6.2 %    Basophil % 0.6 0.0 - 1.5 %    Neutrophils, Absolute 4.80 1.70 - 7.00 10*3/mm3    Lymphocytes, Absolute 1.90 0.70 - 3.10 10*3/mm3    Monocytes, Absolute 0.80 0.10 - 0.90 10*3/mm3    Eosinophils, Absolute 0.20 0.00 - 0.40 10*3/mm3    Basophils, Absolute 0.00 0.00 - 0.20 10*3/mm3    nRBC 0.0 0.0 - 0.2 /100 WBC   Basic Metabolic Panel     Collection Time: 12/20/22 11:13 PM    Specimen: Blood   Result Value Ref Range    Glucose 135 (H) 65 - 99 mg/dL    BUN 28 (H) 8 - 23 mg/dL    Creatinine 1.21 0.76 - 1.27 mg/dL    Sodium 135 (L) 136 - 145 mmol/L    Potassium 4.4 3.5 - 5.2 mmol/L    Chloride 92 (L) 98 - 107 mmol/L    CO2 29.0 22.0 - 29.0 mmol/L    Calcium 9.9 8.6 - 10.5 mg/dL    BUN/Creatinine Ratio 23.1 7.0 - 25.0    Anion Gap 14.0 5.0 - 15.0 mmol/L    eGFR 62.4 >60.0 mL/min/1.73   Phosphorus    Collection Time: 12/20/22 11:13 PM    Specimen: Blood   Result Value Ref Range    Phosphorus 4.5 2.5 - 4.5 mg/dL   Magnesium    Collection Time: 12/20/22 11:13 PM    Specimen: Blood   Result Value Ref Range    Magnesium 1.8 1.6 - 2.4 mg/dL   POC Glucose Once    Collection Time: 12/21/22  7:27 AM    Specimen: Blood   Result Value Ref Range    Glucose 164 (H) 70 - 105 mg/dL   aPTT    Collection Time: 12/21/22  7:30 AM    Specimen: Blood   Result Value Ref Range    PTT 54.6 (L) 61.0 - 76.5 seconds   POC Glucose Once    Collection Time: 12/21/22 11:16 AM    Specimen: Blood   Result Value Ref Range    Glucose 239 (H) 70 - 105 mg/dL        Imaging:  CT Chest Without Contrast Diagnostic  Narrative: CT CHEST WO CONTRAST DIAGNOSTIC-     Date of Exam: 12/19/2022 6:50 PM     Indication: Respiratory illness, nondiagnostic xray.     Comparison: Chest radiograph from 12/17/2022     Technique: Serial and axial CT images of the chest were obtained.  Reconstructions in the coronal and sagittal planes were performed.   Automated exposure control and iterative reconstruction methods were  used.     FINDINGS:     Hilum and Mediastinum: There is no significant pericardial effusion. The  heart is not enlarged. There is severe coronary artery calcific  atherosclerotic disease. There is calcific atherosclerosis of the  thoracic aorta.     Lung Parenchyma and Pleura: There is mild prominence of the pulmonary  interstitium particularly in the periphery of the lungs. There is  mild  emphysema. There are no focal consolidations to suggest pneumonia.     Upper Abdomen: Unremarkable.     Soft tissues: Unremarkable.     Osseous structures: No aggressive focal lytic or sclerotic osseous  lesions.     Impression: Chronic interstitial change. Heavy coronary artery calcification. No  active disease.           Electronically Signed By-Jeffrey Cai MD On:12/19/2022 7:39 PM  This report was finalized on [id number] by  Jeffrey Cai MD.       ASSESSMENT:  Acute hypoxic respiratory failure   Flu B  COPD  LOGAN  Cor Pulmonale   CHF  DM II  Hyperlipidemia  HTN  CAD with multivessel disease        PLAN:  Encourage OOB daily   PT/OT   Patient is at moderate risk for pulmonary issues with surgery   Optimize Incentive spirometer and BD, ICS prior to surgery and after   Tamiflu -will complete tonight   BIPAP while sleeping   Bronchodilators  Inhaled corticosteroids  IS/Flutter valve  Diuresis and monitor JANES's  Electrolytes/ glycemic control  DVT and GI prophylaxis-Heparin drip     Discussed with Dr Kaylee Geller, APRN  12/21/2022  11:44 EST    The NP scribed the note for me, I have examined the patient and reviewed and verified the above findings and and I formulated the assessment and plan as documented   2 weeks

## (undated) DEVICE — SOL IRR H2O BTL 1000ML STRL

## (undated) DEVICE — CABL BIPOL W/ALLGTR CLIP/SM 12FT

## (undated) DEVICE — ANTIBACTERIAL UNDYED BRAIDED (POLYGLACTIN 910), SYNTHETIC ABSORBABLE SUTURE: Brand: COATED VICRYL

## (undated) DEVICE — BG BLD SYS

## (undated) DEVICE — OASIS DRAIN, SINGLE, INLINE & ATS COMPATIBLE: Brand: OASIS

## (undated) DEVICE — SUT PROLN 7/0 BV1 D/A 30IN 8703H

## (undated) DEVICE — 28 FR STRAIGHT – SOFT PVC CATHETER: Brand: PVC THORACIC CATHETERS

## (undated) DEVICE — BNDG ELAS ELITE V/CLOSE 6IN 5YD LF STRL

## (undated) DEVICE — BLD SCLPL BEAVR MINI STR 2BVL 180D LF

## (undated) DEVICE — Device

## (undated) DEVICE — SYS PERFUS SEP PLATLT W TIPS CUST

## (undated) DEVICE — BLOOD TRANSFUSION FILTER: Brand: HAEMONETICS

## (undated) DEVICE — HEMOCONCENTRATOR PERFUS LPS06

## (undated) DEVICE — DRSNG SLVR/ANTIBAC PRIMASEAL POST/OP ADHS 3.5X12IN

## (undated) DEVICE — PK OPN HEART WHT WRP 50

## (undated) DEVICE — SOL IRR NACL 0.9PCT BT 1000ML

## (undated) DEVICE — CANN RETRGR STYLET RSCP 15F

## (undated) DEVICE — SUT PROLENE PP 7/0 BV175-6 24IN

## (undated) DEVICE — 3M™ IOBAN™ 2 ANTIMICROBIAL INCISE DRAPE 6650EZ: Brand: IOBAN™ 2

## (undated) DEVICE — TEMP PACING WIRE: Brand: MYO/WIRE

## (undated) DEVICE — PK ATS CUST W CARDIOTOMY RESEVOIR

## (undated) DEVICE — BLOWER MISTER CLEARVIEW W/TBG

## (undated) DEVICE — CATH IV INSYTE AUTOGARD SHLD 22G 1IN BK

## (undated) DEVICE — CONNECT Y INTERSEPT W/LL 3/8 X 3/8 X 3/8IN

## (undated) DEVICE — BNDG ELAS ELITE V/CLOSE 4IN 5YD LF STRL

## (undated) DEVICE — SUT SILK 0 CT1 18IN 424H

## (undated) DEVICE — NDL PERC 1PRT THNWALL W/BASEPLT 18G 7CM

## (undated) DEVICE — SUT PDS 0 CT-1 Z340H

## (undated) DEVICE — ELECTRD DEFIB M/FUNC PROPADZ STRL 2PK

## (undated) DEVICE — SENSR CERBRL O2 PK/2

## (undated) DEVICE — SYS VASOVIEW HEMOPRO ENDOSCOPIC HARVST VESL

## (undated) DEVICE — CORONARY ARTERY BYPASS GRAFT MARKERS, STAINLESS STEEL, DISTAL, WITHOUT HOLDER: Brand: ANASTOMARK CORONARY ARTERY BYPASS GRAFT MARKERS, STAINLESS STEEL, DISTAL

## (undated) DEVICE — SUT SILK 4/0 TIES 18IN A183H

## (undated) DEVICE — ACCESSRAIL PLATFORM (STANDARD BLADE): Brand: ACCESSRAIL PLATFORM (STANDARD BLADE)

## (undated) DEVICE — SUT PROLN 3/0 V7 D/A 36IN 8976H

## (undated) DEVICE — SUT PROLN 5/0 V5 36IN 8934H

## (undated) DEVICE — TUBING, SUCTION, 1/4" X 12', STRAIGHT: Brand: MEDLINE

## (undated) DEVICE — SUT SILK 0 CT1 CR8 18IN C021D

## (undated) DEVICE — PRESSURE TUBING: Brand: TRUWAVE

## (undated) DEVICE — SUCTION CANISTER, 1500CC,SAFELINER: Brand: DEROYAL

## (undated) DEVICE — SUT PROLN 6/0 RB2 D/A 30IN 8711H

## (undated) DEVICE — SUT PROLN 4/0 V7 36IN 8975H

## (undated) DEVICE — CANN AORT ROOT DLP VNT/8IN 14G 7F

## (undated) DEVICE — SCANLAN® VASCU-STATT® II SINGLE-USE BULLDOG CLAMP W/FIRMER CLAMPING PRESS - MIDI ANGLED 45° (YELLOW), CLAMPING PRESSURE 75-80 G (2/STERILE PKG): Brand: SCANLAN® VASCU-STATT® II SINGLE-USE BULLDOG CLAMP W/FIRMER CLAMPING PRESS

## (undated) DEVICE — ROTATING SURGICAL PUNCHES, 1 PER POUCH: Brand: A&E MEDICAL / ROTATING SURGICAL PUNCHES

## (undated) DEVICE — SOL NACL INJ .9PCT 500ML

## (undated) DEVICE — ADHS SKIN PREMIERPRO EXOFIN TOPICAL HI/VISC .5ML

## (undated) DEVICE — STPCK 3WY W 14IN PRESS LN

## (undated) DEVICE — SUT SILK 2/0 TIES 18IN A185H

## (undated) DEVICE — GAUZE,SPONGE,4"X4",32PLY,XRAY,STRL,LF: Brand: MEDLINE

## (undated) DEVICE — SUT SILK 2 SUTUPAK TIE 60IN SA8H 2STRAND

## (undated) DEVICE — SOL NACL 0.9PCT 1000ML

## (undated) DEVICE — GLV SURG BIOGEL LTX PF 8

## (undated) DEVICE — TOWEL,OR,DSP,ST,WHITE,DLX,4/PK,20PK/CS: Brand: MEDLINE

## (undated) DEVICE — ST PERFUS M/

## (undated) DEVICE — PK PERFUS CUST W/CARDIOPLEGIA

## (undated) DEVICE — TBG INSUFF MALE L/L W 12MM CON: Brand: MEDLINE INDUSTRIES, INC.

## (undated) DEVICE — ELECTRD BLD EZ CLN STD 6.5IN

## (undated) DEVICE — INTENDED FOR TISSUE SEPARATION, AND OTHER PROCEDURES THAT REQUIRE A SHARP SURGICAL BLADE TO PUNCTURE OR CUT.: Brand: BARD-PARKER ® CARBON RIB-BACK BLADES

## (undated) DEVICE — SUT PROLN 4/0 V5 36IN 8935H